# Patient Record
Sex: FEMALE | Race: WHITE | NOT HISPANIC OR LATINO | URBAN - METROPOLITAN AREA
[De-identification: names, ages, dates, MRNs, and addresses within clinical notes are randomized per-mention and may not be internally consistent; named-entity substitution may affect disease eponyms.]

---

## 2017-06-27 ENCOUNTER — OUTPATIENT (OUTPATIENT)
Dept: OUTPATIENT SERVICES | Facility: HOSPITAL | Age: 72
LOS: 1 days | End: 2017-06-27
Payer: MEDICARE

## 2017-06-27 ENCOUNTER — APPOINTMENT (OUTPATIENT)
Dept: MAMMOGRAPHY | Facility: IMAGING CENTER | Age: 72
End: 2017-06-27

## 2017-06-27 DIAGNOSIS — Z12.31 ENCOUNTER FOR SCREENING MAMMOGRAM FOR MALIGNANT NEOPLASM OF BREAST: ICD-10-CM

## 2017-06-27 DIAGNOSIS — Z98.89 OTHER SPECIFIED POSTPROCEDURAL STATES: Chronic | ICD-10-CM

## 2017-06-27 PROCEDURE — 77063 BREAST TOMOSYNTHESIS BI: CPT

## 2017-06-27 PROCEDURE — 77067 SCR MAMMO BI INCL CAD: CPT

## 2017-08-01 ENCOUNTER — RX RENEWAL (OUTPATIENT)
Age: 72
End: 2017-08-01

## 2017-08-10 ENCOUNTER — MEDICATION RENEWAL (OUTPATIENT)
Age: 72
End: 2017-08-10

## 2017-08-14 ENCOUNTER — MEDICATION RENEWAL (OUTPATIENT)
Age: 72
End: 2017-08-14

## 2017-10-03 ENCOUNTER — LABORATORY RESULT (OUTPATIENT)
Age: 72
End: 2017-10-03

## 2017-10-03 ENCOUNTER — APPOINTMENT (OUTPATIENT)
Dept: INTERNAL MEDICINE | Facility: CLINIC | Age: 72
End: 2017-10-03
Payer: MEDICARE

## 2017-10-03 VITALS
OXYGEN SATURATION: 98 % | SYSTOLIC BLOOD PRESSURE: 128 MMHG | TEMPERATURE: 98.6 F | WEIGHT: 176 LBS | HEART RATE: 75 BPM | HEIGHT: 63 IN | DIASTOLIC BLOOD PRESSURE: 64 MMHG | BODY MASS INDEX: 31.18 KG/M2

## 2017-10-03 LAB — HBA1C MFR BLD HPLC: 9.7

## 2017-10-03 PROCEDURE — G0439: CPT

## 2017-10-03 PROCEDURE — 83036 HEMOGLOBIN GLYCOSYLATED A1C: CPT | Mod: QW

## 2017-10-03 PROCEDURE — 36415 COLL VENOUS BLD VENIPUNCTURE: CPT

## 2017-10-03 PROCEDURE — 90662 IIV NO PRSV INCREASED AG IM: CPT

## 2017-10-03 PROCEDURE — G0008: CPT

## 2017-10-04 LAB
25(OH)D3 SERPL-MCNC: 43.8 NG/ML
ALBUMIN SERPL ELPH-MCNC: 4.5 G/DL
ALP BLD-CCNC: 60 U/L
ALT SERPL-CCNC: 30 U/L
ANION GAP SERPL CALC-SCNC: 22 MMOL/L
APPEARANCE: CLEAR
AST SERPL-CCNC: 34 U/L
BASOPHILS # BLD AUTO: 0.03 K/UL
BASOPHILS NFR BLD AUTO: 0.5 %
BILIRUB SERPL-MCNC: 0.5 MG/DL
BILIRUBIN URINE: NEGATIVE
BLOOD URINE: NEGATIVE
BUN SERPL-MCNC: 14 MG/DL
CALCIUM SERPL-MCNC: 10.1 MG/DL
CHLORIDE SERPL-SCNC: 102 MMOL/L
CHOLEST SERPL-MCNC: 179 MG/DL
CHOLEST/HDLC SERPL: 3.2 RATIO
CO2 SERPL-SCNC: 18 MMOL/L
COLOR: YELLOW
CREAT SERPL-MCNC: 0.93 MG/DL
EOSINOPHIL # BLD AUTO: 0.19 K/UL
EOSINOPHIL NFR BLD AUTO: 3 %
GLUCOSE QUALITATIVE U: NEGATIVE MG/DL
GLUCOSE SERPL-MCNC: 171 MG/DL
HCT VFR BLD CALC: 41.4 %
HDLC SERPL-MCNC: 56 MG/DL
HGB BLD-MCNC: 13.8 G/DL
IMM GRANULOCYTES NFR BLD AUTO: 0.2 %
KETONES URINE: NEGATIVE
LDLC SERPL CALC-MCNC: 84 MG/DL
LEUKOCYTE ESTERASE URINE: ABNORMAL
LYMPHOCYTES # BLD AUTO: 2.17 K/UL
LYMPHOCYTES NFR BLD AUTO: 34.6 %
MAN DIFF?: NORMAL
MCHC RBC-ENTMCNC: 31.2 PG
MCHC RBC-ENTMCNC: 33.3 GM/DL
MCV RBC AUTO: 93.5 FL
MONOCYTES # BLD AUTO: 0.41 K/UL
MONOCYTES NFR BLD AUTO: 6.5 %
NEUTROPHILS # BLD AUTO: 3.47 K/UL
NEUTROPHILS NFR BLD AUTO: 55.2 %
NITRITE URINE: NEGATIVE
PH URINE: 6
PLATELET # BLD AUTO: 281 K/UL
POTASSIUM SERPL-SCNC: 5.2 MMOL/L
PROT SERPL-MCNC: 7.9 G/DL
PROTEIN URINE: NEGATIVE MG/DL
RBC # BLD: 4.43 M/UL
RBC # FLD: 14.4 %
SODIUM SERPL-SCNC: 142 MMOL/L
SPECIFIC GRAVITY URINE: 1.01
TRIGL SERPL-MCNC: 195 MG/DL
TSH SERPL-ACNC: 2.19 UIU/ML
UROBILINOGEN URINE: NEGATIVE MG/DL
VIT B12 SERPL-MCNC: 439 PG/ML
WBC # FLD AUTO: 6.28 K/UL

## 2017-10-06 ENCOUNTER — MEDICATION RENEWAL (OUTPATIENT)
Age: 72
End: 2017-10-06

## 2017-10-06 ENCOUNTER — RX RENEWAL (OUTPATIENT)
Age: 72
End: 2017-10-06

## 2017-11-20 ENCOUNTER — APPOINTMENT (OUTPATIENT)
Dept: CHRONIC DISEASE MANAGEMENT | Facility: CLINIC | Age: 72
End: 2017-11-20

## 2018-01-30 ENCOUNTER — APPOINTMENT (OUTPATIENT)
Dept: INTERNAL MEDICINE | Facility: CLINIC | Age: 73
End: 2018-01-30

## 2018-09-05 ENCOUNTER — FORM ENCOUNTER (OUTPATIENT)
Age: 73
End: 2018-09-05

## 2018-09-06 ENCOUNTER — OUTPATIENT (OUTPATIENT)
Dept: OUTPATIENT SERVICES | Facility: HOSPITAL | Age: 73
LOS: 1 days | End: 2018-09-06
Payer: MEDICARE

## 2018-09-06 ENCOUNTER — APPOINTMENT (OUTPATIENT)
Dept: ULTRASOUND IMAGING | Facility: IMAGING CENTER | Age: 73
End: 2018-09-06
Payer: MEDICARE

## 2018-09-06 ENCOUNTER — APPOINTMENT (OUTPATIENT)
Dept: MAMMOGRAPHY | Facility: IMAGING CENTER | Age: 73
End: 2018-09-06
Payer: MEDICARE

## 2018-09-06 DIAGNOSIS — Z98.89 OTHER SPECIFIED POSTPROCEDURAL STATES: Chronic | ICD-10-CM

## 2018-09-06 DIAGNOSIS — Z00.8 ENCOUNTER FOR OTHER GENERAL EXAMINATION: ICD-10-CM

## 2018-09-06 PROCEDURE — 76641 ULTRASOUND BREAST COMPLETE: CPT | Mod: 26,50

## 2018-09-06 PROCEDURE — 77063 BREAST TOMOSYNTHESIS BI: CPT | Mod: 26

## 2018-09-06 PROCEDURE — 77067 SCR MAMMO BI INCL CAD: CPT | Mod: 26

## 2018-09-06 PROCEDURE — 77067 SCR MAMMO BI INCL CAD: CPT

## 2018-09-06 PROCEDURE — 76641 ULTRASOUND BREAST COMPLETE: CPT

## 2018-09-06 PROCEDURE — 77063 BREAST TOMOSYNTHESIS BI: CPT

## 2018-10-12 ENCOUNTER — LABORATORY RESULT (OUTPATIENT)
Age: 73
End: 2018-10-12

## 2018-10-12 ENCOUNTER — APPOINTMENT (OUTPATIENT)
Dept: INTERNAL MEDICINE | Facility: CLINIC | Age: 73
End: 2018-10-12
Payer: MEDICARE

## 2018-10-12 VITALS
WEIGHT: 176 LBS | DIASTOLIC BLOOD PRESSURE: 80 MMHG | SYSTOLIC BLOOD PRESSURE: 140 MMHG | TEMPERATURE: 97.8 F | OXYGEN SATURATION: 98 % | HEIGHT: 63 IN | BODY MASS INDEX: 31.18 KG/M2 | HEART RATE: 78 BPM

## 2018-10-12 PROCEDURE — G0439: CPT

## 2018-10-12 PROCEDURE — 36415 COLL VENOUS BLD VENIPUNCTURE: CPT

## 2018-10-12 NOTE — HISTORY OF PRESENT ILLNESS
[FreeTextEntry1] : came in for annual check up  [de-identified] : DM- \par - saw diabetic educator \par - sedentary now  , compliant with medications \par -has to schedule appointment to see eye doctor - has cataract - will schedule for 11/2018 \par \par Hyperlipidemia- on simvastatin 40 daily\par \par rarely goes to gym once in 2 week x a week does treadmill 15 minutes , bars leg exercises for 40 minutes \par \par did mammo 2018\par

## 2018-10-12 NOTE — ASSESSMENT
[FreeTextEntry1] : Diabetes-active - not monitoring BW sugars \par - get AIC and Microalbumin \par -taking glipizide 10 mg xl po daily continue metformin 1000 bid ,\par -educated pt risk of uncontrolled DM , including microvascular and macrovascular complications ,  retinopathy leading to blindness , nephropathy leading to ESRD requiring Dialysis , neuropathy leading to amputations , poor wound healing , CAD- leading to MI and death. \par -pt verbalized and understands , compliance with medication , diet and exercise enforced\par  -educated to Monitor Accu-Cheks daily fasting and 2 hrs post prandial , ophthalmology consult requested ,Pt to Monitor for signs of hypoglycemia. urine r/o proteins \par -d/w pt to eat 1800 kcal ADA diet, avoid rice, pasta, sugar, sweet, soda, juices, exercise daily.\par rtc 6 weeks \par \par High calcium levels - repeat CA ++ , Vit D normal , PTH normal , endo referral given \par \par Hyperlipidemia- controlled 140/70 \par  continue current medications, \par -eat Low-fat diet, avoid red meat, cheese, butter, peanuts and exercise daily for 40 minutes.\par \par Hypertension- Controlled, continue current medications, low sodium-DASH diet.\par  \par Health maintenance\par Mammogram -2018 \par Colonoscopy 10/2016 \par Flu vaccine - got 1 week ago at [facility] \par Tetanus vaccine -given 2014\par Pneumovax - given 2014, \par Prevnar 13 given 2016 \par skin check - referral to derm given \par Hep c 2016 neg \par

## 2018-10-12 NOTE — HEALTH RISK ASSESSMENT
[Good] : ~his/her~  mood as  good [No falls in past year] : Patient reported no falls in the past year [0] : 2) Feeling down, depressed, or hopeless: Not at all (0) [Alone] : lives alone [Retired] : retired [] :  [Feels Safe at Home] : Feels safe at home [Fully functional (bathing, dressing, toileting, transferring, walking, feeding)] : Fully functional (bathing, dressing, toileting, transferring, walking, feeding) [Fully functional (using the telephone, shopping, preparing meals, housekeeping, doing laundry, using] : Fully functional and needs no help or supervision to perform IADLs (using the telephone, shopping, preparing meals, housekeeping, doing laundry, using transportation, managing medications and managing finances) [Smoke Detector] : smoke detector [Carbon Monoxide Detector] : carbon monoxide detector [] : No [de-identified] : none  [[id number]] : 0 [Reports changes in hearing] : Reports no changes in hearing [Reports changes in vision] : Reports no changes in vision [Reports changes in dental health] : Reports no changes in dental health

## 2018-10-15 ENCOUNTER — LABORATORY RESULT (OUTPATIENT)
Age: 73
End: 2018-10-15

## 2018-10-15 ENCOUNTER — MEDICATION RENEWAL (OUTPATIENT)
Age: 73
End: 2018-10-15

## 2018-10-15 LAB
25(OH)D3 SERPL-MCNC: 26.1 NG/ML
ALBUMIN SERPL ELPH-MCNC: 4.8 G/DL
ALP BLD-CCNC: 64 U/L
ALT SERPL-CCNC: 16 U/L
ANION GAP SERPL CALC-SCNC: 17 MMOL/L
AST SERPL-CCNC: 12 U/L
BASOPHILS # BLD AUTO: 0.05 K/UL
BASOPHILS NFR BLD AUTO: 0.7 %
BILIRUB SERPL-MCNC: 0.3 MG/DL
BUN SERPL-MCNC: 13 MG/DL
CALCIUM SERPL-MCNC: 9.8 MG/DL
CHLORIDE SERPL-SCNC: 104 MMOL/L
CHOLEST SERPL-MCNC: 183 MG/DL
CHOLEST/HDLC SERPL: 3.7 RATIO
CO2 SERPL-SCNC: 22 MMOL/L
CREAT SERPL-MCNC: 0.73 MG/DL
EOSINOPHIL # BLD AUTO: 0.25 K/UL
EOSINOPHIL NFR BLD AUTO: 3.4 %
ESTIMATED AVERAGE GLUCOSE: 240 MG/DL
GLUCOSE SERPL-MCNC: 125 MG/DL
HBA1C MFR BLD HPLC: 10 %
HCT VFR BLD CALC: 39.5 %
HDLC SERPL-MCNC: 50 MG/DL
HGB BLD-MCNC: 13.3 G/DL
IMM GRANULOCYTES NFR BLD AUTO: 0.3 %
LDLC SERPL CALC-MCNC: 93 MG/DL
LYMPHOCYTES # BLD AUTO: 2.68 K/UL
LYMPHOCYTES NFR BLD AUTO: 36.3 %
MAN DIFF?: NORMAL
MCHC RBC-ENTMCNC: 31.4 PG
MCHC RBC-ENTMCNC: 33.7 GM/DL
MCV RBC AUTO: 93.4 FL
MONOCYTES # BLD AUTO: 0.53 K/UL
MONOCYTES NFR BLD AUTO: 7.2 %
NEUTROPHILS # BLD AUTO: 3.85 K/UL
NEUTROPHILS NFR BLD AUTO: 52.1 %
PLATELET # BLD AUTO: 285 K/UL
POTASSIUM SERPL-SCNC: 4.6 MMOL/L
PROT SERPL-MCNC: 7.7 G/DL
RBC # BLD: 4.23 M/UL
RBC # FLD: 13.7 %
SODIUM SERPL-SCNC: 143 MMOL/L
TRIGL SERPL-MCNC: 198 MG/DL
TSH SERPL-ACNC: 1.85 UIU/ML
VIT B12 SERPL-MCNC: 427 PG/ML
WBC # FLD AUTO: 7.38 K/UL

## 2018-10-18 ENCOUNTER — MEDICATION RENEWAL (OUTPATIENT)
Age: 73
End: 2018-10-18

## 2018-10-30 ENCOUNTER — NON-APPOINTMENT (OUTPATIENT)
Age: 73
End: 2018-10-30

## 2018-10-30 ENCOUNTER — APPOINTMENT (OUTPATIENT)
Dept: INTERNAL MEDICINE | Facility: CLINIC | Age: 73
End: 2018-10-30
Payer: MEDICARE

## 2018-10-30 VITALS
BODY MASS INDEX: 31.89 KG/M2 | HEIGHT: 63 IN | TEMPERATURE: 99.1 F | SYSTOLIC BLOOD PRESSURE: 140 MMHG | HEART RATE: 93 BPM | WEIGHT: 180 LBS | DIASTOLIC BLOOD PRESSURE: 70 MMHG | OXYGEN SATURATION: 98 %

## 2018-10-30 PROCEDURE — 93000 ELECTROCARDIOGRAM COMPLETE: CPT

## 2018-10-30 PROCEDURE — 99213 OFFICE O/P EST LOW 20 MIN: CPT | Mod: 25

## 2018-10-30 NOTE — HISTORY OF PRESENT ILLNESS
[No Pertinent Cardiac History] : no history of aortic stenosis, atrial fibrillation, coronary artery disease, recent myocardial infarction, or implantable device/pacemaker [No Pertinent Pulmonary History] : no history of asthma, COPD, sleep apnea, or smoking [No Adverse Anesthesia Reaction] : no adverse anesthesia reaction in self or family member [(Patient denies any chest pain, claudication, dyspnea on exertion, orthopnea, palpitations or syncope)] : Patient denies any chest pain, claudication, dyspnea on exertion, orthopnea, palpitations or syncope [FreeTextEntry1] : cataract L eye  [FreeTextEntry2] : 11/05/18  [FreeTextEntry4] : here for pre- op eval for cataract sx \par she reports that she is feeling well \par was diagnosed w/ UTI - started on abx - no fever / chills/ nausea/ vomiting\par A1C - 10 \par renal fx - nl \par \par  [FreeTextEntry7] : EKG - no acute changes

## 2018-10-30 NOTE — ASSESSMENT
[High Risk Surgery - Intraperitoneal, Intrathoracic or Supringuinal Vascular Procedures] : High Risk Surgery - Intraperitoneal, Intrathoracic or Supringuinal Vascular Procedures - No (0) [Ischemic Heart Disease] : Ischemic Heart Disease - No (0) [Congestive Heart Failure] : Congestive Heart Failure - No (0) [Prior Cerebrovascular Accident or TIA] : Prior Cerebrovascular Accident or TIA - No (0) [Creatinine >= 2mg/dL (1 Point)] : Creatinine >= 2mg/dL - No (0) [Insulin-dependent Diabetic (1 Point)] : Insulin-dependent Diabetic - No (0) [0] : 0 , RCRI Class: I, Risk of Post-Op Cardiac Complications: 0.4%, Procedure Risk: Low-Risk [Patient Optimized for Surgery] : Patient optimized for surgery [No Further Testing Recommended] : no further testing recommended [As per surgery] : as per surgery

## 2018-10-30 NOTE — RESULTS/DATA
[] : results reviewed [de-identified] : NL  [de-identified] : high BG  [de-identified] : no acute changes

## 2018-10-30 NOTE — PHYSICAL EXAM
[No Acute Distress] : no acute distress [Well Nourished] : well nourished [Well Developed] : well developed [Normal Oropharynx] : the oropharynx was normal [No JVD] : no jugular venous distention [Supple] : supple [No Lymphadenopathy] : no lymphadenopathy [No Respiratory Distress] : no respiratory distress  [Clear to Auscultation] : lungs were clear to auscultation bilaterally [No Accessory Muscle Use] : no accessory muscle use [Normal Rate] : normal rate  [Regular Rhythm] : with a regular rhythm [Normal S1, S2] : normal S1 and S2 [No Edema] : there was no peripheral edema

## 2018-11-02 NOTE — ASU PATIENT PROFILE, ADULT - PMH
DMII (diabetes mellitus, type 2)    [place] (hard of hearing)    HTN (hypertension)    Hyperlipidemia    Vitamin D deficiency

## 2018-11-04 ENCOUNTER — TRANSCRIPTION ENCOUNTER (OUTPATIENT)
Age: 73
End: 2018-11-04

## 2018-11-05 ENCOUNTER — OUTPATIENT (OUTPATIENT)
Dept: OUTPATIENT SERVICES | Facility: HOSPITAL | Age: 73
LOS: 1 days | End: 2018-11-05
Payer: MEDICARE

## 2018-11-05 VITALS
TEMPERATURE: 98 F | HEIGHT: 63.5 IN | OXYGEN SATURATION: 100 % | WEIGHT: 180.34 LBS | RESPIRATION RATE: 17 BRPM | SYSTOLIC BLOOD PRESSURE: 120 MMHG | HEART RATE: 73 BPM | DIASTOLIC BLOOD PRESSURE: 54 MMHG

## 2018-11-05 VITALS
DIASTOLIC BLOOD PRESSURE: 61 MMHG | SYSTOLIC BLOOD PRESSURE: 124 MMHG | RESPIRATION RATE: 15 BRPM | HEART RATE: 75 BPM | OXYGEN SATURATION: 100 %

## 2018-11-05 DIAGNOSIS — H25.12 AGE-RELATED NUCLEAR CATARACT, LEFT EYE: ICD-10-CM

## 2018-11-05 DIAGNOSIS — Z98.89 OTHER SPECIFIED POSTPROCEDURAL STATES: Chronic | ICD-10-CM

## 2018-11-05 DIAGNOSIS — Z98.41 CATARACT EXTRACTION STATUS, RIGHT EYE: Chronic | ICD-10-CM

## 2018-11-05 LAB — GLUCOSE BLDC GLUCOMTR-MCNC: 270 MG/DL — HIGH (ref 70–99)

## 2018-11-05 PROCEDURE — 66984 XCAPSL CTRC RMVL W/O ECP: CPT | Mod: LT

## 2018-11-05 PROCEDURE — V2632: CPT

## 2018-11-05 PROCEDURE — 82962 GLUCOSE BLOOD TEST: CPT

## 2018-12-03 ENCOUNTER — FORM ENCOUNTER (OUTPATIENT)
Age: 73
End: 2018-12-03

## 2018-12-04 ENCOUNTER — OUTPATIENT (OUTPATIENT)
Dept: OUTPATIENT SERVICES | Facility: HOSPITAL | Age: 73
LOS: 1 days | End: 2018-12-04
Payer: MEDICARE

## 2018-12-04 ENCOUNTER — APPOINTMENT (OUTPATIENT)
Dept: RADIOLOGY | Facility: IMAGING CENTER | Age: 73
End: 2018-12-04
Payer: MEDICARE

## 2018-12-04 DIAGNOSIS — Z00.8 ENCOUNTER FOR OTHER GENERAL EXAMINATION: ICD-10-CM

## 2018-12-04 DIAGNOSIS — Z98.89 OTHER SPECIFIED POSTPROCEDURAL STATES: Chronic | ICD-10-CM

## 2018-12-04 DIAGNOSIS — Z98.41 CATARACT EXTRACTION STATUS, RIGHT EYE: Chronic | ICD-10-CM

## 2018-12-04 PROBLEM — H91.90 UNSPECIFIED HEARING LOSS, UNSPECIFIED EAR: Chronic | Status: ACTIVE | Noted: 2018-11-05

## 2018-12-04 PROCEDURE — 77080 DXA BONE DENSITY AXIAL: CPT | Mod: 26

## 2018-12-04 PROCEDURE — 77080 DXA BONE DENSITY AXIAL: CPT

## 2019-03-13 ENCOUNTER — APPOINTMENT (OUTPATIENT)
Dept: OBGYN | Facility: CLINIC | Age: 74
End: 2019-03-13
Payer: MEDICARE

## 2019-03-13 VITALS
WEIGHT: 176 LBS | SYSTOLIC BLOOD PRESSURE: 152 MMHG | HEIGHT: 63 IN | DIASTOLIC BLOOD PRESSURE: 76 MMHG | HEART RATE: 86 BPM | BODY MASS INDEX: 31.18 KG/M2

## 2019-03-13 DIAGNOSIS — Z86.69 PERSONAL HISTORY OF OTHER DISEASES OF THE NERVOUS SYSTEM AND SENSE ORGANS: ICD-10-CM

## 2019-03-13 DIAGNOSIS — Z01.419 ENCOUNTER FOR GYNECOLOGICAL EXAMINATION (GENERAL) (ROUTINE) W/OUT ABNORMAL FINDINGS: ICD-10-CM

## 2019-03-13 PROCEDURE — G0101: CPT

## 2019-03-13 NOTE — PHYSICAL EXAM
[Awake] : awake [Alert] : alert [Acute Distress] : no acute distress [LAD] : no lymphadenopathy [Thyroid Nodule] : no thyroid nodule [Goiter] : no goiter [Mass] : no breast mass [Nipple Discharge] : no nipple discharge [Axillary LAD] : no axillary lymphadenopathy [Soft] : soft [Tender] : non tender [Oriented x3] : oriented to person, place, and time [Normal] : uterus [No Bleeding] : there was no active vaginal bleeding [Uterine Adnexae] : were not tender and not enlarged [No Tenderness] : no rectal tenderness [Nl Sphincter Tone] : normal sphincter tone

## 2019-03-13 NOTE — HISTORY OF PRESENT ILLNESS
[Good] : being in good health [Last Mammogram ___] : Last Mammogram was [unfilled] [Last Bone Density ___] : Last bone density studies [unfilled] [Last Colonoscopy ___] : Last colonoscopy [unfilled] [Sexually Active] : is not sexually active

## 2019-03-15 ENCOUNTER — APPOINTMENT (OUTPATIENT)
Dept: INTERNAL MEDICINE | Facility: CLINIC | Age: 74
End: 2019-03-15
Payer: MEDICARE

## 2019-03-15 VITALS
OXYGEN SATURATION: 98 % | WEIGHT: 177 LBS | HEART RATE: 88 BPM | TEMPERATURE: 98.9 F | BODY MASS INDEX: 31.36 KG/M2 | HEIGHT: 63 IN | DIASTOLIC BLOOD PRESSURE: 74 MMHG | SYSTOLIC BLOOD PRESSURE: 142 MMHG

## 2019-03-15 LAB — GLUCOSE BLDC GLUCOMTR-MCNC: 205

## 2019-03-15 PROCEDURE — 99214 OFFICE O/P EST MOD 30 MIN: CPT | Mod: 25

## 2019-03-15 RX ORDER — CIPROFLOXACIN HYDROCHLORIDE 250 MG/1
250 TABLET, FILM COATED ORAL
Qty: 10 | Refills: 0 | Status: DISCONTINUED | COMMUNITY
Start: 2018-10-18 | End: 2019-03-15

## 2019-03-15 NOTE — HISTORY OF PRESENT ILLNESS
[FreeTextEntry1] : f/u on chronic medical conditions  [de-identified] : DM- uncontrolled due to non comp with ? medications and diet - but pt agrees being compliant with all her medications \par -pt never went up on her medication glipizide to BID 10 mg still on 1 daily , takes metformin 500 2 tab po BID \par -AIC never < 9 its been > 9 since 2014 \par -has not scheduled appt with endo yet \par - sedentary now\par -does not monitor sugars at home \par -saw eye doctor 11/2018 did cataract Sx \par \par Hyperlipidemia- on simvastatin 40 daily\par \par rarely goes to gym once in 2 week x a week does treadmill 15 minutes , bars leg exercises for 40 minutes \par \par did mammo 2018\par

## 2019-03-15 NOTE — ASSESSMENT
[FreeTextEntry1] : Diabetes-uncontrolled \par  - not monitoring BW sugars \par - get AIC and Microalbumin \par -taking glipizide 10 mg xl po daily continue metformin 1000 bid ,start Basargal 10 units qhs - pt agreed to start insulin - teaching done on how to use it \par - referral to see endo given \par -educated pt risk of uncontrolled DM , including microvascular and macrovascular complications ,  retinopathy leading to blindness , nephropathy leading to ESRD requiring Dialysis , neuropathy leading to amputations , poor wound healing , CAD- leading to MI and death. \par -pt verbalized and understands , compliance with medication , diet and exercise enforced\par  -educated to Monitor Accu-Cheks daily fasting and 2 hrs post prandial , ophthalmology consult requested ,Pt to Monitor for signs of hypoglycemia. urine r/o proteins \par -d/w pt to eat 1800 kcal ADA diet, avoid rice, pasta, sugar, sweet, soda, juices, exercise daily.\par rtc 6 weeks with glucose readings \par \par High calcium levels - repeat CA ++ , Vit D normal , PTH normal , endo referral given \par \par Hyperlipidemia- controlled 140/70 \par  continue current medications, \par -eat Low-fat diet, avoid red meat, cheese, butter, peanuts and exercise daily for 40 minutes.\par \par Hypertension- Controlled, continue current medications, low sodium-DASH diet.\par  \par Health maintenance\par Mammogram -2018 \par Colonoscopy 10/2016 \par Flu vaccine - 10/[facility] \par Tetanus vaccine -given 2014\par Pneumovax - given 2014, \par Prevnar 13 given 2016 \par skin check - referral to derm given \par Hep c 2016 neg \par shingrex advised

## 2019-03-20 RX ORDER — INSULIN GLARGINE 100 [IU]/ML
100 INJECTION, SOLUTION SUBCUTANEOUS
Qty: 1 | Refills: 0 | Status: DISCONTINUED | COMMUNITY
Start: 2019-03-15 | End: 2019-03-20

## 2019-03-21 ENCOUNTER — MEDICATION RENEWAL (OUTPATIENT)
Age: 74
End: 2019-03-21

## 2019-03-21 RX ORDER — INSULIN DETEMIR 100 [IU]/ML
100 INJECTION, SOLUTION SUBCUTANEOUS
Qty: 1 | Refills: 0 | Status: DISCONTINUED | COMMUNITY
Start: 2019-03-19 | End: 2019-03-21

## 2019-03-26 ENCOUNTER — APPOINTMENT (OUTPATIENT)
Dept: INTERNAL MEDICINE | Facility: CLINIC | Age: 74
End: 2019-03-26

## 2019-03-26 VITALS
HEART RATE: 87 BPM | OXYGEN SATURATION: 97 % | DIASTOLIC BLOOD PRESSURE: 70 MMHG | WEIGHT: 178 LBS | BODY MASS INDEX: 31.54 KG/M2 | HEIGHT: 63 IN | SYSTOLIC BLOOD PRESSURE: 140 MMHG

## 2019-03-26 LAB — HBA1C MFR BLD HPLC: 9.8

## 2019-05-29 ENCOUNTER — APPOINTMENT (OUTPATIENT)
Dept: ENDOCRINOLOGY | Facility: CLINIC | Age: 74
End: 2019-05-29
Payer: MEDICARE

## 2019-05-29 VITALS
HEIGHT: 63 IN | WEIGHT: 174 LBS | BODY MASS INDEX: 30.83 KG/M2 | DIASTOLIC BLOOD PRESSURE: 76 MMHG | HEART RATE: 76 BPM | SYSTOLIC BLOOD PRESSURE: 127 MMHG | OXYGEN SATURATION: 96 %

## 2019-05-29 LAB
GLUCOSE BLDC GLUCOMTR-MCNC: 313
HBA1C MFR BLD HPLC: 11

## 2019-05-29 PROCEDURE — 82962 GLUCOSE BLOOD TEST: CPT

## 2019-05-29 PROCEDURE — 99205 OFFICE O/P NEW HI 60 MIN: CPT | Mod: 25

## 2019-05-29 PROCEDURE — 83036 HEMOGLOBIN GLYCOSYLATED A1C: CPT | Mod: QW

## 2019-05-29 PROCEDURE — 36415 COLL VENOUS BLD VENIPUNCTURE: CPT

## 2019-05-29 RX ORDER — PREDNISOLONE ACETATE 10 MG/ML
1 SUSPENSION/ DROPS OPHTHALMIC
Qty: 5 | Refills: 0 | Status: DISCONTINUED | COMMUNITY
Start: 2018-09-18 | End: 2019-05-29

## 2019-05-29 RX ORDER — OFLOXACIN 3 MG/ML
0.3 SOLUTION/ DROPS OPHTHALMIC
Qty: 5 | Refills: 0 | Status: DISCONTINUED | COMMUNITY
Start: 2018-09-18 | End: 2019-05-29

## 2019-06-14 ENCOUNTER — APPOINTMENT (OUTPATIENT)
Dept: INTERNAL MEDICINE | Facility: CLINIC | Age: 74
End: 2019-06-14

## 2019-06-21 ENCOUNTER — OTHER (OUTPATIENT)
Age: 74
End: 2019-06-21

## 2019-06-30 LAB
25(OH)D3 SERPL-MCNC: 31.6 NG/ML
ALBUMIN SERPL ELPH-MCNC: 4.3 G/DL
ALP BLD-CCNC: 71 U/L
ALT SERPL-CCNC: 17 U/L
ANION GAP SERPL CALC-SCNC: 14 MMOL/L
AST SERPL-CCNC: 13 U/L
BILIRUB SERPL-MCNC: 0.5 MG/DL
BUN SERPL-MCNC: 18 MG/DL
CALCIUM SERPL-MCNC: 10.1 MG/DL
CHLORIDE SERPL-SCNC: 101 MMOL/L
CHOLEST SERPL-MCNC: 201 MG/DL
CHOLEST/HDLC SERPL: 4.2 RATIO
CK SERPL-CCNC: 51 U/L
CO2 SERPL-SCNC: 22 MMOL/L
CREAT SERPL-MCNC: 0.62 MG/DL
CREAT SPEC-SCNC: 72 MG/DL
ESTIMATED AVERAGE GLUCOSE: 263 MG/DL
GLUCOSE SERPL-MCNC: 250 MG/DL
HBA1C MFR BLD HPLC: 10.8 %
HDLC SERPL-MCNC: 48 MG/DL
LDLC SERPL CALC-MCNC: 106 MG/DL
MICROALBUMIN 24H UR DL<=1MG/L-MCNC: <1.2 MG/DL
MICROALBUMIN/CREAT 24H UR-RTO: NORMAL MG/G
POTASSIUM SERPL-SCNC: 4.8 MMOL/L
PROT SERPL-MCNC: 7.2 G/DL
SODIUM SERPL-SCNC: 137 MMOL/L
THYROGLOB AB SERPL-ACNC: <20 IU/ML
THYROPEROXIDASE AB SERPL IA-ACNC: 10.1 IU/ML
TRIGL SERPL-MCNC: 236 MG/DL

## 2019-06-30 NOTE — ASSESSMENT
[FreeTextEntry1] : 74 yo F with PMH DM2, HTN, HLD for DM2\par \par 1. DM2 - will d/c metformin and change to levemir 16 units at bedtime and novolog 5 u tidac.\par \par 2. HLD  - continue simvastatin \par \par 3. HTN - continue lisinopril\par

## 2019-06-30 NOTE — HISTORY OF PRESENT ILLNESS
[FreeTextEntry1] : 72 yo F with PMH DM2, HTN, HLD for DM2\par \par She was diagnosed with DM2 8 yrs ago \par She is on metformin  mg 2 tabs BID also levemir 12-14 units qhs \par B: eggs + 1 slice toast or  2 rice cakes + coffee ( no sugar)\par L; TF  3-4 crackers + regular ice tea no sugar + grapes/ honeydew+ selzter\par D: meat + veg + rare 1-2 x per week carb with meal \par snacks popcorn, light and fit, \par no soda no juice\par Last ophthalmologist visit: 1 month ago\par Last podiatry visit: never \par fasting 150-300\par \par

## 2019-07-01 ENCOUNTER — APPOINTMENT (OUTPATIENT)
Dept: ENDOCRINOLOGY | Facility: CLINIC | Age: 74
End: 2019-07-01
Payer: MEDICARE

## 2019-07-01 ENCOUNTER — APPOINTMENT (OUTPATIENT)
Dept: CHRONIC DISEASE MANAGEMENT | Facility: CLINIC | Age: 74
End: 2019-07-01
Payer: MEDICARE

## 2019-07-01 VITALS
SYSTOLIC BLOOD PRESSURE: 120 MMHG | BODY MASS INDEX: 30.83 KG/M2 | DIASTOLIC BLOOD PRESSURE: 70 MMHG | OXYGEN SATURATION: 96 % | WEIGHT: 174 LBS | HEART RATE: 71 BPM | HEIGHT: 63 IN

## 2019-07-01 PROCEDURE — 99214 OFFICE O/P EST MOD 30 MIN: CPT | Mod: 25

## 2019-07-01 PROCEDURE — 36415 COLL VENOUS BLD VENIPUNCTURE: CPT

## 2019-07-01 PROCEDURE — G0108 DIAB MANAGE TRN  PER INDIV: CPT

## 2019-07-01 NOTE — HISTORY OF PRESENT ILLNESS
[FreeTextEntry1] : 74 yo F with PMH DM2, HTN, HLD for DM2\par \par She was diagnosed with DM2 2011\par previously on metformin IR \par after last visit to endocrine she was transitioned to levemir 16 u qhs and novolog 5 u tidac\par please see scanned glucose log\par she has been taking novolog and then 2 hours after has been eating. \par + nephropathy\par B: light and lively yogurt + rice cake + coffee\par L: sardines + crackers + seltzer\par D: meatloaf + salad + veg + rice + seltzer \par snacks on nuts and popcorn\par Last ophthalmologist visit: 4/2019\par Last podiatry visit: never \par \par \par

## 2019-07-01 NOTE — ASSESSMENT
[FreeTextEntry1] : 74 yo F with PMH DM2, HTN, HLD for DM2\par \par 1. DM2 - continue regimen as above. instructed on correct insulin administration. will send in log\par \par 2. HLD  - has held simvastatin since last visit. will resume. check lipid profile\par \par 3. HTN - stopped lisinopril. BP acceptable.  continue to monitor. \par

## 2019-07-09 ENCOUNTER — OTHER (OUTPATIENT)
Age: 74
End: 2019-07-09

## 2019-07-09 LAB
ALBUMIN SERPL ELPH-MCNC: 4.5 G/DL
ALP BLD-CCNC: 70 U/L
ALT SERPL-CCNC: 22 U/L
ANION GAP SERPL CALC-SCNC: 14 MMOL/L
AST SERPL-CCNC: 17 U/L
BILIRUB SERPL-MCNC: 0.4 MG/DL
BUN SERPL-MCNC: 13 MG/DL
CALCIUM SERPL-MCNC: 9.7 MG/DL
CHLORIDE SERPL-SCNC: 101 MMOL/L
CHOLEST SERPL-MCNC: 292 MG/DL
CHOLEST/HDLC SERPL: 5.6 RATIO
CK SERPL-CCNC: 64 U/L
CO2 SERPL-SCNC: 23 MMOL/L
CREAT SERPL-MCNC: 0.61 MG/DL
GLUCOSE SERPL-MCNC: 242 MG/DL
HDLC SERPL-MCNC: 52 MG/DL
LDLC SERPL CALC-MCNC: 192 MG/DL
POTASSIUM SERPL-SCNC: 4.2 MMOL/L
PROT SERPL-MCNC: 7.3 G/DL
SODIUM SERPL-SCNC: 138 MMOL/L
TRIGL SERPL-MCNC: 240 MG/DL

## 2019-07-13 LAB
CYTOLOGY CVX/VAG DOC THIN PREP: NORMAL
HPV HIGH+LOW RISK DNA PNL CVX: NOT DETECTED

## 2019-07-15 ENCOUNTER — APPOINTMENT (OUTPATIENT)
Dept: INTERNAL MEDICINE | Facility: CLINIC | Age: 74
End: 2019-07-15
Payer: MEDICARE

## 2019-07-15 VITALS
HEIGHT: 63 IN | SYSTOLIC BLOOD PRESSURE: 136 MMHG | RESPIRATION RATE: 16 BRPM | TEMPERATURE: 97.7 F | DIASTOLIC BLOOD PRESSURE: 72 MMHG | OXYGEN SATURATION: 98 % | HEART RATE: 77 BPM

## 2019-07-15 DIAGNOSIS — L72.9 FOLLICULAR CYST OF THE SKIN AND SUBCUTANEOUS TISSUE, UNSPECIFIED: ICD-10-CM

## 2019-07-15 PROCEDURE — 99214 OFFICE O/P EST MOD 30 MIN: CPT

## 2019-07-15 NOTE — ASSESSMENT
[FreeTextEntry1] : Diabetes-uncontrolled AIC 10.8 5/2019 \par -take medications as prescribed by endo \par - start simvastatin 40 qhs \par -f/u  endo as scheduled \par -educated pt risk of uncontrolled DM , including microvascular and macrovascular complications ,  retinopathy leading to blindness , nephropathy leading to ESRD requiring Dialysis , neuropathy leading to amputations , poor wound healing , CAD- leading to MI and death. \par -pt verbalized and understands , compliance with medication , diet and exercise enforced\par  -educated to Monitor Accu-Cheks daily fasting and 2 hrs post prandial , ophthalmology consult requested ,Pt to Monitor for signs of hypoglycemia. urine r/o proteins \par -d/w pt to eat 1800 kcal ADA diet, avoid rice, pasta, sugar, sweet, soda, juices, exercise daily.\par \par High calcium levels - repeat CA ++ , Vit D normal , PTH normal , endo referral given \par \par Hyperlipidemia- controlled 150/70 \par  - was taken off lisinopril by endo - will monitor if elevated next visit will start ACI \par -eat Low-fat diet, avoid red meat, cheese, butter, peanuts and exercise daily for 40 minutes.\par \par Hypertension- Controlled, continue current medications, low sodium-DASH diet.\par  \par Health maintenance\par PAP-3/2019\par Mammogram -9/2018 \par Colonoscopy 10/2016 \par Flu vaccine - 10/[facility] at [facility] \par Tetanus vaccine -given 2014\par Pneumovax - given 2014, \par Prevnar 13 given 2016 \par skin check - referral to derm given \par Hep c 2016 neg \par shingrex advised

## 2019-07-15 NOTE — HISTORY OF PRESENT ILLNESS
[de-identified] : DM- uncontrolled due to non comp with ? medications and diet - but pt agrees being compliant with all her medications \par -AIC never < 9 its been > 9 since 2014 \par now followed by endo - off oral medications - taking levemir 16 units at bedtime and Novolog 5 units with meals - AIC 5/2019 -10.8 \par - sedentary now\par -saw eye doctor 11/2018 did cataract Sx \par \par Hyperlipidemia-was taken off  simvastatin 40 daily 2 month ago - lipids 7/1 elevated \par \par rarely goes to gym once in 2 week x a week does treadmill 15 minutes , bars leg exercises for 40 minutes \par \par did mammo 2018\par

## 2019-08-19 ENCOUNTER — APPOINTMENT (OUTPATIENT)
Dept: CHRONIC DISEASE MANAGEMENT | Facility: CLINIC | Age: 74
End: 2019-08-19
Payer: MEDICARE

## 2019-08-19 PROCEDURE — G0108 DIAB MANAGE TRN  PER INDIV: CPT

## 2019-09-26 ENCOUNTER — APPOINTMENT (OUTPATIENT)
Dept: ENDOCRINOLOGY | Facility: CLINIC | Age: 74
End: 2019-09-26
Payer: MEDICARE

## 2019-09-26 VITALS
SYSTOLIC BLOOD PRESSURE: 137 MMHG | HEART RATE: 77 BPM | HEIGHT: 63 IN | WEIGHT: 172 LBS | DIASTOLIC BLOOD PRESSURE: 76 MMHG | BODY MASS INDEX: 30.48 KG/M2 | OXYGEN SATURATION: 98 %

## 2019-09-26 LAB
GLUCOSE BLDC GLUCOMTR-MCNC: 173
HBA1C MFR BLD HPLC: 9.7

## 2019-09-26 PROCEDURE — 99214 OFFICE O/P EST MOD 30 MIN: CPT | Mod: 25

## 2019-09-26 PROCEDURE — 82962 GLUCOSE BLOOD TEST: CPT

## 2019-09-26 PROCEDURE — 36415 COLL VENOUS BLD VENIPUNCTURE: CPT

## 2019-09-26 PROCEDURE — 83036 HEMOGLOBIN GLYCOSYLATED A1C: CPT | Mod: QW

## 2019-10-02 ENCOUNTER — RX RENEWAL (OUTPATIENT)
Age: 74
End: 2019-10-02

## 2019-10-03 NOTE — HISTORY OF PRESENT ILLNESS
[FreeTextEntry1] : 72 yo F with PMH DM2, HTN, HLD for DM2\par \par She was diagnosed with DM2 2011\par previously on metformin IR \par after last visit to endocrine she was transitioned to levemir 16 u qhs and novolog - she has been taking 6 u tidac also metformin  mg BID\par please see scanned glucose log\par + nephropathy, +neuropathy\par B: light and lively yogurt + rice cake + coffee\par sometimes smoothies 3 x per week \par L: sardines + crackers + seltzer\par D: cken+ veg +/- 1/4 plate rice + seltzer \par snacks on popcorn, pears, \par snacks on nuts and popcorn\par Last ophthalmologist visit: pending 10/2019\par Last podiatry visit: pending 10/2019\par \par \par

## 2019-10-03 NOTE — ASSESSMENT
[FreeTextEntry1] : 72 yo F with PMH DM2, HTN, HLD for DM2\par \par 1. DM2 - continue regimen as above. when returns from vacation in one week will attempt ozempic 0.25 mg q week (samples given - to verify coverage) ;  increase of metformin to 500 mg 2 tabs BID, glipizide 5 mg qd, levemir 16 u qhs (d/c novolog). will call when returns\par \par 2. HLD  - resumed simvastatin check lipid profile.\par \par 3. HTN - stopped lisinopril. BP acceptable.  continue to monitor. \par

## 2019-10-05 LAB
25(OH)D3 SERPL-MCNC: 38.1 NG/ML
ALBUMIN SERPL ELPH-MCNC: 4.4 G/DL
ALP BLD-CCNC: 56 U/L
ALT SERPL-CCNC: 15 U/L
ANION GAP SERPL CALC-SCNC: 14 MMOL/L
AST SERPL-CCNC: 13 U/L
BILIRUB SERPL-MCNC: 0.4 MG/DL
BUN SERPL-MCNC: 15 MG/DL
C PEPTIDE SERPL-MCNC: 3.4 NG/ML
CALCIUM SERPL-MCNC: 9.5 MG/DL
CHLORIDE SERPL-SCNC: 96 MMOL/L
CHOLEST SERPL-MCNC: 180 MG/DL
CHOLEST/HDLC SERPL: 4.1 RATIO
CK SERPL-CCNC: 47 U/L
CO2 SERPL-SCNC: 22 MMOL/L
CREAT SERPL-MCNC: 0.63 MG/DL
ESTIMATED AVERAGE GLUCOSE: 229 MG/DL
GLUCOSE SERPL-MCNC: 141 MG/DL
HBA1C MFR BLD HPLC: 9.6 %
HDLC SERPL-MCNC: 44 MG/DL
LDLC SERPL CALC-MCNC: 95 MG/DL
POTASSIUM SERPL-SCNC: 4.3 MMOL/L
PROT SERPL-MCNC: 7 G/DL
SODIUM SERPL-SCNC: 132 MMOL/L
T4 FREE SERPL-MCNC: 1.1 NG/DL
THYROGLOB AB SERPL-ACNC: <20 IU/ML
THYROPEROXIDASE AB SERPL IA-ACNC: 15.4 IU/ML
TRIGL SERPL-MCNC: 206 MG/DL
TSH SERPL-ACNC: 2.05 UIU/ML

## 2019-10-22 ENCOUNTER — APPOINTMENT (OUTPATIENT)
Dept: INTERNAL MEDICINE | Facility: CLINIC | Age: 74
End: 2019-10-22
Payer: MEDICARE

## 2019-10-22 VITALS
OXYGEN SATURATION: 98 % | TEMPERATURE: 97.8 F | WEIGHT: 170 LBS | HEART RATE: 77 BPM | BODY MASS INDEX: 30.12 KG/M2 | HEIGHT: 63 IN | DIASTOLIC BLOOD PRESSURE: 68 MMHG | SYSTOLIC BLOOD PRESSURE: 150 MMHG

## 2019-10-22 DIAGNOSIS — Z60.2 PROBLEMS RELATED TO LIVING ALONE: ICD-10-CM

## 2019-10-22 DIAGNOSIS — Z63.4 DISAPPEARANCE AND DEATH OF FAMILY MEMBER: ICD-10-CM

## 2019-10-22 PROCEDURE — G0444 DEPRESSION SCREEN ANNUAL: CPT | Mod: 59

## 2019-10-22 PROCEDURE — G0442 ANNUAL ALCOHOL SCREEN 15 MIN: CPT | Mod: 59

## 2019-10-22 PROCEDURE — G0439: CPT

## 2019-10-22 SDOH — SOCIAL STABILITY - SOCIAL INSECURITY: DISSAPEARANCE AND DEATH OF FAMILY MEMBER: Z63.4

## 2019-10-22 SDOH — SOCIAL STABILITY - SOCIAL INSECURITY: PROBLEMS RELATED TO LIVING ALONE: Z60.2

## 2019-10-22 NOTE — HEALTH RISK ASSESSMENT
[Good] : ~his/her~  mood as  good [No] : No [0] : 2) Feeling down, depressed, or hopeless: Not at all (0) [No falls in past year] : Patient reported no falls in the past year [None] : None [Alone] : lives alone [Retired] : retired [] :  [Fully functional (bathing, dressing, toileting, transferring, walking, feeding)] : Fully functional (bathing, dressing, toileting, transferring, walking, feeding) [Fully functional (using the telephone, shopping, preparing meals, housekeeping, doing laundry, using] : Fully functional and needs no help or supervision to perform IADLs (using the telephone, shopping, preparing meals, housekeeping, doing laundry, using transportation, managing medications and managing finances) [] : No [de-identified] : walking  [[id number]] : 0 [Reports changes in hearing] : Reports no changes in hearing [Reports changes in vision] : Reports no changes in vision [Reports changes in dental health] : Reports no changes in dental health

## 2019-10-22 NOTE — HISTORY OF PRESENT ILLNESS
[de-identified] : came in for annual check up \par \par DM- started seeing Endo 5/2019 - was supposed to be on levemir 16 units - does not have refill as per her was was supposed to satr a new injection once a week and was told to stop all other ing Levemir and Novolog - she stopped her injection 2 weeks ago - she ran out of levimir 1 month ago as pharmacy did not refill it as needed more infromation - she left her message and went to her last week but has not heard back from her - at present only on metformin 500 2 ama 2 tab pm \par -she does not have any glipizide 5 mg \par -AIC 9.6 9/2019 \par -uncontrolled due to non comp with ? medications and diet - but pt agrees being compliant with all her medications  -AIC never < 9 its been > 9 since 2014  now followed by [facility] - - sedentary now\par - -saw eye doctor 11/2018 did cataract Sx\par \par    Hyperlipidemia-back on simvastatin 40 daily - lipids 9/2019 better \par \par    did mammo 2018

## 2019-10-22 NOTE — DATA REVIEWED
[FreeTextEntry1] : 9/2019 AIC 9.6 \par TSH -2.05\par LDL-95, TRG-206\par cr-0.63\par ca- 9.5\par \par

## 2019-10-22 NOTE — ASSESSMENT
[FreeTextEntry1] : Diabetes-uncontrolled AIC 9.6 9 /2019 \par -take medications as prescribed by endo - pt will speak with endo today to get her rx clarified - she has rx at pharmacy for levimir will  \par - cont metformin and  simvastatin 40 qhs \par -f/u endo as scheduled 11/2019 \par -educated pt risk of uncontrolled DM , including microvascular and macrovascular complications , retinopathy leading to blindness , nephropathy leading to ESRD requiring Dialysis , neuropathy leading to amputations , poor wound healing , CAD- leading to MI and death. \par -pt verbalized and understands , compliance with medication , diet and exercise enforced\par  -educated to Monitor Accu-Cheks daily fasting and 2 hrs post prandial , ophthalmology consult requested ,Pt to Monitor for signs of hypoglycemia. urine r/o proteins \par -d/w pt to eat 1800 kcal ADA diet, avoid rice, pasta, sugar, sweet, soda, juices, exercise daily.\par \par Hyperlipidemia- \par -eat Low-fat diet, avoid red meat, cheese, butter, peanuts and exercise daily for 40 minutes.\par \par Hypertension- 130/80 Controlled, continue current medications, low sodium-DASH diet.\par  \par Health maintenance\par PAP-3/2019\par Mammogram -9/2018 referral given \par Colonoscopy 10/2016 \par Flu vaccine - 10/[facility] at [facility] \par Tetanus vaccine -given 2014\par Pneumovax - given 2014, \par Prevnar 13 given 2016 \par skin check - referral to derm given \par Hep c 2016 neg \par shingrex advised. \par

## 2019-10-22 NOTE — PHYSICAL EXAM
[No Acute Distress] : no acute distress [Well Nourished] : well nourished [Well Developed] : well developed [Well-Appearing] : well-appearing [Normal Sclera/Conjunctiva] : normal sclera/conjunctiva [Normal Outer Ear/Nose] : the outer ears and nose were normal in appearance [EOMI] : extraocular movements intact [PERRL] : pupils equal round and reactive to light [No JVD] : no jugular venous distention [Normal Oropharynx] : the oropharynx was normal [No Lymphadenopathy] : no lymphadenopathy [Supple] : supple [Thyroid Normal, No Nodules] : the thyroid was normal and there were no nodules present [No Respiratory Distress] : no respiratory distress  [No Accessory Muscle Use] : no accessory muscle use [Clear to Auscultation] : lungs were clear to auscultation bilaterally [Normal Rate] : normal rate  [Regular Rhythm] : with a regular rhythm [No Murmur] : no murmur heard [Normal S1, S2] : normal S1 and S2 [No Carotid Bruits] : no carotid bruits [No Varicosities] : no varicosities [No Abdominal Bruit] : a ~M bruit was not heard ~T in the abdomen [No Edema] : there was no peripheral edema [Pedal Pulses Present] : the pedal pulses are present [No Extremity Clubbing/Cyanosis] : no extremity clubbing/cyanosis [No Palpable Aorta] : no palpable aorta [Soft] : abdomen soft [Non Tender] : non-tender [Non-distended] : non-distended [No HSM] : no HSM [Normal Bowel Sounds] : normal bowel sounds [No Masses] : no abdominal mass palpated [Normal Anterior Cervical Nodes] : no anterior cervical lymphadenopathy [Normal Posterior Cervical Nodes] : no posterior cervical lymphadenopathy [No Spinal Tenderness] : no spinal tenderness [No Joint Swelling] : no joint swelling [No CVA Tenderness] : no CVA  tenderness [Grossly Normal Strength/Tone] : grossly normal strength/tone [No Rash] : no rash [No Focal Deficits] : no focal deficits [Coordination Grossly Intact] : coordination grossly intact [Normal Gait] : normal gait [Deep Tendon Reflexes (DTR)] : deep tendon reflexes were 2+ and symmetric [Normal Affect] : the affect was normal [Normal Insight/Judgement] : insight and judgment were intact

## 2019-10-25 ENCOUNTER — OTHER (OUTPATIENT)
Age: 74
End: 2019-10-25

## 2019-11-04 ENCOUNTER — APPOINTMENT (OUTPATIENT)
Dept: CHRONIC DISEASE MANAGEMENT | Facility: CLINIC | Age: 74
End: 2019-11-04
Payer: MEDICARE

## 2019-11-04 VITALS — HEIGHT: 63 IN | BODY MASS INDEX: 30.12 KG/M2 | WEIGHT: 170 LBS

## 2019-11-04 PROCEDURE — 97803 MED NUTRITION INDIV SUBSEQ: CPT

## 2019-11-04 PROCEDURE — G0108 DIAB MANAGE TRN  PER INDIV: CPT

## 2019-11-08 ENCOUNTER — APPOINTMENT (OUTPATIENT)
Dept: ENDOCRINOLOGY | Facility: CLINIC | Age: 74
End: 2019-11-08
Payer: MEDICARE

## 2019-11-08 VITALS
DIASTOLIC BLOOD PRESSURE: 70 MMHG | HEART RATE: 84 BPM | WEIGHT: 170 LBS | HEIGHT: 63 IN | SYSTOLIC BLOOD PRESSURE: 120 MMHG | OXYGEN SATURATION: 98 % | BODY MASS INDEX: 30.12 KG/M2

## 2019-11-08 LAB
GLUCOSE BLDC GLUCOMTR-MCNC: 129
HBA1C MFR BLD HPLC: 8.6

## 2019-11-08 PROCEDURE — 82962 GLUCOSE BLOOD TEST: CPT

## 2019-11-08 PROCEDURE — 83036 HEMOGLOBIN GLYCOSYLATED A1C: CPT | Mod: QW

## 2019-11-08 PROCEDURE — 99214 OFFICE O/P EST MOD 30 MIN: CPT | Mod: 25

## 2019-11-08 PROCEDURE — 36415 COLL VENOUS BLD VENIPUNCTURE: CPT

## 2019-11-08 RX ORDER — DULAGLUTIDE 0.75 MG/.5ML
0.75 INJECTION, SOLUTION SUBCUTANEOUS
Qty: 1 | Refills: 0 | Status: DISCONTINUED | COMMUNITY
Start: 2019-10-25 | End: 2019-11-08

## 2019-11-08 RX ORDER — SEMAGLUTIDE 1.34 MG/ML
2 INJECTION, SOLUTION SUBCUTANEOUS
Qty: 1 | Refills: 3 | Status: DISCONTINUED | COMMUNITY
Start: 2019-09-26 | End: 2019-11-08

## 2019-11-08 NOTE — HISTORY OF PRESENT ILLNESS
[FreeTextEntry1] : 74 yo F with PMH DM2, HTN, HLD \par \par She was diagnosed with DM2 2011\par previously on metformin IR \par after visit to endocrine she was transitioned to levemir 16 u qhs and novolog 5 u tidac with metformin  mg 2 tabs BID\par please see scanned glucose log\par + nephropathy, + neuropathy\par B: light and lively yogurt + rice cake + tea\par sometimes smoothies 3 x per week \par L: sardines + 4 crackers + tea\par D: fish + veg + tea\par snacks on popcorn, nuts\par Last ophthalmologist visit: 11/7/19 - mild retinopathy?\par Last podiatry visit: 3 weeks ago\par \par \par

## 2019-11-08 NOTE — ASSESSMENT
[FreeTextEntry1] : 72 yo F with PMH DM2, HTN, HLD\par \par 1. DM2 - attempt d/c of novolog and start ozempic 0.25 mg q saturday. continue levemir 16 units qhs, continue metformin 500 mg 2 tabs twice daily . RV next week.  \par \par 2. HLD  - continue simvastatin .\par \par 3. HTN - continue lisinopril.. \par

## 2019-11-15 ENCOUNTER — APPOINTMENT (OUTPATIENT)
Dept: ENDOCRINOLOGY | Facility: CLINIC | Age: 74
End: 2019-11-15
Payer: MEDICARE

## 2019-11-15 VITALS
SYSTOLIC BLOOD PRESSURE: 140 MMHG | OXYGEN SATURATION: 98 % | WEIGHT: 170 LBS | BODY MASS INDEX: 30.12 KG/M2 | HEART RATE: 85 BPM | HEIGHT: 63 IN | DIASTOLIC BLOOD PRESSURE: 80 MMHG

## 2019-11-15 LAB
25(OH)D3 SERPL-MCNC: 49.4 NG/ML
ALBUMIN SERPL ELPH-MCNC: 4.6 G/DL
ALP BLD-CCNC: 59 U/L
ALT SERPL-CCNC: 14 U/L
ANION GAP SERPL CALC-SCNC: 14 MMOL/L
AST SERPL-CCNC: 14 U/L
BILIRUB SERPL-MCNC: 0.3 MG/DL
BUN SERPL-MCNC: 16 MG/DL
CALCIUM SERPL-MCNC: 10.4 MG/DL
CHLORIDE SERPL-SCNC: 103 MMOL/L
CO2 SERPL-SCNC: 24 MMOL/L
CREAT SERPL-MCNC: 0.71 MG/DL
ESTIMATED AVERAGE GLUCOSE: 206 MG/DL
GLUCOSE BLDC GLUCOMTR-MCNC: 124
GLUCOSE SERPL-MCNC: 114 MG/DL
HBA1C MFR BLD HPLC: 8.8 %
POTASSIUM SERPL-SCNC: 4.7 MMOL/L
PROT SERPL-MCNC: 7.4 G/DL
SODIUM SERPL-SCNC: 141 MMOL/L

## 2019-11-15 PROCEDURE — 99214 OFFICE O/P EST MOD 30 MIN: CPT | Mod: 25

## 2019-11-15 PROCEDURE — 82962 GLUCOSE BLOOD TEST: CPT

## 2019-11-18 NOTE — ASSESSMENT
[FreeTextEntry1] : 74 yo F with PMH DM2, HTN, HLD\par \par 1. DM2 - attempt d/c of novolog and start ozempic 0.25 mg q saturday. continue levemir 16 units qhs, continue metformin 500 mg 2 tabs twice daily . RV next week. (noted that glucose was at goal on above regimen)  \par \par 2. HLD  - continue simvastatin .\par \par 3. HTN - continue lisinopril.. \par

## 2019-11-18 NOTE — HISTORY OF PRESENT ILLNESS
[FreeTextEntry1] : 72 yo F with PMH DM2, HTN, HLD \par \par She was diagnosed with DM2 2011\par previously on metformin IR \par after visit to endocrine she was transitioned to levemir 16 u qhs and novolog 5 u tidac with metformin  mg 2 tabs BID\par please see scanned glucose log\par + nephropathy, + neuropathy\par B: light and lively yogurt + rice cake + tea\par sometimes smoothies 3 x per week \par L: sardines + 4 crackers + tea\par D: fish + veg + tea\par snacks on popcorn, nuts\par Last ophthalmologist visit: 11/7/19 - mild retinopathy?\par Last podiatry visit: 3 weeks ago\par \par \par

## 2019-11-22 ENCOUNTER — APPOINTMENT (OUTPATIENT)
Dept: ENDOCRINOLOGY | Facility: CLINIC | Age: 74
End: 2019-11-22
Payer: MEDICARE

## 2019-11-22 VITALS
DIASTOLIC BLOOD PRESSURE: 60 MMHG | BODY MASS INDEX: 30.12 KG/M2 | SYSTOLIC BLOOD PRESSURE: 110 MMHG | WEIGHT: 170 LBS | HEIGHT: 63 IN | HEART RATE: 85 BPM | OXYGEN SATURATION: 97 %

## 2019-11-22 LAB — GLUCOSE BLDC GLUCOMTR-MCNC: 139

## 2019-11-22 PROCEDURE — 82962 GLUCOSE BLOOD TEST: CPT

## 2019-11-22 PROCEDURE — 99213 OFFICE O/P EST LOW 20 MIN: CPT | Mod: 25

## 2019-11-22 PROCEDURE — 36415 COLL VENOUS BLD VENIPUNCTURE: CPT

## 2019-11-22 RX ORDER — INSULIN ASPART 100 [IU]/ML
100 INJECTION, SOLUTION INTRAVENOUS; SUBCUTANEOUS
Qty: 3 | Refills: 0 | Status: DISCONTINUED | COMMUNITY
Start: 2019-05-30 | End: 2019-11-22

## 2019-11-24 RX ORDER — INSULIN LISPRO 100 [IU]/ML
100 INJECTION, SOLUTION INTRAVENOUS; SUBCUTANEOUS
Qty: 6 | Refills: 0 | Status: DISCONTINUED | COMMUNITY
Start: 2019-11-22 | End: 2019-11-24

## 2019-11-24 NOTE — HISTORY OF PRESENT ILLNESS
[FreeTextEntry1] : 74 yo F with PMH DM2, HTN, HLD \par \par She was diagnosed with DM2 2011\par previously on metformin IR \par after last visit to endocrine she was transitioned to levemir 16 u qhs metformin  mg 2 tabs qd, and started on ozempic 0.25 mg q week. novolog was discontinued\par please see scanned glucose log\par + nephropathy, + neuropathy\par B: light and lively yogurt + rice cake + tea\par sometimes smoothies 3 x per week \par L: sardines + 4 crackers + tea\par D: fish + veg + tea\par snacks on popcorn, nuts\par Last ophthalmologist visit: 11/7/19 - mild retinopathy?\par Last podiatry visit: 3 weeks ago\par \par \par

## 2019-11-24 NOTE — ASSESSMENT
[FreeTextEntry1] : 72 yo F with PMH DM2, HTN, HLD\par \par 1. DM2 - continue regimen as above  \par \par 2. HLD  - continue simvastatin .\par \par 3. HTN - continue lisinopril.. \par

## 2019-12-16 ENCOUNTER — APPOINTMENT (OUTPATIENT)
Dept: CHRONIC DISEASE MANAGEMENT | Facility: CLINIC | Age: 74
End: 2019-12-16
Payer: MEDICARE

## 2019-12-16 PROCEDURE — 97803 MED NUTRITION INDIV SUBSEQ: CPT

## 2019-12-16 PROCEDURE — G0108 DIAB MANAGE TRN  PER INDIV: CPT

## 2020-01-03 ENCOUNTER — APPOINTMENT (OUTPATIENT)
Dept: INTERNAL MEDICINE | Facility: CLINIC | Age: 75
End: 2020-01-03

## 2020-01-16 ENCOUNTER — APPOINTMENT (OUTPATIENT)
Dept: ENDOCRINOLOGY | Facility: CLINIC | Age: 75
End: 2020-01-16
Payer: MEDICARE

## 2020-01-16 VITALS
OXYGEN SATURATION: 99 % | WEIGHT: 170 LBS | HEIGHT: 63 IN | DIASTOLIC BLOOD PRESSURE: 60 MMHG | BODY MASS INDEX: 30.12 KG/M2 | SYSTOLIC BLOOD PRESSURE: 110 MMHG | HEART RATE: 87 BPM

## 2020-01-16 LAB — GLUCOSE BLDC GLUCOMTR-MCNC: 128

## 2020-01-16 PROCEDURE — 99214 OFFICE O/P EST MOD 30 MIN: CPT | Mod: 25

## 2020-01-16 PROCEDURE — 82962 GLUCOSE BLOOD TEST: CPT

## 2020-01-16 NOTE — HISTORY OF PRESENT ILLNESS
[FreeTextEntry1] : 75 yo F with PMH DM2, HTN, HLD \par \par She was diagnosed with DM2 2011\par previously on metformin IR \par on levemir 16 u qhs metformin  mg 2 tabs BID and ozempic 0.25 mg q week. novolog was discontinued\par please see scanned glucose log\par + nephropathy, + neuropathy\par Last ophthalmologist visit: 11/7/19 - mild retinopathy?\par Last podiatry visit: 11/2019\par \par \par

## 2020-01-16 NOTE — ASSESSMENT
[FreeTextEntry1] : 75 yo F with PMH DM2, HTN, HLD\par \par 1. DM2 - increase ozempic to 0.5 mg q Saturday and decrease metformin to 500 mg BID. continue levemir  \par \par 2. HLD  - continue simvastatin .\par \par 3. HTN - continue lisinopril.. \par \par RV 4 weeks

## 2020-01-21 ENCOUNTER — APPOINTMENT (OUTPATIENT)
Dept: INTERNAL MEDICINE | Facility: CLINIC | Age: 75
End: 2020-01-21
Payer: MEDICARE

## 2020-01-21 VITALS
HEIGHT: 63 IN | BODY MASS INDEX: 29.77 KG/M2 | WEIGHT: 168 LBS | OXYGEN SATURATION: 98 % | DIASTOLIC BLOOD PRESSURE: 70 MMHG | TEMPERATURE: 97.9 F | SYSTOLIC BLOOD PRESSURE: 120 MMHG | HEART RATE: 79 BPM

## 2020-01-21 PROCEDURE — 99214 OFFICE O/P EST MOD 30 MIN: CPT

## 2020-01-21 NOTE — ASSESSMENT
[FreeTextEntry1] : Diabetes-controlled Aic 7.2 1/2020\par -take medications as prescribed by endo \par - cont metformin and simvastatin 40 qhs \par -f/u endo as scheduled \par -educated pt risk of uncontrolled DM , including microvascular and macrovascular complications , retinopathy leading to blindness , nephropathy leading to ESRD requiring Dialysis , neuropathy leading to amputations , poor wound healing , CAD- leading to MI and death. \par -pt verbalized and understands , compliance with medication , diet and exercise enforced\par  -educated to Monitor Accu-Cheks daily fasting and 2 hrs post prandial , ophthalmology consult requested ,Pt to Monitor for signs of hypoglycemia. urine r/o proteins \par -d/w pt to eat 1800 kcal ADA diet, avoid rice, pasta, sugar, sweet, soda, juices, exercise daily.\par \par Hyperlipidemia- \par -eat Low-fat diet, avoid red meat, cheese, butter, peanuts and exercise daily for 40 minutes.\par \par Hypertension- 130/80 Controlled, continue current medications, low sodium-DASH diet.\par  \par Health maintenance\par PAP-3/2019\par dexa -12/2018 normal \par Mammogram -9/2018 referral given again \par Colonoscopy 10/2016 \par Flu vaccine - 10/2019 at [facility] \par Tetanus vaccine -given 2014\par Pneumovax - given 2014, \par Prevnar 13 given 2016 \par skin check - referral to derm given \par Hep c 2016 neg \par shingrex advised.

## 2020-01-21 NOTE — HISTORY OF PRESENT ILLNESS
[de-identified] : came in for f/u on chronic medical conditions \par \par DM- started seeing Endo 5/2019 - was supposed to be on Levemir 16 units qhs - taking metformin 2 tab d q daily since 1 week now  and Ozempic 0.25  q weekly \par -AIC 7.2 1/20202\par - pt agrees being compliant with all her medications \par --saw eye doctor 11/7/2019 retinopathy \par \par  Hyperlipidemia -back on simvastatin 40 daily - lipids 9/2019 better \par \par  did mammo 2018 \par

## 2020-01-24 LAB
25(OH)D3 SERPL-MCNC: 49.4 NG/ML
ALBUMIN SERPL ELPH-MCNC: 4.7 G/DL
ALP BLD-CCNC: 57 U/L
ALT SERPL-CCNC: 14 U/L
ANION GAP SERPL CALC-SCNC: 17 MMOL/L
AST SERPL-CCNC: 18 U/L
BILIRUB SERPL-MCNC: 0.4 MG/DL
BUN SERPL-MCNC: 19 MG/DL
CALCIUM SERPL-MCNC: 9.6 MG/DL
CHLORIDE SERPL-SCNC: 100 MMOL/L
CO2 SERPL-SCNC: 22 MMOL/L
CREAT SERPL-MCNC: 0.78 MG/DL
ESTIMATED AVERAGE GLUCOSE: 160 MG/DL
GLUCOSE SERPL-MCNC: 102 MG/DL
HBA1C MFR BLD HPLC: 7.2 %
POTASSIUM SERPL-SCNC: 4.8 MMOL/L
PROT SERPL-MCNC: 7.7 G/DL
SODIUM SERPL-SCNC: 139 MMOL/L

## 2020-02-06 ENCOUNTER — FORM ENCOUNTER (OUTPATIENT)
Age: 75
End: 2020-02-06

## 2020-02-07 ENCOUNTER — APPOINTMENT (OUTPATIENT)
Dept: MAMMOGRAPHY | Facility: IMAGING CENTER | Age: 75
End: 2020-02-07
Payer: MEDICARE

## 2020-02-07 ENCOUNTER — RX RENEWAL (OUTPATIENT)
Age: 75
End: 2020-02-07

## 2020-02-07 ENCOUNTER — OUTPATIENT (OUTPATIENT)
Dept: OUTPATIENT SERVICES | Facility: HOSPITAL | Age: 75
LOS: 1 days | End: 2020-02-07
Payer: MEDICARE

## 2020-02-07 DIAGNOSIS — Z98.41 CATARACT EXTRACTION STATUS, RIGHT EYE: Chronic | ICD-10-CM

## 2020-02-07 DIAGNOSIS — Z98.89 OTHER SPECIFIED POSTPROCEDURAL STATES: Chronic | ICD-10-CM

## 2020-02-07 DIAGNOSIS — Z00.8 ENCOUNTER FOR OTHER GENERAL EXAMINATION: ICD-10-CM

## 2020-02-07 PROCEDURE — 77063 BREAST TOMOSYNTHESIS BI: CPT | Mod: 26

## 2020-02-07 PROCEDURE — 77067 SCR MAMMO BI INCL CAD: CPT

## 2020-02-07 PROCEDURE — 77063 BREAST TOMOSYNTHESIS BI: CPT

## 2020-02-07 PROCEDURE — 77067 SCR MAMMO BI INCL CAD: CPT | Mod: 26

## 2020-02-10 ENCOUNTER — APPOINTMENT (OUTPATIENT)
Dept: CHRONIC DISEASE MANAGEMENT | Facility: CLINIC | Age: 75
End: 2020-02-10

## 2020-02-12 ENCOUNTER — APPOINTMENT (OUTPATIENT)
Dept: ENDOCRINOLOGY | Facility: CLINIC | Age: 75
End: 2020-02-12
Payer: MEDICARE

## 2020-02-12 VITALS — OXYGEN SATURATION: 99 % | WEIGHT: 168 LBS | HEART RATE: 78 BPM | BODY MASS INDEX: 29.77 KG/M2 | HEIGHT: 63 IN

## 2020-02-12 LAB — GLUCOSE BLDC GLUCOMTR-MCNC: 106

## 2020-02-12 PROCEDURE — 99214 OFFICE O/P EST MOD 30 MIN: CPT | Mod: 25

## 2020-02-12 PROCEDURE — 82962 GLUCOSE BLOOD TEST: CPT

## 2020-02-13 NOTE — ASSESSMENT
[FreeTextEntry1] : 75 yo F with PMH DM2, HTN, HLD\par \par 1. DM2 - increase ozempic to 1 mg q Saturday, discontinue metformin. decrease levemir to 12 u qhs  \par \par 2. HLD  - continue simvastatin .\par \par 3. HTN - continue lisinopril.. \par \par RV 4 weeks

## 2020-02-13 NOTE — HISTORY OF PRESENT ILLNESS
[FreeTextEntry1] : 75 yo F with PMH DM2, HTN, HLD \par \par She was diagnosed with DM2 2011\par previously on metformin IR \par on levemir 16 u qhs metformin  mg 1 tabs BID and ozempic 0.5 mg q week. novolog was discontinued\par please see scanned glucose log\par + nephropathy, + neuropathy\par Last ophthalmologist visit: 11/7/19 - mild retinopathy?\par Last podiatry visit: 11/2019\par snacks on popcorn, nuts\par B: cereal + light and lively \par L: tuna + 5 crackers + diet tea\par D: cken + 1/4 plate of rice + salad + diet tea\par \par

## 2020-02-20 ENCOUNTER — APPOINTMENT (OUTPATIENT)
Dept: ENDOCRINOLOGY | Facility: CLINIC | Age: 75
End: 2020-02-20
Payer: MEDICARE

## 2020-02-20 LAB — GLUCOSE BLDC GLUCOMTR-MCNC: 127

## 2020-02-20 PROCEDURE — 82962 GLUCOSE BLOOD TEST: CPT

## 2020-02-20 PROCEDURE — 99212 OFFICE O/P EST SF 10 MIN: CPT | Mod: 25

## 2020-02-24 ENCOUNTER — APPOINTMENT (OUTPATIENT)
Dept: CHRONIC DISEASE MANAGEMENT | Facility: CLINIC | Age: 75
End: 2020-02-24
Payer: MEDICARE

## 2020-02-24 PROCEDURE — G0108 DIAB MANAGE TRN  PER INDIV: CPT

## 2020-04-28 ENCOUNTER — APPOINTMENT (OUTPATIENT)
Dept: ENDOCRINOLOGY | Facility: CLINIC | Age: 75
End: 2020-04-28
Payer: MEDICARE

## 2020-04-28 VITALS
SYSTOLIC BLOOD PRESSURE: 120 MMHG | HEART RATE: 68 BPM | WEIGHT: 168 LBS | DIASTOLIC BLOOD PRESSURE: 70 MMHG | HEIGHT: 63 IN | OXYGEN SATURATION: 98 % | BODY MASS INDEX: 29.77 KG/M2

## 2020-04-28 LAB — GLUCOSE BLDC GLUCOMTR-MCNC: 129

## 2020-04-28 PROCEDURE — 99214 OFFICE O/P EST MOD 30 MIN: CPT | Mod: 25

## 2020-04-28 PROCEDURE — 82962 GLUCOSE BLOOD TEST: CPT

## 2020-04-28 PROCEDURE — 36415 COLL VENOUS BLD VENIPUNCTURE: CPT

## 2020-04-28 NOTE — HISTORY OF PRESENT ILLNESS
[FreeTextEntry1] : 75 yo F with PMH DM2, HTN, HLD \par \par She was diagnosed with DM2 2011\par previously on metformin IR \par decreased to levemir 12 u qhs after last visit , metformin  mg 1 tabs BID was discontinued and ozempic was doubled to 1 mg q Saturday.\par novolog previously discontinued\par please see scanned glucose log\par + nephropathy, + neuropathy\par Last ophthalmologist visit: 11/7/19 - mild retinopathy?\par Last podiatry visit: 11/2019\par snacks on popcorn, nuts\par B: cereal + light and lively \par L: tuna + 5 crackers + diet tea\par D: cken + 1/4 plate of rice + salad + diet tea\par \par

## 2020-04-28 NOTE — HISTORY OF PRESENT ILLNESS
[FreeTextEntry1] : 73 yo F with PMH DM2, HTN, HLD \par \par She was diagnosed with DM2 2011\par previously on metformin IR \par decreased to levemir 12 u qhs and ozempic 1 mg q Saturday.\par novolog previously discontinued\par please see scanned glucose log\par + nephropathy, + neuropathy\par Last ophthalmologist visit: 11/7/19 - mild retinopathy?\par Last podiatry visit: 11/2019\par snacks on popcorn, nuts\par B: cereal + light and lively \par L: tuna + 5 crackers + diet tea\par D: cken + 1/4 plate of rice + salad + diet tea\par \par

## 2020-04-28 NOTE — ASSESSMENT
[FreeTextEntry1] : 73 yo F with PMH DM2, HTN, HLD\par \par 1. DM2 - ozempic is no longer covered . change to metformin 4 tabs daily continue levemir 12 u qhs\par \par continue regimen as above. send log in 1 week\par \par 2. HLD  - continue simvastatin .\par \par 3. HTN - continue lisinopril.. \par \par RV 4 weeks

## 2020-05-06 LAB
25(OH)D3 SERPL-MCNC: 51.2 NG/ML
CHOLEST SERPL-MCNC: 150 MG/DL
CHOLEST/HDLC SERPL: 3.1 RATIO
CK SERPL-CCNC: 45 U/L
CREAT SPEC-SCNC: 152 MG/DL
ESTIMATED AVERAGE GLUCOSE: 146 MG/DL
HBA1C MFR BLD HPLC: 6.7 %
HDLC SERPL-MCNC: 48 MG/DL
LDLC SERPL CALC-MCNC: 72 MG/DL
MICROALBUMIN 24H UR DL<=1MG/L-MCNC: 2 MG/DL
MICROALBUMIN/CREAT 24H UR-RTO: 13 MG/G
TRIGL SERPL-MCNC: 147 MG/DL

## 2020-05-18 ENCOUNTER — APPOINTMENT (OUTPATIENT)
Dept: CHRONIC DISEASE MANAGEMENT | Facility: CLINIC | Age: 75
End: 2020-05-18

## 2020-07-27 ENCOUNTER — RX RENEWAL (OUTPATIENT)
Age: 75
End: 2020-07-27

## 2020-09-15 ENCOUNTER — APPOINTMENT (OUTPATIENT)
Dept: ENDOCRINOLOGY | Facility: CLINIC | Age: 75
End: 2020-09-15
Payer: MEDICARE

## 2020-09-15 VITALS
WEIGHT: 170 LBS | OXYGEN SATURATION: 98 % | BODY MASS INDEX: 30.12 KG/M2 | SYSTOLIC BLOOD PRESSURE: 120 MMHG | HEART RATE: 86 BPM | DIASTOLIC BLOOD PRESSURE: 60 MMHG | HEIGHT: 63 IN

## 2020-09-15 PROCEDURE — 36415 COLL VENOUS BLD VENIPUNCTURE: CPT

## 2020-09-15 PROCEDURE — 90662 IIV NO PRSV INCREASED AG IM: CPT

## 2020-09-15 PROCEDURE — G0008: CPT

## 2020-09-15 PROCEDURE — 99213 OFFICE O/P EST LOW 20 MIN: CPT | Mod: 25

## 2020-09-15 PROCEDURE — 82962 GLUCOSE BLOOD TEST: CPT

## 2020-09-15 NOTE — HISTORY OF PRESENT ILLNESS
[FreeTextEntry1] : 75 yo F with PMH DM2, HTN, HLD \par \par She was diagnosed with DM2 2011\par previously on metformin IR \par on levemir 16 u qhs and metformin 500 mg 2 tabs BID\par B: cereal + fruit + coffee\par L: TF 1 toast diet\par D: turkey + veg no carb\par snacks SF jello\par novolog previously discontinued\par + nephropathy, + neuropathy\par Last ophthalmologist visit: 8/2020 - mild retinopathy?\par Last podiatry visit: 8/2020\par snacks on SF Jello\par

## 2020-09-15 NOTE — ASSESSMENT
[FreeTextEntry1] : 75 yo F with PMH DM2, HTN, HLD\par \par 1. DM2 - ozempic is no longer covered . continue above regimen\par \par 2. HLD  - continue simvastatin .\par \par 3. HTN - continue lisinopril.. \par \par RV 4 weeks

## 2020-09-22 LAB
ALBUMIN SERPL ELPH-MCNC: 5 G/DL
ALP BLD-CCNC: 67 U/L
ALT SERPL-CCNC: 14 U/L
ANION GAP SERPL CALC-SCNC: 18 MMOL/L
AST SERPL-CCNC: 15 U/L
BASOPHILS # BLD AUTO: 0.07 K/UL
BASOPHILS NFR BLD AUTO: 1.1 %
BILIRUB SERPL-MCNC: 0.3 MG/DL
BUN SERPL-MCNC: 23 MG/DL
CALCIUM SERPL-MCNC: 10.3 MG/DL
CHLORIDE SERPL-SCNC: 102 MMOL/L
CHOLEST SERPL-MCNC: 193 MG/DL
CHOLEST/HDLC SERPL: 3.4 RATIO
CK SERPL-CCNC: 68 U/L
CO2 SERPL-SCNC: 19 MMOL/L
CREAT SERPL-MCNC: 0.69 MG/DL
CREAT SPEC-SCNC: 90 MG/DL
EOSINOPHIL # BLD AUTO: 0.22 K/UL
EOSINOPHIL NFR BLD AUTO: 3.3 %
ESTIMATED AVERAGE GLUCOSE: 194 MG/DL
GLUCOSE BLDC GLUCOMTR-MCNC: 210
GLUCOSE SERPL-MCNC: 167 MG/DL
HBA1C MFR BLD HPLC: 8.4 %
HCT VFR BLD CALC: 40.2 %
HDLC SERPL-MCNC: 57 MG/DL
HGB BLD-MCNC: 13.3 G/DL
IMM GRANULOCYTES NFR BLD AUTO: 0.3 %
LDLC SERPL CALC-MCNC: 98 MG/DL
LYMPHOCYTES # BLD AUTO: 2.39 K/UL
LYMPHOCYTES NFR BLD AUTO: 36.2 %
MAN DIFF?: NORMAL
MCHC RBC-ENTMCNC: 31.3 PG
MCHC RBC-ENTMCNC: 33.1 GM/DL
MCV RBC AUTO: 94.6 FL
MICROALBUMIN 24H UR DL<=1MG/L-MCNC: 1.4 MG/DL
MICROALBUMIN/CREAT 24H UR-RTO: 15 MG/G
MONOCYTES # BLD AUTO: 0.42 K/UL
MONOCYTES NFR BLD AUTO: 6.4 %
NEUTROPHILS # BLD AUTO: 3.49 K/UL
NEUTROPHILS NFR BLD AUTO: 52.7 %
PLATELET # BLD AUTO: 274 K/UL
POTASSIUM SERPL-SCNC: 4.5 MMOL/L
PROT SERPL-MCNC: 7.8 G/DL
RBC # BLD: 4.25 M/UL
RBC # FLD: 13.1 %
SODIUM SERPL-SCNC: 139 MMOL/L
T4 FREE SERPL-MCNC: 1.2 NG/DL
TRIGL SERPL-MCNC: 190 MG/DL
TSH SERPL-ACNC: 2.21 UIU/ML
WBC # FLD AUTO: 6.61 K/UL

## 2020-11-25 ENCOUNTER — APPOINTMENT (OUTPATIENT)
Dept: INTERNAL MEDICINE | Facility: CLINIC | Age: 75
End: 2020-11-25
Payer: MEDICARE

## 2020-11-25 VITALS
OXYGEN SATURATION: 96 % | BODY MASS INDEX: 29.58 KG/M2 | DIASTOLIC BLOOD PRESSURE: 92 MMHG | TEMPERATURE: 97.8 F | WEIGHT: 167 LBS | HEART RATE: 94 BPM | SYSTOLIC BLOOD PRESSURE: 132 MMHG

## 2020-11-25 PROCEDURE — G0444 DEPRESSION SCREEN ANNUAL: CPT | Mod: 59

## 2020-11-25 PROCEDURE — G0439: CPT

## 2020-11-25 PROCEDURE — G0442 ANNUAL ALCOHOL SCREEN 15 MIN: CPT | Mod: 59

## 2020-11-25 RX ORDER — PEN NEEDLE, DIABETIC 29 G X1/2"
32G X 4 MM NEEDLE, DISPOSABLE MISCELLANEOUS
Qty: 1 | Refills: 0 | Status: DISCONTINUED | COMMUNITY
Start: 2019-03-21 | End: 2020-11-25

## 2020-11-25 NOTE — ASSESSMENT
[FreeTextEntry1] : Diabetes-controlled Aic 8.5 9/2020 due to non comp with diet was caring for grand kids now back home since 2 weeks \par -take medications as prescribed by endo \par - cont metformin and simvastatin 40 qhs \par -f/u endo as scheduled \par -educated pt risk of uncontrolled DM , including microvascular and macrovascular complications , retinopathy leading to blindness , nephropathy leading to ESRD requiring Dialysis , neuropathy leading to amputations , poor wound healing , CAD- leading to MI and death. \par -pt verbalized and understands , compliance with medication , diet and exercise enforced\par  -educated to Monitor Accu-Cheks daily fasting and 2 hrs post prandial , ophthalmology consult requested ,Pt to Monitor for signs of hypoglycemia. urine r/o proteins \par -d/w pt to eat 1800 kcal ADA diet, avoid rice, pasta, sugar, sweet, soda, juices, exercise daily.\par \par Hyperlipidemia- \par -eat Low-fat diet, avoid red meat, cheese, butter, peanuts and exercise daily for 40 minutes.\par \par Hypertension- 130/80 Controlled, continue current medications, low sodium-DASH diet.\par  \par Health maintenance\par PAP-3/2019\par dexa -12/2018 normal \par Mammogram - 2/2020 bi rad 2 \par Colonoscopy 10/2016 due 3 yr due referral given \par Flu vaccine - 9/2020 at endo \par Tetanus vaccine -given 2014\par Pneumovax - given 2014, \par Prevnar 13 given 2016 \par skin check - referral to derm given \par Hep c 2016 neg \par shingrex advised. \par

## 2020-11-25 NOTE — HEALTH RISK ASSESSMENT
[Good] : ~his/her~  mood as  good [No] : No [No falls in past year] : Patient reported no falls in the past year [0] : 2) Feeling down, depressed, or hopeless: Not at all (0) [Alone] : lives alone [Retired] : retired [Single] : single [] : No [de-identified] : walking  [[id number]] : 0 [Reports changes in hearing] : Reports no changes in hearing [Reports changes in vision] : Reports no changes in vision [Reports changes in dental health] : Reports no changes in dental health

## 2020-12-11 ENCOUNTER — APPOINTMENT (OUTPATIENT)
Dept: ENDOCRINOLOGY | Facility: CLINIC | Age: 75
End: 2020-12-11
Payer: MEDICARE

## 2020-12-11 VITALS
OXYGEN SATURATION: 96 % | SYSTOLIC BLOOD PRESSURE: 120 MMHG | WEIGHT: 167 LBS | BODY MASS INDEX: 29.59 KG/M2 | HEIGHT: 63 IN | HEART RATE: 82 BPM | DIASTOLIC BLOOD PRESSURE: 68 MMHG

## 2020-12-11 LAB — GLUCOSE BLDC GLUCOMTR-MCNC: 162

## 2020-12-11 PROCEDURE — 82962 GLUCOSE BLOOD TEST: CPT

## 2020-12-11 PROCEDURE — 99213 OFFICE O/P EST LOW 20 MIN: CPT | Mod: 25

## 2020-12-11 PROCEDURE — 36415 COLL VENOUS BLD VENIPUNCTURE: CPT

## 2020-12-11 RX ORDER — SEMAGLUTIDE 1.34 MG/ML
2 INJECTION, SOLUTION SUBCUTANEOUS
Qty: 3 | Refills: 0 | Status: DISCONTINUED | COMMUNITY
Start: 2020-02-13 | End: 2020-12-11

## 2020-12-11 NOTE — HISTORY OF PRESENT ILLNESS
[FreeTextEntry1] : 76 yo F with PMH DM2, HTN, HLD \par \par She was diagnosed with DM2 2011\par previously on metformin IR \par on levemir 16 u qhs and metformin 500 mg 2 tabs BID\par B: cereal + fruit + coffee\par L: TF 1 toast diet\par D: turkey + veg no carb\par snacks SF jello\par novolog previously discontinued\par + nephropathy, + neuropathy\par Last ophthalmologist visit: 8/2020 - mild retinopathy? Dr Luiza David, [facility]\par Last podiatry visit: 8/2020\par \par

## 2020-12-11 NOTE — ASSESSMENT
[FreeTextEntry1] : 76 yo F with PMH DM2, HTN, HLD\par \par 1. DM2 - ozempic is no longer covered . continue above regimen\par \par 2. HLD  - continue simvastatin .\par \par 3. HTN - continue lisinopril.. \par \par

## 2020-12-19 ENCOUNTER — NON-APPOINTMENT (OUTPATIENT)
Age: 75
End: 2020-12-19

## 2020-12-21 PROBLEM — Z01.419 ENCOUNTER FOR ANNUAL ROUTINE GYNECOLOGICAL EXAMINATION: Status: RESOLVED | Noted: 2019-03-13 | Resolved: 2020-12-21

## 2020-12-21 LAB
25(OH)D3 SERPL-MCNC: 59.2 NG/ML
ALBUMIN SERPL ELPH-MCNC: 4.9 G/DL
ALP BLD-CCNC: 66 U/L
ALT SERPL-CCNC: 14 U/L
ANION GAP SERPL CALC-SCNC: 17 MMOL/L
AST SERPL-CCNC: 16 U/L
BILIRUB SERPL-MCNC: 0.4 MG/DL
BUN SERPL-MCNC: 18 MG/DL
CALCIUM SERPL-MCNC: 10.1 MG/DL
CHLORIDE SERPL-SCNC: 102 MMOL/L
CO2 SERPL-SCNC: 22 MMOL/L
CREAT SERPL-MCNC: 0.77 MG/DL
ESTIMATED AVERAGE GLUCOSE: 235 MG/DL
GLUCOSE SERPL-MCNC: 168 MG/DL
HBA1C MFR BLD HPLC: 9.8 %
POTASSIUM SERPL-SCNC: 4.6 MMOL/L
PROT SERPL-MCNC: 7.8 G/DL
SODIUM SERPL-SCNC: 141 MMOL/L

## 2021-01-18 ENCOUNTER — RX RENEWAL (OUTPATIENT)
Age: 76
End: 2021-01-18

## 2021-02-24 ENCOUNTER — APPOINTMENT (OUTPATIENT)
Dept: INTERNAL MEDICINE | Facility: CLINIC | Age: 76
End: 2021-02-24
Payer: MEDICARE

## 2021-02-24 VITALS
SYSTOLIC BLOOD PRESSURE: 140 MMHG | HEIGHT: 63 IN | DIASTOLIC BLOOD PRESSURE: 70 MMHG | OXYGEN SATURATION: 97 % | TEMPERATURE: 97.7 F | WEIGHT: 169 LBS | HEART RATE: 84 BPM | BODY MASS INDEX: 29.95 KG/M2

## 2021-02-24 PROCEDURE — 99214 OFFICE O/P EST MOD 30 MIN: CPT

## 2021-02-24 NOTE — ASSESSMENT
[FreeTextEntry1] : Diabetes-controlled Aic 8.5 9/2020 - 9.8 12/2020 due to non comp with diet and medications \par -take medications as prescribed by endo \par - cont metformin and levemir  and simvastatin 40 qhs \par -f/u endo as scheduled \par -educated pt risk of uncontrolled DM , including microvascular and macrovascular complications , retinopathy leading to blindness , nephropathy leading to ESRD requiring Dialysis , neuropathy leading to amputations , poor wound healing , CAD- leading to MI and death. \par -pt verbalized and understands , compliance with medication , diet and exercise enforced\par  -educated to Monitor Accu-Cheks daily fasting and 2 hrs post prandial , ophthalmology consult requested ,Pt to Monitor for signs of hypoglycemia. urine r/o proteins \par -d/w pt to eat 1800 kcal ADA diet, avoid rice, pasta, sugar, sweet, soda, juices, exercise daily.\par \par Hyperlipidemia- \par -eat Low-fat diet, avoid red meat, cheese, butter, peanuts and exercise daily for 40 minutes.\par \par Hypertension- 130/80 Controlled, continue current medications, low sodium-DASH diet.\par  \par Health maintenance\par PAP-3/2019\par dexa -12/2018 normal \par Mammogram - 2/2020 bi rad 2 referral given \par Colonoscopy 10/2016 due 3 yr due referral given - has appt for 6/2021 \par Flu vaccine - 9/2020 at endo \par Tetanus vaccine -given 2014\par Pneumovax - given 2014, \par Prevnar 13 given 2016 \par skin check - referral to derm given \par Hep c 2016 neg \par shingrex advised. \par Covid shot first dose 1/13/21, second dose 2/13

## 2021-02-24 NOTE — HISTORY OF PRESENT ILLNESS
[de-identified] : f/u on ch medical conditions \par \par DM- started seeing Endo 5/2019 - was supposed to be on Levemir 16 units qhs - taking metformin 2 tab d q daily- ?? compliance with medication \par  - stopped Ozempic 0.25 q weekly as not covered \par -AIC 8.4 9/22/2020 - 9.8 12/2020 \par - pt agrees being compliant with all her medications \par --saw eye doctor 5/2020 + retinopathy \par \par  Hyperlipidemia -back on simvastatin 40 daily - lipids 9/2020 better \par

## 2021-03-30 ENCOUNTER — APPOINTMENT (OUTPATIENT)
Dept: ENDOCRINOLOGY | Facility: CLINIC | Age: 76
End: 2021-03-30
Payer: MEDICARE

## 2021-03-30 VITALS
WEIGHT: 169 LBS | BODY MASS INDEX: 29.95 KG/M2 | OXYGEN SATURATION: 97 % | HEIGHT: 63 IN | SYSTOLIC BLOOD PRESSURE: 120 MMHG | HEART RATE: 80 BPM | DIASTOLIC BLOOD PRESSURE: 70 MMHG

## 2021-03-30 PROCEDURE — 36415 COLL VENOUS BLD VENIPUNCTURE: CPT

## 2021-03-30 PROCEDURE — 99212 OFFICE O/P EST SF 10 MIN: CPT | Mod: 25

## 2021-03-31 NOTE — HISTORY OF PRESENT ILLNESS
[FreeTextEntry1] : 76 yo F with PMH DM2, HTN, HLD \par \par She was diagnosed with DM2 2011\par previously on metformin IR \par on levemir 16 u qhs and metformin 500 mg 2 tabs BID\par B: cereal + fruit + coffee\par L: TF 1 toast diet\par D: turkey + veg no carb\par snacks SF jello, nuts/popcorn\par F 140-190\par novolog previously discontinued\par + nephropathy, + neuropathy\par Last [facility]20 [facility]\par Last podiatry visit: 2/2021\par \par

## 2021-03-31 NOTE — ASSESSMENT
[FreeTextEntry1] : 74 yo F with PMH DM2, HTN, HLD\par \par 1. DM2 - ozempic is no longer covered . continue above regimen\par \par 2. HLD  - continue simvastatin .\par \par 3. HTN - continue lisinopril.. \par \par

## 2021-04-08 ENCOUNTER — APPOINTMENT (OUTPATIENT)
Dept: MAMMOGRAPHY | Facility: IMAGING CENTER | Age: 76
End: 2021-04-08
Payer: MEDICARE

## 2021-04-08 ENCOUNTER — OUTPATIENT (OUTPATIENT)
Dept: OUTPATIENT SERVICES | Facility: HOSPITAL | Age: 76
LOS: 1 days | End: 2021-04-08
Payer: MEDICARE

## 2021-04-08 ENCOUNTER — RESULT REVIEW (OUTPATIENT)
Age: 76
End: 2021-04-08

## 2021-04-08 DIAGNOSIS — Z00.8 ENCOUNTER FOR OTHER GENERAL EXAMINATION: ICD-10-CM

## 2021-04-08 DIAGNOSIS — Z98.41 CATARACT EXTRACTION STATUS, RIGHT EYE: Chronic | ICD-10-CM

## 2021-04-08 DIAGNOSIS — Z98.89 OTHER SPECIFIED POSTPROCEDURAL STATES: Chronic | ICD-10-CM

## 2021-04-08 PROCEDURE — 77063 BREAST TOMOSYNTHESIS BI: CPT

## 2021-04-08 PROCEDURE — 77063 BREAST TOMOSYNTHESIS BI: CPT | Mod: 26

## 2021-04-08 PROCEDURE — 77067 SCR MAMMO BI INCL CAD: CPT | Mod: 26

## 2021-04-08 PROCEDURE — 77067 SCR MAMMO BI INCL CAD: CPT

## 2021-04-09 DIAGNOSIS — N64.89 OTHER SPECIFIED DISORDERS OF BREAST: ICD-10-CM

## 2021-04-12 ENCOUNTER — OUTPATIENT (OUTPATIENT)
Dept: OUTPATIENT SERVICES | Facility: HOSPITAL | Age: 76
LOS: 1 days | End: 2021-04-12
Payer: MEDICARE

## 2021-04-12 ENCOUNTER — RESULT REVIEW (OUTPATIENT)
Age: 76
End: 2021-04-12

## 2021-04-12 ENCOUNTER — APPOINTMENT (OUTPATIENT)
Dept: ULTRASOUND IMAGING | Facility: IMAGING CENTER | Age: 76
End: 2021-04-12
Payer: MEDICARE

## 2021-04-12 ENCOUNTER — APPOINTMENT (OUTPATIENT)
Dept: MAMMOGRAPHY | Facility: IMAGING CENTER | Age: 76
End: 2021-04-12
Payer: MEDICARE

## 2021-04-12 DIAGNOSIS — Z00.8 ENCOUNTER FOR OTHER GENERAL EXAMINATION: ICD-10-CM

## 2021-04-12 DIAGNOSIS — Z98.89 OTHER SPECIFIED POSTPROCEDURAL STATES: Chronic | ICD-10-CM

## 2021-04-12 DIAGNOSIS — Z98.41 CATARACT EXTRACTION STATUS, RIGHT EYE: Chronic | ICD-10-CM

## 2021-04-12 PROCEDURE — G0279: CPT | Mod: 26

## 2021-04-12 PROCEDURE — 76642 ULTRASOUND BREAST LIMITED: CPT

## 2021-04-12 PROCEDURE — 76642 ULTRASOUND BREAST LIMITED: CPT | Mod: 26,LT

## 2021-04-12 PROCEDURE — 77065 DX MAMMO INCL CAD UNI: CPT

## 2021-04-12 PROCEDURE — G0279: CPT

## 2021-04-12 PROCEDURE — 77065 DX MAMMO INCL CAD UNI: CPT | Mod: 26,LT

## 2021-04-16 ENCOUNTER — RX RENEWAL (OUTPATIENT)
Age: 76
End: 2021-04-16

## 2021-04-23 ENCOUNTER — APPOINTMENT (OUTPATIENT)
Dept: INTERNAL MEDICINE | Facility: CLINIC | Age: 76
End: 2021-04-23

## 2021-04-23 VITALS — DIASTOLIC BLOOD PRESSURE: 70 MMHG | SYSTOLIC BLOOD PRESSURE: 134 MMHG | OXYGEN SATURATION: 98 % | HEART RATE: 75 BPM

## 2021-06-09 ENCOUNTER — APPOINTMENT (OUTPATIENT)
Dept: GASTROENTEROLOGY | Facility: CLINIC | Age: 76
End: 2021-06-09
Payer: MEDICARE

## 2021-06-09 VITALS
HEIGHT: 63 IN | TEMPERATURE: 97.5 F | OXYGEN SATURATION: 98 % | DIASTOLIC BLOOD PRESSURE: 80 MMHG | BODY MASS INDEX: 30.65 KG/M2 | SYSTOLIC BLOOD PRESSURE: 110 MMHG | WEIGHT: 173 LBS | RESPIRATION RATE: 16 BRPM | HEART RATE: 87 BPM

## 2021-06-09 DIAGNOSIS — Z80.0 FAMILY HISTORY OF MALIGNANT NEOPLASM OF DIGESTIVE ORGANS: ICD-10-CM

## 2021-06-09 PROCEDURE — 99202 OFFICE O/P NEW SF 15 MIN: CPT

## 2021-06-09 NOTE — REASON FOR VISIT
[Initial Evaluation] : an initial evaluation [FreeTextEntry1] : FH colon ca, hx of adenomatous colon polyp

## 2021-06-09 NOTE — HISTORY OF PRESENT ILLNESS
[FreeTextEntry1] : This is a 75-year-old female with history of diabetes and hypertension presenting for screening colonoscopy evaluation.  She has a family history of colon cancer in her sister.  She underwent a screening colonoscopy in September 2016 and was found to have an adenomatous colon polyp.  She denies abdominal pain, nausea or vomiting.  She denies changes in bowel habits.  She denies rectal bleeding or melena.  She denies weight loss or anemia.

## 2021-06-09 NOTE — ASSESSMENT
[FreeTextEntry1] : This is a 75-year-old female with family history of colon cancer in his sister found to have an adenomatous colon polyp by colonoscopy 5 years ago.  I explained to her the risks, alternatives and benefits to a colonoscopy.  Risk including but not limited to bleeding, perforation, infection and adverse medication reaction.  Questions were answered.  She stated understanding.

## 2021-07-15 ENCOUNTER — APPOINTMENT (OUTPATIENT)
Dept: OTOLARYNGOLOGY | Facility: CLINIC | Age: 76
End: 2021-07-15
Payer: MEDICARE

## 2021-07-15 VITALS
WEIGHT: 170 LBS | HEART RATE: 80 BPM | BODY MASS INDEX: 30.12 KG/M2 | HEIGHT: 63 IN | DIASTOLIC BLOOD PRESSURE: 72 MMHG | SYSTOLIC BLOOD PRESSURE: 148 MMHG

## 2021-07-15 DIAGNOSIS — H61.23 IMPACTED CERUMEN, BILATERAL: ICD-10-CM

## 2021-07-15 PROCEDURE — 99203 OFFICE O/P NEW LOW 30 MIN: CPT | Mod: 25

## 2021-07-15 PROCEDURE — 92557 COMPREHENSIVE HEARING TEST: CPT

## 2021-07-15 PROCEDURE — 69210 REMOVE IMPACTED EAR WAX UNI: CPT

## 2021-07-15 PROCEDURE — 92567 TYMPANOMETRY: CPT

## 2021-07-15 NOTE — HISTORY OF PRESENT ILLNESS
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 Stated that she had chickenpox as a child and that is where she developed hearing loss. Notes that her hearing appears to have become worse in the right ear is much worse than the left ear.  She was concerned that perhaps there was an infection or something blocking that ear.  She denies any vertigo or tinnitus.

## 2021-07-15 NOTE — PROCEDURE
[Cerumen Impaction] : Cerumen Impaction [Same] : same as the Pre Op Dx. [] : Removal of Cerumen [FreeTextEntry6] : Cerumen was removed from both ears under binocular microscopy with a combination of a suction and/or a loop curette and a Colace soak. The patient tolerated the procedure well and there were no complications. The  findings are noted above.\par

## 2021-08-30 ENCOUNTER — APPOINTMENT (OUTPATIENT)
Dept: DISASTER EMERGENCY | Facility: CLINIC | Age: 76
End: 2021-08-30

## 2021-08-31 LAB — SARS-COV-2 N GENE NPH QL NAA+PROBE: NOT DETECTED

## 2021-09-02 ENCOUNTER — APPOINTMENT (OUTPATIENT)
Dept: GASTROENTEROLOGY | Facility: HOSPITAL | Age: 76
End: 2021-09-02

## 2021-09-02 ENCOUNTER — RESULT REVIEW (OUTPATIENT)
Age: 76
End: 2021-09-02

## 2021-09-02 ENCOUNTER — OUTPATIENT (OUTPATIENT)
Dept: OUTPATIENT SERVICES | Facility: HOSPITAL | Age: 76
LOS: 1 days | End: 2021-09-02
Payer: MEDICARE

## 2021-09-02 VITALS
DIASTOLIC BLOOD PRESSURE: 62 MMHG | HEART RATE: 75 BPM | TEMPERATURE: 97 F | OXYGEN SATURATION: 98 % | SYSTOLIC BLOOD PRESSURE: 171 MMHG | RESPIRATION RATE: 19 BRPM | WEIGHT: 175.05 LBS | HEIGHT: 63 IN

## 2021-09-02 VITALS
DIASTOLIC BLOOD PRESSURE: 69 MMHG | RESPIRATION RATE: 16 BRPM | SYSTOLIC BLOOD PRESSURE: 155 MMHG | OXYGEN SATURATION: 100 % | HEART RATE: 72 BPM

## 2021-09-02 DIAGNOSIS — Z98.89 OTHER SPECIFIED POSTPROCEDURAL STATES: Chronic | ICD-10-CM

## 2021-09-02 DIAGNOSIS — Z80.0 FAMILY HISTORY OF MALIGNANT NEOPLASM OF DIGESTIVE ORGANS: ICD-10-CM

## 2021-09-02 DIAGNOSIS — Z98.41 CATARACT EXTRACTION STATUS, RIGHT EYE: Chronic | ICD-10-CM

## 2021-09-02 LAB — GLUCOSE BLDC GLUCOMTR-MCNC: 336 MG/DL — HIGH (ref 70–99)

## 2021-09-02 PROCEDURE — 82962 GLUCOSE BLOOD TEST: CPT

## 2021-09-02 PROCEDURE — 45385 COLONOSCOPY W/LESION REMOVAL: CPT | Mod: GC

## 2021-09-02 PROCEDURE — 88305 TISSUE EXAM BY PATHOLOGIST: CPT

## 2021-09-02 PROCEDURE — 45380 COLONOSCOPY AND BIOPSY: CPT | Mod: PT,XU

## 2021-09-02 PROCEDURE — 45380 COLONOSCOPY AND BIOPSY: CPT | Mod: 59,GC

## 2021-09-02 PROCEDURE — 88305 TISSUE EXAM BY PATHOLOGIST: CPT | Mod: 26

## 2021-09-02 PROCEDURE — 45385 COLONOSCOPY W/LESION REMOVAL: CPT | Mod: PT

## 2021-09-02 RX ORDER — SODIUM CHLORIDE 9 MG/ML
500 INJECTION INTRAMUSCULAR; INTRAVENOUS; SUBCUTANEOUS
Refills: 0 | Status: COMPLETED | OUTPATIENT
Start: 2021-09-02 | End: 2021-09-02

## 2021-09-02 RX ADMIN — SODIUM CHLORIDE 30 MILLILITER(S): 9 INJECTION INTRAMUSCULAR; INTRAVENOUS; SUBCUTANEOUS at 10:36

## 2021-09-02 NOTE — ASU PATIENT PROFILE, ADULT - NSICDXPASTMEDICALHX_GEN_ALL_CORE_FT
PAST MEDICAL HISTORY:  DMII (diabetes mellitus, type 2)     [place] (hard of hearing)     HTN (hypertension)     Hyperlipidemia     Vitamin D deficiency

## 2021-09-02 NOTE — PRE PROCEDURE NOTE - PRE PROCEDURE EVALUATION
Attending Physician:              Kerrie              Procedure: Colonoscopy     Indication for Procedure: polyps surveillance   ________________________________________________________  PAST MEDICAL & SURGICAL HISTORY:  DMII (diabetes mellitus, type 2)    Hyperlipidemia    HTN (hypertension)    Vitamin D deficiency    [place] (hard of hearing)    History of cholecystectomy    S/P right cataract extraction      ALLERGIES:  No Known Allergies    HOME MEDICATIONS:    AICD/PPM: [ ] yes   [ ] no    PERTINENT LAB DATA:                      PHYSICAL EXAMINATION:    T(C): --  HR: --  BP: --  RR: --  SpO2: --    Constitutional: NAD  HEENT: PERRLA, EOMI,    Neck:  No JVD  Respiratory: CTAB/L  Cardiovascular: S1 and S2  Gastrointestinal: BS+, soft, NT/ND  Extremities: No peripheral edema  Neurological: A/O x 3, no focal deficits  Psychiatric: Normal mood, normal affect  Skin: No rashes    ASA Class: I [ ]  II [ ]  III [ ]  IV [ ]    COMMENTS:    The patient is a suitable candidate for the planned procedure unless box checked [ ]  No, explain:    
Attending Physician:             Delmy Shah               Procedure: colonoscopy    Indication for Procedure: FH colon cancer, hx of adenomatous colon polyp  ________________________________________________________  PAST MEDICAL & SURGICAL HISTORY:  DMII (diabetes mellitus, type 2)    Hyperlipidemia    HTN (hypertension)    Vitamin D deficiency    [place] (hard of hearing)    History of cholecystectomy    S/P right cataract extraction      ALLERGIES:  No Known Allergies    HOME MEDICATIONS:  lisinopril 5 mg oral tablet: 1 tab(s) orally once a day  metFORMIN 500 mg oral tablet: 1 tab(s) orally 2 times a day  simvastatin 40 mg oral tablet: 1 tab(s) orally once a day (at bedtime)    AICD/PPM: [ ] yes   [ ] no    PERTINENT LAB DATA:                      PHYSICAL EXAMINATION:    Height (cm): 160  Weight (kg): 79.4  BMI (kg/m2): 31  BSA (m2): 1.83T(C): 35.9  HR: 75  BP: 171/62  RR: 19  SpO2: 98%    Constitutional: NAD  HEENT: PERRLA, EOMI,    Neck:  No JVD  Respiratory: CTAB/L  Cardiovascular: S1 and S2  Gastrointestinal: BS+, soft, NT/ND  Extremities: No peripheral edema  Neurological: A/O x 3, no focal deficits  Psychiatric: Normal mood, normal affect  Skin: No rashes    ASA Class: I [ ]  II [ ]  III [ ]  IV [ ]    COMMENTS:    The patient is a suitable candidate for the planned procedure unless box checked [ ]  No, explain:

## 2021-09-07 LAB — SURGICAL PATHOLOGY STUDY: SIGNIFICANT CHANGE UP

## 2021-09-08 ENCOUNTER — APPOINTMENT (OUTPATIENT)
Dept: ENDOCRINOLOGY | Facility: CLINIC | Age: 76
End: 2021-09-08

## 2021-11-30 ENCOUNTER — LABORATORY RESULT (OUTPATIENT)
Age: 76
End: 2021-11-30

## 2021-11-30 ENCOUNTER — APPOINTMENT (OUTPATIENT)
Dept: INTERNAL MEDICINE | Facility: CLINIC | Age: 76
End: 2021-11-30
Payer: MEDICARE

## 2021-11-30 VITALS
HEART RATE: 71 BPM | BODY MASS INDEX: 30.12 KG/M2 | TEMPERATURE: 98.3 F | HEIGHT: 63 IN | WEIGHT: 170 LBS | SYSTOLIC BLOOD PRESSURE: 132 MMHG | DIASTOLIC BLOOD PRESSURE: 70 MMHG | OXYGEN SATURATION: 98 %

## 2021-11-30 PROCEDURE — G0444 DEPRESSION SCREEN ANNUAL: CPT | Mod: 59

## 2021-11-30 PROCEDURE — G0442 ANNUAL ALCOHOL SCREEN 15 MIN: CPT | Mod: 59

## 2021-11-30 PROCEDURE — 99213 OFFICE O/P EST LOW 20 MIN: CPT | Mod: 25

## 2021-11-30 PROCEDURE — 36415 COLL VENOUS BLD VENIPUNCTURE: CPT

## 2021-11-30 PROCEDURE — 83036 HEMOGLOBIN GLYCOSYLATED A1C: CPT | Mod: QW

## 2021-11-30 PROCEDURE — G0439: CPT

## 2021-11-30 RX ORDER — IBUPROFEN 600 MG/1
600 TABLET, FILM COATED ORAL
Qty: 28 | Refills: 0 | Status: DISCONTINUED | COMMUNITY
Start: 2021-11-24

## 2021-11-30 RX ORDER — AMOXICILLIN 500 MG/1
500 CAPSULE ORAL
Qty: 21 | Refills: 0 | Status: DISCONTINUED | COMMUNITY
Start: 2021-11-24

## 2021-11-30 NOTE — HEALTH RISK ASSESSMENT
[Good] : ~his/her~  mood as  good [No] : No [No falls in past year] : Patient reported no falls in the past year [0] : 2) Feeling down, depressed, or hopeless: Not at all (0) [PHQ-2 Negative - No further assessment needed] : PHQ-2 Negative - No further assessment needed [Alone] : lives alone [Retired] : retired [Single] : single [Fully functional (bathing, dressing, toileting, transferring, walking, feeding)] : Fully functional (bathing, dressing, toileting, transferring, walking, feeding) [Fully functional (using the telephone, shopping, preparing meals, housekeeping, doing laundry, using] : Fully functional and needs no help or supervision to perform IADLs (using the telephone, shopping, preparing meals, housekeeping, doing laundry, using transportation, managing medications and managing finances) [Reports changes in hearing] : Reports changes in hearing [With Patient/Caregiver] : , with patient/caregiver [] : No [de-identified] : walking , sedentary  [[id number]] : 0 [Reports changes in vision] : Reports no changes in vision [Reports changes in dental health] : Reports no changes in dental health [AdvancecareDate] : 11/30/21 [FreeTextEntry4] : HCP forms given to pt to fill bring to next visit

## 2021-11-30 NOTE — ASSESSMENT
[FreeTextEntry1] : Diabetes-controlled Aic 8.5 9/2020 - 9.8 12/2020 -POC AIC 11/30/21--> 12.3 \par -due to non comp with diet and medications \par -keep appointment with ENDO 1/2022 \par - cont metformin and levemir and simvastatin 40 qhs \par -educated pt risk of uncontrolled DM , including microvascular and macrovascular complications , retinopathy leading to blindness , nephropathy leading to ESRD requiring Dialysis , neuropathy leading to amputations , poor wound healing , CAD- leading to MI and death. \par -pt verbalized and understands , compliance with medication , diet and exercise enforced\par  -educated to Monitor Accu-Cheks daily fasting and 2 hrs post prandial , ophthalmology consult requested ,Pt to Monitor for signs of hypoglycemia. urine r/o proteins \par -d/w pt to eat 1800 kcal ADA diet, avoid rice, pasta, sugar, sweet, soda, juices, exercise daily.\par \par Hyperlipidemia- \par -eat Low-fat diet, avoid red meat, cheese, butter, peanuts and exercise daily for 40 minutes.\par \par Hypertension- 130/80 Controlled, continue current medications, low sodium-DASH diet.\par  \par Health maintenance\par PAP-3/2019\par dexa -12/2018 normal \par Mammogram -4/2021 bi rad 2 referral given \par Colonoscopy 10/2016 due 3 yr 9/2/21 - tubular adenoma - due 3 yrs \par Flu vaccine - 11/2021 \par Tetanus vaccine -given 2014\par Pneumovax - given 2014, \par Prevnar 13 given 2016 \par skin check - referral to derm given \par Hep c 2016 neg \par shingrex advised. \par Moderna Covid shot first dose 1/13/21, second dose 2/13. Booster 11/2021 \par dermatologist - 12/2020 skinn check - due pt to schedule appt \par

## 2021-11-30 NOTE — HISTORY OF PRESENT ILLNESS
[de-identified] : last seen 2/24/21 \par seen today for AWV \par Hard of hearing even with hearing aids \par \par DMdx 2011 \par - saw Endo 3/30/21 - Dr Guerrero left - she is looking to schedule appt with new endo has appt 1/2022 \par -on levemir 16 u qhs and metformin 500 mg 2 tabs BID- ?? compliance with medication admits - has not been taking medications regularly \par  - stopped Ozempic 0.25 q weekly as not covered \par -AIC 8.4 9/22/2020 - 9.8 12/2020 \par - pt agrees being compliant with all her medications \par --saw eye doctor 12/2020 + retinopathy Dr Gutierrez has appt 12/2021 \par \par  Hyperlipidemia -back on simvastatin 40 daily - lipids 9/2020 better \par

## 2021-12-01 LAB
25(OH)D3 SERPL-MCNC: 38 NG/ML
ALBUMIN SERPL ELPH-MCNC: 4.6 G/DL
ALP BLD-CCNC: 76 U/L
ALT SERPL-CCNC: 14 U/L
ANION GAP SERPL CALC-SCNC: 15 MMOL/L
APPEARANCE: ABNORMAL
AST SERPL-CCNC: 14 U/L
BASOPHILS # BLD AUTO: 0.06 K/UL
BASOPHILS NFR BLD AUTO: 0.8 %
BILIRUB SERPL-MCNC: 0.4 MG/DL
BILIRUBIN URINE: NEGATIVE
BLOOD URINE: NEGATIVE
BUN SERPL-MCNC: 15 MG/DL
CALCIUM SERPL-MCNC: 9.6 MG/DL
CHLORIDE SERPL-SCNC: 102 MMOL/L
CHOLEST SERPL-MCNC: 187 MG/DL
CO2 SERPL-SCNC: 22 MMOL/L
COLOR: YELLOW
CREAT SERPL-MCNC: 0.61 MG/DL
EOSINOPHIL # BLD AUTO: 0.22 K/UL
EOSINOPHIL NFR BLD AUTO: 3.1 %
GLUCOSE QUALITATIVE U: ABNORMAL
GLUCOSE SERPL-MCNC: 273 MG/DL
HCT VFR BLD CALC: 38.4 %
HDLC SERPL-MCNC: 52 MG/DL
HGB BLD-MCNC: 12.8 G/DL
IMM GRANULOCYTES NFR BLD AUTO: 0.1 %
KETONES URINE: ABNORMAL
LDLC SERPL CALC-MCNC: 98 MG/DL
LEUKOCYTE ESTERASE URINE: ABNORMAL
LYMPHOCYTES # BLD AUTO: 1.99 K/UL
LYMPHOCYTES NFR BLD AUTO: 27.8 %
MAN DIFF?: NORMAL
MCHC RBC-ENTMCNC: 30.5 PG
MCHC RBC-ENTMCNC: 33.3 GM/DL
MCV RBC AUTO: 91.4 FL
MONOCYTES # BLD AUTO: 0.49 K/UL
MONOCYTES NFR BLD AUTO: 6.9 %
NEUTROPHILS # BLD AUTO: 4.38 K/UL
NEUTROPHILS NFR BLD AUTO: 61.3 %
NITRITE URINE: NEGATIVE
NONHDLC SERPL-MCNC: 136 MG/DL
PH URINE: 5.5
PLATELET # BLD AUTO: 299 K/UL
POTASSIUM SERPL-SCNC: 4.5 MMOL/L
PROT SERPL-MCNC: 7.4 G/DL
PROTEIN URINE: ABNORMAL
RBC # BLD: 4.2 M/UL
RBC # FLD: 13.2 %
SODIUM SERPL-SCNC: 139 MMOL/L
SPECIFIC GRAVITY URINE: 1.02
TRIGL SERPL-MCNC: 188 MG/DL
TSH SERPL-ACNC: 1.38 UIU/ML
UROBILINOGEN URINE: NORMAL
VIT B12 SERPL-MCNC: 565 PG/ML
WBC # FLD AUTO: 7.15 K/UL

## 2021-12-09 NOTE — ASU PATIENT PROFILE, ADULT - PATIENT KNOW
I have reviewed discharge instructions with the patient. The patient verbalized understanding. Patient left ED via Discharge Method: ambulatory to Home with self. Opportunity for questions and clarification provided. Patient given 5 scripts. To continue your aftercare when you leave the hospital, you may receive an automated call from our care team to check in on how you are doing. This is a free service and part of our promise to provide the best care and service to meet your aftercare needs.  If you have questions, or wish to unsubscribe from this service please call [phone]. Thank you for Choosing our [address] [place] [address] Emergency Department.
yes

## 2022-01-05 ENCOUNTER — APPOINTMENT (OUTPATIENT)
Dept: ENDOCRINOLOGY | Facility: CLINIC | Age: 77
End: 2022-01-05
Payer: MEDICARE

## 2022-01-05 VITALS
SYSTOLIC BLOOD PRESSURE: 123 MMHG | WEIGHT: 166 LBS | OXYGEN SATURATION: 97 % | BODY MASS INDEX: 29.41 KG/M2 | DIASTOLIC BLOOD PRESSURE: 82 MMHG | HEIGHT: 63 IN | HEART RATE: 69 BPM

## 2022-01-05 LAB
GLUCOSE BLDC GLUCOMTR-MCNC: 196
HBA1C MFR BLD HPLC: 10.1

## 2022-01-05 PROCEDURE — 99204 OFFICE O/P NEW MOD 45 MIN: CPT

## 2022-01-05 PROCEDURE — 83036 HEMOGLOBIN GLYCOSYLATED A1C: CPT | Mod: QW

## 2022-01-05 PROCEDURE — 95250 CONT GLUC MNTR PHYS/QHP EQP: CPT

## 2022-01-05 NOTE — HISTORY OF PRESENT ILLNESS
[FreeTextEntry1] : Ms. YUSUF SAWYER is a 76 year old female who presents for initial endocrine evaluation with regard to a hx of type 2 dm. The diabetes has been present for about 10 years. She denies any history of retinopathy or nephropathy. With regard to neuropathy, she denies any neurologic s/s. For the diabetes, she is currently taking Levemir 25 units hs along with Metformin 500 mg two bid. She is starting Jardiance 10 mg daily. She denies polyuria, polydipsia, or any visual changes. She too denies any skin lesions, skin breakdown or non-healing areas of skin. She too denies any podiatric concerns. Ophthalmologic evaluation is up to date. She has received retinopathy injections as per Dr. David. Home glucose monitoring has shown values to be running in the 180-200s. She  does deny any hypoglycemia or hypoglycemic signs or symptoms. A1c over the last 2 years has run from 6.7 to latest value on 11/30 of 12.3%. States had not been fully compliant in taking her medications. Had apparently also been on novolog in the past.\par POCT A1c returned 11/30/2021 at 10.1% ; previously 12.3% on 11/30/2021.\par POCT glucose returned today at 196 mg/dL\par Additional medical history includes that of hypertension, hyperlipidemia,  along with vitamin d deficiency.\par She does take Lisinopril 5 mg and Simvastatin 40 mg.\par Taking multivitamins

## 2022-01-18 ENCOUNTER — APPOINTMENT (OUTPATIENT)
Dept: ENDOCRINOLOGY | Facility: CLINIC | Age: 77
End: 2022-01-18
Payer: MEDICARE

## 2022-01-18 VITALS
HEART RATE: 98 BPM | SYSTOLIC BLOOD PRESSURE: 122 MMHG | WEIGHT: 164 LBS | OXYGEN SATURATION: 99 % | BODY MASS INDEX: 29.06 KG/M2 | HEIGHT: 63 IN | TEMPERATURE: 98.2 F | DIASTOLIC BLOOD PRESSURE: 70 MMHG

## 2022-01-18 PROCEDURE — 95251 CONT GLUC MNTR ANALYSIS I&R: CPT

## 2022-01-18 PROCEDURE — 99214 OFFICE O/P EST MOD 30 MIN: CPT | Mod: 25

## 2022-01-18 NOTE — HISTORY OF PRESENT ILLNESS
[FreeTextEntry1] : Ms. YUSUF SAWYER is a 76 year old female who follows up with regard to a hx of type 2 dm. The diabetes has been present for about 10 years. She denies any history of retinopathy or nephropathy. With regard to neuropathy, she denies any neurologic s/s. For the diabetes, she is currently taking Levemir 28 units hs along with Metformin 500 mg two bid. She is starting Jardiance 10 mg daily. She denies polyuria, polydipsia, or any visual changes. She too denies any skin lesions, skin breakdown or non-healing areas of skin. She too denies any podiatric concerns. Ophthalmologic evaluation is up to date. She has received retinopathy injections as per Dr. David. Home glucose monitoring via [employer] has shown values to be running   120- 220 in the am, 200s pre-dinner.  She does deny any hypoglycemia or hypoglycemic signs or symptoms. A1c over the last 2 years has run from 6.7 to latest value on 11/30 of 12.3%. States had not been fully compliant in taking her medications. Had apparently also been on novolog in the past.\par POCT A1c returned 1/5/2022 at 10.1% ; previously 12.3% on 11/30/2021.\par POCT glucose returned today at 196 mg/dL\par Additional medical history includes that of hypertension, hyperlipidemia, along with vitamin d deficiency.\par She does take Lisinopril 5 mg and Simvastatin 40 mg.\par Taking multivitamins

## 2022-02-18 ENCOUNTER — APPOINTMENT (OUTPATIENT)
Dept: ENDOCRINOLOGY | Facility: CLINIC | Age: 77
End: 2022-02-18
Payer: MEDICARE

## 2022-02-18 VITALS
TEMPERATURE: 98.6 F | RESPIRATION RATE: 15 BRPM | WEIGHT: 165 LBS | HEART RATE: 77 BPM | OXYGEN SATURATION: 98 % | DIASTOLIC BLOOD PRESSURE: 78 MMHG | BODY MASS INDEX: 29.23 KG/M2 | SYSTOLIC BLOOD PRESSURE: 122 MMHG

## 2022-02-18 LAB
GLUCOSE BLDC GLUCOMTR-MCNC: 114
HBA1C MFR BLD HPLC: 8.1

## 2022-02-18 PROCEDURE — 82962 GLUCOSE BLOOD TEST: CPT

## 2022-02-18 PROCEDURE — 99214 OFFICE O/P EST MOD 30 MIN: CPT

## 2022-02-18 PROCEDURE — 83036 HEMOGLOBIN GLYCOSYLATED A1C: CPT | Mod: QW

## 2022-02-18 NOTE — PHYSICAL EXAM
[Alert] : alert [Well Nourished] : well nourished [No Acute Distress] : no acute distress [Well Developed] : well developed [Normal Sclera/Conjunctiva] : normal sclera/conjunctiva [EOMI] : extra ocular movement intact [No Proptosis] : no proptosis [Normal Oropharynx] : the oropharynx was normal [Thyroid Not Enlarged] : the thyroid was not enlarged [No Thyroid Nodules] : no palpable thyroid nodules [No Respiratory Distress] : no respiratory distress [No Accessory Muscle Use] : no accessory muscle use [Clear to Auscultation] : lungs were clear to auscultation bilaterally [Normal S1, S2] : normal S1 and S2 [Normal Rate] : heart rate was normal [No Edema] : no peripheral edema [Regular Rhythm] : with a regular rhythm [Pedal Pulses Normal] : the pedal pulses are present [Normal Bowel Sounds] : normal bowel sounds [Not Tender] : non-tender [Not Distended] : not distended [Soft] : abdomen soft [Normal Anterior Cervical Nodes] : no anterior cervical lymphadenopathy [Normal Posterior Cervical Nodes] : no posterior cervical lymphadenopathy [No Spinal Tenderness] : no spinal tenderness [Spine Straight] : spine straight [No Stigmata of Cushings Syndrome] : no stigmata of Cushings Syndrome [Normal Gait] : normal gait [Normal Strength/Tone] : muscle strength and tone were normal [No Rash] : no rash [Normal Reflexes] : deep tendon reflexes were 2+ and symmetric [No Tremors] : no tremors [Oriented x3] : oriented to person, place, and time [Acanthosis Nigricans] : no acanthosis nigricans

## 2022-04-15 ENCOUNTER — APPOINTMENT (OUTPATIENT)
Dept: INTERNAL MEDICINE | Facility: CLINIC | Age: 77
End: 2022-04-15
Payer: MEDICARE

## 2022-04-15 VITALS
WEIGHT: 163 LBS | HEIGHT: 63 IN | OXYGEN SATURATION: 96 % | BODY MASS INDEX: 28.88 KG/M2 | HEART RATE: 86 BPM | SYSTOLIC BLOOD PRESSURE: 126 MMHG | DIASTOLIC BLOOD PRESSURE: 60 MMHG | TEMPERATURE: 98.4 F

## 2022-04-15 PROCEDURE — 99214 OFFICE O/P EST MOD 30 MIN: CPT

## 2022-04-15 NOTE — ASSESSMENT
[FreeTextEntry1] : Diabetes-10.1 1/22 to 8.1 2/2022 \par -discussed comp with diet and medications \par -f/u with endo consult reviewed \par - cont metformin and levemir and jardiance 10  \par -educated pt risk of uncontrolled DM , including microvascular and macrovascular complications , retinopathy leading to blindness , nephropathy leading to ESRD requiring Dialysis , neuropathy leading to amputations , poor wound healing , CAD- leading to MI and death. \par -pt verbalized and understands , compliance with medication , diet and exercise enforced\par  -educated to Monitor Accu-Cheks daily fasting and 2 hrs post prandial , ophthalmology consult requested ,Pt to Monitor for signs of hypoglycemia. urine r/o proteins \par -d/w pt to eat 1800 kcal ADA diet, avoid rice, pasta, sugar, sweet, soda, juices, exercise daily.\par \par Hyperlipidemia- ASCVD risk 40 % change to Crestor 20 rtc 3 month get LFT and lipids \par -eat Low-fat diet, avoid red meat, cheese, butter, peanuts and exercise daily for 40 minutes.\par \par Hypertension- 130/80 Controlled, continue current medications, low sodium-DASH diet.\par  \par Health maintenance\par PAP-3/2019- advised \par dexa -12/2018 normal \par Mammogram -4/2021 bi rad 2 referral given again \par Colonoscopy 10/2016 due 3 yr 9/2/21 - tubular adenoma - due 3 yrs \par Flu vaccine - 11/2021 \par Tetanus vaccine -given 2014\par Pneumovax - given 2014, \par Prevnar 13 given 2016 \par skin check - referral to derm given \par Hep c 2016 neg \par shingrex advised. \par Moderna Covid shot first dose 1/13/21, second dose 2/13. Booster 11/2021 \par dermatologist - 12/2020 skinn check - due pt to schedule appt

## 2022-04-15 NOTE — HISTORY OF PRESENT ILLNESS
[de-identified] : Hard of hearing even with hearing aids \par \par DMdx 2011 \par - saw Endo 1/5/22 \par -changed to  levemir  12  units nazario m 23units in qhs  and metformin 500 mg 2 tabs BID- jardiance 10 qd compliance with medication admits - has not been taking medications regularly - ran out of mneedles this week so only on on dose till she will get new script \par  - stopped Ozempic 0.25 q weekly as not covered \par -AIC was 1/2022 10.1 to 8.1 2/2022 \par - pt agrees being compliant with all her medications \par --saw eye doctor 12/2021 + retinopathy Dr Gutierrez  has f/u appt 6/2022 \par \par  Hyperlipidemia -back on simvastatin 40 daily - lipids 9/2020 better \par

## 2022-05-25 ENCOUNTER — APPOINTMENT (OUTPATIENT)
Dept: ENDOCRINOLOGY | Facility: CLINIC | Age: 77
End: 2022-05-25
Payer: MEDICARE

## 2022-05-25 VITALS
DIASTOLIC BLOOD PRESSURE: 78 MMHG | BODY MASS INDEX: 28.87 KG/M2 | WEIGHT: 163 LBS | SYSTOLIC BLOOD PRESSURE: 122 MMHG | OXYGEN SATURATION: 98 % | HEART RATE: 77 BPM | TEMPERATURE: 98.6 F | RESPIRATION RATE: 15 BRPM

## 2022-05-25 LAB
GLUCOSE BLDC GLUCOMTR-MCNC: 104
HBA1C MFR BLD HPLC: 8.1

## 2022-05-25 PROCEDURE — 99214 OFFICE O/P EST MOD 30 MIN: CPT

## 2022-05-25 PROCEDURE — 83036 HEMOGLOBIN GLYCOSYLATED A1C: CPT | Mod: QW

## 2022-05-25 PROCEDURE — 82962 GLUCOSE BLOOD TEST: CPT

## 2022-05-25 NOTE — HISTORY OF PRESENT ILLNESS
[FreeTextEntry1] : Ms. YUSUF SAWYER is a 76 year old female who follows up with regard to a hx of type 2 dm. The diabetes has been present for about 10 years. She denies any history of retinopathy or nephropathy. With regard to neuropathy, she denies any neurologic s/s. For the diabetes, she is currently taking Levemir 12-13 units am and 25 hs ( was suppose to increase to 28 units but did not)  along with Metformin 500 mg two bid. She also continues on Jardiance 10 mg daily. She denies polyuria, polydipsia, or any visual changes. She too denies any skin lesions, skin breakdown or non-healing areas of skin. She too denies any podiatric concerns. Ophthalmologic evaluation is up to date, no new changes . She has received retinopathy injections as per Dr. David. Home glucose monitoring has shown values to be running -140 and before bed 112-140s. No Bg values < 100 or >200.   She does deny any hypoglycemia or hypoglycemic signs or symptoms. Had apparently also been on novolog in the past.\par \par POCT A1c returned 8.1 % ; previously 8.1 % on 2/18/22 (from 10.1 in January 2022) \par POCT glucose returned today at 104 mg/dL\par \par Additional medical history includes that of hypertension, hyperlipidemia, along with vitamin d deficiency.\par She does take Lisinopril 5 mg and Simvastatin 40 mg.\par Taking multivitamins \par Received COVID vaccine x3

## 2022-05-26 LAB
25(OH)D3 SERPL-MCNC: 54.6 NG/ML
ALBUMIN SERPL ELPH-MCNC: 4.6 G/DL
ALP BLD-CCNC: 58 U/L
ALT SERPL-CCNC: 14 U/L
ANION GAP SERPL CALC-SCNC: 15 MMOL/L
AST SERPL-CCNC: 20 U/L
BILIRUB SERPL-MCNC: 0.3 MG/DL
BUN SERPL-MCNC: 19 MG/DL
CALCIUM SERPL-MCNC: 10.6 MG/DL
CHLORIDE SERPL-SCNC: 103 MMOL/L
CHOLEST SERPL-MCNC: 138 MG/DL
CO2 SERPL-SCNC: 22 MMOL/L
CREAT SERPL-MCNC: 0.7 MG/DL
CREAT SPEC-SCNC: 84 MG/DL
EGFR: 89 ML/MIN/1.73M2
ESTIMATED AVERAGE GLUCOSE: 186 MG/DL
FRUCTOSAMINE SERPL-MCNC: 347 UMOL/L
GLUCOSE SERPL-MCNC: 91 MG/DL
GLYCOMARK.: 1 UG/ML
HBA1C MFR BLD HPLC: 8.1 %
HDLC SERPL-MCNC: 52 MG/DL
LDLC SERPL CALC-MCNC: 58 MG/DL
MICROALBUMIN 24H UR DL<=1MG/L-MCNC: 2.9 MG/DL
MICROALBUMIN/CREAT 24H UR-RTO: 35 MG/G
NONHDLC SERPL-MCNC: 86 MG/DL
POTASSIUM SERPL-SCNC: 4.5 MMOL/L
PROT SERPL-MCNC: 8 G/DL
SODIUM SERPL-SCNC: 140 MMOL/L
T3FREE SERPL-MCNC: 2.63 PG/ML
T4 FREE SERPL-MCNC: 1.1 NG/DL
TRIGL SERPL-MCNC: 139 MG/DL
TSH SERPL-ACNC: 2.32 UIU/ML
TSH SERPL-ACNC: 2.34 UIU/ML

## 2022-06-01 ENCOUNTER — NON-APPOINTMENT (OUTPATIENT)
Age: 77
End: 2022-06-01

## 2022-06-07 ENCOUNTER — RESULT REVIEW (OUTPATIENT)
Age: 77
End: 2022-06-07

## 2022-06-07 ENCOUNTER — OUTPATIENT (OUTPATIENT)
Dept: OUTPATIENT SERVICES | Facility: HOSPITAL | Age: 77
LOS: 1 days | End: 2022-06-07
Payer: MEDICARE

## 2022-06-07 ENCOUNTER — APPOINTMENT (OUTPATIENT)
Dept: MAMMOGRAPHY | Facility: IMAGING CENTER | Age: 77
End: 2022-06-07
Payer: MEDICARE

## 2022-06-07 ENCOUNTER — APPOINTMENT (OUTPATIENT)
Dept: ULTRASOUND IMAGING | Facility: IMAGING CENTER | Age: 77
End: 2022-06-07
Payer: MEDICARE

## 2022-06-07 DIAGNOSIS — Z00.8 ENCOUNTER FOR OTHER GENERAL EXAMINATION: ICD-10-CM

## 2022-06-07 DIAGNOSIS — Z98.41 CATARACT EXTRACTION STATUS, RIGHT EYE: Chronic | ICD-10-CM

## 2022-06-07 DIAGNOSIS — Z98.89 OTHER SPECIFIED POSTPROCEDURAL STATES: Chronic | ICD-10-CM

## 2022-06-07 PROCEDURE — 76641 ULTRASOUND BREAST COMPLETE: CPT | Mod: 26,50

## 2022-06-07 PROCEDURE — 76641 ULTRASOUND BREAST COMPLETE: CPT

## 2022-06-07 PROCEDURE — 77063 BREAST TOMOSYNTHESIS BI: CPT | Mod: 26

## 2022-06-07 PROCEDURE — 77063 BREAST TOMOSYNTHESIS BI: CPT

## 2022-06-07 PROCEDURE — 77067 SCR MAMMO BI INCL CAD: CPT | Mod: 26

## 2022-06-07 PROCEDURE — 77067 SCR MAMMO BI INCL CAD: CPT

## 2022-07-19 ENCOUNTER — LABORATORY RESULT (OUTPATIENT)
Age: 77
End: 2022-07-19

## 2022-07-19 ENCOUNTER — APPOINTMENT (OUTPATIENT)
Dept: ENDOCRINOLOGY | Facility: CLINIC | Age: 77
End: 2022-07-19

## 2022-07-19 VITALS
OXYGEN SATURATION: 96 % | DIASTOLIC BLOOD PRESSURE: 64 MMHG | HEIGHT: 63 IN | BODY MASS INDEX: 29.41 KG/M2 | WEIGHT: 166 LBS | HEART RATE: 76 BPM | SYSTOLIC BLOOD PRESSURE: 120 MMHG

## 2022-07-19 LAB
HBA1C MFR BLD HPLC: 8.3
POCT GLUC: 94

## 2022-07-19 PROCEDURE — 95250 CONT GLUC MNTR PHYS/QHP EQP: CPT

## 2022-07-19 PROCEDURE — 99214 OFFICE O/P EST MOD 30 MIN: CPT

## 2022-07-19 PROCEDURE — 83036 HEMOGLOBIN GLYCOSYLATED A1C: CPT | Mod: QW

## 2022-07-20 LAB
24R-OH-CALCIDIOL SERPL-MCNC: 26 PG/ML
25(OH)D3 SERPL-MCNC: 49.4 NG/ML
ALBUMIN SERPL ELPH-MCNC: 4.7 G/DL
ALP BLD-CCNC: 59 U/L
ALT SERPL-CCNC: 15 U/L
ANION GAP SERPL CALC-SCNC: 13 MMOL/L
AST SERPL-CCNC: 16 U/L
BILIRUB SERPL-MCNC: 0.3 MG/DL
BUN SERPL-MCNC: 26 MG/DL
CA-I SERPL-SCNC: 5.1 MG/DL
CALCIUM SERPL-MCNC: 10 MG/DL
CALCIUM SERPL-MCNC: 10 MG/DL
CHLORIDE SERPL-SCNC: 104 MMOL/L
CO2 SERPL-SCNC: 24 MMOL/L
CREAT SERPL-MCNC: 0.69 MG/DL
EGFR: 89 ML/MIN/1.73M2
GLUCOSE SERPL-MCNC: 101 MG/DL
MAGNESIUM SERPL-MCNC: 2.2 MG/DL
PARATHYROID HORMONE INTACT: 26 PG/ML
PHOSPHATE SERPL-MCNC: 4.2 MG/DL
POTASSIUM SERPL-SCNC: 4.5 MMOL/L
PROT SERPL-MCNC: 7.4 G/DL
SODIUM SERPL-SCNC: 141 MMOL/L

## 2022-07-22 LAB — M PROTEIN SPEC IFE-MCNC: NORMAL

## 2022-07-23 NOTE — HISTORY OF PRESENT ILLNESS
[FreeTextEntry1] : Ms. YUSUF SAWYER is a 76 year old female who follows up with regard to a hx of type 2 dm. The diabetes has been present for about 10 years. She denies any history of retinopathy or nephropathy. With regard to neuropathy, she denies any neurologic s/s. For the diabetes, she is currently taking Levemir 15 units am and 25 hs  along with Metformin 500 mg two bid. She also continues on Jardiance 10 mg daily. She denies polyuria, polydipsia, or any visual changes. She too denies any skin lesions, skin breakdown or non-healing areas of skin. She too denies any podiatric concerns. Ophthalmologic evaluation is up to date, no new changes . She has received retinopathy injections as per Dr. David. \par Home glucose monitoring has shown values to be running in the morning  140's. She does deny any hypoglycemia or hypoglycemic signs or symptoms. \par \par POCT A1c returned  8.3%  ; previously 8.1% on 5/25/22 \par POCT glucose returned today at 94 mg/dL\par \par Additional medical history includes that of hypertension, hyperlipidemia, along with vitamin d deficiency. \par \par Too, on 05/25/2022, serum calcium returned at 10.6\par \par She does take Lisinopril 5 mg and Simvastatin 40 mg.\par Taking multivitamins \par Received COVID vaccine x3

## 2022-07-28 ENCOUNTER — APPOINTMENT (OUTPATIENT)
Dept: ENDOCRINOLOGY | Facility: CLINIC | Age: 77
End: 2022-07-28

## 2022-07-28 VITALS
SYSTOLIC BLOOD PRESSURE: 123 MMHG | HEIGHT: 63 IN | OXYGEN SATURATION: 98 % | WEIGHT: 166 LBS | HEART RATE: 68 BPM | BODY MASS INDEX: 29.41 KG/M2 | DIASTOLIC BLOOD PRESSURE: 75 MMHG | TEMPERATURE: 98.7 F

## 2022-07-28 LAB
GLUCOSE BLDC GLUCOMTR-MCNC: 128
IGA 24H UR QL IFE: NORMAL

## 2022-07-28 PROCEDURE — 99214 OFFICE O/P EST MOD 30 MIN: CPT | Mod: 25

## 2022-07-28 PROCEDURE — 95251 CONT GLUC MNTR ANALYSIS I&R: CPT

## 2022-07-29 ENCOUNTER — TRANSCRIPTION ENCOUNTER (OUTPATIENT)
Age: 77
End: 2022-07-29

## 2022-07-29 ENCOUNTER — APPOINTMENT (OUTPATIENT)
Dept: ENDOCRINOLOGY | Facility: CLINIC | Age: 77
End: 2022-07-29

## 2022-08-02 NOTE — HISTORY OF PRESENT ILLNESS
[FreeTextEntry1] : Ms. YUSUF SAWYER is a 76 year old female who follows up with regard to a hx of type 2 dm. The diabetes has been present for about 10 years. She denies any history of retinopathy or nephropathy. With regard to neuropathy, she denies any neurologic s/s. For the diabetes, she is currently taking Levemir 15 units am and 25 hs along with Metformin 500 mg two bid. She also continues on Jardiance 10 mg daily. She is somewhat confused as to how much insulin she is taking  but  ultimately we clarified the aforementioned dosages.\par \par She denies polyuria, polydipsia, or any visual changes. She too denies any skin lesions, skin breakdown or non-healing areas of skin. She too denies any podiatric concerns. Ophthalmologic evaluation is up to date, no new changes . She has received retinopathy injections as per Dr. David. \par Home glucose monitoring from [employer] shows values in the 130-150 range  occasionally bg to 80 range but rare.\par \par  A1c returned 8.3% on 7/19/22 ; previously 8.1% on 5/25/22 \par \par Additional medical history includes that of hypertension, hyperlipidemia, along with vitamin d deficiency. \par \par Too, on 05/25/2022, serum calcium returned at 10.6. Urine immunofixation showed weak bence leger kappa type proteins. \par \par She does take Lisinopril 5 mg and Simvastatin 40 mg.\par Taking multivitamins \par Received COVID vaccine x3

## 2022-08-19 ENCOUNTER — APPOINTMENT (OUTPATIENT)
Dept: INTERNAL MEDICINE | Facility: CLINIC | Age: 77
End: 2022-08-19

## 2022-08-19 VITALS
WEIGHT: 170 LBS | BODY MASS INDEX: 30.12 KG/M2 | HEART RATE: 76 BPM | HEIGHT: 63 IN | DIASTOLIC BLOOD PRESSURE: 71 MMHG | OXYGEN SATURATION: 98 % | SYSTOLIC BLOOD PRESSURE: 127 MMHG

## 2022-08-19 PROCEDURE — 99214 OFFICE O/P EST MOD 30 MIN: CPT

## 2022-08-19 NOTE — ASSESSMENT
[FreeTextEntry1] : Weak Bence Collins protein in urine immunofixation - referral to see hematologist given \par hx hypercalcemia\par \par Diabetes-10.1 1/22 to 8.1 2/2022 --> 8.3 7/2022 \par -discussed comp with diet and medications \par -f/u with endo consult reviewed \par - cont metformin 500 mg 2 tab BID and levemir 15U am 25 PM  and jardiance 10 \par -educated pt risk of uncontrolled DM , including microvascular and macrovascular complications , retinopathy leading to blindness , nephropathy leading to ESRD requiring Dialysis , neuropathy leading to amputations , poor wound healing , CAD- leading to MI and death. \par -pt verbalized and understands , compliance with medication , diet and exercise enforced\par  -educated to Monitor Accu-Cheks daily fasting and 2 hrs post prandial , ophthalmology consult requested ,Pt to Monitor for signs of hypoglycemia. urine r/o proteins \par -d/w pt to eat 1800 kcal ADA diet, avoid rice, pasta, sugar, sweet, soda, juices, exercise daily.\par \par Hyperlipidemia- ASCVD risk 40 % change to Crestor 20 4/2022  LFT and lipids nl 5/2022 \par -eat Low-fat diet, avoid red meat, cheese, butter, peanuts and exercise daily for 40 minutes.\par \par Hypertension- 130/80 Controlled, continue current medications, low sodium-DASH diet.\par  \par Health maintenance\par PAP-3/2019- advised \par dexa -12/2018 normal \par Mammogram -4/2021 bi rad 2 referral given again \par Colonoscopy 10/2016 due 3 yr 9/2/21 - tubular adenoma - due 3 yrs \par Flu vaccine - 11/2021 \par Tetanus vaccine -given 2014\par Pneumovax - given 2014, \par Prevnar 13 given 2016 \par skin check - referral to derm given \par Hep c 2016 neg \par shingrex advised. \par Moderna Covid shot first dose 1/13/21, second dose 2/13. Booster 11/2021 \par dermatologist - 12/2020 skinn check - due pt to schedule appt. \par \par

## 2022-08-19 NOTE — HISTORY OF PRESENT ILLNESS
[de-identified] : seen for f/u on ch medical issues \par \par Hard of hearing even with hearing aids \par \par Weak benzone protein in urine - Serum Nl -was told to see heamtologist - pending a call back from her office to schedule appt as per pt \par calcium nl \par \par DMdx 2011 \par Followed by endo \par -on  levemir 15 units nazario m 25 units in qhs and metformin 500 mg 2 tabs BID- jardiance 10 qd compliance with medication admits - has not been taking medications regularly \par  - stopped Ozempic 0.25 q weekly as not covered \par -AIC was 8.3 7/2022 \par - pt agrees being compliant with all her medications \par --saw eye doctor 12/2021 + retinopathy Dr Gutierrez  8/18/22 as per pt \par \par  Hyperlipidemia -on crestor 20 4/2022 \par

## 2022-09-19 ENCOUNTER — OUTPATIENT (OUTPATIENT)
Dept: OUTPATIENT SERVICES | Facility: HOSPITAL | Age: 77
LOS: 1 days | Discharge: HOME | End: 2022-09-19

## 2022-09-19 DIAGNOSIS — Z98.89 OTHER SPECIFIED POSTPROCEDURAL STATES: Chronic | ICD-10-CM

## 2022-09-19 DIAGNOSIS — R79.82 ELEVATED C-REACTIVE PROTEIN (CRP): ICD-10-CM

## 2022-09-19 DIAGNOSIS — Z98.41 CATARACT EXTRACTION STATUS, RIGHT EYE: Chronic | ICD-10-CM

## 2022-09-20 ENCOUNTER — RESULT REVIEW (OUTPATIENT)
Age: 77
End: 2022-09-20

## 2022-09-20 ENCOUNTER — APPOINTMENT (OUTPATIENT)
Dept: HEMATOLOGY ONCOLOGY | Facility: CLINIC | Age: 77
End: 2022-09-20

## 2022-09-20 VITALS
WEIGHT: 169.07 LBS | BODY MASS INDEX: 29.96 KG/M2 | OXYGEN SATURATION: 94 % | SYSTOLIC BLOOD PRESSURE: 138 MMHG | DIASTOLIC BLOOD PRESSURE: 73 MMHG | HEART RATE: 93 BPM | TEMPERATURE: 96.8 F | HEIGHT: 62.99 IN | RESPIRATION RATE: 16 BRPM

## 2022-09-20 LAB
BASOPHILS # BLD AUTO: 0.06 K/UL — SIGNIFICANT CHANGE UP (ref 0–0.2)
BASOPHILS NFR BLD AUTO: 0.9 % — SIGNIFICANT CHANGE UP (ref 0–2)
EOSINOPHIL # BLD AUTO: 0.3 K/UL — SIGNIFICANT CHANGE UP (ref 0–0.5)
EOSINOPHIL NFR BLD AUTO: 4.4 % — SIGNIFICANT CHANGE UP (ref 0–6)
HCT VFR BLD CALC: 39 % — SIGNIFICANT CHANGE UP (ref 34.5–45)
HGB BLD-MCNC: 12.9 G/DL — SIGNIFICANT CHANGE UP (ref 11.5–15.5)
IMM GRANULOCYTES NFR BLD AUTO: 0.3 % — SIGNIFICANT CHANGE UP (ref 0–0.9)
LYMPHOCYTES # BLD AUTO: 2.15 K/UL — SIGNIFICANT CHANGE UP (ref 1–3.3)
LYMPHOCYTES # BLD AUTO: 31.4 % — SIGNIFICANT CHANGE UP (ref 13–44)
MCHC RBC-ENTMCNC: 30 PG — SIGNIFICANT CHANGE UP (ref 27–34)
MCHC RBC-ENTMCNC: 33.1 G/DL — SIGNIFICANT CHANGE UP (ref 32–36)
MCV RBC AUTO: 90.7 FL — SIGNIFICANT CHANGE UP (ref 80–100)
MONOCYTES # BLD AUTO: 0.46 K/UL — SIGNIFICANT CHANGE UP (ref 0–0.9)
MONOCYTES NFR BLD AUTO: 6.7 % — SIGNIFICANT CHANGE UP (ref 2–14)
NEUTROPHILS # BLD AUTO: 3.85 K/UL — SIGNIFICANT CHANGE UP (ref 1.8–7.4)
NEUTROPHILS NFR BLD AUTO: 56.3 % — SIGNIFICANT CHANGE UP (ref 43–77)
NRBC # BLD: 0 /100 WBCS — SIGNIFICANT CHANGE UP (ref 0–0)
PLATELET # BLD AUTO: 248 K/UL — SIGNIFICANT CHANGE UP (ref 150–400)
RBC # BLD: 4.3 M/UL — SIGNIFICANT CHANGE UP (ref 3.8–5.2)
RBC # FLD: 13.6 % — SIGNIFICANT CHANGE UP (ref 10.3–14.5)
WBC # BLD: 6.84 K/UL — SIGNIFICANT CHANGE UP (ref 3.8–10.5)
WBC # FLD AUTO: 6.84 K/UL — SIGNIFICANT CHANGE UP (ref 3.8–10.5)

## 2022-09-20 PROCEDURE — 99204 OFFICE O/P NEW MOD 45 MIN: CPT

## 2022-09-20 RX ORDER — MULTIVIT-MIN/IRON/FOLIC ACID/K 18-600-40
CAPSULE ORAL
Refills: 0 | Status: ACTIVE | COMMUNITY

## 2022-09-20 RX ORDER — COLD-HOT PACK
EACH MISCELLANEOUS DAILY
Refills: 0 | Status: ACTIVE | COMMUNITY

## 2022-09-27 NOTE — HISTORY OF PRESENT ILLNESS
[de-identified] : Initial Consultation on 2022\par Referred by: Dr. Coe\par CC: Bence Colilns proteinuria\par \par HPI:\par 76 year old woman with history of diabetes, hyperlipidemia and hypertension here for evaluation of weak Bence Collins proteinuria.  Patient had laboratory studies on 2022 that showed total protein random urine 15 and urine immunofixation that showed weak Bence Collins protein kappa type.  The serum immunofixation showed no monoclonal band. Recent laboratory studies showed normal renal function (creatinine 0.69) and normal CBC.   \par \par She feels well overall.  She reports a good energy level and appetite without recent weight loss.  Patient denies any fever/chills, recent infections, CP, SOB, palpitations, abdominal pain, n/v/d, frequent headaches, dizziness, change in vision or new skeletal pain. \par \par PMHx:\par Diabetes\par Diabetic retinopathy- stable\par Chronic hearing loss from childhood varicella infection\par Hyperlipidemia\par Hypertension\par Vitamin D deficiency\par \par PSHx:\par Cataract surgery\par Cholecystectomy- 30 years ago\par \par Hospitalizations:\par Denies\par \par Medications:\par See List.  Medications reconciled\par \par Allergies:\par NKDA\par \par Social History:\par .    7 years ago from leukemia. Has one son and two grandchildren. \par Retired.  Used to work as an .  \par Denies smoking or alcohol use. \[place] Parents are [place] \par Lives alone and is able to take care of her own ADLs. Drives herself. \par \par Family History:\par Mother , breast cancer s/p surgical resection\par Sister , ? colon cancer\par \par Healthcare Maintenance:\par Primary care doctor- Dr. Coe- last visit 2022 \par Last colonoscopy- 2021- polyps removed- tubular adenoma\par Denies endoscopy\par Last mammogram- 2022\par Last gyn exam- many years ago\par Received three doses of COVID vaccine\par Denies history of COVID illness\par Dr. Rai- endocrine [de-identified] : Initial Visit

## 2022-09-27 NOTE — ASSESSMENT
[FreeTextEntry1] : 76 year old woman with history of diabetes, hyperlipidemia and hypertension here for evaluation of weak Bence Collins proteinuria.  Patient had laboratory studies on 7/19/2022 that showed total protein random urine 15 and urine immunofixation that showed weak Bence Collins protein kappa type.  The serum immunofixation showed no monoclonal band. Recent laboratory studies showed normal renal function (creatinine 0.69) and normal CBC.   \par \par Plan:\par Will check CBC, CMP, SPEP, serum immunofixation and quantitative free light chains. \par Will call patient when laboratory results are available. \par Patient seen and examined, case and management discussed with Dr. Arnoldo David. \par \par Attending Attestation:\par This is a 76 year old woman with a history of hypertension, diabetes. She presents for evaluation of Bence Collins protein in the urine. This is an older screening tool for multiple myeloma, however has since been supplanted by the immunofixation and PEP. Will have patient repeat blood work with SPEP, KADEN, free light chains rule out plasma cell dyscrasia.

## 2022-09-27 NOTE — PHYSICAL EXAM
[Fully active, able to carry on all pre-disease performance without restriction] : Status 0 - Fully active, able to carry on all pre-disease performance without restriction [Normal] : affect appropriate [de-identified] : RRR with systolic murmur [de-identified] : trace pedal edema  [de-identified] : No cervical, axillary or inguinal LAD noted.

## 2022-09-28 LAB
ALBUMIN MFR SERPL ELPH: 58.5 %
ALBUMIN SERPL ELPH-MCNC: 4.9 G/DL
ALBUMIN SERPL-MCNC: 4.6 G/DL
ALBUMIN/GLOB SERPL: 1.4 RATIO
ALP BLD-CCNC: 63 U/L
ALPHA1 GLOB MFR SERPL ELPH: 3.2 %
ALPHA1 GLOB SERPL ELPH-MCNC: 0.2 G/DL
ALPHA2 GLOB MFR SERPL ELPH: 11 %
ALPHA2 GLOB SERPL ELPH-MCNC: 0.9 G/DL
ALT SERPL-CCNC: 16 U/L
ANION GAP SERPL CALC-SCNC: 14 MMOL/L
AST SERPL-CCNC: 17 U/L
B-GLOBULIN MFR SERPL ELPH: 11.8 %
B-GLOBULIN SERPL ELPH-MCNC: 0.9 G/DL
BILIRUB SERPL-MCNC: 0.3 MG/DL
BUN SERPL-MCNC: 20 MG/DL
CALCIUM SERPL-MCNC: 10.4 MG/DL
CHLORIDE SERPL-SCNC: 104 MMOL/L
CO2 SERPL-SCNC: 25 MMOL/L
CREAT SERPL-MCNC: 0.75 MG/DL
DEPRECATED KAPPA LC FREE/LAMBDA SER: 2.15 RATIO
DEPRECATED KAPPA LC FREE/LAMBDA SER: 2.15 RATIO
EGFR: 82 ML/MIN/1.73M2
GAMMA GLOB FLD ELPH-MCNC: 1.2 G/DL
GAMMA GLOB MFR SERPL ELPH: 15.5 %
GLUCOSE SERPL-MCNC: 132 MG/DL
IGA SER QL IEP: 182 MG/DL
IGG SER QL IEP: 1242 MG/DL
IGM SER QL IEP: 77 MG/DL
INTERPRETATION SERPL IEP-IMP: NORMAL
KAPPA LC CSF-MCNC: 2.03 MG/DL
KAPPA LC CSF-MCNC: 2.03 MG/DL
KAPPA LC SERPL-MCNC: 4.36 MG/DL
KAPPA LC SERPL-MCNC: 4.36 MG/DL
M PROTEIN SPEC IFE-MCNC: NORMAL
POTASSIUM SERPL-SCNC: 4.9 MMOL/L
PROT SERPL-MCNC: 7.8 G/DL
SODIUM SERPL-SCNC: 143 MMOL/L

## 2022-09-29 ENCOUNTER — NON-APPOINTMENT (OUTPATIENT)
Age: 77
End: 2022-09-29

## 2022-11-04 ENCOUNTER — APPOINTMENT (OUTPATIENT)
Dept: ENDOCRINOLOGY | Facility: CLINIC | Age: 77
End: 2022-11-04

## 2022-11-04 VITALS
SYSTOLIC BLOOD PRESSURE: 118 MMHG | BODY MASS INDEX: 29.95 KG/M2 | WEIGHT: 169 LBS | HEART RATE: 69 BPM | HEIGHT: 62.99 IN | OXYGEN SATURATION: 97 % | TEMPERATURE: 98.3 F | DIASTOLIC BLOOD PRESSURE: 65 MMHG

## 2022-11-04 LAB
GLUCOSE BLDC GLUCOMTR-MCNC: 104
HBA1C MFR BLD HPLC: 7.6

## 2022-11-04 PROCEDURE — 82962 GLUCOSE BLOOD TEST: CPT

## 2022-11-04 PROCEDURE — 83036 HEMOGLOBIN GLYCOSYLATED A1C: CPT | Mod: QW

## 2022-11-04 PROCEDURE — 99214 OFFICE O/P EST MOD 30 MIN: CPT

## 2022-11-07 LAB
CREAT SPEC-SCNC: 73 MG/DL
MICROALBUMIN 24H UR DL<=1MG/L-MCNC: 2.6 MG/DL
MICROALBUMIN/CREAT 24H UR-RTO: 36 MG/G

## 2022-11-15 NOTE — HISTORY OF PRESENT ILLNESS
[FreeTextEntry1] : Ms. SAWYER is a 76 year year old  female who  returns today in follow up with regard to a history of type 2 diabetes mellitus. The diabetes mellitus has been present for about  15-20 years  There is no known history of  nephropathy. She  too denies any history of neuropathy. There is a hx of retinopathy. \par \par Current dm medications include Jardiance 10 mg QD, Metformin 500 mg [2 tabts BID] and Levemir pen [15 units in the AM and 25 units HS]. Patient checks BG levels at home 2 times per day. HGM of late has shown values to be running 115-120 mg/dL in the AM fasting and 140-150 in the evening. There has been no significant hypoglycemia. Patient denies any chest pain, sob, neurologic or ophthalmologic complaints. She  too denies any new podiatric concerns. She  is up to date with her ophthalmologic visit.\par \par POCT glucose returned today at 104 mg/dL\par POCT A1c returned today at 7.6 %; previously 8.3% in July 2022. \par \par Too, she plans to begin walking next week. \par Patient follows with hematology; weak bence leger kappa type noted in urine immunofixation levels dated 7-. \par Additional medical history includes that of\par

## 2022-12-02 ENCOUNTER — APPOINTMENT (OUTPATIENT)
Dept: INTERNAL MEDICINE | Facility: CLINIC | Age: 77
End: 2022-12-02

## 2022-12-02 VITALS
BODY MASS INDEX: 29.59 KG/M2 | WEIGHT: 167 LBS | SYSTOLIC BLOOD PRESSURE: 150 MMHG | HEART RATE: 73 BPM | OXYGEN SATURATION: 98 % | DIASTOLIC BLOOD PRESSURE: 78 MMHG | TEMPERATURE: 97.8 F | HEIGHT: 63 IN

## 2022-12-02 VITALS — SYSTOLIC BLOOD PRESSURE: 134 MMHG | DIASTOLIC BLOOD PRESSURE: 76 MMHG

## 2022-12-02 PROCEDURE — 36415 COLL VENOUS BLD VENIPUNCTURE: CPT

## 2022-12-02 PROCEDURE — G0439: CPT

## 2022-12-02 NOTE — HISTORY OF PRESENT ILLNESS
[de-identified] : seen fro [facility] \par \par Weak benzone protein in urine - Serum Nl -saw heamtologist 9/20/22 -was advised - despite the bence leger protein, the free light chain ratio is only minimally abnormal, not representative of myeloma. Follow up annually. \par \par DMdx 2011 \par Followed by endo \par -on levemir 15 units nazario m 25 units in qhs and metformin 500 mg 2 tabs BID- jardiance 10 qd compliance with medication admits - has not been taking medications regularly \par  - stopped Ozempic 0.25 q weekly as not covered \par -AIC was 7.6 11/2022 \par - pt agrees being compliant with all her medications \par --saw eye doctor 12/2021 + retinopathy Dr Gutierrez 8/18/22 as per pt \par \par  Hyperlipidemia -on crestor 20 4/2022 \par

## 2022-12-02 NOTE — ASSESSMENT
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 Follow up annually. \par \par Diabetes-10.1 1/22 to 8.1 2/2022 --> 8.3 7/2022 --> 7.6 11/2022 \par -discussed comp with diet and medications \par -f/u with endo consult reviewed \par - cont metformin 500 mg 2 tab BID and levemir 15U am 25 PM and jardiance 10 \par -educated pt risk of uncontrolled DM , including microvascular and macrovascular complications , retinopathy leading to blindness , nephropathy leading to ESRD requiring Dialysis , neuropathy leading to amputations , poor wound healing , CAD- leading to MI and death. \par -pt verbalized and understands , compliance with medication , diet and exercise enforced\par  -educated to Monitor Accu-Cheks daily fasting and 2 hrs post prandial , ophthalmology consult requested ,Pt to Monitor for signs of hypoglycemia. urine r/o proteins \par -d/w pt to eat 1800 kcal ADA diet, avoid rice, pasta, sugar, sweet, soda, juices, exercise daily.\par \par Hyperlipidemia- ASCVD risk 40 % change to Crestor 20 4/2022 LFT and lipids nl 5/2022 \par -eat Low-fat diet, avoid red meat, cheese, butter, peanuts and exercise daily for 40 minutes.\par \par Hypertension- 130/80 Controlled, continue current medications, low sodium-DASH diet.\par  \par Health maintenance\par PAP-3/2019- advised \par dexa -12/2018 normal \par Mammogram -6/2022  bi rad 2 \par Colonoscopy 10/2016 due 3 yr repeated 9/2/21 - tubular adenoma - due 3 yrs \par Flu vaccine -9/2022 \par Tetanus vaccine -given 2014\par Pneumovax - given 2014, \par Prevnar 13 given 2016 \par skin check - referral to derm given \par Hep c 2016 neg \par shingrex advised. \par Moderna Covid shot first dose 1/13/21, second dose 2/13. Booster 11/2021, 10/2022 \par dermatologist - 12/2020 skinn check - due pt to schedule appt. \par

## 2022-12-02 NOTE — HEALTH RISK ASSESSMENT
[Good] : ~his/her~  mood as  good [Never] : Never [No] : In the past 12 months have you used drugs other than those required for medical reasons? No [No falls in past year] : Patient reported no falls in the past year [0] : 2) Feeling down, depressed, or hopeless: Not at all (0) [PHQ-2 Negative - No further assessment needed] : PHQ-2 Negative - No further assessment needed [Alone] : lives alone [Retired] : retired [Single] : single [Fully functional (bathing, dressing, toileting, transferring, walking, feeding)] : Fully functional (bathing, dressing, toileting, transferring, walking, feeding) [Fully functional (using the telephone, shopping, preparing meals, housekeeping, doing laundry, using] : Fully functional and needs no help or supervision to perform IADLs (using the telephone, shopping, preparing meals, housekeeping, doing laundry, using transportation, managing medications and managing finances) [Reports changes in hearing] : Reports changes in hearing [Reports changes in vision] : Reports changes in vision [de-identified] : walking 30 minutes 3-4 days a week  [[id number]] : 0 [Reports changes in dental health] : Reports no changes in dental health

## 2022-12-03 LAB
CHOLEST SERPL-MCNC: 153 MG/DL
HDLC SERPL-MCNC: 61 MG/DL
LDLC SERPL CALC-MCNC: 69 MG/DL
NONHDLC SERPL-MCNC: 92 MG/DL
TRIGL SERPL-MCNC: 113 MG/DL
VIT B12 SERPL-MCNC: 652 PG/ML

## 2023-01-23 ENCOUNTER — NON-APPOINTMENT (OUTPATIENT)
Age: 78
End: 2023-01-23

## 2023-02-03 DIAGNOSIS — E13.319 OTHER SPECIFIED DIABETES MELLITUS WITH UNSPECIFIED DIABETIC RETINOPATHY W/OUT MACULAR EDEMA: ICD-10-CM

## 2023-02-21 ENCOUNTER — RX RENEWAL (OUTPATIENT)
Age: 78
End: 2023-02-21

## 2023-03-20 ENCOUNTER — APPOINTMENT (OUTPATIENT)
Dept: ENDOCRINOLOGY | Facility: CLINIC | Age: 78
End: 2023-03-20
Payer: MEDICARE

## 2023-03-20 VITALS
TEMPERATURE: 98.6 F | WEIGHT: 169 LBS | SYSTOLIC BLOOD PRESSURE: 122 MMHG | HEIGHT: 63 IN | OXYGEN SATURATION: 98 % | HEART RATE: 76 BPM | BODY MASS INDEX: 29.95 KG/M2 | DIASTOLIC BLOOD PRESSURE: 58 MMHG

## 2023-03-20 LAB
GLUCOSE BLDC GLUCOMTR-MCNC: 81
HBA1C MFR BLD HPLC: 7.9

## 2023-03-20 PROCEDURE — 99214 OFFICE O/P EST MOD 30 MIN: CPT | Mod: 25

## 2023-03-20 PROCEDURE — 83036 HEMOGLOBIN GLYCOSYLATED A1C: CPT | Mod: QW

## 2023-03-20 PROCEDURE — 82962 GLUCOSE BLOOD TEST: CPT

## 2023-03-20 PROCEDURE — 36415 COLL VENOUS BLD VENIPUNCTURE: CPT

## 2023-03-21 LAB
25(OH)D3 SERPL-MCNC: 56 NG/ML
ALBUMIN SERPL ELPH-MCNC: 4.7 G/DL
ALP BLD-CCNC: 55 U/L
ALT SERPL-CCNC: 16 U/L
ANION GAP SERPL CALC-SCNC: 19 MMOL/L
AST SERPL-CCNC: 18 U/L
BILIRUB SERPL-MCNC: 0.3 MG/DL
BUN SERPL-MCNC: 26 MG/DL
CALCIUM SERPL-MCNC: 10.3 MG/DL
CALCIUM SERPL-MCNC: 10.3 MG/DL
CHLORIDE SERPL-SCNC: 104 MMOL/L
CHOLEST SERPL-MCNC: 139 MG/DL
CO2 SERPL-SCNC: 19 MMOL/L
CREAT SERPL-MCNC: 0.67 MG/DL
EGFR: 89 ML/MIN/1.73M2
ESTIMATED AVERAGE GLUCOSE: 189 MG/DL
FRUCTOSAMINE SERPL-MCNC: 333 UMOL/L
GLUCOSE SERPL-MCNC: 72 MG/DL
GLYCOMARK.: 1.3 UG/ML
HBA1C MFR BLD HPLC: 8.2 %
HDLC SERPL-MCNC: 55 MG/DL
LDLC SERPL DIRECT ASSAY-MCNC: 59 MG/DL
MAGNESIUM SERPL-MCNC: 2.3 MG/DL
PARATHYROID HORMONE INTACT: 24 PG/ML
PHOSPHATE SERPL-MCNC: 4 MG/DL
POTASSIUM SERPL-SCNC: 4.3 MMOL/L
PROT SERPL-MCNC: 7.3 G/DL
SODIUM SERPL-SCNC: 142 MMOL/L
T3FREE SERPL-MCNC: 2.54 PG/ML
T4 FREE SERPL-MCNC: 1.2 NG/DL
TRIGL SERPL-MCNC: 136 MG/DL
TSH SERPL-ACNC: 2.01 UIU/ML

## 2023-03-22 LAB — CA-I SERPL-SCNC: 5.2 MG/DL

## 2023-03-24 ENCOUNTER — APPOINTMENT (OUTPATIENT)
Dept: INTERNAL MEDICINE | Facility: CLINIC | Age: 78
End: 2023-03-24
Payer: MEDICARE

## 2023-03-24 VITALS
RESPIRATION RATE: 16 BRPM | TEMPERATURE: 98.1 F | WEIGHT: 169 LBS | HEIGHT: 63 IN | HEART RATE: 80 BPM | BODY MASS INDEX: 29.95 KG/M2 | DIASTOLIC BLOOD PRESSURE: 70 MMHG | OXYGEN SATURATION: 98 % | SYSTOLIC BLOOD PRESSURE: 128 MMHG

## 2023-03-24 PROCEDURE — 99214 OFFICE O/P EST MOD 30 MIN: CPT

## 2023-03-24 NOTE — ASSESSMENT
[FreeTextEntry1] : Groin rash / itching rx for Nystatin / triamcinolone cream send BID 2-4 weeks see gyn if no help \par \par Weak Bence Collins protein in urine immunofixation - saw hematologist 9/2022-despite the bence collins protein, the free light chain ratio is only minimally abnormal, not representative of myeloma. Follow up annually. \par \par Diabetes-10.1 1/22 to 8.1 2/2022 --> 8.3 7/2022 --> 7.6 11/2022 --> 8.2 3/2023 \par - saw endo 3/21/23 \par -discussed comp with diet and medications \par -f/u with endo consult reviewed \par - cont metformin 500 mg 2 tab BID and levemir 15U am 25 PM and jardiance 10 \par -educated pt risk of uncontrolled DM , including microvascular and macrovascular complications , retinopathy leading to blindness , nephropathy leading to ESRD requiring Dialysis , neuropathy leading to amputations , poor wound healing , CAD- leading to MI and death. \par -pt verbalized and understands , compliance with medication , diet and exercise enforced\par  -educated to Monitor Accu-Cheks daily fasting and 2 hrs post prandial , ophthalmology consult requested ,Pt to Monitor for signs of hypoglycemia. urine r/o proteins \par -d/w pt to eat 1800 kcal ADA diet, avoid rice, pasta, sugar, sweet, soda, juices, exercise daily.\par \par Hyperlipidemia- ASCVD risk 40 % change to Crestor 20 4/2022 LFT and lipids nl 3/2023 - LDL 59 \par -eat Low-fat diet, avoid red meat, cheese, butter, peanuts and exercise daily for 40 minutes.\par \par Hypertension- 130/80 Controlled, continue current medications, low sodium-DASH diet.\par  \par Health maintenance\par PAP-3/2019- advised \par dexa -12/2018 normal \par Mammogram -6/2022 bi rad 2 \par Colonoscopy 10/2016 due 3 yr repeated 9/2/21 - tubular adenoma - due 3 yrs 2024 \par Flu vaccine -9/2022 \par Tetanus vaccine -given 2014\par Pneumovax - given 2014, Prevnar 13 given 2016 - completed \par skin check - referral to derm given \par Hep c 2016 neg \par shingrex advised. \par Moderna Covid shot first dose 1/13/21, second dose 2/13. Booster 11/2021, 10/2022 \par dermatologist - 12/2020 skinn check - due pt to schedule appt. \par  \par

## 2023-03-24 NOTE — HISTORY OF PRESENT ILLNESS
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 Follow up annually. \par \par DMdx 2011 \par Followed by endo \par -on levemir 15 units nazario m 25 units in qhs and metformin 500 mg 2 tabs BID- jardiance 10 qd compliance with medication admits - has not been taking medications regularly \par  - stopped Ozempic 0.25 q weekly as not covered \par -AIC was 7.6 11/2022 \par - pt agrees being compliant with all her medications \par --saw eye doctor 12/2021 + retinopathy Dr Gutierrez 8/18/22 as per pt \par \par  Hyperlipidemia -on crestor 20 4/2022 \par

## 2023-04-01 NOTE — HISTORY OF PRESENT ILLNESS
[FreeTextEntry1] : Ms. SAWYER is a 76 year year old female who returns today in follow up with regard to a history of type 2 diabetes mellitus. The diabetes mellitus has been present for about 15-20 years There is no known history of nephropathy. She too denies any history of neuropathy. There is a hx of retinopathy. \par \par Current dm medications include Jardiance 10 mg QD, Metformin 500 mg [2 tabts BID] and Levemir pen [15 units in the AM and 25 units HS].\par  Patient checks BG levels at home 2 times per day. HGM of late has shown values to be running 80's to 150 range    There has been no significant hypoglycemia. Patient denies any chest pain, sob, neurologic or ophthalmologic complaints. She too denies any new podiatric concerns. She is up to date with her ophthalmologic visit.\par \par \par POCT A1C returned today at 7.9 %\par POCT BG was 81\par \par \par Too, she plans to begin walking next week. \par \par Additional medical history includes that of Hyperlipidemia dn hypertension\par Patient follows with hematology; weak Bence Collins kappa type noted in urine immunofixation levels dated 7-. \par \par

## 2023-04-01 NOTE — ADDENDUM
[FreeTextEntry1] : Note: Blood was drawn in the office today for laboratory evaluation.\par POCT glucose testing and Hemoglobin A1c was carried out today in light of underlying diagnosis of type 2 diabetes mellitus.\par

## 2023-04-04 RX ORDER — SEMAGLUTIDE 1.34 MG/ML
2 INJECTION, SOLUTION SUBCUTANEOUS
Qty: 4 | Refills: 1 | Status: DISCONTINUED | COMMUNITY
Start: 2023-03-31 | End: 2023-04-04

## 2023-04-11 ENCOUNTER — APPOINTMENT (OUTPATIENT)
Dept: INTERNAL MEDICINE | Facility: CLINIC | Age: 78
End: 2023-04-11

## 2023-04-11 VITALS
HEIGHT: 63 IN | WEIGHT: 167 LBS | OXYGEN SATURATION: 98 % | BODY MASS INDEX: 29.59 KG/M2 | TEMPERATURE: 98 F | HEART RATE: 84 BPM | DIASTOLIC BLOOD PRESSURE: 74 MMHG | SYSTOLIC BLOOD PRESSURE: 162 MMHG

## 2023-04-25 ENCOUNTER — NON-APPOINTMENT (OUTPATIENT)
Age: 78
End: 2023-04-25

## 2023-06-26 ENCOUNTER — APPOINTMENT (OUTPATIENT)
Dept: INTERNAL MEDICINE | Facility: CLINIC | Age: 78
End: 2023-06-26

## 2023-06-26 VITALS
SYSTOLIC BLOOD PRESSURE: 113 MMHG | HEIGHT: 63 IN | TEMPERATURE: 98.3 F | WEIGHT: 162 LBS | DIASTOLIC BLOOD PRESSURE: 72 MMHG | BODY MASS INDEX: 28.7 KG/M2 | OXYGEN SATURATION: 98 % | HEART RATE: 76 BPM

## 2023-06-30 ENCOUNTER — APPOINTMENT (OUTPATIENT)
Dept: INTERNAL MEDICINE | Facility: CLINIC | Age: 78
End: 2023-06-30
Payer: MEDICARE

## 2023-06-30 VITALS
OXYGEN SATURATION: 98 % | SYSTOLIC BLOOD PRESSURE: 132 MMHG | HEART RATE: 68 BPM | WEIGHT: 162 LBS | HEIGHT: 63 IN | BODY MASS INDEX: 28.7 KG/M2 | DIASTOLIC BLOOD PRESSURE: 68 MMHG

## 2023-06-30 DIAGNOSIS — H91.93 UNSPECIFIED HEARING LOSS, BILATERAL: ICD-10-CM

## 2023-06-30 DIAGNOSIS — E11.3493 TYPE 2 DIABETES MELLITUS WITH SEVERE NONPROLIFERATIVE DIABETIC RETINOPATHY WITHOUT MACULAR EDEMA, BILATERAL: ICD-10-CM

## 2023-06-30 LAB — HBA1C MFR BLD HPLC: 7.4

## 2023-06-30 PROCEDURE — 99214 OFFICE O/P EST MOD 30 MIN: CPT | Mod: 25

## 2023-06-30 PROCEDURE — 83036 HEMOGLOBIN GLYCOSYLATED A1C: CPT | Mod: QW

## 2023-06-30 NOTE — HISTORY OF PRESENT ILLNESS
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 Follow up annually. \par \par DMdx 2011 \par Followed by endo \par -on levemir 15 units nazario m 25 units in qhs and metformin 500 mg 2 tabs BID- jardiance 10 qd compliance with medication admits - has not been taking medications regularly \par  -started Ozempic 0.50 q weekly 5/2023 \par -AIC was 8.2 3/2023 \par - pt agrees being compliant with all her medications \par --saw eye doctor 12/2021 + retinopathy Dr Gutierrez 8/18/22 as per pt \par \par  Hyperlipidemia -on crestor 20 4/2022 \par

## 2023-06-30 NOTE — ASSESSMENT
[FreeTextEntry1] : \par Diabetes-10.1 1/22 to 8.1 2/2022 --> 8.3 7/2022 --> 7.6 11/2022 --> 8.2 3/2023 --> 7.4 6/30/23 -improving \par - saw endo 3/21/23 \par -discussed comp with diet and medications \par -f/u with endo consult reviewed \par - cont metformin 500 mg 2 tab BID and levemir 15U am 25 PM and jardiance 10 , added ozempic 0.25 5/2023 - now started 0.5 1 week ago \par -educated pt risk of uncontrolled DM , including microvascular and macrovascular complications , retinopathy leading to blindness , nephropathy leading to ESRD requiring Dialysis , neuropathy leading to amputations , poor wound healing , CAD- leading to MI and death. \par -pt verbalized and understands , compliance with medication , diet and exercise enforced\par  -educated to Monitor Accu-Cheks daily fasting and 2 hrs post prandial , ophthalmology consult requested ,Pt to Monitor for signs of hypoglycemia. 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 Follow up annually. \par \par Hyperlipidemia- ASCVD risk 40 % change to Crestor 20 4/2022 LFT and lipids nl 3/2023 - LDL 59 \par -eat Low-fat diet, avoid red meat, cheese, butter, peanuts and exercise daily for 40 minutes.\par \par Hypertension- 132/68 Controlled, continue current medications, low sodium-DASH diet.\par  \par Health maintenance\par PAP-3/2019- advised \par dexa -12/2018 normal \par Mammogram -6/2022 bi rad 2 \par Colonoscopy 10/2016 due 3 yr repeated 9/2/21 - tubular adenoma - due 3 yrs 2024 \par Flu vaccine -9/2022 \par Tetanus vaccine -given 2014\par Pneumovax - given 2014, Prevnar 13 given 2016 - completed \par skin check - referral to derm given \par Hep c 2016 neg \par shingrex advised. \par Moderna Covid shot first dose 1/13/21, second dose 2/13. Booster 11/2021, 10/2022 \par dermatologist - 12/2020 skinn check - due pt to schedule appt. \par  \par

## 2023-07-20 ENCOUNTER — APPOINTMENT (OUTPATIENT)
Dept: ENDOCRINOLOGY | Facility: CLINIC | Age: 78
End: 2023-07-20
Payer: MEDICARE

## 2023-07-20 VITALS
BODY MASS INDEX: 28.88 KG/M2 | OXYGEN SATURATION: 97 % | HEART RATE: 73 BPM | WEIGHT: 163 LBS | DIASTOLIC BLOOD PRESSURE: 60 MMHG | HEIGHT: 63 IN | TEMPERATURE: 98 F | SYSTOLIC BLOOD PRESSURE: 120 MMHG

## 2023-07-20 LAB — GLUCOSE BLDC GLUCOMTR-MCNC: 87

## 2023-07-20 PROCEDURE — 83036 HEMOGLOBIN GLYCOSYLATED A1C: CPT | Mod: QW

## 2023-07-20 PROCEDURE — 82962 GLUCOSE BLOOD TEST: CPT

## 2023-07-20 PROCEDURE — 99214 OFFICE O/P EST MOD 30 MIN: CPT

## 2023-07-24 RX ORDER — PEN NEEDLE, DIABETIC 29 G X1/2"
32G X 4 MM NEEDLE, DISPOSABLE MISCELLANEOUS
Qty: 2 | Refills: 3 | Status: ACTIVE | COMMUNITY
Start: 2019-05-30 | End: 1900-01-01

## 2023-07-25 ENCOUNTER — RX RENEWAL (OUTPATIENT)
Age: 78
End: 2023-07-25

## 2023-07-30 NOTE — ADDENDUM
[FreeTextEntry1] : POCT glucose testing and Hemoglobin A1c was carried out today in light of underlying diagnosis of type 2 diabetes mellitus.

## 2023-07-30 NOTE — HISTORY OF PRESENT ILLNESS
[FreeTextEntry1] : Ms. SAWYER is a 77 year old female who returns today in follow up with regard to a history of type 2 diabetes mellitus. The diabetes mellitus has been present for about 20 years There is no known history of nephropathy. She too denies any history of neuropathy. There is a hx of retinopathy.   Current dm medications include Jardiance 10 mg QD, Metformin 500 mg [2 tabts BID] and Levemir pen [13 units in the AM and 22 units HS]. She was previously taking Ozempic 0.5 mg weekly, but she has been off for the past 3 weeks because it is too expensive.   ***Patient states that Ozempic and Jardiance are too expensive***  Patient checks BG levels at home 2 times per day. HGM of late has shown values to be running mainly in the 120s in the AM.    There has been no significant hypoglycemia. Patient denies any chest pain, sob, neurologic or ophthalmologic complaints. She too denies any new podiatric concerns. She is up to date with her ophthalmologic visit.  A1c returned at 7.4% on 6/30/23 POCT BG was 87   Too, has been walking for exercise  Additional medical history includes that of Hyperlipidemia and hypertension Patient follows with hematology; weak Bence Collins kappa type noted in urine immunofixation-has seen hematologist

## 2023-08-11 NOTE — ASU PREOP CHECKLIST - TYPE OF SOLUTION
saline lock Valtrex Counseling: I discussed with the patient the risks of valacyclovir including but not limited to kidney damage, nausea, vomiting and severe allergy.  The patient understands that if the infection seems to be worsening or is not improving, they are to call.

## 2023-09-18 ENCOUNTER — OUTPATIENT (OUTPATIENT)
Dept: OUTPATIENT SERVICES | Facility: HOSPITAL | Age: 78
LOS: 1 days | Discharge: HOME | End: 2023-09-18

## 2023-09-18 DIAGNOSIS — Z98.89 OTHER SPECIFIED POSTPROCEDURAL STATES: Chronic | ICD-10-CM

## 2023-09-18 DIAGNOSIS — R79.82 ELEVATED C-REACTIVE PROTEIN (CRP): ICD-10-CM

## 2023-09-18 DIAGNOSIS — Z98.41 CATARACT EXTRACTION STATUS, RIGHT EYE: Chronic | ICD-10-CM

## 2023-09-22 ENCOUNTER — APPOINTMENT (OUTPATIENT)
Dept: HEMATOLOGY ONCOLOGY | Facility: CLINIC | Age: 78
End: 2023-09-22

## 2023-09-27 ENCOUNTER — RESULT REVIEW (OUTPATIENT)
Age: 78
End: 2023-09-27

## 2023-09-27 ENCOUNTER — APPOINTMENT (OUTPATIENT)
Dept: HEMATOLOGY ONCOLOGY | Facility: CLINIC | Age: 78
End: 2023-09-27
Payer: MEDICARE

## 2023-09-27 VITALS
BODY MASS INDEX: 27.28 KG/M2 | WEIGHT: 157.85 LBS | HEIGHT: 63.86 IN | RESPIRATION RATE: 16 BRPM | TEMPERATURE: 97.2 F | HEART RATE: 79 BPM | SYSTOLIC BLOOD PRESSURE: 131 MMHG | OXYGEN SATURATION: 98 % | DIASTOLIC BLOOD PRESSURE: 75 MMHG

## 2023-09-27 DIAGNOSIS — R80.3 BENCE JONES PROTEINURIA: ICD-10-CM

## 2023-09-27 LAB
BASOPHILS # BLD AUTO: 0.05 K/UL — SIGNIFICANT CHANGE UP (ref 0–0.2)
BASOPHILS NFR BLD AUTO: 0.7 % — SIGNIFICANT CHANGE UP (ref 0–2)
EOSINOPHIL # BLD AUTO: 0.21 K/UL — SIGNIFICANT CHANGE UP (ref 0–0.5)
EOSINOPHIL NFR BLD AUTO: 2.9 % — SIGNIFICANT CHANGE UP (ref 0–6)
HCT VFR BLD CALC: 39.3 % — SIGNIFICANT CHANGE UP (ref 34.5–45)
HGB BLD-MCNC: 12.9 G/DL — SIGNIFICANT CHANGE UP (ref 11.5–15.5)
IMM GRANULOCYTES NFR BLD AUTO: 0.4 % — SIGNIFICANT CHANGE UP (ref 0–0.9)
LYMPHOCYTES # BLD AUTO: 1.89 K/UL — SIGNIFICANT CHANGE UP (ref 1–3.3)
LYMPHOCYTES # BLD AUTO: 26.4 % — SIGNIFICANT CHANGE UP (ref 13–44)
MCHC RBC-ENTMCNC: 30.2 PG — SIGNIFICANT CHANGE UP (ref 27–34)
MCHC RBC-ENTMCNC: 32.8 G/DL — SIGNIFICANT CHANGE UP (ref 32–36)
MCV RBC AUTO: 92 FL — SIGNIFICANT CHANGE UP (ref 80–100)
MONOCYTES # BLD AUTO: 0.47 K/UL — SIGNIFICANT CHANGE UP (ref 0–0.9)
MONOCYTES NFR BLD AUTO: 6.6 % — SIGNIFICANT CHANGE UP (ref 2–14)
NEUTROPHILS # BLD AUTO: 4.52 K/UL — SIGNIFICANT CHANGE UP (ref 1.8–7.4)
NEUTROPHILS NFR BLD AUTO: 63 % — SIGNIFICANT CHANGE UP (ref 43–77)
NRBC # BLD: 0 /100 WBCS — SIGNIFICANT CHANGE UP (ref 0–0)
PLATELET # BLD AUTO: 245 K/UL — SIGNIFICANT CHANGE UP (ref 150–400)
RBC # BLD: 4.27 M/UL — SIGNIFICANT CHANGE UP (ref 3.8–5.2)
RBC # FLD: 13.7 % — SIGNIFICANT CHANGE UP (ref 10.3–14.5)
WBC # BLD: 7.17 K/UL — SIGNIFICANT CHANGE UP (ref 3.8–10.5)
WBC # FLD AUTO: 7.17 K/UL — SIGNIFICANT CHANGE UP (ref 3.8–10.5)

## 2023-09-27 PROCEDURE — 99213 OFFICE O/P EST LOW 20 MIN: CPT

## 2023-10-04 ENCOUNTER — APPOINTMENT (OUTPATIENT)
Dept: INTERNAL MEDICINE | Facility: CLINIC | Age: 78
End: 2023-10-04
Payer: MEDICARE

## 2023-10-04 VITALS
OXYGEN SATURATION: 98 % | SYSTOLIC BLOOD PRESSURE: 118 MMHG | HEART RATE: 80 BPM | BODY MASS INDEX: 28.17 KG/M2 | WEIGHT: 159 LBS | DIASTOLIC BLOOD PRESSURE: 71 MMHG | RESPIRATION RATE: 16 BRPM | HEIGHT: 63 IN

## 2023-10-04 DIAGNOSIS — Z23 ENCOUNTER FOR IMMUNIZATION: ICD-10-CM

## 2023-10-04 LAB — HBA1C MFR BLD HPLC: 6.6

## 2023-10-04 PROCEDURE — G0008: CPT

## 2023-10-04 PROCEDURE — 83036 HEMOGLOBIN GLYCOSYLATED A1C: CPT | Mod: QW

## 2023-10-04 PROCEDURE — 90662 IIV NO PRSV INCREASED AG IM: CPT

## 2023-10-04 PROCEDURE — 99214 OFFICE O/P EST MOD 30 MIN: CPT | Mod: 25

## 2023-10-06 LAB
ALBUMIN MFR SERPL ELPH: 58.5 %
ALBUMIN SERPL ELPH-MCNC: 4.7 G/DL
ALBUMIN SERPL-MCNC: 4.3 G/DL
ALBUMIN/GLOB SERPL: 1.4 RATIO
ALP BLD-CCNC: 51 U/L
ALPHA1 GLOB MFR SERPL ELPH: 3.1 %
ALPHA1 GLOB SERPL ELPH-MCNC: 0.2 G/DL
ALPHA2 GLOB MFR SERPL ELPH: 10.8 %
ALPHA2 GLOB SERPL ELPH-MCNC: 0.8 G/DL
ALT SERPL-CCNC: 18 U/L
ANION GAP SERPL CALC-SCNC: 12 MMOL/L
AST SERPL-CCNC: 18 U/L
B-GLOBULIN MFR SERPL ELPH: 11.1 %
B-GLOBULIN SERPL ELPH-MCNC: 0.8 G/DL
BILIRUB SERPL-MCNC: 0.4 MG/DL
BUN SERPL-MCNC: 22 MG/DL
CALCIUM SERPL-MCNC: 10.2 MG/DL
CHLORIDE SERPL-SCNC: 102 MMOL/L
CO2 SERPL-SCNC: 26 MMOL/L
CREAT SERPL-MCNC: 0.72 MG/DL
DEPRECATED KAPPA LC FREE/LAMBDA SER: 2.76 RATIO
EGFR: 86 ML/MIN/1.73M2
GAMMA GLOB FLD ELPH-MCNC: 1.2 G/DL
GAMMA GLOB MFR SERPL ELPH: 16.5 %
GLUCOSE SERPL-MCNC: 101 MG/DL
IGA SER QL IEP: 214 MG/DL
IGG SER QL IEP: 1255 MG/DL
IGM SER QL IEP: 73 MG/DL
INTERPRETATION SERPL IEP-IMP: NORMAL
KAPPA LC CSF-MCNC: 2.01 MG/DL
KAPPA LC SERPL-MCNC: 5.54 MG/DL
M PROTEIN SPEC IFE-MCNC: NORMAL
POTASSIUM SERPL-SCNC: 4.8 MMOL/L
PROT SERPL-MCNC: 7.4 G/DL
SODIUM SERPL-SCNC: 140 MMOL/L

## 2023-10-23 ENCOUNTER — RX RENEWAL (OUTPATIENT)
Age: 78
End: 2023-10-23

## 2023-11-06 ENCOUNTER — RX RENEWAL (OUTPATIENT)
Age: 78
End: 2023-11-06

## 2023-11-22 ENCOUNTER — RESULT REVIEW (OUTPATIENT)
Age: 78
End: 2023-11-22

## 2023-11-22 ENCOUNTER — APPOINTMENT (OUTPATIENT)
Dept: RADIOLOGY | Facility: IMAGING CENTER | Age: 78
End: 2023-11-22
Payer: MEDICARE

## 2023-11-22 ENCOUNTER — APPOINTMENT (OUTPATIENT)
Dept: ULTRASOUND IMAGING | Facility: IMAGING CENTER | Age: 78
End: 2023-11-22
Payer: MEDICARE

## 2023-11-22 ENCOUNTER — APPOINTMENT (OUTPATIENT)
Dept: ENDOCRINOLOGY | Facility: CLINIC | Age: 78
End: 2023-11-22
Payer: MEDICARE

## 2023-11-22 ENCOUNTER — OUTPATIENT (OUTPATIENT)
Dept: OUTPATIENT SERVICES | Facility: HOSPITAL | Age: 78
LOS: 1 days | End: 2023-11-22
Payer: MEDICARE

## 2023-11-22 VITALS
OXYGEN SATURATION: 96 % | HEART RATE: 90 BPM | DIASTOLIC BLOOD PRESSURE: 65 MMHG | WEIGHT: 159 LBS | HEIGHT: 63 IN | SYSTOLIC BLOOD PRESSURE: 110 MMHG | BODY MASS INDEX: 28.17 KG/M2

## 2023-11-22 DIAGNOSIS — Z00.00 ENCOUNTER FOR GENERAL ADULT MEDICAL EXAMINATION WITHOUT ABNORMAL FINDINGS: ICD-10-CM

## 2023-11-22 DIAGNOSIS — N64.89 OTHER SPECIFIED DISORDERS OF BREAST: ICD-10-CM

## 2023-11-22 DIAGNOSIS — Z98.89 OTHER SPECIFIED POSTPROCEDURAL STATES: Chronic | ICD-10-CM

## 2023-11-22 DIAGNOSIS — Z98.41 CATARACT EXTRACTION STATUS, RIGHT EYE: Chronic | ICD-10-CM

## 2023-11-22 LAB — GLUCOSE BLDC GLUCOMTR-MCNC: 104

## 2023-11-22 PROCEDURE — 77063 BREAST TOMOSYNTHESIS BI: CPT

## 2023-11-22 PROCEDURE — 77067 SCR MAMMO BI INCL CAD: CPT | Mod: 26

## 2023-11-22 PROCEDURE — 76641 ULTRASOUND BREAST COMPLETE: CPT | Mod: 26,50,GY

## 2023-11-22 PROCEDURE — 77080 DXA BONE DENSITY AXIAL: CPT | Mod: 26

## 2023-11-22 PROCEDURE — 82962 GLUCOSE BLOOD TEST: CPT

## 2023-11-22 PROCEDURE — 77067 SCR MAMMO BI INCL CAD: CPT

## 2023-11-22 PROCEDURE — 77063 BREAST TOMOSYNTHESIS BI: CPT | Mod: 26

## 2023-11-22 PROCEDURE — 83036 HEMOGLOBIN GLYCOSYLATED A1C: CPT | Mod: QW

## 2023-11-22 PROCEDURE — 36415 COLL VENOUS BLD VENIPUNCTURE: CPT

## 2023-11-22 PROCEDURE — 76641 ULTRASOUND BREAST COMPLETE: CPT

## 2023-11-22 PROCEDURE — 77080 DXA BONE DENSITY AXIAL: CPT

## 2023-11-22 RX ORDER — SEMAGLUTIDE 0.68 MG/ML
2 INJECTION, SOLUTION SUBCUTANEOUS
Qty: 1 | Refills: 11 | Status: DISCONTINUED | COMMUNITY
Start: 2023-06-26 | End: 2023-11-22

## 2023-11-24 LAB
25(OH)D3 SERPL-MCNC: 46.8 NG/ML
ALBUMIN SERPL ELPH-MCNC: 4.2 G/DL
ALP BLD-CCNC: 55 U/L
ALT SERPL-CCNC: 20 U/L
ANION GAP SERPL CALC-SCNC: 13 MMOL/L
AST SERPL-CCNC: 35 U/L
BASOPHILS # BLD AUTO: 0.05 K/UL
BASOPHILS NFR BLD AUTO: 0.5 %
BILIRUB SERPL-MCNC: 0.4 MG/DL
BUN SERPL-MCNC: 22 MG/DL
CALCIUM SERPL-MCNC: 9.8 MG/DL
CHLORIDE SERPL-SCNC: 101 MMOL/L
CHOLEST SERPL-MCNC: 108 MG/DL
CO2 SERPL-SCNC: 22 MMOL/L
CREAT SERPL-MCNC: 0.83 MG/DL
CREAT SPEC-SCNC: 108 MG/DL
EGFR: 72 ML/MIN/1.73M2
EOSINOPHIL # BLD AUTO: 0.1 K/UL
EOSINOPHIL NFR BLD AUTO: 1.1 %
ESTIMATED AVERAGE GLUCOSE: 137 MG/DL
FRUCTOSAMINE SERPL-MCNC: 255 UMOL/L
GLUCOSE SERPL-MCNC: 88 MG/DL
GLYCOMARK.: 2 UG/ML
HBA1C MFR BLD HPLC: 6.4 %
HCT VFR BLD CALC: 33 %
HDLC SERPL-MCNC: 57 MG/DL
HGB BLD-MCNC: 10.5 G/DL
IMM GRANULOCYTES NFR BLD AUTO: 0.2 %
LDLC SERPL DIRECT ASSAY-MCNC: 36 MG/DL
LYMPHOCYTES # BLD AUTO: 1.9 K/UL
LYMPHOCYTES NFR BLD AUTO: 20.3 %
MAGNESIUM SERPL-MCNC: 2.1 MG/DL
MAN DIFF?: NORMAL
MCHC RBC-ENTMCNC: 30 PG
MCHC RBC-ENTMCNC: 31.8 GM/DL
MCV RBC AUTO: 94.3 FL
MICROALBUMIN 24H UR DL<=1MG/L-MCNC: 4.8 MG/DL
MICROALBUMIN/CREAT 24H UR-RTO: 44 MG/G
MONOCYTES # BLD AUTO: 1.02 K/UL
MONOCYTES NFR BLD AUTO: 10.9 %
NEUTROPHILS # BLD AUTO: 6.28 K/UL
NEUTROPHILS NFR BLD AUTO: 67 %
PLATELET # BLD AUTO: 279 K/UL
POTASSIUM SERPL-SCNC: 4.5 MMOL/L
PROT SERPL-MCNC: 7.6 G/DL
RBC # BLD: 3.5 M/UL
RBC # FLD: 14.4 %
SODIUM SERPL-SCNC: 137 MMOL/L
T3FREE SERPL-MCNC: 2.09 PG/ML
T4 FREE SERPL-MCNC: 1.3 NG/DL
TRIGL SERPL-MCNC: 61 MG/DL
TSH SERPL-ACNC: 1.85 UIU/ML
WBC # FLD AUTO: 9.37 K/UL

## 2023-11-27 ENCOUNTER — APPOINTMENT (OUTPATIENT)
Dept: INTERNAL MEDICINE | Facility: CLINIC | Age: 78
End: 2023-11-27
Payer: MEDICARE

## 2023-11-27 ENCOUNTER — INPATIENT (INPATIENT)
Facility: HOSPITAL | Age: 78
LOS: 8 days | Discharge: HOME HEALTH | End: 2023-12-06
Attending: INTERNAL MEDICINE | Admitting: INTERNAL MEDICINE
Payer: MEDICARE

## 2023-11-27 ENCOUNTER — NON-APPOINTMENT (OUTPATIENT)
Age: 78
End: 2023-11-27

## 2023-11-27 VITALS
TEMPERATURE: 98 F | SYSTOLIC BLOOD PRESSURE: 98 MMHG | HEART RATE: 81 BPM | OXYGEN SATURATION: 98 % | DIASTOLIC BLOOD PRESSURE: 64 MMHG | RESPIRATION RATE: 18 BRPM

## 2023-11-27 VITALS
SYSTOLIC BLOOD PRESSURE: 103 MMHG | RESPIRATION RATE: 16 BRPM | DIASTOLIC BLOOD PRESSURE: 66 MMHG | HEART RATE: 85 BPM | OXYGEN SATURATION: 98 % | HEIGHT: 63 IN

## 2023-11-27 DIAGNOSIS — R06.09 OTHER FORMS OF DYSPNEA: ICD-10-CM

## 2023-11-27 DIAGNOSIS — Z98.41 CATARACT EXTRACTION STATUS, RIGHT EYE: Chronic | ICD-10-CM

## 2023-11-27 DIAGNOSIS — Z98.89 OTHER SPECIFIED POSTPROCEDURAL STATES: Chronic | ICD-10-CM

## 2023-11-27 DIAGNOSIS — I21.4 NON-ST ELEVATION (NSTEMI) MYOCARDIAL INFARCTION: ICD-10-CM

## 2023-11-27 DIAGNOSIS — E78.5 HYPERLIPIDEMIA, UNSPECIFIED: ICD-10-CM

## 2023-11-27 DIAGNOSIS — I10 ESSENTIAL (PRIMARY) HYPERTENSION: ICD-10-CM

## 2023-11-27 DIAGNOSIS — D50.9 IRON DEFICIENCY ANEMIA, UNSPECIFIED: ICD-10-CM

## 2023-11-27 DIAGNOSIS — I50.9 HEART FAILURE, UNSPECIFIED: ICD-10-CM

## 2023-11-27 DIAGNOSIS — Z29.9 ENCOUNTER FOR PROPHYLACTIC MEASURES, UNSPECIFIED: ICD-10-CM

## 2023-11-27 LAB
ALBUMIN SERPL ELPH-MCNC: 3.8 G/DL — SIGNIFICANT CHANGE UP (ref 3.3–5)
ALBUMIN SERPL ELPH-MCNC: 3.8 G/DL — SIGNIFICANT CHANGE UP (ref 3.3–5)
ALP SERPL-CCNC: 65 U/L — SIGNIFICANT CHANGE UP (ref 40–120)
ALP SERPL-CCNC: 65 U/L — SIGNIFICANT CHANGE UP (ref 40–120)
ALT FLD-CCNC: 29 U/L — SIGNIFICANT CHANGE UP (ref 4–33)
ALT FLD-CCNC: 29 U/L — SIGNIFICANT CHANGE UP (ref 4–33)
ANION GAP SERPL CALC-SCNC: 13 MMOL/L — SIGNIFICANT CHANGE UP (ref 7–14)
ANION GAP SERPL CALC-SCNC: 13 MMOL/L — SIGNIFICANT CHANGE UP (ref 7–14)
APTT BLD: 26.7 SEC — SIGNIFICANT CHANGE UP (ref 24.5–35.6)
APTT BLD: 26.7 SEC — SIGNIFICANT CHANGE UP (ref 24.5–35.6)
APTT BLD: 47.1 SEC — HIGH (ref 24.5–35.6)
APTT BLD: 47.1 SEC — HIGH (ref 24.5–35.6)
AST SERPL-CCNC: 24 U/L — SIGNIFICANT CHANGE UP (ref 4–32)
AST SERPL-CCNC: 24 U/L — SIGNIFICANT CHANGE UP (ref 4–32)
BASE EXCESS BLDV CALC-SCNC: 0 MMOL/L — SIGNIFICANT CHANGE UP (ref -2–3)
BASE EXCESS BLDV CALC-SCNC: 0 MMOL/L — SIGNIFICANT CHANGE UP (ref -2–3)
BASOPHILS # BLD AUTO: 0.06 K/UL — SIGNIFICANT CHANGE UP (ref 0–0.2)
BASOPHILS # BLD AUTO: 0.06 K/UL — SIGNIFICANT CHANGE UP (ref 0–0.2)
BASOPHILS NFR BLD AUTO: 0.9 % — SIGNIFICANT CHANGE UP (ref 0–2)
BASOPHILS NFR BLD AUTO: 0.9 % — SIGNIFICANT CHANGE UP (ref 0–2)
BILIRUB SERPL-MCNC: 0.3 MG/DL — SIGNIFICANT CHANGE UP (ref 0.2–1.2)
BILIRUB SERPL-MCNC: 0.3 MG/DL — SIGNIFICANT CHANGE UP (ref 0.2–1.2)
BLOOD GAS VENOUS COMPREHENSIVE RESULT: SIGNIFICANT CHANGE UP
BLOOD GAS VENOUS COMPREHENSIVE RESULT: SIGNIFICANT CHANGE UP
BUN SERPL-MCNC: 25 MG/DL — HIGH (ref 7–23)
BUN SERPL-MCNC: 25 MG/DL — HIGH (ref 7–23)
CALCIUM SERPL-MCNC: 8.9 MG/DL — SIGNIFICANT CHANGE UP (ref 8.4–10.5)
CALCIUM SERPL-MCNC: 8.9 MG/DL — SIGNIFICANT CHANGE UP (ref 8.4–10.5)
CHLORIDE BLDV-SCNC: 105 MMOL/L — SIGNIFICANT CHANGE UP (ref 96–108)
CHLORIDE BLDV-SCNC: 105 MMOL/L — SIGNIFICANT CHANGE UP (ref 96–108)
CHLORIDE SERPL-SCNC: 103 MMOL/L — SIGNIFICANT CHANGE UP (ref 98–107)
CHLORIDE SERPL-SCNC: 103 MMOL/L — SIGNIFICANT CHANGE UP (ref 98–107)
CO2 BLDV-SCNC: 24.9 MMOL/L — SIGNIFICANT CHANGE UP (ref 22–26)
CO2 BLDV-SCNC: 24.9 MMOL/L — SIGNIFICANT CHANGE UP (ref 22–26)
CO2 SERPL-SCNC: 21 MMOL/L — LOW (ref 22–31)
CO2 SERPL-SCNC: 21 MMOL/L — LOW (ref 22–31)
CREAT SERPL-MCNC: 0.79 MG/DL — SIGNIFICANT CHANGE UP (ref 0.5–1.3)
CREAT SERPL-MCNC: 0.79 MG/DL — SIGNIFICANT CHANGE UP (ref 0.5–1.3)
EGFR: 76 ML/MIN/1.73M2 — SIGNIFICANT CHANGE UP
EGFR: 76 ML/MIN/1.73M2 — SIGNIFICANT CHANGE UP
EOSINOPHIL # BLD AUTO: 0.22 K/UL — SIGNIFICANT CHANGE UP (ref 0–0.5)
EOSINOPHIL # BLD AUTO: 0.22 K/UL — SIGNIFICANT CHANGE UP (ref 0–0.5)
EOSINOPHIL NFR BLD AUTO: 3.3 % — SIGNIFICANT CHANGE UP (ref 0–6)
EOSINOPHIL NFR BLD AUTO: 3.3 % — SIGNIFICANT CHANGE UP (ref 0–6)
GAS PNL BLDV: 135 MMOL/L — LOW (ref 136–145)
GAS PNL BLDV: 135 MMOL/L — LOW (ref 136–145)
GLUCOSE BLDC GLUCOMTR-MCNC: 184 MG/DL — HIGH (ref 70–99)
GLUCOSE BLDC GLUCOMTR-MCNC: 184 MG/DL — HIGH (ref 70–99)
GLUCOSE BLDC GLUCOMTR-MCNC: 93 MG/DL — SIGNIFICANT CHANGE UP (ref 70–99)
GLUCOSE BLDC GLUCOMTR-MCNC: 93 MG/DL — SIGNIFICANT CHANGE UP (ref 70–99)
GLUCOSE BLDV-MCNC: 102 MG/DL — HIGH (ref 70–99)
GLUCOSE BLDV-MCNC: 102 MG/DL — HIGH (ref 70–99)
GLUCOSE SERPL-MCNC: 100 MG/DL — HIGH (ref 70–99)
GLUCOSE SERPL-MCNC: 100 MG/DL — HIGH (ref 70–99)
HCO3 BLDV-SCNC: 24 MMOL/L — SIGNIFICANT CHANGE UP (ref 22–29)
HCO3 BLDV-SCNC: 24 MMOL/L — SIGNIFICANT CHANGE UP (ref 22–29)
HCT VFR BLD CALC: 29 % — LOW (ref 34.5–45)
HCT VFR BLD CALC: 29 % — LOW (ref 34.5–45)
HCT VFR BLD CALC: 33.2 % — LOW (ref 34.5–45)
HCT VFR BLD CALC: 33.2 % — LOW (ref 34.5–45)
HCT VFR BLDA CALC: 36 % — SIGNIFICANT CHANGE UP (ref 34.5–46.5)
HCT VFR BLDA CALC: 36 % — SIGNIFICANT CHANGE UP (ref 34.5–46.5)
HGB BLD CALC-MCNC: 12 G/DL — SIGNIFICANT CHANGE UP (ref 11.7–16.1)
HGB BLD CALC-MCNC: 12 G/DL — SIGNIFICANT CHANGE UP (ref 11.7–16.1)
HGB BLD-MCNC: 11.1 G/DL — LOW (ref 11.5–15.5)
HGB BLD-MCNC: 11.1 G/DL — LOW (ref 11.5–15.5)
HGB BLD-MCNC: 9.5 G/DL — LOW (ref 11.5–15.5)
HGB BLD-MCNC: 9.5 G/DL — LOW (ref 11.5–15.5)
IANC: 4.53 K/UL — SIGNIFICANT CHANGE UP (ref 1.8–7.4)
IANC: 4.53 K/UL — SIGNIFICANT CHANGE UP (ref 1.8–7.4)
IMM GRANULOCYTES NFR BLD AUTO: 0.6 % — SIGNIFICANT CHANGE UP (ref 0–0.9)
IMM GRANULOCYTES NFR BLD AUTO: 0.6 % — SIGNIFICANT CHANGE UP (ref 0–0.9)
INR BLD: 1.2 RATIO — HIGH (ref 0.85–1.18)
INR BLD: 1.2 RATIO — HIGH (ref 0.85–1.18)
LACTATE BLDV-MCNC: 2.1 MMOL/L — HIGH (ref 0.5–2)
LACTATE BLDV-MCNC: 2.1 MMOL/L — HIGH (ref 0.5–2)
LYMPHOCYTES # BLD AUTO: 1.37 K/UL — SIGNIFICANT CHANGE UP (ref 1–3.3)
LYMPHOCYTES # BLD AUTO: 1.37 K/UL — SIGNIFICANT CHANGE UP (ref 1–3.3)
LYMPHOCYTES # BLD AUTO: 20.4 % — SIGNIFICANT CHANGE UP (ref 13–44)
LYMPHOCYTES # BLD AUTO: 20.4 % — SIGNIFICANT CHANGE UP (ref 13–44)
MAGNESIUM SERPL-MCNC: 2.3 MG/DL — SIGNIFICANT CHANGE UP (ref 1.6–2.6)
MAGNESIUM SERPL-MCNC: 2.3 MG/DL — SIGNIFICANT CHANGE UP (ref 1.6–2.6)
MCHC RBC-ENTMCNC: 29.8 PG — SIGNIFICANT CHANGE UP (ref 27–34)
MCHC RBC-ENTMCNC: 32.8 GM/DL — SIGNIFICANT CHANGE UP (ref 32–36)
MCHC RBC-ENTMCNC: 32.8 GM/DL — SIGNIFICANT CHANGE UP (ref 32–36)
MCHC RBC-ENTMCNC: 33.4 GM/DL — SIGNIFICANT CHANGE UP (ref 32–36)
MCHC RBC-ENTMCNC: 33.4 GM/DL — SIGNIFICANT CHANGE UP (ref 32–36)
MCV RBC AUTO: 89.2 FL — SIGNIFICANT CHANGE UP (ref 80–100)
MCV RBC AUTO: 89.2 FL — SIGNIFICANT CHANGE UP (ref 80–100)
MCV RBC AUTO: 90.9 FL — SIGNIFICANT CHANGE UP (ref 80–100)
MCV RBC AUTO: 90.9 FL — SIGNIFICANT CHANGE UP (ref 80–100)
MONOCYTES # BLD AUTO: 0.51 K/UL — SIGNIFICANT CHANGE UP (ref 0–0.9)
MONOCYTES # BLD AUTO: 0.51 K/UL — SIGNIFICANT CHANGE UP (ref 0–0.9)
MONOCYTES NFR BLD AUTO: 7.6 % — SIGNIFICANT CHANGE UP (ref 2–14)
MONOCYTES NFR BLD AUTO: 7.6 % — SIGNIFICANT CHANGE UP (ref 2–14)
NEUTROPHILS # BLD AUTO: 4.53 K/UL — SIGNIFICANT CHANGE UP (ref 1.8–7.4)
NEUTROPHILS # BLD AUTO: 4.53 K/UL — SIGNIFICANT CHANGE UP (ref 1.8–7.4)
NEUTROPHILS NFR BLD AUTO: 67.2 % — SIGNIFICANT CHANGE UP (ref 43–77)
NEUTROPHILS NFR BLD AUTO: 67.2 % — SIGNIFICANT CHANGE UP (ref 43–77)
NRBC # BLD: 0 /100 WBCS — SIGNIFICANT CHANGE UP (ref 0–0)
NRBC # FLD: 0.03 K/UL — HIGH (ref 0–0)
NT-PROBNP SERPL-SCNC: 8329 PG/ML — HIGH
NT-PROBNP SERPL-SCNC: 8329 PG/ML — HIGH
PCO2 BLDV: 35 MMHG — LOW (ref 39–52)
PCO2 BLDV: 35 MMHG — LOW (ref 39–52)
PH BLDV: 7.44 — HIGH (ref 7.32–7.43)
PH BLDV: 7.44 — HIGH (ref 7.32–7.43)
PLATELET # BLD AUTO: 285 K/UL — SIGNIFICANT CHANGE UP (ref 150–400)
PLATELET # BLD AUTO: 285 K/UL — SIGNIFICANT CHANGE UP (ref 150–400)
PLATELET # BLD AUTO: 337 K/UL — SIGNIFICANT CHANGE UP (ref 150–400)
PLATELET # BLD AUTO: 337 K/UL — SIGNIFICANT CHANGE UP (ref 150–400)
PO2 BLDV: <20 MMHG — LOW (ref 25–45)
PO2 BLDV: <20 MMHG — LOW (ref 25–45)
POTASSIUM BLDV-SCNC: 4.4 MMOL/L — SIGNIFICANT CHANGE UP (ref 3.5–5.1)
POTASSIUM BLDV-SCNC: 4.4 MMOL/L — SIGNIFICANT CHANGE UP (ref 3.5–5.1)
POTASSIUM SERPL-MCNC: 4.3 MMOL/L — SIGNIFICANT CHANGE UP (ref 3.5–5.3)
POTASSIUM SERPL-MCNC: 4.3 MMOL/L — SIGNIFICANT CHANGE UP (ref 3.5–5.3)
POTASSIUM SERPL-SCNC: 4.3 MMOL/L — SIGNIFICANT CHANGE UP (ref 3.5–5.3)
POTASSIUM SERPL-SCNC: 4.3 MMOL/L — SIGNIFICANT CHANGE UP (ref 3.5–5.3)
PROT SERPL-MCNC: 7 G/DL — SIGNIFICANT CHANGE UP (ref 6–8.3)
PROT SERPL-MCNC: 7 G/DL — SIGNIFICANT CHANGE UP (ref 6–8.3)
PROTHROM AB SERPL-ACNC: 13.4 SEC — HIGH (ref 9.5–13)
PROTHROM AB SERPL-ACNC: 13.4 SEC — HIGH (ref 9.5–13)
RBC # BLD: 3.19 M/UL — LOW (ref 3.8–5.2)
RBC # BLD: 3.19 M/UL — LOW (ref 3.8–5.2)
RBC # BLD: 3.72 M/UL — LOW (ref 3.8–5.2)
RBC # BLD: 3.72 M/UL — LOW (ref 3.8–5.2)
RBC # FLD: 14.2 % — SIGNIFICANT CHANGE UP (ref 10.3–14.5)
RBC # FLD: 14.2 % — SIGNIFICANT CHANGE UP (ref 10.3–14.5)
RBC # FLD: 14.3 % — SIGNIFICANT CHANGE UP (ref 10.3–14.5)
RBC # FLD: 14.3 % — SIGNIFICANT CHANGE UP (ref 10.3–14.5)
SAO2 % BLDV: 13.5 % — LOW (ref 67–88)
SAO2 % BLDV: 13.5 % — LOW (ref 67–88)
SODIUM SERPL-SCNC: 137 MMOL/L — SIGNIFICANT CHANGE UP (ref 135–145)
SODIUM SERPL-SCNC: 137 MMOL/L — SIGNIFICANT CHANGE UP (ref 135–145)
TROPONIN T, HIGH SENSITIVITY RESULT: 856 NG/L — CRITICAL HIGH
TROPONIN T, HIGH SENSITIVITY RESULT: 856 NG/L — CRITICAL HIGH
TROPONIN T, HIGH SENSITIVITY RESULT: 862 NG/L — CRITICAL HIGH
TROPONIN T, HIGH SENSITIVITY RESULT: 862 NG/L — CRITICAL HIGH
WBC # BLD: 6.25 K/UL — SIGNIFICANT CHANGE UP (ref 3.8–10.5)
WBC # BLD: 6.25 K/UL — SIGNIFICANT CHANGE UP (ref 3.8–10.5)
WBC # BLD: 6.73 K/UL — SIGNIFICANT CHANGE UP (ref 3.8–10.5)
WBC # BLD: 6.73 K/UL — SIGNIFICANT CHANGE UP (ref 3.8–10.5)
WBC # FLD AUTO: 6.25 K/UL — SIGNIFICANT CHANGE UP (ref 3.8–10.5)
WBC # FLD AUTO: 6.25 K/UL — SIGNIFICANT CHANGE UP (ref 3.8–10.5)
WBC # FLD AUTO: 6.73 K/UL — SIGNIFICANT CHANGE UP (ref 3.8–10.5)
WBC # FLD AUTO: 6.73 K/UL — SIGNIFICANT CHANGE UP (ref 3.8–10.5)

## 2023-11-27 PROCEDURE — 93454 CORONARY ARTERY ANGIO S&I: CPT | Mod: 26

## 2023-11-27 PROCEDURE — 99223 1ST HOSP IP/OBS HIGH 75: CPT

## 2023-11-27 PROCEDURE — 99285 EMERGENCY DEPT VISIT HI MDM: CPT | Mod: GC

## 2023-11-27 PROCEDURE — 99152 MOD SED SAME PHYS/QHP 5/>YRS: CPT

## 2023-11-27 PROCEDURE — 99223 1ST HOSP IP/OBS HIGH 75: CPT | Mod: 25

## 2023-11-27 PROCEDURE — 71045 X-RAY EXAM CHEST 1 VIEW: CPT | Mod: 26

## 2023-11-27 PROCEDURE — 99214 OFFICE O/P EST MOD 30 MIN: CPT

## 2023-11-27 RX ORDER — DEXTROSE 50 % IN WATER 50 %
25 SYRINGE (ML) INTRAVENOUS ONCE
Refills: 0 | Status: DISCONTINUED | OUTPATIENT
Start: 2023-11-27 | End: 2023-12-06

## 2023-11-27 RX ORDER — HEPARIN SODIUM 5000 [USP'U]/ML
4400 INJECTION INTRAVENOUS; SUBCUTANEOUS EVERY 6 HOURS
Refills: 0 | Status: DISCONTINUED | OUTPATIENT
Start: 2023-11-27 | End: 2023-11-28

## 2023-11-27 RX ORDER — LISINOPRIL 2.5 MG/1
5 TABLET ORAL DAILY
Refills: 0 | Status: DISCONTINUED | OUTPATIENT
Start: 2023-11-27 | End: 2023-11-28

## 2023-11-27 RX ORDER — INSULIN LISPRO 100/ML
VIAL (ML) SUBCUTANEOUS
Refills: 0 | Status: DISCONTINUED | OUTPATIENT
Start: 2023-11-27 | End: 2023-12-06

## 2023-11-27 RX ORDER — DEXTROSE 50 % IN WATER 50 %
12.5 SYRINGE (ML) INTRAVENOUS ONCE
Refills: 0 | Status: DISCONTINUED | OUTPATIENT
Start: 2023-11-27 | End: 2023-12-06

## 2023-11-27 RX ORDER — ACETAMINOPHEN 500 MG
650 TABLET ORAL EVERY 6 HOURS
Refills: 0 | Status: DISCONTINUED | OUTPATIENT
Start: 2023-11-27 | End: 2023-12-06

## 2023-11-27 RX ORDER — ATORVASTATIN CALCIUM 80 MG/1
80 TABLET, FILM COATED ORAL AT BEDTIME
Refills: 0 | Status: DISCONTINUED | OUTPATIENT
Start: 2023-11-27 | End: 2023-12-06

## 2023-11-27 RX ORDER — FUROSEMIDE 40 MG
40 TABLET ORAL DAILY
Refills: 0 | Status: DISCONTINUED | OUTPATIENT
Start: 2023-11-27 | End: 2023-11-28

## 2023-11-27 RX ORDER — ASPIRIN/CALCIUM CARB/MAGNESIUM 324 MG
324 TABLET ORAL ONCE
Refills: 0 | Status: COMPLETED | OUTPATIENT
Start: 2023-11-27 | End: 2023-11-27

## 2023-11-27 RX ORDER — GLUCAGON INJECTION, SOLUTION 0.5 MG/.1ML
1 INJECTION, SOLUTION SUBCUTANEOUS ONCE
Refills: 0 | Status: DISCONTINUED | OUTPATIENT
Start: 2023-11-27 | End: 2023-12-06

## 2023-11-27 RX ORDER — DEXTROSE 50 % IN WATER 50 %
15 SYRINGE (ML) INTRAVENOUS ONCE
Refills: 0 | Status: DISCONTINUED | OUTPATIENT
Start: 2023-11-27 | End: 2023-12-06

## 2023-11-27 RX ORDER — HEPARIN SODIUM 5000 [USP'U]/ML
INJECTION INTRAVENOUS; SUBCUTANEOUS
Qty: 25000 | Refills: 0 | Status: DISCONTINUED | OUTPATIENT
Start: 2023-11-27 | End: 2023-11-27

## 2023-11-27 RX ORDER — ASPIRIN/CALCIUM CARB/MAGNESIUM 324 MG
81 TABLET ORAL DAILY
Refills: 0 | Status: DISCONTINUED | OUTPATIENT
Start: 2023-11-28 | End: 2023-12-01

## 2023-11-27 RX ORDER — SODIUM CHLORIDE 9 MG/ML
1000 INJECTION, SOLUTION INTRAVENOUS
Refills: 0 | Status: DISCONTINUED | OUTPATIENT
Start: 2023-11-27 | End: 2023-12-06

## 2023-11-27 RX ORDER — ONDANSETRON 8 MG/1
4 TABLET, FILM COATED ORAL EVERY 8 HOURS
Refills: 0 | Status: DISCONTINUED | OUTPATIENT
Start: 2023-11-27 | End: 2023-12-06

## 2023-11-27 RX ORDER — HEPARIN SODIUM 5000 [USP'U]/ML
4400 INJECTION INTRAVENOUS; SUBCUTANEOUS ONCE
Refills: 0 | Status: COMPLETED | OUTPATIENT
Start: 2023-11-27 | End: 2023-11-27

## 2023-11-27 RX ORDER — CLOPIDOGREL BISULFATE 75 MG/1
300 TABLET, FILM COATED ORAL ONCE
Refills: 0 | Status: COMPLETED | OUTPATIENT
Start: 2023-11-27 | End: 2023-11-27

## 2023-11-27 RX ORDER — CLOPIDOGREL BISULFATE 75 MG/1
75 TABLET, FILM COATED ORAL DAILY
Refills: 0 | Status: DISCONTINUED | OUTPATIENT
Start: 2023-11-28 | End: 2023-12-06

## 2023-11-27 RX ORDER — HEPARIN SODIUM 5000 [USP'U]/ML
INJECTION INTRAVENOUS; SUBCUTANEOUS
Qty: 25000 | Refills: 0 | Status: DISCONTINUED | OUTPATIENT
Start: 2023-11-27 | End: 2023-11-28

## 2023-11-27 RX ORDER — LANOLIN ALCOHOL/MO/W.PET/CERES
3 CREAM (GRAM) TOPICAL AT BEDTIME
Refills: 0 | Status: DISCONTINUED | OUTPATIENT
Start: 2023-11-27 | End: 2023-12-06

## 2023-11-27 RX ADMIN — Medication 40 MILLIGRAM(S): at 21:09

## 2023-11-27 RX ADMIN — ATORVASTATIN CALCIUM 80 MILLIGRAM(S): 80 TABLET, FILM COATED ORAL at 23:37

## 2023-11-27 RX ADMIN — CLOPIDOGREL BISULFATE 300 MILLIGRAM(S): 75 TABLET, FILM COATED ORAL at 21:09

## 2023-11-27 RX ADMIN — HEPARIN SODIUM 1050 UNIT(S)/HR: 5000 INJECTION INTRAVENOUS; SUBCUTANEOUS at 20:29

## 2023-11-27 RX ADMIN — HEPARIN SODIUM 4400 UNIT(S): 5000 INJECTION INTRAVENOUS; SUBCUTANEOUS at 13:43

## 2023-11-27 RX ADMIN — HEPARIN SODIUM 1050 UNIT(S)/HR: 5000 INJECTION INTRAVENOUS; SUBCUTANEOUS at 19:52

## 2023-11-27 RX ADMIN — HEPARIN SODIUM 900 UNIT(S)/HR: 5000 INJECTION INTRAVENOUS; SUBCUTANEOUS at 13:43

## 2023-11-27 RX ADMIN — Medication 324 MILLIGRAM(S): at 13:05

## 2023-11-27 RX ADMIN — HEPARIN SODIUM 1050 UNIT(S)/HR: 5000 INJECTION INTRAVENOUS; SUBCUTANEOUS at 21:44

## 2023-11-27 NOTE — ED PROVIDER NOTE - ST/T WAVE
j elev 0.5-1.0mm in anterior leads without corresponding st dep Improved JAMIN in V4, V3, V2, No STEMI New 1mm JAMIN in v4, unchanged JAMIN v2,v3, no STEMI

## 2023-11-27 NOTE — ED ADULT NURSE REASSESSMENT NOTE - NS ED NURSE REASSESS COMMENT FT1
20g placed in the pts left AC. pt respirations even and unlabored. pt denies chest pain, SOB, headaches, dizziness, n/v/d, abdominal pain, or fever like symptoms. Heparin dosage checked at break coverage shift to verify correct dosing, Plan of care ongoing.

## 2023-11-27 NOTE — ED ADULT NURSE NOTE - PRO INTERPRETER NEED 2
Reason For Visit  AMAURY KWONG is here today for a nurse visit for TB Placement.      Allergies  No Known Drug Allergies    Vitals  Vital Signs    Recorded: 03Nov2018 12:09PM   Temperature 98.7 F, Oral     Screening  TB Screening:     Symptoms the patient presents with today: no symptoms   Reason TB test is being done today? work     Have you ever had a positive TB skin test or TB blood test? No.   Have you had a sever reaction to a TB skin test? No.   What country were you born in[place]   Have you traveled or lived ouside the [place] in the last 2 years? Yes.   -- If yes, where? [place].   Have you been in contact with someone who has TB disease? No.   Have you ever used injection drugs? No.   Do you have HIV/AIDS? No.   Do you have any diseases that could affect your immunes system such as cancer, leukemia or other? No.   Do you have diabetes? No.   Do you have severe kidney disease? No.   Are you underweight or do you have a disease which affects how you absorb food and nutrients? No.   Have you had the BCG vaccine? No.   Do you take any prescription medications? No.      Nurse Documentation    Pt here for PPD placement. TB screening questions answered and completed. PPD placed as ordered.PPD placed in RIGHT forearm., Patient was observed after administration and no side effects noted., Patient discharged to home.           Assessment   1. PPD screening test (Z11.1)    Plan    Patient Instructions Pt given verbal instructions to return in 48 -72 hrs for reading. Pt states she will come 11/05/18 after work. Informed patient to come before 6:30 pm for reading. Pt will bring form to complete for work.   Return to Clinic in 2 days for TB reading, .      Signatures   Electronically signed by : Nesha Banerjee R.N.; Nov  3 2018 12:21PM CST    
English

## 2023-11-27 NOTE — H&P ADULT - HISTORY OF PRESENT ILLNESS
78-year-old female with past medical history significant for hypertension, hyperlipidemia, diabetes on metformin presented with SOB. Patient developed SOB acutely on Tuesday  and since has been having WILLIS. She also reports to have intermittent chest pain on exertion. She had a dry cough as well, without any fevers, sore throat, sick contactsShe continued to have sx for the next few days so she saw her PCP who got an EKG that showed septal/anterior Q waves w/ possible JAMIN so she was sent ot the ED. In the ED she was HDS w/o any sx. Denies orthopnea, PND, LE swelling or active CP/SOB. Trop was 862 > 856 and BNP 8300 w/ Cr 0.79. CXR was clear. EKG showed anterolateral septal q waves with sub-1mm ST elevations, not meeting STEMI criteria

## 2023-11-27 NOTE — ED ADULT NURSE NOTE - OBJECTIVE STATEMENT
Patient arrives to room #22 AOx4, ambulatory. Hx of DMT2, HTN, HLD. c/o new onset exertional dyspnea since 11/21. Pt denies cardiac hx, hx of blood clots. States she started having SOB w/ walking a few feet, states rest helps improve symptoms. Denies chest pain, palpitations, dizziness, weakness. Denies recent travel or long car rides. No swelling noted LE or UE b/l. Denies HA, vision changes, n/v/d, constipation, fever, chills, numbness, tingling. Breathing even and unlabored on RA, no signs of dyspnea or respiratory distress. Neuro status intact. Skin warm, dry, intact, appropriate for race, No signs of cyanosis/pallor noted. Pulses b/l equal, cap refill <2 sec. EKG in chart, NSR w/ runs of PVCs on cardiac monitor.  20G IV placed in right ac. Labs drawn and sent. Vital signs as charted. Safety maintained, stretcher locked in lowest position with siderails up x2, call bell and personal items within reach. Pt hard of hearing- uses hearing aides but depends mostly on lip-reading to communicate as per pt. Patient pending

## 2023-11-27 NOTE — ED ADULT NURSE NOTE - NSFALLRISKINTERV_ED_ALL_ED
Assistance with ambulation/Communicate fall risk and risk factors to all staff, patient, and family/Provide visual cue: yellow wristband, yellow gown, etc/Reinforce activity limits and safety measures with patient and family/Call bell, personal items and telephone in reach/Instruct patient to call for assistance before getting out of bed/chair/stretcher/Non-slip footwear applied when patient is off stretcher/[place] to call system/Physically safe environment - no spills, clutter or unnecessary equipment/Purposeful Proactive Rounding/Room/bathroom lighting operational, light cord in reach

## 2023-11-27 NOTE — CONSULT NOTE ADULT - ASSESSMENT
Assessment and Plan:   78F with PMHx HTN, HLD, DM p/w shortness of breath on exertion. Pt states SOB started acutely last week while shopping. Pt with mild b/l rales and peripheral edema. EKG with anterolateral septal q waves with sub-1mm ST elevations, not meeting STEMI criteria. Trop 862>856. CXR without acute consolidation. No suspicion of acute ACS at this time however possibly had ischemic event a week ago. Possible atypical presentation in elderly female with hx of DM. Concern for new heart failure in setting of NSTEMI    #NSTEMI  #suspected new CHF   #HTN    Recommendations:  -treat for NSTEMI with DAPT, statin, bblocker  -diuresis with 40mg IV lasix, strict Is and Os, daily weights   -STAT Echo  -plan for cardiac cath on this admission     *** Recommendations are preliminary until cosigned by the attending.   Assessment and Plan:   78F with PMHx HTN, HLD, DM p/w shortness of breath on exertion. Pt states SOB started acutely last week while shopping. Pt with mild b/l rales and peripheral edema. EKG with anterolateral septal q waves with sub-1mm ST elevations, not meeting STEMI criteria. Trop 862>856. CXR without acute consolidation. No suspicion of acute ACS at this time however possibly had ischemic event a week ago. Possible atypical presentation in elderly female with hx of DM. Concern for new heart failure in setting of NSTEMI    #NSTEMI  #suspected new CHF   #HTN    Recommendations:  -treat for NSTEMI with DAPT, statin, bblocker  -diuresis with 40mg IV lasix, strict Is and Os, daily weights   -STAT Echo  -plan for cardiac cath within 48-72 hrs    *** Recommendations are preliminary until cosigned by the attending.   Assessment and Plan:   78F with PMHx HTN, HLD, DM p/w shortness of breath on exertion. Pt states SOB started acutely last week while shopping. Pt with mild b/l rales and peripheral edema. EKG with anterolateral septal q waves with sub-1mm ST elevations, not meeting STEMI criteria. Trop 862>856. CXR without acute consolidation. No suspicion of acute ACS at this time however possibly had ischemic event a week ago. Possible atypical presentation in elderly female with hx of DM. Concern for new heart failure in setting of NSTEMI    #NSTEMI  #suspected new CHF   #HTN    Recommendations:  - plan for LHC within the next 1-2d  - please get a TTE  - continue heparin gtt for 48hr or until LHC, which ever happens first  - please give plavix 300mg qd, continue 90mg qd following  - please give lasix 40mg IV now, and re-dose in the morning  - please continue ASA 81mg qd  - please start atrovastatin 40mg  - will plan to start BB after TTE  - strict I/Os, daily weights, K >4, Mg >2    *** Recommendations are preliminary until cosigned by the attending.   Assessment and Plan:   78F with PMHx HTN, HLD, DM p/w shortness of breath on exertion. Pt states SOB started acutely last week while shopping. Pt with mild b/l rales and peripheral edema. EKG with anterolateral septal q waves with sub-1mm ST elevations, not meeting STEMI criteria. Trop 862>856. CXR without acute consolidation. No suspicion of acute ACS at this time however possibly had ischemic event a week ago. Possible atypical presentation in elderly female with hx of DM. Concern for new heart failure in setting of NSTEMI    #NSTEMI  #suspected new CHF   #HTN    Recommendations:  - plan for LHC within the next 1-2d  - please get a TTE  - continue heparin gtt for 48hr or until LHC, which ever happens first  - please give plavix 300mg qd, continue 90mg qd following  - please give lasix 40mg IV now, and re-dose in the morning  - please continue ASA 81mg qd  - please start atrovastatin 40mg  - please get lipid panel, A1c and TSH  - will plan to start BB after TTE  - strict I/Os, daily weights, K >4, Mg >2    *** Recommendations are preliminary until cosigned by the attending.

## 2023-11-27 NOTE — H&P ADULT - NSHPREVIEWOFSYSTEMS_GEN_ALL_CORE
REVIEW OF SYSTEMS:    CONSTITUTIONAL: No weakness, fevers or chills  EYES/ENT: No visual changes;  No vertigo or throat pain   NECK: No pain or stiffness  RESPIRATORY: No cough, wheezing, hemoptysis; + shortness of breath  CARDIOVASCULAR: +chest pain  GASTROINTESTINAL: No abdominal or epigastric pain. No nausea, vomiting, or hematemesis; No diarrhea or constipation. No melena or hematochezia.  GENITOURINARY: No dysuria, frequency or hematuria  NEUROLOGICAL: No numbness or weakness  MUSCULOSKELETAL: No joint pain, no muscle ache   SKIN: No itching, burning, rashes, or lesions   All other review of systems is negative unless indicated above.

## 2023-11-27 NOTE — H&P ADULT - NSICDXPASTMEDICALHX_GEN_ALL_CORE_FT
PAST MEDICAL HISTORY:  DMII (diabetes mellitus, type 2)     [place] (hard of hearing)     HTN (hypertension)     Hyperlipidemia     Vitamin D deficiency

## 2023-11-27 NOTE — H&P ADULT - PROBLEM SELECTOR PLAN 1
Patient with SOB, found to have elevated trops and EKG changes   -consistent with NSTEMI   -Cardiology consulted, recs appreciated   -S/p heparin gtt, ASA and plavix load   -C/w DAPT and heparin gtt   -C/w Atorvastatin 80 mg daily   -F/u with TTE   -Plan for cath this admission   -C/w IV lasix 40 mg daily  -F/u with lipid panel and HgbA1c

## 2023-11-27 NOTE — H&P ADULT - ASSESSMENT
78-year-old female with past medical history significant for hypertension, hyperlipidemia, diabetes on metformin presented with SOB, found to have elevated trops and EKG changes, consistent with NSTEMI.

## 2023-11-27 NOTE — H&P ADULT - NSHPLABSRESULTS_GEN_ALL_CORE
9.5    6.25  )-----------( 285      ( 27 Nov 2023 19:30 )             29.0     Hgb Trend: 9.5<--, 11.1<--  11-27    137  |  103  |  25<H>  ----------------------------<  100<H>  4.3   |  21<L>  |  0.79    Ca    8.9      27 Nov 2023 10:56  Mg     2.30     11-27    TPro  7.0  /  Alb  3.8  /  TBili  0.3  /  DBili  x   /  AST  24  /  ALT  29  /  AlkPhos  65  11-27    Creatinine Trend: 0.79<--  PT/INR - ( 27 Nov 2023 10:56 )   PT: 13.4 sec;   INR: 1.20 ratio         PTT - ( 27 Nov 2023 19:30 )  PTT:47.1 sec      Urinalysis Basic - ( 27 Nov 2023 10:56 )    Color: x / Appearance: x / SG: x / pH: x  Gluc: 100 mg/dL / Ketone: x  / Bili: x / Urobili: x   Blood: x / Protein: x / Nitrite: x   Leuk Esterase: x / RBC: x / WBC x   Sq Epi: x / Non Sq Epi: x / Bacteria: x        TTE is ordered

## 2023-11-27 NOTE — ED PROVIDER NOTE - PROGRESS NOTE DETAILS
Juan Cruz, DO PGY-3: Patient's repeat EKG with 1 mm ST elevation in V4, will repeat EKG for third time. Juan Cruz, DO PGY-3: Received call from lab, troponin greater than 800.  Third EKG with improved ST elevation in V4 as well as V2, V3.  Will place patient on heparin drip, give full dose aspirin and consult cardiology. Stefan, Attending  Cardiology at bedside and report EKGs do not meet STEMI criteria. Report no plavix/brillinta load at this time and continue to trend troponins. Nikolay Blancas MD PGY2: Heparin initiated, repeat trop nonascending. Discussed with teledoc Brenna, to admit to Dr. Damaris vera.

## 2023-11-27 NOTE — H&P ADULT - PROBLEM SELECTOR PLAN 2
Exam concerning for volume overload in the setting of NSTEMI   -proBNP elevated   -Cardiology recs appreciated   -F/u with TTE   -C/w IV lasix 40 mg daily   -GDMT after TTE   -Plan for LHC within 24-48 hrs   -C/w telemonitoring

## 2023-11-27 NOTE — H&P ADULT - NSHPPHYSICALEXAM_GEN_ALL_CORE
Vital Signs Last 24 Hrs  T(C): 36.7 (27 Nov 2023 19:08), Max: 36.9 (27 Nov 2023 10:59)  T(F): 98 (27 Nov 2023 19:08), Max: 98.5 (27 Nov 2023 10:59)  HR: 80 (27 Nov 2023 19:08) (75 - 81)  BP: 109/62 (27 Nov 2023 19:08) (98/64 - 118/71)  BP(mean): --  RR: 20 (27 Nov 2023 19:08) (16 - 21)  SpO2: 100% (27 Nov 2023 19:08) (98% - 100%)    Parameters below as of 27 Nov 2023 19:08  Patient On (Oxygen Delivery Method): room air    GENERAL: NAD, well-developed  HEENT:  Atraumatic, Normocephalic, EOMI, PERRLA, conjunctiva and sclera clear, oral mucosa moist, clear w/o any exudate   NECK: Supple, No JVD  CHEST/LUNG: Clear to auscultation bilaterally; No wheeze  HEART: Regular rate and rhythm; No murmurs, rubs, or gallops  ABDOMEN: Soft, Nontender, Nondistended; Bowel sounds present  EXTREMITIES:  2+ Peripheral Pulses, No clubbing, cyanosis, or edema  PSYCH: AAOx3, normal affect  NEUROLOGY: non-focal, moving all extremities   SKIN: No rashes or lesions

## 2023-11-27 NOTE — CONSULT NOTE ADULT - SUBJECTIVE AND OBJECTIVE BOX
Cardiology Consult Note   [Please check [email or web address] password: [email or web address]" for cardiology service schedule and contact information]    History of Present Illness:   78-year-old female with past medical history significant for hypertension, hyperlipidemia, diabetes on metformin presenting for shortness of breath on exertion.  Patient states since Tuesday she has been experiencing shortness of breath with taking a few steps while walking, present united without any associated chest pain, diaphoresis, syncope, nausea or vomiting.  Has had a dry cough as well, without any fevers, sore throat, sick contacts.  No recent travel.  States she has not noticed any leg pain or swelling.  Has not seen a cardiologist in the past, no prior cardiac workup including echo or stress.  Cardiology consulted for concern for NSTEMI    EKG: anterolateral septal q waves with sub-1mm ST elevations, not meeting STEMI criteria       PMHx: reviewed, see below  SOCIAL HISTORY:  unchanged    MEDICATIONS:  heparin   Injectable 4400 Unit(s) IV Push every 6 hours PRN  heparin  Infusion.  Unit(s)/Hr IV Continuous <Continuous>        REVIEW OF SYSTEMS:  CV: chest pain (-), palpitation (-), orthopnea (-), PND (-), edema (-)  PULM: SOB (-), cough (-), wheezing (-), hemoptysis (-).   CONST: fever (-), chills (-) or fatigue (-)  GI: abdominal distension (-), abdominal pain (-) , nausea/vomiting (-), hematemesis, (-), melena (-), hematochezia (-)  : dysuria (-), frequency (-), hematuria (-).   NEURO: numbness (-), weakness (-), dizziness (-)  SKIN: itching (-), rash (-)  HEENT:  visual changes (-); vertigo or throat pain (-);  neck stiffness (-)     All other review of systems is negative unless indicated above.   -------------------------------------------------------------------------------------------  VITALS:  T(C): 36.9 (11-27-23 @ 10:59), Max: 36.9 (11-27-23 @ 10:59)  HR: 75 (11-27-23 @ 13:40) (75 - 81)  BP: 102/67 (11-27-23 @ 13:40) (98/64 - 102/67)  RR: 20 (11-27-23 @ 13:40) (16 - 20)  SpO2: 100% (11-27-23 @ 13:40) (98% - 100%)  Wt(kg): --  I&O's Summary      PHYSICAL EXAM:  GEN: NAD, sitting in bed  HEENT: NCAT, EOMI  CV: RRR, Ns1/s2, no m/r/g, JVP not elevated  RESP: CTA B/l, +mild b/l rales  ABD: soft, nt, nd  EXT: warm, 2+ pulses, 2+ b/l edema  SKIN: no visible lesions, rashes  NEURO: AAOx3, no focal deficits  PSYCH: Calm, cooperative    -------------------------------------------------------------------------------------------  LABS:                          11.1   6.73  )-----------( 337      ( 27 Nov 2023 10:56 )             33.2     11-27    137  |  103  |  25<H>  ----------------------------<  100<H>  4.3   |  21<L>  |  0.79    Ca    8.9      27 Nov 2023 10:56  Mg     2.30     11-27    TPro  7.0  /  Alb  3.8  /  TBili  0.3  /  DBili  x   /  AST  24  /  ALT  29  /  AlkPhos  65  11-27    PT/INR - ( 27 Nov 2023 10:56 )   PT: 13.4 sec;   INR: 1.20 ratio         PTT - ( 27 Nov 2023 10:56 )  PTT:26.7 sec  CARDIAC MARKERS ( 27 Nov 2023 13:27 )  856 ng/L / x     / x     / x     / x     / x      CARDIAC MARKERS ( 27 Nov 2023 10:56 )  862 ng/L / x     / x     / x     / x     / x          IMAGING:       ACC: [id number] EXAM:  XR CHEST PORTABLE URGENT 1V   ORDERED BY: DAVONTE MERLOS     PROCEDURE DATE:  11/27/2023          INTERPRETATION:  CLINICAL INDICATION: Chest pain    EXAM: Frontal radiograph of the chest.    COMPARISON: None available.    FINDINGS:  No focal consolidations.  There is no pleural effusion or pneumothorax.  The heart is normal in size  The visualized osseous structures demonstrate no acute pathology.  Right upper quadrant clips.    IMPRESSION:  No focal consolidations.    --- End of Report ---      PIPPA MITCHELL MD; Resident Radiologist  This document has been electronically signed.  JANE WHITMAN MD; Attending Interventional Radiologist  This document has been electronically signed. Nov 27 2023  2:45PM     Cardiology Consult Note   [Please check [email or web address] password: [email or web address]" for cardiology service schedule and contact information]    History of Present Illness:   78-year-old female with past medical history significant for hypertension, hyperlipidemia, diabetes on metformin presenting for shortness of breath on exertion found to have EKG changes from b/l and elevated trop for which cardiology is being consulted.    Pt states she developed relatviely acute onset SOB on Tuesday and since has been having WILLIS w/o CP, diaphoresis, syncope, nausea or vomiting.  Has had a dry cough as well, without any fevers, sore throat, sick contacts.  No recent travel.  States she has not noticed any leg pain or swelling.  She continued to have sx for the next few days so she saw her PCP who got an EKG that showed septal/anterior Q waves w/ possible JAMIN so she was sent ot the ED. She has not seen a cardiologist in the past, no prior cardiac workup including echo or stress.    In the ED she was HDS w/o any sx. Denies orthopnea, PND, LE swelling or active CP/SOB. Trop was 862 > 856 and BNP 8300 w/ Cr 0.79. CXR was clear. EKG showed anterolateral septal q waves with sub-1mm ST elevations, not meeting STEMI criteria    She was given  and started on a heparin gtt.    PMHx: reviewed, see below  SOCIAL HISTORY:  unchanged    MEDICATIONS:  heparin   Injectable 4400 Unit(s) IV Push every 6 hours PRN  heparin  Infusion.  Unit(s)/Hr IV Continuous <Continuous>        REVIEW OF SYSTEMS:  CV: chest pain (-), palpitation (-), orthopnea (-), PND (-), edema (-)  PULM: SOB (+), cough (-), wheezing (-), hemoptysis (-).   CONST: fever (-), chills (-) or fatigue (-)  GI: abdominal distension (-), abdominal pain (-) , nausea/vomiting (-), hematemesis, (-), melena (-), hematochezia (-)  : dysuria (-), frequency (-), hematuria (-).   NEURO: numbness (-), weakness (-), dizziness (-)  SKIN: itching (-), rash (-)  HEENT:  visual changes (-); vertigo or throat pain (-);  neck stiffness (-)     All other review of systems is negative unless indicated above.   -------------------------------------------------------------------------------------------  VITALS:  T(C): 36.9 (11-27-23 @ 10:59), Max: 36.9 (11-27-23 @ 10:59)  HR: 75 (11-27-23 @ 13:40) (75 - 81)  BP: 102/67 (11-27-23 @ 13:40) (98/64 - 102/67)  RR: 20 (11-27-23 @ 13:40) (16 - 20)  SpO2: 100% (11-27-23 @ 13:40) (98% - 100%)  Wt(kg): --  I&O's Summary      PHYSICAL EXAM:  GEN: NAD, sitting in bed  HEENT: NCAT, EOMI  CV: RRR, Ns1/s2, no m/r/g, JVP not elevated  RESP: +mild b/l rales  ABD: soft, nt, nd  EXT: warm, 2+ pulses, 2+ b/l edema  SKIN: no visible lesions, rashes  NEURO: AAOx3, no focal deficits  PSYCH: Calm, cooperative    -------------------------------------------------------------------------------------------  LABS:                          11.1   6.73  )-----------( 337      ( 27 Nov 2023 10:56 )             33.2     11-27    137  |  103  |  25<H>  ----------------------------<  100<H>  4.3   |  21<L>  |  0.79    Ca    8.9      27 Nov 2023 10:56  Mg     2.30     11-27    TPro  7.0  /  Alb  3.8  /  TBili  0.3  /  DBili  x   /  AST  24  /  ALT  29  /  AlkPhos  65  11-27    PT/INR - ( 27 Nov 2023 10:56 )   PT: 13.4 sec;   INR: 1.20 ratio         PTT - ( 27 Nov 2023 10:56 )  PTT:26.7 sec  CARDIAC MARKERS ( 27 Nov 2023 13:27 )  856 ng/L / x     / x     / x     / x     / x      CARDIAC MARKERS ( 27 Nov 2023 10:56 )  862 ng/L / x     / x     / x     / x     / x          IMAGING:       ACC: [id number] EXAM:  XR CHEST PORTABLE URGENT 1V   ORDERED BY: DAVONTE MERLOS     PROCEDURE DATE:  11/27/2023          INTERPRETATION:  CLINICAL INDICATION: Chest pain    EXAM: Frontal radiograph of the chest.    COMPARISON: None available.    FINDINGS:  No focal consolidations.  There is no pleural effusion or pneumothorax.  The heart is normal in size  The visualized osseous structures demonstrate no acute pathology.  Right upper quadrant clips.    IMPRESSION:  No focal consolidations.    --- End of Report ---      PIPPA MITCHELL MD; Resident Radiologist  This document has been electronically signed.  JANE WHITMAN MD; Attending Interventional Radiologist  This document has been electronically signed. Nov 27 2023  2:45PM

## 2023-11-27 NOTE — ED PROVIDER NOTE - CLINICAL SUMMARY MEDICAL DECISION MAKING FREE TEXT BOX
Patient is a 78-year-old female with past medical history significant for hypertension, hyperlipidemia, diabetes on metformin presenting for shortness of breath on exertion. Currently with vital signs significant for blood pressure in triage 98/64, nontachycardic, nonhypoxic on room air and afebrile.  Presenting for dyspnea on exertion for several days without corresponding chest pain or diaphoresis, no active chest pain currently.  With exam findings significant for lower extremity pitting edema, clear lung sounds.  Differential includes but not limited to new diagnosis of CHF  versus valvulopathy versus ACS given abnormal EKG, to repeat EKG.  Clinically unlikely to represent PE, less likely pneumonia without fever. To eval with labs, cxr, repeat ekg, likely admit for further cardiac eval. Patient is a 78-year-old female with past medical history significant for hypertension, hyperlipidemia, diabetes on metformin presenting for shortness of breath on exertion. Currently with vital signs significant for blood pressure in triage 98/64, nontachycardic, nonhypoxic on room air and afebrile.  Presenting for dyspnea on exertion for several days without corresponding chest pain or diaphoresis, no active chest pain currently.  With exam findings significant for lower extremity pitting edema, clear lung sounds.  Differential includes but not limited to new diagnosis of CHF  versus valvulopathy versus ACS given abnormal EKG, to repeat EKG.  Clinically unlikely to represent PE, less likely pneumonia without fever. To eval with labs, cxr, repeat ekg, likely admit for further cardiac eval.    Stefan, Attending  See Attestation

## 2023-11-27 NOTE — CONSULT NOTE ADULT - ATTENDING COMMENTS
Sudden onset dyspnea that started while patient was shopping for groceries 6 days prior to admission to   Troponin elevation ~800. ECG with anteroseptal Q wave with JAMIN <1 mm not meeting Stemi criteria   Exam consistent to ADHF  Start DAPT, heparin and high-intensity statin for ACS treatment  Echocardiogram   Plan for [facility] this admission

## 2023-11-27 NOTE — ED PROVIDER NOTE - OBJECTIVE STATEMENT
Patient is a 78-year-old female with past medical history significant for hypertension, hyperlipidemia, diabetes on metformin presenting for shortness of breath on exertion.  Patient states since Tuesday she has been experiencing shortness of breath with taking a few steps while walking, present united without any associated chest pain, diaphoresis, syncope, nausea or vomiting.  Has had a dry cough as well, without any fevers, sore throat, sick contacts.  No recent travel.  States she has not noticed any leg pain or swelling.  Has not seen a cardiologist in the past, no prior cardiac workup including echo or stress.

## 2023-11-27 NOTE — ED PROVIDER NOTE - PHYSICAL EXAMINATION
CONSTITUTIONAL: awake; in no acute distress.   SKIN: warm, dry  HEAD: Normocephalic; atraumatic.  EYES: no conjunctival injection. no scleral icterus  ENT: No nasal discharge; airway clear.  CARD: S1, S2 normal; no murmurs, gallops, or rubs. Regular rate and rhythm.   RESP: No wheezes, rales or rhonchi. Good air movement bilaterally.   ABD: soft ntnd, no guarding, no distention, no rigidity.   EXT: +BLE pitting edema to shins  NEURO: Alert, oriented, grossly unremarkable  PSYCH: Cooperative, appropriate.

## 2023-11-27 NOTE — ED ADULT NURSE REASSESSMENT NOTE - NS ED NURSE REASSESS COMMENT FT1
Patient remains at baseline mental status. Appears to be resting comfortably in stretcher, breathing even and unlabored on RA. Vital signs as charted. NAD noted at this time. Medications administered as per eMAR. IV site clean dry and intact. Maintained on heparin IV infusion. Safety maintained, stretcher locked in lowest position with siderails up x2, call bell and personal items within reach.

## 2023-11-27 NOTE — ED PROVIDER NOTE - ATTENDING CONTRIBUTION TO CARE
I performed a history and physical exam of the patient and discussed their management with the resident/fellow/ACP/student. I have reviewed the resident/fellow/ACP/student note and agree with the documented findings and plan of care, except as noted. I have personally performed a substantive portion of the visit including all aspects of the medical decision making. My medical decision making and observations are found above. Please refer to any progress notes for updates on clinical course.    78-year-old female with past medical history significant for hypertension, hyperlipidemia, DM2 on metformin presenting with shortness of breath on exertion.  Patient reports since Tuesday she has been having intermittent SOB after taking a few steps while walking with no associated LOC, chest pain, palpitations, diaphoresis, focal weakness, numbness/tingling.  Reports intermittent mild dry cough but denies any fever/chills, N/V/D, rashes, or changes in urination.  Denies any history of blood clots, leg swelling/pain, hemoptysis, recent travel/bedbound nature.  Never seen a cardiologist with no prior cardiac work-up including echo or stress.    Gen: WDWN, NAD, comfortable appearing, BP 98/64, afebrile   HEENT: No nasal discharge, mucous membranes moist,  CV: RRR, +S1/S2, no M/R/G, equal b/l radial pulses 2+  Resp: CTAB, no W/R/R, SPO2 >95% on RA, no increased WOB   GI: Abdomen soft non-distended, NTTP, no masses/organomegaly   MSK/Skin: No CVA tenderness, no open wounds, no bruising, mild LE symmetric edema with no calf TTP   Neuro: A&Ox4, moving all 4 extremities spontaneously, gross sensation intact in UE and LE BL  Psych: appropriate mood    MDM:   78-year-old female with past medical history significant for hypertension, hyperlipidemia, DM2, presenting with SOB on exertion, no chest pain, no increased work of breathing/hypoxia, mild cough but no infectious symptoms, afebrile, well-appearing, mild lower extremity symmetric edema with no calf TTP, no PE risk factors. EKG shows nonspecific ST changes/Q waves in anterior septal leads, no prior cardiac/echo/stress work-up, . Exam/history concerning for but not limited to ACS versus CHF versus pneumonia versus viral syndrome versus anemia versus metabolic derangement.  Will evaluate with cardiac enzymes, proBNP, basic labs, chest x-ray.  Patient high risk with high heart score and will require admission due to no prior cardiac work-up/exertional symptoms with minimal exertion I performed a history and physical exam of the patient and discussed their management with the resident/fellow/ACP/student. I have reviewed the resident/fellow/ACP/student note and agree with the documented findings and plan of care, except as noted. I have personally performed a substantive portion of the visit including all aspects of the medical decision making. My medical decision making and observations are found above. Please refer to any progress notes for updates on clinical course.    78-year-old female with past medical history significant for hypertension, hyperlipidemia, DM2 on metformin presenting with shortness of breath on exertion.  Patient reports since Tuesday she has been having intermittent SOB after taking a few steps while walking with no associated LOC, chest pain, palpitations, diaphoresis, focal weakness, numbness/tingling.  Reports intermittent mild dry cough but denies any fever/chills, N/V/D, rashes, or changes in urination.  Denies any history of blood clots, leg swelling/pain, hemoptysis, recent travel/bedbound nature.  Never seen a cardiologist with no prior cardiac work-up including echo or stress.    Gen: WDWN, NAD, comfortable appearing, BP 98/64, afebrile   HEENT: No nasal discharge, mucous membranes moist,  CV: RRR, +S1/S2, no M/R/G, equal b/l radial pulses 2+  Resp: CTAB, no W/R/R, SPO2 >95% on RA, no increased WOB   GI: Abdomen soft non-distended, NTTP, no masses/organomegaly   MSK/Skin: No CVA tenderness, no open wounds, no bruising, mild LE symmetric edema with no calf TTP   Neuro: A&Ox4, moving all 4 extremities spontaneously, gross sensation intact in UE and LE BL  Psych: appropriate mood    MDM:   78-year-old female with past medical history significant for hypertension, hyperlipidemia, DM2, presenting with SOB on exertion, no chest pain, no increased work of breathing/hypoxia, mild cough but no infectious symptoms, afebrile, well-appearing, mild lower extremity symmetric edema with no calf TTP, no PE risk factors. EKG shows nonspecific ST changes with no prior to compare to in anterior septal leads, no prior cardiac/echo/stress work-up, . Exam/history concerning for but not limited to ACS versus CHF versus pneumonia versus viral syndrome versus anemia versus metabolic derangement.  Will evaluate with cardiac enzymes, proBNP, basic labs, chest x-ray and rpt EKG.  Patient high risk with high heart score and will require admission due to no prior cardiac work-up/exertional symptoms with minimal exertion

## 2023-11-28 ENCOUNTER — TRANSCRIPTION ENCOUNTER (OUTPATIENT)
Age: 78
End: 2023-11-28

## 2023-11-28 LAB
A1C WITH ESTIMATED AVERAGE GLUCOSE RESULT: 6.4 % — HIGH (ref 4–5.6)
A1C WITH ESTIMATED AVERAGE GLUCOSE RESULT: 6.4 % — HIGH (ref 4–5.6)
ALBUMIN SERPL ELPH-MCNC: 3.3 G/DL — SIGNIFICANT CHANGE UP (ref 3.3–5)
ALBUMIN SERPL ELPH-MCNC: 3.3 G/DL — SIGNIFICANT CHANGE UP (ref 3.3–5)
ALP SERPL-CCNC: 63 U/L — SIGNIFICANT CHANGE UP (ref 40–120)
ALP SERPL-CCNC: 63 U/L — SIGNIFICANT CHANGE UP (ref 40–120)
ALT FLD-CCNC: 23 U/L — SIGNIFICANT CHANGE UP (ref 4–33)
ALT FLD-CCNC: 23 U/L — SIGNIFICANT CHANGE UP (ref 4–33)
ANION GAP SERPL CALC-SCNC: 14 MMOL/L — SIGNIFICANT CHANGE UP (ref 7–14)
ANION GAP SERPL CALC-SCNC: 14 MMOL/L — SIGNIFICANT CHANGE UP (ref 7–14)
APTT BLD: 46.7 SEC — HIGH (ref 24.5–35.6)
APTT BLD: 46.7 SEC — HIGH (ref 24.5–35.6)
APTT BLD: 55.9 SEC — HIGH (ref 24.5–35.6)
APTT BLD: 55.9 SEC — HIGH (ref 24.5–35.6)
AST SERPL-CCNC: 16 U/L — SIGNIFICANT CHANGE UP (ref 4–32)
AST SERPL-CCNC: 16 U/L — SIGNIFICANT CHANGE UP (ref 4–32)
BASOPHILS # BLD AUTO: 0.04 K/UL — SIGNIFICANT CHANGE UP (ref 0–0.2)
BASOPHILS # BLD AUTO: 0.04 K/UL — SIGNIFICANT CHANGE UP (ref 0–0.2)
BASOPHILS NFR BLD AUTO: 0.6 % — SIGNIFICANT CHANGE UP (ref 0–2)
BASOPHILS NFR BLD AUTO: 0.6 % — SIGNIFICANT CHANGE UP (ref 0–2)
BILIRUB SERPL-MCNC: 0.3 MG/DL — SIGNIFICANT CHANGE UP (ref 0.2–1.2)
BILIRUB SERPL-MCNC: 0.3 MG/DL — SIGNIFICANT CHANGE UP (ref 0.2–1.2)
BUN SERPL-MCNC: 26 MG/DL — HIGH (ref 7–23)
BUN SERPL-MCNC: 26 MG/DL — HIGH (ref 7–23)
CALCIUM SERPL-MCNC: 8.5 MG/DL — SIGNIFICANT CHANGE UP (ref 8.4–10.5)
CALCIUM SERPL-MCNC: 8.5 MG/DL — SIGNIFICANT CHANGE UP (ref 8.4–10.5)
CHLORIDE SERPL-SCNC: 103 MMOL/L — SIGNIFICANT CHANGE UP (ref 98–107)
CHLORIDE SERPL-SCNC: 103 MMOL/L — SIGNIFICANT CHANGE UP (ref 98–107)
CHOLEST SERPL-MCNC: 108 MG/DL — SIGNIFICANT CHANGE UP
CHOLEST SERPL-MCNC: 108 MG/DL — SIGNIFICANT CHANGE UP
CO2 SERPL-SCNC: 20 MMOL/L — LOW (ref 22–31)
CO2 SERPL-SCNC: 20 MMOL/L — LOW (ref 22–31)
CREAT SERPL-MCNC: 0.81 MG/DL — SIGNIFICANT CHANGE UP (ref 0.5–1.3)
CREAT SERPL-MCNC: 0.81 MG/DL — SIGNIFICANT CHANGE UP (ref 0.5–1.3)
EGFR: 74 ML/MIN/1.73M2 — SIGNIFICANT CHANGE UP
EGFR: 74 ML/MIN/1.73M2 — SIGNIFICANT CHANGE UP
EOSINOPHIL # BLD AUTO: 0.31 K/UL — SIGNIFICANT CHANGE UP (ref 0–0.5)
EOSINOPHIL # BLD AUTO: 0.31 K/UL — SIGNIFICANT CHANGE UP (ref 0–0.5)
EOSINOPHIL NFR BLD AUTO: 4.7 % — SIGNIFICANT CHANGE UP (ref 0–6)
EOSINOPHIL NFR BLD AUTO: 4.7 % — SIGNIFICANT CHANGE UP (ref 0–6)
ESTIMATED AVERAGE GLUCOSE: 137 — SIGNIFICANT CHANGE UP
ESTIMATED AVERAGE GLUCOSE: 137 — SIGNIFICANT CHANGE UP
GLUCOSE BLDC GLUCOMTR-MCNC: 198 MG/DL — HIGH (ref 70–99)
GLUCOSE BLDC GLUCOMTR-MCNC: 198 MG/DL — HIGH (ref 70–99)
GLUCOSE BLDC GLUCOMTR-MCNC: 210 MG/DL — HIGH (ref 70–99)
GLUCOSE BLDC GLUCOMTR-MCNC: 210 MG/DL — HIGH (ref 70–99)
GLUCOSE BLDC GLUCOMTR-MCNC: 213 MG/DL — HIGH (ref 70–99)
GLUCOSE BLDC GLUCOMTR-MCNC: 213 MG/DL — HIGH (ref 70–99)
GLUCOSE BLDC GLUCOMTR-MCNC: 253 MG/DL — HIGH (ref 70–99)
GLUCOSE BLDC GLUCOMTR-MCNC: 253 MG/DL — HIGH (ref 70–99)
GLUCOSE SERPL-MCNC: 237 MG/DL — HIGH (ref 70–99)
GLUCOSE SERPL-MCNC: 237 MG/DL — HIGH (ref 70–99)
HCT VFR BLD CALC: 31 % — LOW (ref 34.5–45)
HCT VFR BLD CALC: 31 % — LOW (ref 34.5–45)
HDLC SERPL-MCNC: 31 MG/DL — LOW
HDLC SERPL-MCNC: 31 MG/DL — LOW
HGB BLD-MCNC: 10.3 G/DL — LOW (ref 11.5–15.5)
HGB BLD-MCNC: 10.3 G/DL — LOW (ref 11.5–15.5)
IANC: 3.97 K/UL — SIGNIFICANT CHANGE UP (ref 1.8–7.4)
IANC: 3.97 K/UL — SIGNIFICANT CHANGE UP (ref 1.8–7.4)
IMM GRANULOCYTES NFR BLD AUTO: 0.5 % — SIGNIFICANT CHANGE UP (ref 0–0.9)
IMM GRANULOCYTES NFR BLD AUTO: 0.5 % — SIGNIFICANT CHANGE UP (ref 0–0.9)
LIPID PNL WITH DIRECT LDL SERPL: 54 MG/DL — SIGNIFICANT CHANGE UP
LIPID PNL WITH DIRECT LDL SERPL: 54 MG/DL — SIGNIFICANT CHANGE UP
LYMPHOCYTES # BLD AUTO: 1.69 K/UL — SIGNIFICANT CHANGE UP (ref 1–3.3)
LYMPHOCYTES # BLD AUTO: 1.69 K/UL — SIGNIFICANT CHANGE UP (ref 1–3.3)
LYMPHOCYTES # BLD AUTO: 25.8 % — SIGNIFICANT CHANGE UP (ref 13–44)
LYMPHOCYTES # BLD AUTO: 25.8 % — SIGNIFICANT CHANGE UP (ref 13–44)
MCHC RBC-ENTMCNC: 30.2 PG — SIGNIFICANT CHANGE UP (ref 27–34)
MCHC RBC-ENTMCNC: 30.2 PG — SIGNIFICANT CHANGE UP (ref 27–34)
MCHC RBC-ENTMCNC: 33.2 GM/DL — SIGNIFICANT CHANGE UP (ref 32–36)
MCHC RBC-ENTMCNC: 33.2 GM/DL — SIGNIFICANT CHANGE UP (ref 32–36)
MCV RBC AUTO: 90.9 FL — SIGNIFICANT CHANGE UP (ref 80–100)
MCV RBC AUTO: 90.9 FL — SIGNIFICANT CHANGE UP (ref 80–100)
MONOCYTES # BLD AUTO: 0.52 K/UL — SIGNIFICANT CHANGE UP (ref 0–0.9)
MONOCYTES # BLD AUTO: 0.52 K/UL — SIGNIFICANT CHANGE UP (ref 0–0.9)
MONOCYTES NFR BLD AUTO: 7.9 % — SIGNIFICANT CHANGE UP (ref 2–14)
MONOCYTES NFR BLD AUTO: 7.9 % — SIGNIFICANT CHANGE UP (ref 2–14)
NEUTROPHILS # BLD AUTO: 3.97 K/UL — SIGNIFICANT CHANGE UP (ref 1.8–7.4)
NEUTROPHILS # BLD AUTO: 3.97 K/UL — SIGNIFICANT CHANGE UP (ref 1.8–7.4)
NEUTROPHILS NFR BLD AUTO: 60.5 % — SIGNIFICANT CHANGE UP (ref 43–77)
NEUTROPHILS NFR BLD AUTO: 60.5 % — SIGNIFICANT CHANGE UP (ref 43–77)
NON HDL CHOLESTEROL: 77 MG/DL — SIGNIFICANT CHANGE UP
NON HDL CHOLESTEROL: 77 MG/DL — SIGNIFICANT CHANGE UP
NRBC # BLD: 0 /100 WBCS — SIGNIFICANT CHANGE UP (ref 0–0)
NRBC # BLD: 0 /100 WBCS — SIGNIFICANT CHANGE UP (ref 0–0)
NRBC # FLD: 0.02 K/UL — HIGH (ref 0–0)
NRBC # FLD: 0.02 K/UL — HIGH (ref 0–0)
PLATELET # BLD AUTO: 327 K/UL — SIGNIFICANT CHANGE UP (ref 150–400)
PLATELET # BLD AUTO: 327 K/UL — SIGNIFICANT CHANGE UP (ref 150–400)
POTASSIUM SERPL-MCNC: 3.8 MMOL/L — SIGNIFICANT CHANGE UP (ref 3.5–5.3)
POTASSIUM SERPL-MCNC: 3.8 MMOL/L — SIGNIFICANT CHANGE UP (ref 3.5–5.3)
POTASSIUM SERPL-SCNC: 3.8 MMOL/L — SIGNIFICANT CHANGE UP (ref 3.5–5.3)
POTASSIUM SERPL-SCNC: 3.8 MMOL/L — SIGNIFICANT CHANGE UP (ref 3.5–5.3)
PROT SERPL-MCNC: 6.6 G/DL — SIGNIFICANT CHANGE UP (ref 6–8.3)
PROT SERPL-MCNC: 6.6 G/DL — SIGNIFICANT CHANGE UP (ref 6–8.3)
RBC # BLD: 3.41 M/UL — LOW (ref 3.8–5.2)
RBC # BLD: 3.41 M/UL — LOW (ref 3.8–5.2)
RBC # FLD: 14.2 % — SIGNIFICANT CHANGE UP (ref 10.3–14.5)
RBC # FLD: 14.2 % — SIGNIFICANT CHANGE UP (ref 10.3–14.5)
SODIUM SERPL-SCNC: 137 MMOL/L — SIGNIFICANT CHANGE UP (ref 135–145)
SODIUM SERPL-SCNC: 137 MMOL/L — SIGNIFICANT CHANGE UP (ref 135–145)
TRIGL SERPL-MCNC: 117 MG/DL — SIGNIFICANT CHANGE UP
TRIGL SERPL-MCNC: 117 MG/DL — SIGNIFICANT CHANGE UP
TSH SERPL-MCNC: 2.02 UIU/ML — SIGNIFICANT CHANGE UP (ref 0.27–4.2)
TSH SERPL-MCNC: 2.02 UIU/ML — SIGNIFICANT CHANGE UP (ref 0.27–4.2)
WBC # BLD: 6.56 K/UL — SIGNIFICANT CHANGE UP (ref 3.8–10.5)
WBC # BLD: 6.56 K/UL — SIGNIFICANT CHANGE UP (ref 3.8–10.5)
WBC # FLD AUTO: 6.56 K/UL — SIGNIFICANT CHANGE UP (ref 3.8–10.5)
WBC # FLD AUTO: 6.56 K/UL — SIGNIFICANT CHANGE UP (ref 3.8–10.5)

## 2023-11-28 PROCEDURE — 99233 SBSQ HOSP IP/OBS HIGH 50: CPT

## 2023-11-28 RX ORDER — HEPARIN SODIUM 5000 [USP'U]/ML
3000 INJECTION INTRAVENOUS; SUBCUTANEOUS EVERY 6 HOURS
Refills: 0 | Status: DISCONTINUED | OUTPATIENT
Start: 2023-11-28 | End: 2023-12-02

## 2023-11-28 RX ORDER — LANOLIN ALCOHOL/MO/W.PET/CERES
3 CREAM (GRAM) TOPICAL ONCE
Refills: 0 | Status: DISCONTINUED | OUTPATIENT
Start: 2023-11-28 | End: 2023-12-06

## 2023-11-28 RX ORDER — LOSARTAN POTASSIUM 100 MG/1
12.5 TABLET, FILM COATED ORAL DAILY
Refills: 0 | Status: DISCONTINUED | OUTPATIENT
Start: 2023-11-28 | End: 2023-11-29

## 2023-11-28 RX ORDER — HEPARIN SODIUM 5000 [USP'U]/ML
1200 INJECTION INTRAVENOUS; SUBCUTANEOUS
Qty: 25000 | Refills: 0 | Status: DISCONTINUED | OUTPATIENT
Start: 2023-11-28 | End: 2023-12-02

## 2023-11-28 RX ORDER — HEPARIN SODIUM 5000 [USP'U]/ML
6000 INJECTION INTRAVENOUS; SUBCUTANEOUS EVERY 6 HOURS
Refills: 0 | Status: DISCONTINUED | OUTPATIENT
Start: 2023-11-28 | End: 2023-12-02

## 2023-11-28 RX ORDER — FUROSEMIDE 40 MG
40 TABLET ORAL
Refills: 0 | Status: DISCONTINUED | OUTPATIENT
Start: 2023-11-28 | End: 2023-11-29

## 2023-11-28 RX ADMIN — Medication 81 MILLIGRAM(S): at 12:28

## 2023-11-28 RX ADMIN — HEPARIN SODIUM 1200 UNIT(S)/HR: 5000 INJECTION INTRAVENOUS; SUBCUTANEOUS at 02:17

## 2023-11-28 RX ADMIN — HEPARIN SODIUM 1200 UNIT(S)/HR: 5000 INJECTION INTRAVENOUS; SUBCUTANEOUS at 20:01

## 2023-11-28 RX ADMIN — HEPARIN SODIUM 1200 UNIT(S)/HR: 5000 INJECTION INTRAVENOUS; SUBCUTANEOUS at 08:13

## 2023-11-28 RX ADMIN — Medication 40 MILLIGRAM(S): at 17:32

## 2023-11-28 RX ADMIN — Medication 2: at 08:35

## 2023-11-28 RX ADMIN — Medication 3: at 13:01

## 2023-11-28 RX ADMIN — CLOPIDOGREL BISULFATE 75 MILLIGRAM(S): 75 TABLET, FILM COATED ORAL at 12:29

## 2023-11-28 RX ADMIN — ATORVASTATIN CALCIUM 80 MILLIGRAM(S): 80 TABLET, FILM COATED ORAL at 23:05

## 2023-11-28 RX ADMIN — HEPARIN SODIUM 1200 UNIT(S)/HR: 5000 INJECTION INTRAVENOUS; SUBCUTANEOUS at 20:16

## 2023-11-28 RX ADMIN — Medication 2: at 17:33

## 2023-11-28 NOTE — DISCHARGE NOTE PROVIDER - NSDCFUSCHEDAPPT_GEN_ALL_CORE_FT
Adria Coe  [facility]  [address]  Scheduled Appointment: 12/13/2023     Adria Coe  [facility]  [address]  Scheduled Appointment: 12/13/2023     Adria Coe  [facility]  [address]  Scheduled Appointment: 12/13/2023

## 2023-11-28 NOTE — DISCHARGE NOTE PROVIDER - HOSPITAL COURSE
78-year-old female with past medical history significant for hypertension, hyperlipidemia, diabetes on metformin presented with SOB, found to have elevated trops and EKG changes, consistent with NSTEMI 78-year-old female with past medical history significant for hypertension, hyperlipidemia, diabetes on metformin presented with SOB, found to have elevated trops and EKG changes, consistent with NSTEMI.         Problem/Plan - 1:  ·  Problem: NSTEMI (non-ST elevation myocardial infarction).   ·  Plan: Patient with SOB, found to have elevated trops and EKG changes   Recommendations:   - please start empagliflozin 10mg qd, check w/ pharmacy for outpt insurance approval to continue at home ( pt declined secondary to cost)  - continue furosemide 40mg po BID  - continue warfarin for goal INR 2-3 (DC'd on 5 mg every evening) needs Coumadin INR check in 2-3 days , continue for LV thrombus  - continue clopidogrel 75mg qd  - continue sacubitril-valsartan 24-26mg BID, hold for SBP <90 (DC'd Lisinopril)  - continue metop succ 25mg qd, hold for HR <50  - continue spironolactone 12.5mg qd, hold for SBP <90  - continue atorvastatin 40mg  - stable for cardiology perspective for DC once INR is therapeutic, INR 2-3 , 2.3 on DC  - please ensure pt has cardiology f/up within 2w of DC      Patient seen and evaluated. Reviewed discharge medications with patient and attending. All new medications requiring new prescriptions were sent to the pharmacy of patient's choice. Reviewed need for prescription for previous home medications and new prescriptions sent if requested. Medically cleared/stable for discharge as per Dr. Thompson with appropriate follow up. Patient understands and agrees with plan of care. 78-year-old female with past medical history significant for hypertension, hyperlipidemia, diabetes on metformin presented with SOB, found to have elevated trops and EKG changes, consistent with NSTEMI.         NSTEMI (non-ST elevation myocardial infarction).   Patient with SOB, found to have elevated trops and EKG changes   Recommendations:   - please start empagliflozin 10mg qd, check w/ pharmacy for outpt insurance approval to continue at home ( pt declined secondary to cost)  - continue furosemide 40mg po BID  - continue warfarin for goal INR 2-3 (DC'd on 5 mg every evening) needs Coumadin INR check in 2-3 days , continue for LV thrombus  - continue clopidogrel 75mg qd  - continue sacubitril-valsartan 24-26mg BID, hold for SBP <90 (DC'd Lisinopril)  - continue metop succ 25mg qd, hold for HR <50  - continue spironolactone 12.5mg qd, hold for SBP <90  - continue atorvastatin 40mg  - stable for cardiology perspective for DC once INR is therapeutic, INR 2-3 , 2.3 on DC  - please ensure pt has cardiology f/up within 2w of DC      Patient seen and evaluated. Reviewed discharge medications with patient and attending. All new medications requiring new prescriptions were sent to the pharmacy of patient's choice. Reviewed need for prescription for previous home medications and new prescriptions sent if requested. Medically cleared/stable for discharge as per Dr. Thompson with appropriate follow up. Patient understands and agrees with plan of care.

## 2023-11-28 NOTE — PROGRESS NOTE ADULT - SUBJECTIVE AND OBJECTIVE BOX
Patient is a 78y old  Female who presents with a chief complaint of Chest pain, SOB (28 Nov 2023 15:33)    Date of servie : 11-28-23 @ 16:55  INTERVAL HPI/OVERNIGHT EVENTS:  T(C): 36.8 (11-28-23 @ 05:52), Max: 36.8 (11-28-23 @ 05:52)  HR: 78 (11-28-23 @ 05:52) (77 - 80)  BP: 100/55 (11-28-23 @ 05:52) (100/55 - 109/62)  RR: 18 (11-28-23 @ 05:52) (18 - 20)  SpO2: 100% (11-28-23 @ 05:52) (98% - 100%)  Wt(kg): --  I&O's Summary      LABS:                        10.3   6.56  )-----------( 327      ( 28 Nov 2023 06:00 )             31.0     11-28    137  |  103  |  26<H>  ----------------------------<  237<H>  3.8   |  20<L>  |  0.81    Ca    8.5      28 Nov 2023 06:00  Mg     2.30     11-27    TPro  6.6  /  Alb  3.3  /  TBili  0.3  /  DBili  x   /  AST  16  /  ALT  23  /  AlkPhos  63  11-28    PT/INR - ( 27 Nov 2023 10:56 )   PT: 13.4 sec;   INR: 1.20 ratio         PTT - ( 28 Nov 2023 09:00 )  PTT:55.9 sec  Urinalysis Basic - ( 28 Nov 2023 06:00 )    Color: x / Appearance: x / SG: x / pH: x  Gluc: 237 mg/dL / Ketone: x  / Bili: x / Urobili: x   Blood: x / Protein: x / Nitrite: x   Leuk Esterase: x / RBC: x / WBC x   Sq Epi: x / Non Sq Epi: x / Bacteria: x      CAPILLARY BLOOD GLUCOSE      POCT Blood Glucose.: 253 mg/dL (28 Nov 2023 12:36)  POCT Blood Glucose.: 210 mg/dL (28 Nov 2023 08:29)  POCT Blood Glucose.: 184 mg/dL (27 Nov 2023 21:20)  POCT Blood Glucose.: 93 mg/dL (27 Nov 2023 17:05)        Urinalysis Basic - ( 28 Nov 2023 06:00 )    Color: x / Appearance: x / SG: x / pH: x  Gluc: 237 mg/dL / Ketone: x  / Bili: x / Urobili: x   Blood: x / Protein: x / Nitrite: x   Leuk Esterase: x / RBC: x / WBC x   Sq Epi: x / Non Sq Epi: x / Bacteria: x        MEDICATIONS  (STANDING):  aspirin enteric coated 81 milliGRAM(s) Oral daily  atorvastatin 80 milliGRAM(s) Oral at bedtime  clopidogrel Tablet 75 milliGRAM(s) Oral daily  dextrose 5%. 1000 milliLiter(s) (50 mL/Hr) IV Continuous <Continuous>  dextrose 5%. 1000 milliLiter(s) (100 mL/Hr) IV Continuous <Continuous>  dextrose 50% Injectable 25 Gram(s) IV Push once  dextrose 50% Injectable 12.5 Gram(s) IV Push once  dextrose 50% Injectable 25 Gram(s) IV Push once  furosemide   Injectable 40 milliGRAM(s) IV Push two times a day  glucagon  Injectable 1 milliGRAM(s) IntraMuscular once  heparin  Infusion. 1200 Unit(s)/Hr (12 mL/Hr) IV Continuous <Continuous>  insulin lispro (ADMELOG) corrective regimen sliding scale   SubCutaneous three times a day before meals  losartan 12.5 milliGRAM(s) Oral daily    MEDICATIONS  (PRN):  acetaminophen     Tablet .. 650 milliGRAM(s) Oral every 6 hours PRN Temp greater or equal to 38C (100.4F), Mild Pain (1 - 3)  aluminum hydroxide/magnesium hydroxide/simethicone Suspension 30 milliLiter(s) Oral every 4 hours PRN Dyspepsia  dextrose Oral Gel 15 Gram(s) Oral once PRN Blood Glucose LESS THAN 70 milliGRAM(s)/deciliter  heparin   Injectable 6000 Unit(s) IV Push every 6 hours PRN For aPTT less than 40  heparin   Injectable 3000 Unit(s) IV Push every 6 hours PRN For aPTT between 40 - 57  melatonin 3 milliGRAM(s) Oral at bedtime PRN Insomnia  melatonin 3 milliGRAM(s) Oral once PRN Insomnia  ondansetron Injectable 4 milliGRAM(s) IV Push every 8 hours PRN Nausea and/or Vomiting          PHYSICAL EXAM:  GENERAL: NAD, well-groomed, well-developed  HEAD:  Atraumatic, Normocephalic  CHEST/LUNG: Clear to percussion bilaterally; No rales, rhonchi, wheezing, or rubs  HEART: Regular rate and rhythm; No murmurs, rubs, or gallops  ABDOMEN: Soft, Nontender, Nondistended; Bowel sounds present  EXTREMITIES:  2+ Peripheral Pulses, No clubbing, cyanosis, or edema  LYMPH: No lymphadenopathy noted  SKIN: No rashes or lesions    Care Discussed with Consultants/Other Providers [ ] YES  [ ] NO

## 2023-11-28 NOTE — DISCHARGE NOTE PROVIDER - CARE PROVIDER_API CALL
Adria Coe  Internal Medicine  [address] [facility] [address], [address]  [place] [address]  Phone: [phone]  Fax: [phone]  Follow Up Time:    Adria Coe  Internal Medicine  [address] [facility], [address]  [place] [address]  Phone: [phone]  Fax: [phone]  Follow Up Time:    Adria Coe  Internal Medicine  [address] [facility], [address]  [place] [address]  Phone: [phone]  Fax: [phone]  Follow Up Time:

## 2023-11-28 NOTE — DISCHARGE NOTE PROVIDER - NSDCCPCAREPLAN_GEN_ALL_CORE_FT
PRINCIPAL DISCHARGE DIAGNOSIS  Diagnosis: Dyspnea on exertion  Assessment and Plan of Treatment:

## 2023-11-28 NOTE — DISCHARGE NOTE PROVIDER - NSFOLLOWUPCLINICSTOKEN_GEN_ALL_ED_FT
757894: ||12/8/2023||00\01||False; [id number]: ||12/8/2023||00\01||False; [id number]: ||12/8/2023||00\01||False;

## 2023-11-28 NOTE — DISCHARGE NOTE PROVIDER - CARE PROVIDERS DIRECT ADDRESSES
[email or web address]

## 2023-11-28 NOTE — CHART NOTE - NSCHARTNOTEFT_GEN_A_CORE
Right radial site s/p LHC procedure is without bleeding or hematoma. Dressing dry, clean and intact. Vital signs stable. *Radial/fem/pedal* pulses palpable and symmetric.**No bruit heard at femoral site**. Sensation, strength, and ROM equal bilaterally. Patient denies chest pain, SOB, numbness, and tingling. Will continue to monitor.    ******CXR preliminary report w/o acute findings. Right radial site s/p LHC procedure is without bleeding or hematoma. Dressing dry, clean and intact. Vital signs stable. BL radial and brachial pulses palpable and symmetric. Sensation, strength, and ROM equal bilaterally. Patient denies chest pain, SOB, numbness, and tingling. Will continue to monitor. Right radial site s/p LHC procedure is without bleeding or hematoma. Dressing dry, clean and intact. Vital signs stable. BL radial and brachial pulses palpable and symmetric. Sensation, strength, and ROM equal bilaterally. Patient denies chest pain, SOB, numbness, and tingling. Hep gtt restarted by cardiology, will monitor site closely. Will continue to monitor.

## 2023-11-28 NOTE — DISCHARGE NOTE PROVIDER - NSDCMRMEDTOKEN_GEN_ALL_CORE_FT
lisinopril 5 mg oral tablet: 1 tab(s) orally once a day  metFORMIN 500 mg oral tablet: 1 tab(s) orally 2 times a day  simvastatin 40 mg oral tablet: 1 tab(s) orally once a day (at bedtime)   atorvastatin 80 mg oral tablet: 1 tab(s) orally once a day (at bedtime)  clopidogrel 75 mg oral tablet: 1 tab(s) orally once a day  furosemide 40 mg oral tablet: 1 tab(s) orally 2 times a day  metFORMIN 500 mg oral tablet: 1 tab(s) orally 2 times a day  metoprolol succinate 25 mg oral tablet, extended release: 1 tab(s) orally once a day  sacubitril-valsartan 24 mg-26 mg oral tablet: 1 tab(s) orally 2 times a day  spironolactone 25 mg oral tablet: 0.5 tab(s) orally once a day  warfarin 5 mg oral tablet: 1 tab(s) orally once a day (in the evening)

## 2023-11-28 NOTE — PROGRESS NOTE ADULT - ATTENDING COMMENTS
78F, postmenopausal, DM2 (HbA1c 6.4), presenting with NSTEMI c/b ADHF.   Echo reviewed personally by me-  shows severe segmental LV dysfunction with LV thrombus.   Start DAPT, heparin and high-intensity statin for ACS treatment  LHC today, will likely need viability study  Start Losartan 12.5 mg after cath, no BB at this time due to ADHF

## 2023-11-28 NOTE — PROGRESS NOTE ADULT - SUBJECTIVE AND OBJECTIVE BOX
Cardiology Consult Note   [Please check [email or web address] password: [email or web address]" for cardiology service schedule and contact information]    History of Present Illness:      78-year-old female with past medical history significant for hypertension, hyperlipidemia, diabetes on metformin presenting for shortness of breath on exertion found to have EKG changes from b/l and elevated trop for which cardiology is being consulted.    Pt states she developed relatviely acute onset SOB on Tuesday and since has been having WILLIS w/o CP, diaphoresis, syncope, nausea or vomiting.  Has had a dry cough as well, without any fevers, sore throat, sick contacts.  No recent travel.  States she has not noticed any leg pain or swelling.  She continued to have sx for the next few days so she saw her PCP who got an EKG that showed septal/anterior Q waves w/ possible JAMIN so she was sent ot the ED. She has not seen a cardiologist in the past, no prior cardiac workup including echo or stress.    In the ED she was HDS w/o any sx. Denies orthopnea, PND, LE swelling or active CP/SOB. Trop was 862 > 856 and BNP 8300 w/ Cr 0.79. CXR was clear. EKG showed anterolateral septal q waves with sub-1mm ST elevations, not meeting STEMI criteria. She was given  and started on a heparin gtt.      PMHx: reviewed, see below  SOCIAL HISTORY:  unchanged    MEDICATIONS:  aspirin enteric coated 81 milliGRAM(s) Oral daily  clopidogrel Tablet 75 milliGRAM(s) Oral daily  furosemide   Injectable 40 milliGRAM(s) IV Push daily  heparin   Injectable 4400 Unit(s) IV Push every 6 hours PRN  heparin  Infusion.  Unit(s)/Hr IV Continuous <Continuous>  lisinopril 5 milliGRAM(s) Oral daily        acetaminophen     Tablet .. 650 milliGRAM(s) Oral every 6 hours PRN  melatonin 3 milliGRAM(s) Oral at bedtime PRN  melatonin 3 milliGRAM(s) Oral once PRN  ondansetron Injectable 4 milliGRAM(s) IV Push every 8 hours PRN    aluminum hydroxide/magnesium hydroxide/simethicone Suspension 30 milliLiter(s) Oral every 4 hours PRN    atorvastatin 80 milliGRAM(s) Oral at bedtime  dextrose 50% Injectable 12.5 Gram(s) IV Push once  dextrose 50% Injectable 25 Gram(s) IV Push once  dextrose 50% Injectable 25 Gram(s) IV Push once  dextrose Oral Gel 15 Gram(s) Oral once PRN  glucagon  Injectable 1 milliGRAM(s) IntraMuscular once  insulin lispro (ADMELOG) corrective regimen sliding scale   SubCutaneous three times a day before meals    dextrose 5%. 1000 milliLiter(s) IV Continuous <Continuous>  dextrose 5%. 1000 milliLiter(s) IV Continuous <Continuous>      REVIEW OF SYSTEMS:  CV: chest pain (-), palpitation (-), orthopnea (-), PND (-), edema (-)  PULM: SOB (-), cough (-), wheezing (-), hemoptysis (-).   CONST: fever (-), chills (-) or fatigue (-)  GI: abdominal distension (-), abdominal pain (-) , nausea/vomiting (-), hematemesis, (-), melena (-), hematochezia (-)  : dysuria (-), frequency (-), hematuria (-).   NEURO: numbness (-), weakness (-), dizziness (-)  SKIN: itching (-), rash (-)  HEENT:  visual changes (-); vertigo or throat pain (-);  neck stiffness (-)     All other review of systems is negative unless indicated above.   -------------------------------------------------------------------------------------------  VITALS:  T(C): 36.8 (11-28-23 @ 05:52), Max: 36.9 (11-27-23 @ 10:59)  HR: 78 (11-28-23 @ 05:52) (75 - 81)  BP: 100/55 (11-28-23 @ 05:52) (98/64 - 118/71)  RR: 18 (11-28-23 @ 05:52) (16 - 21)  SpO2: 100% (11-28-23 @ 05:52) (98% - 100%)  Wt(kg): --  I&O's Summary      PHYSICAL EXAM:  GEN: NAD, sitting in bed  HEENT: NCAT, EOMI  CV: RRR, Ns1/s2, no m/r/g, JVP not elevated  RESP: CTA B/l, no w/r/r  ABD: soft, nt, nd  EXT: warm, 2+ pulses, no edema  SKIN: no visible lesions, rashes  NEURO: AAOx3, no focal deficits  PSYCH: Calm, cooperative    -------------------------------------------------------------------------------------------  LABS:                          10.3   6.56  )-----------( 327      ( 28 Nov 2023 06:00 )             31.0     11-28    137  |  103  |  26<H>  ----------------------------<  237<H>  3.8   |  20<L>  |  0.81    Ca    8.5      28 Nov 2023 06:00  Mg     2.30     11-27    TPro  6.6  /  Alb  3.3  /  TBili  0.3  /  DBili  x   /  AST  16  /  ALT  23  /  AlkPhos  63  11-28    PT/INR - ( 27 Nov 2023 10:56 )   PT: 13.4 sec;   INR: 1.20 ratio         PTT - ( 28 Nov 2023 01:47 )  PTT:46.7 sec  CARDIAC MARKERS ( 27 Nov 2023 13:27 )  856 ng/L / x     / x     / x     / x     / x      CARDIAC MARKERS ( 27 Nov 2023 10:56 )  862 ng/L / x     / x     / x     / x     / x            IMAGING:       ACC: [id number] EXAM:  XR CHEST PORTABLE URGENT 1V   ORDERED BY: DAVONTE MERLOS     PROCEDURE DATE:  11/27/2023          INTERPRETATION:  CLINICAL INDICATION: Chest pain    EXAM: Frontal radiograph of the chest.    COMPARISON: None available.    FINDINGS:  No focal consolidations.  There is no pleural effusion or pneumothorax.  The heart is normal in size  The visualized osseous structures demonstrate no acute pathology.  Right upper quadrant clips.    IMPRESSION:  No focal consolidations.    --- End of Report ---      PIPPA MITCHELL MD; Resident Radiologist  This document has been electronically signed.  JANE WHITMAN MD; Attending Interventional Radiologist  This document has been electronically signed. Nov 27 2023  2:45PM        ECHO:    TRANSTHORACIC ECHOCARDIOGRAM REPORT  ________________________________________________________________________________                                      _______       *Report Contains Critical Result*       Pt. Name:       YUSUF SAWYER Study Date:    11/28/2023  MRN:            [id number]           YOB: 1945  Accession #:    [id number]           Age:           78 years  Account#:       [id number]            Gender:        F  Heart Rate:                         Height: 63.00 in (160.02 cm)  Rhythm:                             Weight:        163.00 lb (73.94 kg)  Blood Pressure: 104/56 mmHg         BSA/BMI:       1.77 m² / 28.87 kg/m²  ________________________________________________________________________________________  Referring Physician:    Abdelrahman Owens MD  Interpreting Physician: Johan Perez M.D.  Primary Sonographer:    [facility]    CPT:               ECHO TTE WO CON COMP W DOPP - 07590.m  Indication(s):     Abnormal electrocardiogram ECG/EKG - R94.31; Dyspnea,                     unspecified - R06.00  Procedure:         Transthoracic echocardiogram with 2-D, M-mode and complete                     spectral and color flow Doppler.  Ordering Location: [facility]  Study Information: Image quality for this study is good.    _______________________________________________________________________________________     CONCLUSIONS:      1. The left ventricular cavity is normal. Left ventricular wall thickness is normal. Left ventricular systolic function is severely decreased with an ejection fraction visually estimated at 20 to 25%. There are regional wall motion abnormalities present. There is mild (grade 1) left ventricular diastolic dysfunction. There is a left ventricular thrombus. A large (~ 2.7 cm X 1.9 cm) left ventricular apical thrombus is visualized. Hypokinesis of the apex, mid to distal septum, and mid to distal anterior wall.   2. Normal right ventricular cavity size and systolic function.   3. Structurally normal mitral valve with normal leaflet excursion. There is calcification of the mitral valve annulus. There is mild to moderate mitral regurgitation.   4. No prior echocardiogram is available for comparison.    ________________________________________________________________________________________  *Report Contains Critical Result*  Critical Findings: Intracardiac Thrombus. Josiah Resendez M.D. was informed of the  findings by telephone on 11/28/2023 at 8:00:00 AM.    ________________________________________________________________________________________  FINDINGS:     Left Ventricle:  The left ventricular cavity is normal. Left ventricular wall thickness is normal. Left ventricular systolic function is severely decreased with an ejection fraction visually estimated at 20 to 25%. There are regional wall motion abnormalities present. There is mild (grade 1) left ventricular diastolic dysfunction. There is a left ventricular thrombus. A large (~ 2.7 cm X 1.9 cm) left ventricular apical thrombus is visualized. Hypokinesis of the apex, mid to distal septum, and mid to distal anterior wall.     Right Ventricle:  The right ventricular cavity is normal in size and normal systolic function. Tricuspid annular plane systolic excursion (TAPSE) is 1.6 cm (normal >=1.7 cm).     Left Atrium:  The left atrium is normal with an indexed volume of 32.43 ml/m².     Right Atrium:  The right atrium is normal in size.     Aortic Valve:  The aortic valve appears trileaflet with normal systolic excursion. There is calcification of the aortic valve leaflets. There is no evidence of aortic regurgitation.     Mitral Valve:  Structurally normal mitral valve with normal leaflet excursion. There is calcification of the mitral valve annulus. There is mild to moderate mitral regurgitation.     Tricuspid Valve:  Structurally normal tricuspid valve with normal leaflet excursion. There is mild tricuspid regurgitation.     Pulmonic Valve:  Structurally normal pulmonic valve with normal leaflet excursion. There is mild pulmonic regurgitation.     Aorta:  The aortic annulus and aortic root appear normal in size. The aortic root at the sinuses of Valsalva is normal in size, measuring 2.70 cm (indexed 1.52 cm/m²). The aortic diameter at the sinotubular junction is normal in size, measuring 2.1 cm. The ascending aorta diameter is normal in size, measuring 2.70 cm (indexed 1.52 cm/m²).     Pericardium:  No pericardial effusion seen.     Systemic Veins:  The inferior vena cava is normal in size (normal <2.1cm) with abnormal inspiratory collapse (abnormal <50%) consistent with mildly elevated right atrial pressure (~8, range 5-10mmHg).  ____________________________________________________________________  QUANTITATIVE DATA:  Left Ventricle Measurements: (Indexed to BSA)     IVSd (2D):   0.8 cm  LVPWd (2D):  0.8 cm  LVIDd (2D):  5.0 cm  LVIDs (2D):  4.0 cm  LV Mass:     136 g  76.6 g/m²  Visualized LV EF%: 20 to 25%     MV E Vmax:    0.68 m/s  MV A Vmax:    0.99 m/s  MV E/A:       0.68  e' lateral:   8.16 cm/s  e' medial:    5.22 cm/s  E/e' lateral: 8.28  E/e' medial:  12.95  E/e' Average: 10.10  MV DT:        194 msec    Aorta Measurements: (normal range) (Indexed to BSA)     Sinuses of Valsalva: 2.70 cm (2.7 - 3.3 cm)  Ao ST Junct:         2.1 cm  Ao Ascprox:         2.70 cm       Left Atrium Measurements: (Indexed to BSA)  LA Diam 2D:        3.80 cm  LA Vol s, MOD A4C: 48.00 ml.  LA Vol s, MOD A2C: 66.60 ml.  LA Vol s, MOD BP:  57.50 ml  32.43 ml/m²    Right Ventricle Measurements:     TAPSE:     1.6 cm  TV Larissa. S':       8.49 cm/s  RV Base (RVID1):  3.1 cm  RV Mid (RVID2):   2.3 cm  RV Major (RVID3): 8.3 cm       LVOT / RVOT/ Qp/Qs Data: (Indexed to BSA)  LVOT Diameter: 2.00 cm  LVOT Vmax:     0.79 m/s  LVOT VTI:      15.00 cm  LVOT SV:      47.1 ml  26.58 ml/m²    Mitral Valve Measurements:     MV E Vmax: 0.7 m/s         MR Vmax:          5.24 m/s  MV A Vmax: 1.0 m/s         MR VTI:           185.00 cm  MV E/A:    0.7             MR Mean Gradient: 57.0 mmHg      MR Peak Gradient: 109.8 mmHg                             MR PISA Radius:   0.60 cm                             MR Aliasing Adryan:  30.80 cm/s                             MR Regurg Fract:  0.52 %       Tricuspid Valve Measurements:     RA Pressure: 8 mmHg    ________________________________________________________________________________________  Electronically signed on 11/28/2023 at 8:32:36 AM by Johan Perez M.D.       *** Final ***     Cardiology Consult Note   [Please check [email or web address] password: "[email or web address]" for cardiology service schedule and contact information]    History of Present Illness:      78-year-old female with past medical history significant for hypertension, hyperlipidemia, diabetes on metformin presenting for shortness of breath on exertion found to have EKG changes from b/l and elevated trop for which cardiology is being consulted.    Pt states she developed relatviely acute onset SOB on Tuesday and since has been having WILLIS w/o CP, diaphoresis, syncope, nausea or vomiting.  Has had a dry cough as well, without any fevers, sore throat, sick contacts.  No recent travel.  States she has not noticed any leg pain or swelling.  She continued to have sx for the next few days so she saw her PCP who got an EKG that showed septal/anterior Q waves w/ possible JAMIN so she was sent ot the ED. She has not seen a cardiologist in the past, no prior cardiac workup including echo or stress.    In the ED she was HDS w/o any sx. Denies orthopnea, PND, LE swelling or active CP/SOB. Trop was 862 > 856 and BNP 8300 w/ Cr 0.79. CXR was clear. EKG showed anterolateral septal q waves with sub-1mm ST elevations, not meeting STEMI criteria. She was given  and started on a heparin gtt.      PMHx: reviewed, see below  SOCIAL HISTORY:  unchanged    MEDICATIONS:  aspirin enteric coated 81 milliGRAM(s) Oral daily  clopidogrel Tablet 75 milliGRAM(s) Oral daily  furosemide   Injectable 40 milliGRAM(s) IV Push daily  heparin   Injectable 4400 Unit(s) IV Push every 6 hours PRN  heparin  Infusion.  Unit(s)/Hr IV Continuous <Continuous>  lisinopril 5 milliGRAM(s) Oral daily        acetaminophen     Tablet .. 650 milliGRAM(s) Oral every 6 hours PRN  melatonin 3 milliGRAM(s) Oral at bedtime PRN  melatonin 3 milliGRAM(s) Oral once PRN  ondansetron Injectable 4 milliGRAM(s) IV Push every 8 hours PRN    aluminum hydroxide/magnesium hydroxide/simethicone Suspension 30 milliLiter(s) Oral every 4 hours PRN    atorvastatin 80 milliGRAM(s) Oral at bedtime  dextrose 50% Injectable 12.5 Gram(s) IV Push once  dextrose 50% Injectable 25 Gram(s) IV Push once  dextrose 50% Injectable 25 Gram(s) IV Push once  dextrose Oral Gel 15 Gram(s) Oral once PRN  glucagon  Injectable 1 milliGRAM(s) IntraMuscular once  insulin lispro (ADMELOG) corrective regimen sliding scale   SubCutaneous three times a day before meals    dextrose 5%. 1000 milliLiter(s) IV Continuous <Continuous>  dextrose 5%. 1000 milliLiter(s) IV Continuous <Continuous>      REVIEW OF SYSTEMS:  CV: chest pain (-), palpitation (-), orthopnea (-), PND (-), edema (-)  PULM: SOB (-), cough (-), wheezing (-), hemoptysis (-).   CONST: fever (-), chills (-) or fatigue (-)  GI: abdominal distension (-), abdominal pain (-) , nausea/vomiting (-), hematemesis, (-), melena (-), hematochezia (-)  : dysuria (-), frequency (-), hematuria (-).   NEURO: numbness (-), weakness (-), dizziness (-)  SKIN: itching (-), rash (-)  HEENT:  visual changes (-); vertigo or throat pain (-);  neck stiffness (-)     All other review of systems is negative unless indicated above.   -------------------------------------------------------------------------------------------  VITALS:  T(C): 36.8 (11-28-23 @ 05:52), Max: 36.9 (11-27-23 @ 10:59)  HR: 78 (11-28-23 @ 05:52) (75 - 81)  BP: 100/55 (11-28-23 @ 05:52) (98/64 - 118/71)  RR: 18 (11-28-23 @ 05:52) (16 - 21)  SpO2: 100% (11-28-23 @ 05:52) (98% - 100%)  Wt(kg): --  I&O's Summary      PHYSICAL EXAM:  GEN: NAD, sitting in bed  HEENT: NCAT, EOMI  CV: RRR, Ns1/s2, no m/r/g, JVP not elevated  RESP: +mild b/l rales, no wheezing   ABD: soft, nt, nd  EXT: warm, 2+ pulses, no edema  SKIN: no visible lesions, rashes  NEURO: AAOx3, no focal deficits  PSYCH: Calm, cooperative    -------------------------------------------------------------------------------------------  LABS:                          10.3   6.56  )-----------( 327      ( 28 Nov 2023 06:00 )             31.0     11-28    137  |  103  |  26<H>  ----------------------------<  237<H>  3.8   |  20<L>  |  0.81    Ca    8.5      28 Nov 2023 06:00  Mg     2.30     11-27    TPro  6.6  /  Alb  3.3  /  TBili  0.3  /  DBili  x   /  AST  16  /  ALT  23  /  AlkPhos  63  11-28    PT/INR - ( 27 Nov 2023 10:56 )   PT: 13.4 sec;   INR: 1.20 ratio         PTT - ( 28 Nov 2023 01:47 )  PTT:46.7 sec  CARDIAC MARKERS ( 27 Nov 2023 13:27 )  856 ng/L / x     / x     / x     / x     / x      CARDIAC MARKERS ( 27 Nov 2023 10:56 )  862 ng/L / x     / x     / x     / x     / x            IMAGING:       ACC: [id number] EXAM:  XR CHEST PORTABLE URGENT 1V   ORDERED BY: DAVONTE MERLOS     PROCEDURE DATE:  11/27/2023          INTERPRETATION:  CLINICAL INDICATION: Chest pain    EXAM: Frontal radiograph of the chest.    COMPARISON: None available.    FINDINGS:  No focal consolidations.  There is no pleural effusion or pneumothorax.  The heart is normal in size  The visualized osseous structures demonstrate no acute pathology.  Right upper quadrant clips.    IMPRESSION:  No focal consolidations.    --- End of Report ---      PIPPA MITCHELL MD; Resident Radiologist  This document has been electronically signed.  JANE WHITMAN MD; Attending Interventional Radiologist  This document has been electronically signed. Nov 27 2023  2:45PM        ECHO:    TRANSTHORACIC ECHOCARDIOGRAM REPORT  ________________________________________________________________________________                                      _______       *Report Contains Critical Result*       Pt. Name:       YUSUF SAWYER Study Date:    11/28/2023  MRN:            [id number]           YOB: 1945  Accession #:    [id number]           Age:           78 years  Account#:       [id number]            Gender:        F  Heart Rate:                         Height: 63.00 in (160.02 cm)  Rhythm:                             Weight:        163.00 lb (73.94 kg)  Blood Pressure: 104/56 mmHg         BSA/BMI:       1.77 m² / 28.87 kg/m²  ________________________________________________________________________________________  Referring Physician:    Abdelrahman Owens MD  Interpreting Physician: Johan Perez M.D.  Primary Sonographer:    [facility]    CPT:               ECHO TTE WO CON COMP W DOPP - 20789.m  Indication(s):     Abnormal electrocardiogram ECG/EKG - R94.31; Dyspnea,                     unspecified - R06.00  Procedure:         Transthoracic echocardiogram with 2-D, M-mode and complete                     spectral and color flow Doppler.  Ordering Location: [facility]  Study Information: Image quality for this study is good.    _______________________________________________________________________________________     CONCLUSIONS:      1. The left ventricular cavity is normal. Left ventricular wall thickness is normal. Left ventricular systolic function is severely decreased with an ejection fraction visually estimated at 20 to 25%. There are regional wall motion abnormalities present. There is mild (grade 1) left ventricular diastolic dysfunction. There is a left ventricular thrombus. A large (~ 2.7 cm X 1.9 cm) left ventricular apical thrombus is visualized. Hypokinesis of the apex, mid to distal septum, and mid to distal anterior wall.   2. Normal right ventricular cavity size and systolic function.   3. Structurally normal mitral valve with normal leaflet excursion. There is calcification of the mitral valve annulus. There is mild to moderate mitral regurgitation.   4. No prior echocardiogram is available for comparison.    ________________________________________________________________________________________  *Report Contains Critical Result*  Critical Findings: Intracardiac Thrombus. Josiah Resendez M.D. was informed of the  findings by telephone on 11/28/2023 at 8:00:00 AM.    ________________________________________________________________________________________  FINDINGS:     Left Ventricle:  The left ventricular cavity is normal. Left ventricular wall thickness is normal. Left ventricular systolic function is severely decreased with an ejection fraction visually estimated at 20 to 25%. There are regional wall motion abnormalities present. There is mild (grade 1) left ventricular diastolic dysfunction. There is a left ventricular thrombus. A large (~ 2.7 cm X 1.9 cm) left ventricular apical thrombus is visualized. Hypokinesis of the apex, mid to distal septum, and mid to distal anterior wall.     Right Ventricle:  The right ventricular cavity is normal in size and normal systolic function. Tricuspid annular plane systolic excursion (TAPSE) is 1.6 cm (normal >=1.7 cm).     Left Atrium:  The left atrium is normal with an indexed volume of 32.43 ml/m².     Right Atrium:  The right atrium is normal in size.     Aortic Valve:  The aortic valve appears trileaflet with normal systolic excursion. There is calcification of the aortic valve leaflets. There is no evidence of aortic regurgitation.     Mitral Valve:  Structurally normal mitral valve with normal leaflet excursion. There is calcification of the mitral valve annulus. There is mild to moderate mitral regurgitation.     Tricuspid Valve:  Structurally normal tricuspid valve with normal leaflet excursion. There is mild tricuspid regurgitation.     Pulmonic Valve:  Structurally normal pulmonic valve with normal leaflet excursion. There is mild pulmonic regurgitation.     Aorta:  The aortic annulus and aortic root appear normal in size. The aortic root at the sinuses of Valsalva is normal in size, measuring 2.70 cm (indexed 1.52 cm/m²). The aortic diameter at the sinotubular junction is normal in size, measuring 2.1 cm. The ascending aorta diameter is normal in size, measuring 2.70 cm (indexed 1.52 cm/m²).     Pericardium:  No pericardial effusion seen.     Systemic Veins:  The inferior vena cava is normal in size (normal <2.1cm) with abnormal inspiratory collapse (abnormal <50%) consistent with mildly elevated right atrial pressure (~8, range 5-10mmHg).  ____________________________________________________________________  QUANTITATIVE DATA:  Left Ventricle Measurements: (Indexed to BSA)     IVSd (2D):   0.8 cm  LVPWd (2D):  0.8 cm  LVIDd (2D):  5.0 cm  LVIDs (2D):  4.0 cm  LV Mass:     136 g  76.6 g/m²  Visualized LV EF%: 20 to 25%     MV E Vmax:    0.68 m/s  MV A Vmax:    0.99 m/s  MV E/A:       0.68  e' lateral:   8.16 cm/s  e' medial:    5.22 cm/s  E/e' lateral: 8.28  E/e' medial:  12.95  E/e' Average: 10.10  MV DT:        194 msec    Aorta Measurements: (normal range) (Indexed to BSA)     Sinuses of Valsalva: 2.70 cm (2.7 - 3.3 cm)  Ao ST Junct:         2.1 cm  Ao Ascprox:         2.70 cm       Left Atrium Measurements: (Indexed to BSA)  LA Diam 2D:        3.80 cm  LA Vol s, MOD A4C: 48.00 ml.  LA Vol s, MOD A2C: 66.60 ml.  LA Vol s, MOD BP:  57.50 ml  32.43 ml/m²    Right Ventricle Measurements:     TAPSE:     1.6 cm  TV Larissa. S':       8.49 cm/s  RV Base (RVID1):  3.1 cm  RV Mid (RVID2):   2.3 cm  RV Major (RVID3): 8.3 cm       LVOT / RVOT/ Qp/Qs Data: (Indexed to BSA)  LVOT Diameter: 2.00 cm  LVOT Vmax:     0.79 m/s  LVOT VTI:      15.00 cm  LVOT SV:      47.1 ml  26.58 ml/m²    Mitral Valve Measurements:     MV E Vmax: 0.7 m/s         MR Vmax:          5.24 m/s  MV A Vmax: 1.0 m/s         MR VTI:           185.00 cm  MV E/A:    0.7             MR Mean Gradient: 57.0 mmHg      MR Peak Gradient: 109.8 mmHg                             MR PISA Radius:   0.60 cm                             MR Aliasing Adryan:  30.80 cm/s                             MR Regurg Fract:  0.52 %       Tricuspid Valve Measurements:     RA Pressure: 8 mmHg    ________________________________________________________________________________________  Electronically signed on 11/28/2023 at 8:32:36 AM by Johan Perez M.D.       *** Final ***

## 2023-11-28 NOTE — PROGRESS NOTE ADULT - ASSESSMENT
Assessment and Plan:   78F with PMHx HTN, HLD, DM p/w shortness of breath on exertion. Pt states SOB started acutely last week while shopping. Pt with mild b/l rales and peripheral edema. EKG with anterolateral septal q waves with sub-1mm ST elevations, not meeting STEMI criteria. Trop 862>856. CXR without acute consolidation. No suspicion of acute ACS at this time however possibly had ischemic event a week ago. Possible atypical presentation in elderly female with hx of DM. Concern for new heart failure in setting of NSTEMI. Echo as above, showing EF 20-25% with wall motion abnormalities as well as LV thrombus.     #NSTEMI  #suspected new CHF   #HTN    Recommendations:  - plan for cardiac cath today   - continue heparin gtt for 48hr or until LHC   - c/w DAPT with aspirin 81mg and plavix 90mg   - c/w atorvastatin 40mg  - c/w lasix 40mg qd     - strict I/Os, daily weights, K >4, Mg >2    *** Recommendations are preliminary until cosigned by the attending.     Assessment and Plan:   78F with PMHx HTN, HLD, DM p/w shortness of breath on exertion. Pt states SOB started acutely last week while shopping. Pt with mild b/l rales and peripheral edema. EKG with anterolateral septal q waves with sub-1mm ST elevations, not meeting STEMI criteria. Trop 862>856. CXR without acute consolidation. No suspicion of acute ACS at this time however possibly had ischemic event a week ago. Possible atypical presentation in elderly female with hx of DM. Concern for new heart failure in setting of NSTEMI. Echo as above, showing EF 20-25% with wall motion abnormalities as well as LV thrombus.     #NSTEMI  #suspected new CHF   #HTN    Recommendations:  - plan for cardiac cath today   - continue heparin gtt for 48hr or until LHC   - start metoprolol succinate 25mg qd  - c/w DAPT with aspirin 81mg and plavix 90mg   - c/w atorvastatin 40mg  - c/w lasix 40mg qd     - strict I/Os, daily weights, K >4, Mg >2    *** Recommendations are preliminary until cosigned by the attending.     Assessment and Plan:   78F with PMHx HTN, HLD, DM p/w shortness of breath on exertion. Pt states SOB started acutely last week while shopping. Pt with mild b/l rales and peripheral edema. EKG with anterolateral septal q waves with sub-1mm ST elevations, not meeting STEMI criteria. Trop 862>856. CXR without acute consolidation. No suspicion of acute ACS at this time however possibly had ischemic event a week ago. Possible atypical presentation in elderly female with hx of DM. Concern for new heart failure in setting of NSTEMI. Echo as above, showing EF 20-25% with wall motion abnormalities as well as LV thrombus.     #NSTEMI  #suspected new CHF   #HTN    Recommendations:  - plan for cardiac cath today   - continue heparin gtt or until LHC  - start losartan after cath    - c/w DAPT with aspirin 81mg and plavix 90mg   - c/w atorvastatin 40mg  - c/w lasix 40mg qd     - strict I/Os, daily weights, K >4, Mg >2    *** Recommendations are preliminary until cosigned by the attending.     Assessment and Plan:   78F with PMHx HTN, HLD, DM p/w shortness of breath on exertion. Pt states SOB started acutely last week while shopping. Pt with mild b/l rales and peripheral edema. EKG with anterolateral septal q waves with sub-1mm ST elevations, not meeting STEMI criteria. Trop 862>856. CXR without acute consolidation. No suspicion of acute ACS at this time however possibly had ischemic event a week ago. Possible atypical presentation in elderly female with hx of DM. Concern for new heart failure in setting of NSTEMI. Echo as above, showing EF 20-25% with wall motion abnormalities as well as LV thrombus.     #NSTEMI  #suspected new CHF   #HTN    Recommendations:  - plan for cardiac cath today, likely just diagnostic  - please order nuclear viability study  - c/w lasix 40mg IV BID today  - start losartan 25mg qd after cath    - continue heparin gtt or until LHC  - c/w DAPT with aspirin 81mg and plavix 90mg   - c/w atorvastatin 40mg  - strict I/Os, daily weights, K >4, Mg >2    *** Recommendations are preliminary until cosigned by the attending.

## 2023-11-28 NOTE — DISCHARGE NOTE PROVIDER - NSDCFUADDAPPT_GEN_ALL_CORE_FT
STAR patient  APPTS ARE READY TO BE MADE: [ ] YES    Best Family or Patient Contact (if needed):    Additional Information about above appointments (if needed):    1: Dr Carolin Covington PCP within one week  2:   3:     Other comments or requests:    STAR patient  APPTS ARE READY TO BE MADE: [ ] YES    Best Family or Patient Contact (if needed):    Additional Information about above appointments (if needed):    1: Dr Carolin Covington PCP within one week  2: Coumadin testing at PCP or Cardiology office  3:     Other comments or requests:    STAR patient  APPTS ARE READY TO BE MADE: [ ] YES    Best Family or Patient Contact (if needed):    Additional Information about above appointments (if needed):    1: Dr Carolin Covington PCP within one week  2: Coumadin testing at PCP or Cardiology office  3:     Other comments or requests:   Patient was previously scheduled for an appointment on  12/13/2023 at 11:30am at [address] [place] [address] with Dr. Adria Coe.    Patient was scheduled for an appointment on 12/13/23 2:30pm at [address] [facility], [address], [place] with Dr. Acosta Mahmood. Cole the son the caregiver was advised of appointment details.     STAR patient  APPTS ARE READY TO BE MADE: [ ] YES    Best Family or Patient Contact (if needed):    Additional Information about above appointments (if needed):    1: Dr Carolin Covington PCP within one week  2: Coumadin testing at PCP or Cardiology office  3:     Other comments or requests:   Patient was previously scheduled for an appointment on  12/13/2023 at 11:30am at [address] [place] [address] with Dr. Adria Coe.    Patient was scheduled for an appointment on 12/13/23 2:30pm at [address] [facility], [address], [place] with Dr. Acosta Mahmood. Cole the son the caregiver was advised of appointment details.     STAR patient  APPTS ARE READY TO BE MADE: [ ] YES    Best Family or Patient Contact (if needed):    Additional Information about above appointments (if needed):    1: Dr Carolin Covington PCP within one week  2: Coumadin testing at PCP or Cardiology office  3:     Other comments or requests:   Patient was previously scheduled for an appointment on  12/13/2023 at 11:30am at [address] [place] [address] with Dr. Adria Coe.    Patient was scheduled for an appointment on 12/13/23 2:30pm at [address] [facility], [address], [place] with Dr. Acosta Mahmood. Cole the son the caregiver was advised of appointment details.

## 2023-11-28 NOTE — DISCHARGE NOTE PROVIDER - NSFOLLOWUPCLINICS_GEN_ALL_ED_FT
Cardiology at [facility]  Cardiology  [address] [place] [address]  Phone: [phone]  Fax:   Scheduled Appointment: 12/8/2023      Cardiology at [facility]  Cardiology  [address] [place] [address]  Phone: [phone]  Fax:   Scheduled Appointment: 12/8/2023      Cardiology at [facility]  Cardiology  [address] [place] [address]  Phone: [phone]  Fax:   Scheduled Appointment: 12/8/2023

## 2023-11-29 LAB
ANION GAP SERPL CALC-SCNC: 11 MMOL/L — SIGNIFICANT CHANGE UP (ref 7–14)
ANION GAP SERPL CALC-SCNC: 11 MMOL/L — SIGNIFICANT CHANGE UP (ref 7–14)
APTT BLD: 47.9 SEC — HIGH (ref 24.5–35.6)
APTT BLD: 47.9 SEC — HIGH (ref 24.5–35.6)
APTT BLD: 61.5 SEC — HIGH (ref 24.5–35.6)
APTT BLD: 61.5 SEC — HIGH (ref 24.5–35.6)
APTT BLD: 67.1 SEC — HIGH (ref 24.5–35.6)
APTT BLD: 67.1 SEC — HIGH (ref 24.5–35.6)
BUN SERPL-MCNC: 23 MG/DL — SIGNIFICANT CHANGE UP (ref 7–23)
BUN SERPL-MCNC: 23 MG/DL — SIGNIFICANT CHANGE UP (ref 7–23)
CALCIUM SERPL-MCNC: 8.9 MG/DL — SIGNIFICANT CHANGE UP (ref 8.4–10.5)
CALCIUM SERPL-MCNC: 8.9 MG/DL — SIGNIFICANT CHANGE UP (ref 8.4–10.5)
CHLORIDE SERPL-SCNC: 105 MMOL/L — SIGNIFICANT CHANGE UP (ref 98–107)
CHLORIDE SERPL-SCNC: 105 MMOL/L — SIGNIFICANT CHANGE UP (ref 98–107)
CO2 SERPL-SCNC: 24 MMOL/L — SIGNIFICANT CHANGE UP (ref 22–31)
CO2 SERPL-SCNC: 24 MMOL/L — SIGNIFICANT CHANGE UP (ref 22–31)
CREAT SERPL-MCNC: 0.82 MG/DL — SIGNIFICANT CHANGE UP (ref 0.5–1.3)
CREAT SERPL-MCNC: 0.82 MG/DL — SIGNIFICANT CHANGE UP (ref 0.5–1.3)
EGFR: 73 ML/MIN/1.73M2 — SIGNIFICANT CHANGE UP
EGFR: 73 ML/MIN/1.73M2 — SIGNIFICANT CHANGE UP
GLUCOSE BLDC GLUCOMTR-MCNC: 216 MG/DL — HIGH (ref 70–99)
GLUCOSE BLDC GLUCOMTR-MCNC: 216 MG/DL — HIGH (ref 70–99)
GLUCOSE BLDC GLUCOMTR-MCNC: 220 MG/DL — HIGH (ref 70–99)
GLUCOSE BLDC GLUCOMTR-MCNC: 220 MG/DL — HIGH (ref 70–99)
GLUCOSE BLDC GLUCOMTR-MCNC: 227 MG/DL — HIGH (ref 70–99)
GLUCOSE BLDC GLUCOMTR-MCNC: 227 MG/DL — HIGH (ref 70–99)
GLUCOSE BLDC GLUCOMTR-MCNC: 285 MG/DL — HIGH (ref 70–99)
GLUCOSE BLDC GLUCOMTR-MCNC: 285 MG/DL — HIGH (ref 70–99)
GLUCOSE SERPL-MCNC: 190 MG/DL — HIGH (ref 70–99)
GLUCOSE SERPL-MCNC: 190 MG/DL — HIGH (ref 70–99)
HCT VFR BLD CALC: 29 % — LOW (ref 34.5–45)
HCT VFR BLD CALC: 29 % — LOW (ref 34.5–45)
HCT VFR BLD CALC: 31.1 % — LOW (ref 34.5–45)
HCT VFR BLD CALC: 31.1 % — LOW (ref 34.5–45)
HGB BLD-MCNC: 10.1 G/DL — LOW (ref 11.5–15.5)
HGB BLD-MCNC: 10.1 G/DL — LOW (ref 11.5–15.5)
HGB BLD-MCNC: 9.6 G/DL — LOW (ref 11.5–15.5)
HGB BLD-MCNC: 9.6 G/DL — LOW (ref 11.5–15.5)
MAGNESIUM SERPL-MCNC: 2.1 MG/DL — SIGNIFICANT CHANGE UP (ref 1.6–2.6)
MAGNESIUM SERPL-MCNC: 2.1 MG/DL — SIGNIFICANT CHANGE UP (ref 1.6–2.6)
MCHC RBC-ENTMCNC: 29.5 PG — SIGNIFICANT CHANGE UP (ref 27–34)
MCHC RBC-ENTMCNC: 29.5 PG — SIGNIFICANT CHANGE UP (ref 27–34)
MCHC RBC-ENTMCNC: 29.9 PG — SIGNIFICANT CHANGE UP (ref 27–34)
MCHC RBC-ENTMCNC: 29.9 PG — SIGNIFICANT CHANGE UP (ref 27–34)
MCHC RBC-ENTMCNC: 32.5 GM/DL — SIGNIFICANT CHANGE UP (ref 32–36)
MCHC RBC-ENTMCNC: 32.5 GM/DL — SIGNIFICANT CHANGE UP (ref 32–36)
MCHC RBC-ENTMCNC: 33.1 GM/DL — SIGNIFICANT CHANGE UP (ref 32–36)
MCHC RBC-ENTMCNC: 33.1 GM/DL — SIGNIFICANT CHANGE UP (ref 32–36)
MCV RBC AUTO: 89.2 FL — SIGNIFICANT CHANGE UP (ref 80–100)
MCV RBC AUTO: 89.2 FL — SIGNIFICANT CHANGE UP (ref 80–100)
MCV RBC AUTO: 92 FL — SIGNIFICANT CHANGE UP (ref 80–100)
MCV RBC AUTO: 92 FL — SIGNIFICANT CHANGE UP (ref 80–100)
NRBC # BLD: 0 /100 WBCS — SIGNIFICANT CHANGE UP (ref 0–0)
NRBC # FLD: 0 K/UL — SIGNIFICANT CHANGE UP (ref 0–0)
NRBC # FLD: 0 K/UL — SIGNIFICANT CHANGE UP (ref 0–0)
NRBC # FLD: 0.02 K/UL — HIGH (ref 0–0)
NRBC # FLD: 0.02 K/UL — HIGH (ref 0–0)
PHOSPHATE SERPL-MCNC: 2.9 MG/DL — SIGNIFICANT CHANGE UP (ref 2.5–4.5)
PHOSPHATE SERPL-MCNC: 2.9 MG/DL — SIGNIFICANT CHANGE UP (ref 2.5–4.5)
PLATELET # BLD AUTO: 311 K/UL — SIGNIFICANT CHANGE UP (ref 150–400)
PLATELET # BLD AUTO: 311 K/UL — SIGNIFICANT CHANGE UP (ref 150–400)
PLATELET # BLD AUTO: 326 K/UL — SIGNIFICANT CHANGE UP (ref 150–400)
PLATELET # BLD AUTO: 326 K/UL — SIGNIFICANT CHANGE UP (ref 150–400)
POTASSIUM SERPL-MCNC: 3.8 MMOL/L — SIGNIFICANT CHANGE UP (ref 3.5–5.3)
POTASSIUM SERPL-MCNC: 3.8 MMOL/L — SIGNIFICANT CHANGE UP (ref 3.5–5.3)
POTASSIUM SERPL-SCNC: 3.8 MMOL/L — SIGNIFICANT CHANGE UP (ref 3.5–5.3)
POTASSIUM SERPL-SCNC: 3.8 MMOL/L — SIGNIFICANT CHANGE UP (ref 3.5–5.3)
RBC # BLD: 3.25 M/UL — LOW (ref 3.8–5.2)
RBC # BLD: 3.25 M/UL — LOW (ref 3.8–5.2)
RBC # BLD: 3.38 M/UL — LOW (ref 3.8–5.2)
RBC # BLD: 3.38 M/UL — LOW (ref 3.8–5.2)
RBC # FLD: 14.4 % — SIGNIFICANT CHANGE UP (ref 10.3–14.5)
RBC # FLD: 14.4 % — SIGNIFICANT CHANGE UP (ref 10.3–14.5)
RBC # FLD: 14.6 % — HIGH (ref 10.3–14.5)
RBC # FLD: 14.6 % — HIGH (ref 10.3–14.5)
SODIUM SERPL-SCNC: 140 MMOL/L — SIGNIFICANT CHANGE UP (ref 135–145)
SODIUM SERPL-SCNC: 140 MMOL/L — SIGNIFICANT CHANGE UP (ref 135–145)
WBC # BLD: 7.21 K/UL — SIGNIFICANT CHANGE UP (ref 3.8–10.5)
WBC # BLD: 7.21 K/UL — SIGNIFICANT CHANGE UP (ref 3.8–10.5)
WBC # BLD: 7.34 K/UL — SIGNIFICANT CHANGE UP (ref 3.8–10.5)
WBC # BLD: 7.34 K/UL — SIGNIFICANT CHANGE UP (ref 3.8–10.5)
WBC # FLD AUTO: 7.21 K/UL — SIGNIFICANT CHANGE UP (ref 3.8–10.5)
WBC # FLD AUTO: 7.21 K/UL — SIGNIFICANT CHANGE UP (ref 3.8–10.5)
WBC # FLD AUTO: 7.34 K/UL — SIGNIFICANT CHANGE UP (ref 3.8–10.5)
WBC # FLD AUTO: 7.34 K/UL — SIGNIFICANT CHANGE UP (ref 3.8–10.5)

## 2023-11-29 PROCEDURE — 78452 HT MUSCLE IMAGE SPECT MULT: CPT | Mod: 26

## 2023-11-29 RX ORDER — SACUBITRIL AND VALSARTAN 24; 26 MG/1; MG/1
1 TABLET, FILM COATED ORAL
Refills: 0 | Status: DISCONTINUED | OUTPATIENT
Start: 2023-11-29 | End: 2023-11-29

## 2023-11-29 RX ORDER — METOPROLOL TARTRATE 50 MG
25 TABLET ORAL DAILY
Refills: 0 | Status: DISCONTINUED | OUTPATIENT
Start: 2023-11-29 | End: 2023-11-29

## 2023-11-29 RX ORDER — METOPROLOL TARTRATE 50 MG
25 TABLET ORAL DAILY
Refills: 0 | Status: DISCONTINUED | OUTPATIENT
Start: 2023-11-29 | End: 2023-12-06

## 2023-11-29 RX ORDER — SACUBITRIL AND VALSARTAN 24; 26 MG/1; MG/1
1 TABLET, FILM COATED ORAL
Refills: 0 | Status: DISCONTINUED | OUTPATIENT
Start: 2023-11-29 | End: 2023-11-30

## 2023-11-29 RX ORDER — FUROSEMIDE 40 MG
40 TABLET ORAL DAILY
Refills: 0 | Status: DISCONTINUED | OUTPATIENT
Start: 2023-11-29 | End: 2023-12-05

## 2023-11-29 RX ADMIN — HEPARIN SODIUM 1400 UNIT(S)/HR: 5000 INJECTION INTRAVENOUS; SUBCUTANEOUS at 08:02

## 2023-11-29 RX ADMIN — ATORVASTATIN CALCIUM 80 MILLIGRAM(S): 80 TABLET, FILM COATED ORAL at 21:29

## 2023-11-29 RX ADMIN — Medication 40 MILLIGRAM(S): at 05:49

## 2023-11-29 RX ADMIN — HEPARIN SODIUM 1400 UNIT(S)/HR: 5000 INJECTION INTRAVENOUS; SUBCUTANEOUS at 09:24

## 2023-11-29 RX ADMIN — HEPARIN SODIUM 1400 UNIT(S)/HR: 5000 INJECTION INTRAVENOUS; SUBCUTANEOUS at 17:36

## 2023-11-29 RX ADMIN — Medication 2: at 09:10

## 2023-11-29 RX ADMIN — LOSARTAN POTASSIUM 12.5 MILLIGRAM(S): 100 TABLET, FILM COATED ORAL at 05:49

## 2023-11-29 RX ADMIN — HEPARIN SODIUM 1400 UNIT(S)/HR: 5000 INJECTION INTRAVENOUS; SUBCUTANEOUS at 02:14

## 2023-11-29 RX ADMIN — Medication 2: at 17:40

## 2023-11-29 RX ADMIN — SACUBITRIL AND VALSARTAN 1 TABLET(S): 24; 26 TABLET, FILM COATED ORAL at 18:58

## 2023-11-29 RX ADMIN — Medication 81 MILLIGRAM(S): at 11:39

## 2023-11-29 RX ADMIN — CLOPIDOGREL BISULFATE 75 MILLIGRAM(S): 75 TABLET, FILM COATED ORAL at 11:40

## 2023-11-29 RX ADMIN — Medication 3: at 12:26

## 2023-11-29 RX ADMIN — HEPARIN SODIUM 1400 UNIT(S)/HR: 5000 INJECTION INTRAVENOUS; SUBCUTANEOUS at 16:44

## 2023-11-29 RX ADMIN — HEPARIN SODIUM 3000 UNIT(S): 5000 INJECTION INTRAVENOUS; SUBCUTANEOUS at 02:22

## 2023-11-29 RX ADMIN — HEPARIN SODIUM 1400 UNIT(S)/HR: 5000 INJECTION INTRAVENOUS; SUBCUTANEOUS at 19:27

## 2023-11-29 NOTE — PROGRESS NOTE ADULT - SUBJECTIVE AND OBJECTIVE BOX
Patient is a 78y old  Female who presents with a chief complaint of Chest pain, SOB (29 Nov 2023 09:30)    Date of servie : 11-29-23 @ 14:21  INTERVAL HPI/OVERNIGHT EVENTS:  T(C): 36.8 (11-29-23 @ 05:49), Max: 36.8 (11-29-23 @ 05:49)  HR: 81 (11-29-23 @ 05:49) (81 - 85)  BP: 111/60 (11-29-23 @ 05:49) (104/59 - 128/64)  RR: 18 (11-29-23 @ 05:49) (18 - 18)  SpO2: 97% (11-29-23 @ 05:49) (97% - 100%)  Wt(kg): --  I&O's Summary    28 Nov 2023 07:01  -  29 Nov 2023 07:00  --------------------------------------------------------  IN: 542 mL / OUT: 360 mL / NET: 182 mL        LABS:                        10.1   7.21  )-----------( 326      ( 29 Nov 2023 05:35 )             31.1     11-29    140  |  105  |  23  ----------------------------<  190<H>  3.8   |  24  |  0.82    Ca    8.9      29 Nov 2023 05:35  Phos  2.9     11-29  Mg     2.10     11-29    TPro  6.6  /  Alb  3.3  /  TBili  0.3  /  DBili  x   /  AST  16  /  ALT  23  /  AlkPhos  63  11-28    PTT - ( 29 Nov 2023 08:59 )  PTT:67.1 sec  Urinalysis Basic - ( 29 Nov 2023 05:35 )    Color: x / Appearance: x / SG: x / pH: x  Gluc: 190 mg/dL / Ketone: x  / Bili: x / Urobili: x   Blood: x / Protein: x / Nitrite: x   Leuk Esterase: x / RBC: x / WBC x   Sq Epi: x / Non Sq Epi: x / Bacteria: x      CAPILLARY BLOOD GLUCOSE      POCT Blood Glucose.: 285 mg/dL (29 Nov 2023 11:41)  POCT Blood Glucose.: 216 mg/dL (29 Nov 2023 08:28)  POCT Blood Glucose.: 198 mg/dL (28 Nov 2023 21:51)  POCT Blood Glucose.: 213 mg/dL (28 Nov 2023 17:01)        Urinalysis Basic - ( 29 Nov 2023 05:35 )    Color: x / Appearance: x / SG: x / pH: x  Gluc: 190 mg/dL / Ketone: x  / Bili: x / Urobili: x   Blood: x / Protein: x / Nitrite: x   Leuk Esterase: x / RBC: x / WBC x   Sq Epi: x / Non Sq Epi: x / Bacteria: x        MEDICATIONS  (STANDING):  aspirin enteric coated 81 milliGRAM(s) Oral daily  atorvastatin 80 milliGRAM(s) Oral at bedtime  clopidogrel Tablet 75 milliGRAM(s) Oral daily  dextrose 5%. 1000 milliLiter(s) (50 mL/Hr) IV Continuous <Continuous>  dextrose 5%. 1000 milliLiter(s) (100 mL/Hr) IV Continuous <Continuous>  dextrose 50% Injectable 25 Gram(s) IV Push once  dextrose 50% Injectable 12.5 Gram(s) IV Push once  dextrose 50% Injectable 25 Gram(s) IV Push once  furosemide    Tablet 40 milliGRAM(s) Oral daily  glucagon  Injectable 1 milliGRAM(s) IntraMuscular once  heparin  Infusion. 1200 Unit(s)/Hr (12 mL/Hr) IV Continuous <Continuous>  insulin lispro (ADMELOG) corrective regimen sliding scale   SubCutaneous three times a day before meals  metoprolol succinate ER 25 milliGRAM(s) Oral daily  sacubitril 24 mG/valsartan 26 mG 1 Tablet(s) Oral two times a day    MEDICATIONS  (PRN):  acetaminophen     Tablet .. 650 milliGRAM(s) Oral every 6 hours PRN Temp greater or equal to 38C (100.4F), Mild Pain (1 - 3)  aluminum hydroxide/magnesium hydroxide/simethicone Suspension 30 milliLiter(s) Oral every 4 hours PRN Dyspepsia  dextrose Oral Gel 15 Gram(s) Oral once PRN Blood Glucose LESS THAN 70 milliGRAM(s)/deciliter  heparin   Injectable 6000 Unit(s) IV Push every 6 hours PRN For aPTT less than 40  heparin   Injectable 3000 Unit(s) IV Push every 6 hours PRN For aPTT between 40 - 57  melatonin 3 milliGRAM(s) Oral at bedtime PRN Insomnia  melatonin 3 milliGRAM(s) Oral once PRN Insomnia  ondansetron Injectable 4 milliGRAM(s) IV Push every 8 hours PRN Nausea and/or Vomiting          PHYSICAL EXAM:  GENERAL: NAD, well-groomed, well-developed  HEAD:  Atraumatic, Normocephalic  CHEST/LUNG: Clear to percussion bilaterally; No rales, rhonchi, wheezing, or rubs  HEART: Regular rate and rhythm; No murmurs, rubs, or gallops  ABDOMEN: Soft, Nontender, Nondistended; Bowel sounds present  EXTREMITIES:  2+ Peripheral Pulses, No clubbing, cyanosis, or edema  LYMPH: No lymphadenopathy noted  SKIN: No rashes or lesions    Care Discussed with Consultants/Other Providers [ ] YES  [ ] NO

## 2023-11-29 NOTE — PROGRESS NOTE ADULT - ASSESSMENT
Assessment and Plan:   78F with PMHx HTN, HLD, DM p/w shortness of breath on exertion. Pt states SOB started acutely last week while shopping. Pt with mild b/l rales and peripheral edema. EKG with anterolateral septal q waves with sub-1mm ST elevations, not meeting STEMI criteria. Trop 862>856. CXR without acute consolidation. No suspicion of acute ACS at this time however possibly had ischemic event a week ago. Possible atypical presentation in elderly female with hx of DM. New heart failure in setting of NSTEMI. Echo as above, showing EF 20-25% with wall motion abnormalities as well as LV thrombus. Diagnostic LHC as above, showing 95% stenosis in proximal and 90% stenosis in mid LAD. Plan for viability test today prior to further PCI.    #NSTEMI  #new HFrEF  #LV thrombus   #HTN    Recommendations:   - DC losartan and start Entresto this evening   - c/w lasix 40mg qd    - c/w DAPT with aspirin 81mg and plavix 90mg   - c/w atorvastatin 40mg  - continue heparin gtt, will require warfarin after viability study pending further PCI   - strict I/Os, daily weights, K >4, Mg >2    -----   Assessment and Plan:   78F with PMHx HTN, HLD, DM p/w shortness of breath on exertion. Pt states SOB started acutely last week while shopping. Pt with mild b/l rales and peripheral edema. EKG with anterolateral septal q waves with sub-1mm ST elevations, not meeting STEMI criteria. Trop 862>856. CXR without acute consolidation. No suspicion of acute ACS at this time however possibly had ischemic event a week ago. Possible atypical presentation in elderly female with hx of DM. New heart failure in setting of NSTEMI. Echo as above, showing EF 20-25% with wall motion abnormalities as well as LV thrombus. Diagnostic LHC as above, showing 95% stenosis in proximal and 90% stenosis in mid LAD. Plan for viability test today prior to further PCI.    #NSTEMI  #new HFrEF  #LV thrombus   #HTN    Recommendations:   - DC losartan and start Entresto this evening   - c/w lasix 40mg qd    - c/w DAPT with aspirin 81mg and plavix 90mg   - c/w atorvastatin 40mg  - continue heparin gtt, will require warfarin after viability study pending plan for further PCI   - strict I/Os, daily weights, K >4, Mg >2    -----   Assessment and Plan:   78F with PMHx HTN, HLD, DM p/w shortness of breath on exertion. Pt states SOB started acutely last week while shopping. Pt with mild b/l rales and peripheral edema. EKG with anterolateral septal q waves with sub-1mm ST elevations, not meeting STEMI criteria. Trop 862>856. CXR without acute consolidation. No suspicion of acute ACS at this time however possibly had ischemic event a week ago. Possible atypical presentation in elderly female with hx of DM. New heart failure in setting of NSTEMI. Echo as above, showing EF 20-25% with wall motion abnormalities as well as LV thrombus. Diagnostic LHC as above, showing 95% stenosis in proximal and 90% stenosis in mid LAD. Plan for viability test today prior to further PCI.    #NSTEMI  #new HFrEF  #LV thrombus   #HTN    Recommendations:   - f/up viability study, ordered; no caffeine prior  - DC losartan and start Entresto 24-26mg BID this evening   - please start metop succ 25mg qd today  - please stop lasix IV, and start lasix 40mg po qd tomorrow  - continue heparin gtt for LV thrombus, will require warfarin after viability study pending plan for further PCI   - c/w DAPT with aspirin 81mg and plavix 90mg   - c/w atorvastatin 40mg  - strict I/Os, daily weights, K >4, Mg >2    -----

## 2023-11-29 NOTE — PROGRESS NOTE ADULT - SUBJECTIVE AND OBJECTIVE BOX
Cardiology Progress Note  ------------------------------------------------------------------------------------------    SUBJECTIVE/EVENTS: Pt feels at baseline. Denies any more shortness of breath. No other acute complaints.  - Tele:     -------------------------------------------------------------------------------------------  ROS:  CV: chest pain (-), palpitation (-), orthopnea (-), PND (-), edema (-)  PULM: SOB (-), cough (-), wheezing (-), hemoptysis (-).   CONST: fever (-), chills (-) or fatigue (-)  GI: abdominal distension (-), abdominal pain (-) , nausea/vomiting (-), hematemesis, (-), melena (-), hematochezia (-)  : dysuria (-), frequency (-), hematuria (-).   NEURO: numbness (-), weakness (-), dizziness (-)  MSK: myalgia (-), joint pain (-)   SKIN: itching (-), rash (-)  HEENT:  visual changes (-); vertigo or throat pain (-);  neck stiffness (-)   Psych: change in mood (-), anxiety (-), depression (-)     All other review of systems is negative unless indicated above.   -------------------------------------------------------------------------------------------  VITALS:  T(F): 98.2 (), Max: 98.2 ()  HR: 81 () (81 - 85)  BP: 111/60 () (104/59 - 128/64)  RR: 18 ()  SpO2: 97% ()  I&O's Summary    2023 07:01  -  2023 07:00  --------------------------------------------------------  IN: 542 mL / OUT: 360 mL / NET: 182 mL      ------------------------------------------------------------------------------------------  PHYSICAL EXAM:  GEN: NAD, sitting in bed  HEENT: NCAT, EOMI  CV: RRR, Ns1/s2, no m/r/g, JVP not elevated  RESP: +fine rales b/l, no wheezing   ABD: soft, nt, nd  EXT: warm, 2+ pulses, no edema  SKIN: no visible lesions, rashes  NEURO: AAOx3, no focal deficits  PSYCH: Calm, cooperative  -------------------------------------------------------------------------------------------  LABS:                          10.1   7.21  )-----------( 326      ( 2023 05:35 )             31.1         140  |  105  |  23  ----------------------------<  190<H>  3.8   |  24  |  0.82    Ca    8.9      2023 05:35  Phos  2.9       Mg     2.10         TPro  6.6  /  Alb  3.3  /  TBili  0.3  /  DBili  x   /  AST  16  /  ALT  23  /  AlkPhos  63      PT/INR - ( 2023 10:56 )   PT: 13.4 sec;   INR: 1.20 ratio         PTT - ( 2023 08:59 )  PTT:67.1 sec  CARDIAC MARKERS ( 2023 13:27 )  856 ng/L / x     / x     / x     / x     / x      CARDIAC MARKERS ( 2023 10:56 )  862 ng/L / x     / x     / x     / x     / x          Total Cholesterol: 108  LDL: --  HDL: 31  T        -------------------------------------------------------------------------------------------  Meds:  acetaminophen     Tablet .. 650 milliGRAM(s) Oral every 6 hours PRN  aluminum hydroxide/magnesium hydroxide/simethicone Suspension 30 milliLiter(s) Oral every 4 hours PRN  aspirin enteric coated 81 milliGRAM(s) Oral daily  atorvastatin 80 milliGRAM(s) Oral at bedtime  clopidogrel Tablet 75 milliGRAM(s) Oral daily  dextrose 5%. 1000 milliLiter(s) IV Continuous <Continuous>  dextrose 5%. 1000 milliLiter(s) IV Continuous <Continuous>  dextrose 50% Injectable 25 Gram(s) IV Push once  dextrose 50% Injectable 12.5 Gram(s) IV Push once  dextrose 50% Injectable 25 Gram(s) IV Push once  dextrose Oral Gel 15 Gram(s) Oral once PRN  furosemide   Injectable 40 milliGRAM(s) IV Push two times a day  glucagon  Injectable 1 milliGRAM(s) IntraMuscular once  heparin   Injectable 6000 Unit(s) IV Push every 6 hours PRN  heparin   Injectable 3000 Unit(s) IV Push every 6 hours PRN  heparin  Infusion. 1200 Unit(s)/Hr IV Continuous <Continuous>  insulin lispro (ADMELOG) corrective regimen sliding scale   SubCutaneous three times a day before meals  losartan 12.5 milliGRAM(s) Oral daily  melatonin 3 milliGRAM(s) Oral at bedtime PRN  melatonin 3 milliGRAM(s) Oral once PRN  ondansetron Injectable 4 milliGRAM(s) IV Push every 8 hours PRN    -------------------------------------------------------------------------------------------  Cardiovascular Diagnostic Testing:          Study Date:     2023   Name:           YUSUF SAWYER   :            1945   (78 years)   Gender:         female   MR#:            [id number]   MPI#:           [id number]   Patient Class:  Inpatient     Cath Lab Report    Diagnostic Cardiologist:       Miladys Kelly MD   Fellow:                        Kristen Santos MD   Referring Physician:           Josiah Resendez MD     Procedures Performed   Procedures:               1.    Arterial Access - Right Radial     2.    Diagnostic Coronary Angiography     Indications:                Myocardial infarction without ST elevation  (NSTEMI)  Lab Visit Indication:      ACS greater than 24 hours     Diagnostic Conclusions:     Diagnostic cath showed severe disease in the mid and proximal LAD.  There was VIANEY 2 flow in the vessel. Given late clinical  presentation, Q waves on ECG, LV dysfunction and LV thrombus would  recommend assessing for viability prior to any  revascularization attempts.    Recommendations:     - Viability Study    -GDMT for Heart failure.      Presentation:   Late presentation MI, likely missed STEMI. Q waves on ECG anterior      Procedure Narrative:   The risks and alternatives of the procedures and conscious sedation  were explained to the patient and informed consent was  obtained. The patient was brought to the cath lab and placed on the  exam table.  Access   Right radial artery:   The puncture site was infiltrated with 2% Lidocaine. Vascular access  was obtained using modified seldinger technique and a 6  Fr. Radial Glidesheath Slender was advanced into the vessel.  Hemostasis/Sheath Status: Hemostasis was successful using  TR Band.      Coronary Angiography   Left Coronary System:   A 5 Fr. DxTerity TRA ULTRA 4.0 was positioned into the vessel ostia  under fluoroscopic guidance. Contrast injections were  performed using hand injection. Right Coronary System:   A 5 Fr. DxTerity TRA ULTRA 4.0 was positioned into the vessel ostia  under fluoroscopic guidance. Contrast injections were  performed using hand injection.    Diagnostic Findings:     Patient: YUSUF SAWYER           MRN: [id number]  Study Date: 2023   02:27 PM      Page 1 of 4          Coronary Angiography   The coronary circulation is left dominant.      LM   Left main artery: Angiography shows no disease.      LAD   Proximal left anterior descending: There is a 95 % stenosis. Mid left  anterior descending: There is a 90 % stenosis. VIANEY Flow  2.      CX   Circumflex: The segment is large. Angiography shows minor  irregularities.    RCA   Right coronary artery: The segment is small, non-dominant. Angiography  shows minor irregularities.    History and Risk Factors:   Hypertension:                                Yes   Dyslipidemia:                                Yes   Prior MI:                                    No   Prior PCI:                                   No   Family History of Premature CAD:             No   Cerebrovascular Disease:                     No   Peripheral Arterial Disease:                 No   Prior CABG:                                  No   Tobacco Use:                                 Never   Cardiac Arrest Out of Hospital:              No   Cardiac Arrest at Transferring Facility:     No   Prior Heart Failure:                         No   Diabetes:                                    Yes   CSHA ([place] Study of Health and Aging)    5 - Mildly Frail   Frailty Scale:     X-Ray:   Diagnostic Flouro time:       1.7 min.                     Exam record  DAP:           3513.70 cGycm2  Interventional Flouro time:                                Exam fluoro  DAP:           746.70 cGycm2  Total Flouro Time:            1.7 min.                     Exam total  DAP:            4260.40  cGycm2  Exam Total Dose:              502 mGy     Exam Start Time:   02:27 PM   Exam End Time:     02:37 PM   Exam Duration:     10 min     Contrast:   Description                      Dose          Unit        Serial No.   Omnipaque 350mg 100ml              22.000   BX   Contrast Bottle     Patient: YUSUF SAWYER           MRN: [id number]  Study Date: 2023   02:27 PM      Page 2 of 4          Medication:   Description                  Dose, Unit, Route, Time   fentaNYL  50 mCg/mL          25.0, mcg, IV, 14:24:20   Injectable (Sublimaze)   midazolam  1 mG/mL           1.0, mg, IV, 14:24:24   Injectable (Versed)   lidocaine 2 % Injectable     3.0, ml, Subcut, 14:27:11   (Xylocaine)   verapamil 2.5 mG/mL          2.5, mg, IA, 14:27:59   Injectable   heparin (porcine) 1,000      3000.0, units, IA, 14:28:09   Unit(s)/mL Injectable     sodium chloride 0.9% IVPB    15.0, ml per hr, IV, 14:36:08     Inventory:   Description                  Quantity     Gavi#        Reference  No.    Serial No.      Lot No.       CDM  Angio Pack       1.000        [id number]   [id number]  [id number]*    720   .035 Radial Guidewire        1.000        [id number]   [id number]  [id number]*    594   Sodium Heparin               1.000        [id number]  [id number]          [id number]*    490   Omnipaque 350mg 100ml        1.000        [id number]   [id number]  [id number]*  Contrast Bottle                           139   6 Fr. Radial Glidesheath     1.000        [id number]     [id number]*  Slender                       208   5 Fr. DxTerity TRA ULTRA     1.000        [id number]   [id number]  [id number]*  4.0                                       561   [employer] 27cm      1.000        [id number]   3527  [id number]*  Radial Band                         881     Hemodynamic Pressures:   Phase          Location           [mmHg]                [mmHg]  [mmHg]            HR  Baseline       Ao                   s       95                d  53                m       63         77  Flow Calculations, Phase: Baseline   CO                                HR  O2Insp  CI                                PO2  O2Exp  SV                                VO2                  221.40 ml/min  O2(ExAir)  SVI                               A-VO2  Hb  Shunts:   Qs                                Qs Index   Qp                                Qp Index  Qp/Qs  EPBF                              EPBF Index   L-R                               L-R Index   R-L                               R-L Index     Patient: YUSUF ASWYER           MRN: [id number]  Study Date: 2023   02:27 PM      Page 3 of 4          Conscious sedation was administered and monitored by Cardiology  nursing staff,  under my direct supervision when applicable.     Electronically signed by Miladys Kelly MD on 2023 at 05:29 PM   (No Signature Object)     Patient: YUSUF SAWYER           MRN: [id number]  Study Date: 2023   02:27 PM      Page 4 of 4          TRANSTHORACIC ECHOCARDIOGRAM REPORT  ________________________________________________________________________________                                      _______       *Report Contains Critical Result*       Pt. Name:       YUSUF SAWYER Study Date:    2023  MRN:            [id number]           YOB: 1945  Accession #:    [id number]           Age:           78 years  Account#:       [id number]            Gender:        F  Heart Rate:                         Height: 63.00 in (160.02 cm)  Rhythm:                             Weight:        163.00 lb (73.94 kg)  Blood Pressure: 104/56 mmHg         BSA/BMI:       1.77 m² / 28.87 kg/m²  ________________________________________________________________________________________  Referring Physician:    Abdelrahman Owens MD  Interpreting Physician: Johan Perez M.D.  Primary Sonographer:    [facility]    CPT:               ECHO TTE WO CON COMP W DOPP - 32938.m  Indication(s):     Abnormal electrocardiogram ECG/EKG - R94.31; Dyspnea,                     unspecified - R06.00  Procedure:         Transthoracic echocardiogram with 2-D, M-mode and complete                     spectral and color flow Doppler.  Ordering Location: [facility]  Study Information: Image quality for this study is good.    _______________________________________________________________________________________     CONCLUSIONS:      1. The left ventricular cavity is normal. Left ventricular wall thickness is normal. Left ventricular systolic function is severely decreased with an ejection fraction visually estimated at 20 to 25%. There are regional wall motion abnormalities present. There is mild (grade 1) left ventricular diastolic dysfunction. There is a left ventricular thrombus. A large (~ 2.7 cm X 1.9 cm) left ventricular apical thrombus is visualized. Hypokinesis of the apex, mid to distal septum, and mid to distal anterior wall.   2. Normal right ventricular cavity size and systolic function.   3. Structurally normal mitral valve with normal leaflet excursion. There is calcification of the mitral valve annulus. There is mild to moderate mitral regurgitation.   4. No prior echocardiogram is available for comparison.    ________________________________________________________________________________________  *Report Contains Critical Result*  Critical Findings: Intracardiac Thrombus. Josiah Resendez M.D. was informed of the  findings by telephone on 2023 at 8:00:00 AM.    ________________________________________________________________________________________  FINDINGS:     Left Ventricle:  The left ventricular cavity is normal. Left ventricular wall thickness is normal. Left ventricular systolic function is severely decreased with an ejection fraction visually estimated at 20 to 25%. There are regional wall motion abnormalities present. There is mild (grade 1) left ventricular diastolic dysfunction. There is a left ventricular thrombus. A large (~ 2.7 cm X 1.9 cm) left ventricular apical thrombus is visualized. Hypokinesis of the apex, mid to distal septum, and mid to distal anterior wall.     Right Ventricle:  The right ventricular cavity is normal in size and normal systolic function. Tricuspid annular plane systolic excursion (TAPSE) is 1.6 cm (normal >=1.7 cm).     Left Atrium:  The left atrium is normal with an indexed volume of 32.43 ml/m².     Right Atrium:  The right atrium is normal in size.     Aortic Valve:  The aortic valve appears trileaflet with normal systolic excursion. There is calcification of the aortic valve leaflets. There is no evidence of aortic regurgitation.     Mitral Valve:  Structurally normal mitral valve with normal leaflet excursion. There is calcification of the mitral valve annulus. There is mild to moderate mitral regurgitation.     Tricuspid Valve:  Structurally normal tricuspid valve with normal leaflet excursion. There is mild tricuspid regurgitation.     Pulmonic Valve:  Structurally normal pulmonic valve with normal leaflet excursion. There is mild pulmonic regurgitation.     Aorta:  The aortic annulus and aortic root appear normal in size. The aortic root at the sinuses of Valsalva is normal in size, measuring 2.70 cm (indexed 1.52 cm/m²). The aortic diameter at the sinotubular junction is normal in size, measuring 2.1 cm. The ascending aorta diameter is normal in size, measuring 2.70 cm (indexed 1.52 cm/m²).     Pericardium:  No pericardial effusion seen.     Systemic Veins:  The inferior vena cava is normal in size (normal <2.1cm) with abnormal inspiratory collapse (abnormal <50%) consistent with mildly elevated right atrial pressure (~8, range 5-10mmHg).  ____________________________________________________________________  QUANTITATIVE DATA:  Left Ventricle Measurements: (Indexed to BSA)     IVSd (2D):   0.8 cm  LVPWd (2D):  0.8 cm  LVIDd (2D):  5.0 cm  LVIDs (2D):  4.0 cm  LV Mass:     136 g  76.6 g/m²  Visualized LV EF%: 20 to 25%     MV E Vmax:    0.68 m/s  MV A Vmax:    0.99 m/s  MV E/A:       0.68  e' lateral:   8.16 cm/s  e' medial:    5.22 cm/s  E/e' lateral: 8.28  E/e' medial:  12.95  E/e' Average: 10.10  MV DT:        194 msec    Aorta Measurements: (normal range) (Indexed to BSA)     Sinuses of Valsalva: 2.70 cm (2.7 - 3.3 cm)  Ao ST Junct:         2.1 cm  Ao Ascprox:         2.70 cm       Left Atrium Measurements: (Indexed to BSA)  LA Diam 2D:        3.80 cm  LA Vol s, MOD A4C: 48.00 ml.  LA Vol s, MOD A2C: 66.60 ml.  LA Vol s, MOD BP:  57.50 ml  32.43 ml/m²    Right Ventricle Measurements:     TAPSE:     1.6 cm  TV Larissa. S':       8.49 cm/s  RV Base (RVID1):  3.1 cm  RV Mid (RVID2):   2.3 cm  RV Major (RVID3): 8.3 cm       LVOT / RVOT/ Qp/Qs Data: (Indexed to BSA)  LVOT Diameter: 2.00 cm  LVOT Vmax:     0.79 m/s  LVOT VTI:      15.00 cm  LVOT SV:      47.1 ml  26.58 ml/m²    Mitral Valve Measurements:     MV E Vmax: 0.7 m/s         MR Vmax:          5.24 m/s  MV A Vmax: 1.0 m/s         MR VTI:           185.00 cm  MV E/A:    0.7             MR Mean Gradient: 57.0 mmHg      MR Peak Gradient: 109.8 mmHg                             MR PISA Radius:   0.60 cm                             MR Aliasing Adryan:  30.80 cm/s                             MR Regurg Fract:  0.52 %       Tricuspid Valve Measurements:     RA Pressure: 8 mmHg    ________________________________________________________________________________________  Electronically signed on 2023 at 8:32:36 AM by Johan Perez M.D.         *** Final ***   Cardiology Progress Note  ------------------------------------------------------------------------------------------    SUBJECTIVE/EVENTS: Pt feels at baseline. Denies any more shortness of breath. No other acute complaints.    -------------------------------------------------------------------------------------------  ROS:  CV: chest pain (-), palpitation (-), orthopnea (-), PND (-), edema (-)  PULM: SOB (-), cough (-), wheezing (-), hemoptysis (-).   CONST: fever (-), chills (-) or fatigue (-)  GI: abdominal distension (-), abdominal pain (-) , nausea/vomiting (-), hematemesis, (-), melena (-), hematochezia (-)  : dysuria (-), frequency (-), hematuria (-).   NEURO: numbness (-), weakness (-), dizziness (-)  MSK: myalgia (-), joint pain (-)   SKIN: itching (-), rash (-)  HEENT:  visual changes (-); vertigo or throat pain (-);  neck stiffness (-)   Psych: change in mood (-), anxiety (-), depression (-)     All other review of systems is negative unless indicated above.   -------------------------------------------------------------------------------------------  VITALS:  T(F): 98.2 (), Max: 98.2 ()  HR: 81 () (81 - 85)  BP: 111/60 () (104/59 - 128/64)  RR: 18 ()  SpO2: 97% ()  I&O's Summary    2023 07:01  -  2023 07:00  --------------------------------------------------------  IN: 542 mL / OUT: 360 mL / NET: 182 mL      ------------------------------------------------------------------------------------------  PHYSICAL EXAM:  GEN: NAD, sitting in bed  HEENT: NCAT, EOMI  CV: RRR, Ns1/s2, no m/r/g, JVP not elevated  RESP: +fine rales b/l, no wheezing   ABD: soft, nt, nd  EXT: warm, 2+ pulses, no edema  SKIN: no visible lesions, rashes  NEURO: AAOx3, no focal deficits  PSYCH: Calm, cooperative  -------------------------------------------------------------------------------------------  LABS:                          10.1   7.21  )-----------( 326      ( 2023 05:35 )             31.1         140  |  105  |  23  ----------------------------<  190<H>  3.8   |  24  |  0.82    Ca    8.9      2023 05:35  Phos  2.9       Mg     2.10         TPro  6.6  /  Alb  3.3  /  TBili  0.3  /  DBili  x   /  AST  16  /  ALT  23  /  AlkPhos  63      PT/INR - ( 2023 10:56 )   PT: 13.4 sec;   INR: 1.20 ratio         PTT - ( 2023 08:59 )  PTT:67.1 sec  CARDIAC MARKERS ( 2023 13:27 )  856 ng/L / x     / x     / x     / x     / x      CARDIAC MARKERS ( 2023 10:56 )  862 ng/L / x     / x     / x     / x     / x          Total Cholesterol: 108  LDL: --  HDL: 31  T        -------------------------------------------------------------------------------------------  Meds:  acetaminophen     Tablet .. 650 milliGRAM(s) Oral every 6 hours PRN  aluminum hydroxide/magnesium hydroxide/simethicone Suspension 30 milliLiter(s) Oral every 4 hours PRN  aspirin enteric coated 81 milliGRAM(s) Oral daily  atorvastatin 80 milliGRAM(s) Oral at bedtime  clopidogrel Tablet 75 milliGRAM(s) Oral daily  dextrose 5%. 1000 milliLiter(s) IV Continuous <Continuous>  dextrose 5%. 1000 milliLiter(s) IV Continuous <Continuous>  dextrose 50% Injectable 25 Gram(s) IV Push once  dextrose 50% Injectable 12.5 Gram(s) IV Push once  dextrose 50% Injectable 25 Gram(s) IV Push once  dextrose Oral Gel 15 Gram(s) Oral once PRN  furosemide   Injectable 40 milliGRAM(s) IV Push two times a day  glucagon  Injectable 1 milliGRAM(s) IntraMuscular once  heparin   Injectable 6000 Unit(s) IV Push every 6 hours PRN  heparin   Injectable 3000 Unit(s) IV Push every 6 hours PRN  heparin  Infusion. 1200 Unit(s)/Hr IV Continuous <Continuous>  insulin lispro (ADMELOG) corrective regimen sliding scale   SubCutaneous three times a day before meals  losartan 12.5 milliGRAM(s) Oral daily  melatonin 3 milliGRAM(s) Oral at bedtime PRN  melatonin 3 milliGRAM(s) Oral once PRN  ondansetron Injectable 4 milliGRAM(s) IV Push every 8 hours PRN    -------------------------------------------------------------------------------------------  Cardiovascular Diagnostic Testing:          Study Date:     2023   Name:           YUSUF SAWYER   :            1945   (78 years)   Gender:         female   MR#:            [id number]   MPI#:           [id number]   Patient Class:  Inpatient     Cath Lab Report    Diagnostic Cardiologist:       Miladys Kelly MD   Fellow:                        Kristen Santos MD   Referring Physician:           Josiah Resendez MD     Procedures Performed   Procedures:               1.    Arterial Access - Right Radial     2.    Diagnostic Coronary Angiography     Indications:                Myocardial infarction without ST elevation  (NSTEMI)  Lab Visit Indication:      ACS greater than 24 hours     Diagnostic Conclusions:     Diagnostic cath showed severe disease in the mid and proximal LAD.  There was VIANEY 2 flow in the vessel. Given late clinical  presentation, Q waves on ECG, LV dysfunction and LV thrombus would  recommend assessing for viability prior to any  revascularization attempts.    Recommendations:     - Viability Study    -GDMT for Heart failure.      Presentation:   Late presentation MI, likely missed STEMI. Q waves on ECG anterior      Procedure Narrative:   The risks and alternatives of the procedures and conscious sedation  were explained to the patient and informed consent was  obtained. The patient was brought to the cath lab and placed on the  exam table.  Access   Right radial artery:   The puncture site was infiltrated with 2% Lidocaine. Vascular access  was obtained using modified seldinger technique and a 6  Fr. Radial Glidesheath Slender was advanced into the vessel.  Hemostasis/Sheath Status: Hemostasis was successful using  TR Band.      Coronary Angiography   Left Coronary System:   A 5 Fr. DxTerity TRA ULTRA 4.0 was positioned into the vessel ostia  under fluoroscopic guidance. Contrast injections were  performed using hand injection. Right Coronary System:   A 5 Fr. DxTerity TRA ULTRA 4.0 was positioned into the vessel ostia  under fluoroscopic guidance. Contrast injections were  performed using hand injection.    Diagnostic Findings:     Patient: YUSUF SAWYER           MRN: [id number]  Study Date: 2023   02:27 PM      Page 1 of 4          Coronary Angiography   The coronary circulation is left dominant.      LM   Left main artery: Angiography shows no disease.      LAD   Proximal left anterior descending: There is a 95 % stenosis. Mid left  anterior descending: There is a 90 % stenosis. VIANEY Flow  2.      CX   Circumflex: The segment is large. Angiography shows minor  irregularities.    RCA   Right coronary artery: The segment is small, non-dominant. Angiography  shows minor irregularities.    History and Risk Factors:   Hypertension:                                Yes   Dyslipidemia:                                Yes   Prior MI:                                    No   Prior PCI:                                   No   Family History of Premature CAD:             No   Cerebrovascular Disease:                     No   Peripheral Arterial Disease:                 No   Prior CABG:                                  No   Tobacco Use:                                 Never   Cardiac Arrest Out of Hospital:              No   Cardiac Arrest at Transferring Facility:     No   Prior Heart Failure:                         No   Diabetes:                                    Yes   CSHA ([place] Study of Health and Aging)    5 - Mildly Frail   Frailty Scale:     X-Ray:   Diagnostic Flouro time:       1.7 min.                     Exam record  DAP:           3513.70 cGycm2  Interventional Flouro time:                                Exam fluoro  DAP:           746.70 cGycm2  Total Flouro Time:            1.7 min.                     Exam total  DAP:            4260.40  cGycm2  Exam Total Dose:              502 mGy     Exam Start Time:   02:27 PM   Exam End Time:     02:37 PM   Exam Duration:     10 min     Contrast:   Description                      Dose          Unit        Serial No.   Omnipaque 350mg 100ml              22.000   BX   Contrast Bottle     Patient: YUSUF SAWYER           MRN: [id number]  Study Date: 2023   02:27 PM      Page 2 of 4          Medication:   Description                  Dose, Unit, Route, Time   fentaNYL  50 mCg/mL          25.0, mcg, IV, 14:24:20   Injectable (Sublimaze)   midazolam  1 mG/mL           1.0, mg, IV, 14:24:24   Injectable (Versed)   lidocaine 2 % Injectable     3.0, ml, Subcut, 14:27:11   (Xylocaine)   verapamil 2.5 mG/mL          2.5, mg, IA, 14:27:59   Injectable   heparin (porcine) 1,000      3000.0, units, IA, 14:28:09   Unit(s)/mL Injectable     sodium chloride 0.9% IVPB    15.0, ml per hr, IV, 14:36:08     Inventory:   Description                  Quantity     Gavi#        Reference  No.    Serial No.      Lot No.       CDM  Angio Pack       1.000        [id number]   [id number]  [id number]*    720   .035 Radial Guidewire        1.000        [id number]   [id number]  [id number]*    594   Sodium Heparin               1.000        [id number]  [id number]          [id number]*    490   Omnipaque 350mg 100ml        1.000        [id number]   [id number]  [id number]*  Contrast Bottle                           139   6 Fr. Radial Glidesheath     1.000        [id number]     [id number]*  Slender                       208   5 Fr. DxTerity TRA ULTRA     1.000        [id number]   [id number]  [id number]*  4.0                                       561   Vascular Solutions 27cm      1.000        [id number]   3527  [id number]*  Radial Band                         881     Hemodynamic Pressures:   Phase          Location           [mmHg]                [mmHg]  [mmHg]            HR  Baseline       Ao                   s       95                d  53                m       63         77  Flow Calculations, Phase: Baseline   CO                                HR  O2Insp  CI                                PO2  O2Exp  SV                                VO2                  221.40 ml/min  O2(ExAir)  SVI                               A-VO2  Hb  Shunts:   Qs                                Qs Index   Qp                                Qp Index  Qp/Qs  EPBF                              EPBF Index   L-R                               L-R Index   R-L                               R-L Index     Patient: YUSUF SAWYER           MRN: [id number]  Study Date: 2023   02:27 PM      Page 3 of 4          Conscious sedation was administered and monitored by Cardiology  nursing staff,  under my direct supervision when applicable.     Electronically signed by Miladys Kelly MD on 2023 at 05:29 PM   (No Signature Object)     Patient: YUSUF SAWYER           MRN: [id number]  Study Date: 2023   02:27 PM      Page 4 of 4          TRANSTHORACIC ECHOCARDIOGRAM REPORT  ________________________________________________________________________________                                      _______       *Report Contains Critical Result*       Pt. Name:       YUSUF SAWYER Study Date:    2023  MRN:            [id number]           YOB: 1945  Accession #:    [id number]           Age:           78 years  Account#:       [id number]            Gender:        F  Heart Rate:                         Height: 63.00 in (160.02 cm)  Rhythm:                             Weight:        163.00 lb (73.94 kg)  Blood Pressure: 104/56 mmHg         BSA/BMI:       1.77 m² / 28.87 kg/m²  ________________________________________________________________________________________  Referring Physician:    Abdelrahman Owens MD  Interpreting Physician: Johan Perez M.D.  Primary Sonographer:    Lauren R. Finkelstein [facility]    CPT:               ECHO TTE WO CON COMP W DOPP - 40477.m  Indication(s):     Abnormal electrocardiogram ECG/EKG - R94.31; Dyspnea,                     unspecified - R06.00  Procedure:         Transthoracic echocardiogram with 2-D, M-mode and complete                     spectral and color flow Doppler.  Ordering Location: [facility]  Study Information: Image quality for this study is good.    _______________________________________________________________________________________     CONCLUSIONS:      1. The left ventricular cavity is normal. Left ventricular wall thickness is normal. Left ventricular systolic function is severely decreased with an ejection fraction visually estimated at 20 to 25%. There are regional wall motion abnormalities present. There is mild (grade 1) left ventricular diastolic dysfunction. There is a left ventricular thrombus. A large (~ 2.7 cm X 1.9 cm) left ventricular apical thrombus is visualized. Hypokinesis of the apex, mid to distal septum, and mid to distal anterior wall.   2. Normal right ventricular cavity size and systolic function.   3. Structurally normal mitral valve with normal leaflet excursion. There is calcification of the mitral valve annulus. There is mild to moderate mitral regurgitation.   4. No prior echocardiogram is available for comparison.    ________________________________________________________________________________________  *Report Contains Critical Result*  Critical Findings: Intracardiac Thrombus. Josiah Resendez M.D. was informed of the  findings by telephone on 2023 at 8:00:00 AM.    ________________________________________________________________________________________  FINDINGS:     Left Ventricle:  The left ventricular cavity is normal. Left ventricular wall thickness is normal. Left ventricular systolic function is severely decreased with an ejection fraction visually estimated at 20 to 25%. There are regional wall motion abnormalities present. There is mild (grade 1) left ventricular diastolic dysfunction. There is a left ventricular thrombus. A large (~ 2.7 cm X 1.9 cm) left ventricular apical thrombus is visualized. Hypokinesis of the apex, mid to distal septum, and mid to distal anterior wall.     Right Ventricle:  The right ventricular cavity is normal in size and normal systolic function. Tricuspid annular plane systolic excursion (TAPSE) is 1.6 cm (normal >=1.7 cm).     Left Atrium:  The left atrium is normal with an indexed volume of 32.43 ml/m².     Right Atrium:  The right atrium is normal in size.     Aortic Valve:  The aortic valve appears trileaflet with normal systolic excursion. There is calcification of the aortic valve leaflets. There is no evidence of aortic regurgitation.     Mitral Valve:  Structurally normal mitral valve with normal leaflet excursion. There is calcification of the mitral valve annulus. There is mild to moderate mitral regurgitation.     Tricuspid Valve:  Structurally normal tricuspid valve with normal leaflet excursion. There is mild tricuspid regurgitation.     Pulmonic Valve:  Structurally normal pulmonic valve with normal leaflet excursion. There is mild pulmonic regurgitation.     Aorta:  The aortic annulus and aortic root appear normal in size. The aortic root at the sinuses of Valsalva is normal in size, measuring 2.70 cm (indexed 1.52 cm/m²). The aortic diameter at the sinotubular junction is normal in size, measuring 2.1 cm. The ascending aorta diameter is normal in size, measuring 2.70 cm (indexed 1.52 cm/m²).     Pericardium:  No pericardial effusion seen.     Systemic Veins:  The inferior vena cava is normal in size (normal <2.1cm) with abnormal inspiratory collapse (abnormal <50%) consistent with mildly elevated right atrial pressure (~8, range 5-10mmHg).  ____________________________________________________________________  QUANTITATIVE DATA:  Left Ventricle Measurements: (Indexed to BSA)     IVSd (2D):   0.8 cm  LVPWd (2D):  0.8 cm  LVIDd (2D):  5.0 cm  LVIDs (2D):  4.0 cm  LV Mass:     136 g  76.6 g/m²  Visualized LV EF%: 20 to 25%     MV E Vmax:    0.68 m/s  MV A Vmax:    0.99 m/s  MV E/A:       0.68  e' lateral:   8.16 cm/s  e' medial:    5.22 cm/s  E/e' lateral: 8.28  E/e' medial:  12.95  E/e' Average: 10.10  MV DT:        194 msec    Aorta Measurements: (normal range) (Indexed to BSA)     Sinuses of Valsalva: 2.70 cm (2.7 - 3.3 cm)  Ao ST Junct:         2.1 cm  Ao Ascprox:         2.70 cm       Left Atrium Measurements: (Indexed to BSA)  LA Diam 2D:        3.80 cm  LA Vol s, MOD A4C: 48.00 ml.  LA Vol s, MOD A2C: 66.60 ml.  LA Vol s, MOD BP:  57.50 ml  32.43 ml/m²    Right Ventricle Measurements:     TAPSE:     1.6 cm  TV Larissa. S':       8.49 cm/s  RV Base (RVID1):  3.1 cm  RV Mid (RVID2):   2.3 cm  RV Major (RVID3): 8.3 cm       LVOT / RVOT/ Qp/Qs Data: (Indexed to BSA)  LVOT Diameter: 2.00 cm  LVOT Vmax:     0.79 m/s  LVOT VTI:      15.00 cm  LVOT SV:      47.1 ml  26.58 ml/m²    Mitral Valve Measurements:     MV E Vmax: 0.7 m/s         MR Vmax:          5.24 m/s  MV A Vmax: 1.0 m/s         MR VTI:           185.00 cm  MV E/A:    0.7             MR Mean Gradient: 57.0 mmHg      MR Peak Gradient: 109.8 mmHg                             MR PISA Radius:   0.60 cm                             MR Aliasing Adryan:  30.80 cm/s                             MR Regurg Fract:  0.52 %       Tricuspid Valve Measurements:     RA Pressure: 8 mmHg    ________________________________________________________________________________________  Electronically signed on 2023 at 8:32:36 AM by Johan Perez M.D.         *** Final ***

## 2023-11-29 NOTE — PROGRESS NOTE ADULT - ATTENDING COMMENTS
agree with fellow's assessment.   Awaiting viability study to guide revascularization  Start on metoprolol XL and Transition to Entresto. Please do not hold GDMT unless SBP is less than 85-90

## 2023-11-29 NOTE — PROGRESS NOTE ADULT - ASSESSMENT
78-year-old female with past medical history significant for hypertension, hyperlipidemia, diabetes on metformin presented with SOB, found to have elevated trops and EKG changes, consistent with NSTEMI.         Problem/Plan - 1:  ·  Problem: NSTEMI (non-ST elevation myocardial infarction).   ·  Plan: Patient with SOB, found to have elevated trops and EKG changes   -consistent with NSTEMI   -Cardiology fu   -S/p heparin gtt, ASA and plavix load   -C/w DAPT and heparin gtt   -C/w Atorvastatin 80 mg daily   -F/u with TTE   - cath today     Problem/Plan - 2:  ·  Problem: Acute HF (heart failure).   ·  Plan: Exam concerning for volume overload in the setting of NSTEMI   -proBNP elevated   -Cardiology recs appreciated   -F/u with TTE   -C/w IV lasix 40 mg daily   -GDMT after TTE     Problem/Plan - 3:  ·  Problem: Essential hypertension.   ·  Plan: C/w lisinopril 5 mg daily.    Problem/Plan - 4:  ·  Problem: Hyperlipidemia.   ·  Plan: C/w atorvastatin 80 mg daily   -F/u with lipid panel.    Problem/Plan - 5:  ·  Problem: Preventive measure.   ·  Plan: DVT ppx heparin gtt.

## 2023-11-30 LAB
ANION GAP SERPL CALC-SCNC: 11 MMOL/L — SIGNIFICANT CHANGE UP (ref 7–14)
ANION GAP SERPL CALC-SCNC: 11 MMOL/L — SIGNIFICANT CHANGE UP (ref 7–14)
APTT BLD: 75.5 SEC — HIGH (ref 24.5–35.6)
APTT BLD: 75.5 SEC — HIGH (ref 24.5–35.6)
BUN SERPL-MCNC: 22 MG/DL — SIGNIFICANT CHANGE UP (ref 7–23)
BUN SERPL-MCNC: 22 MG/DL — SIGNIFICANT CHANGE UP (ref 7–23)
CALCIUM SERPL-MCNC: 8.7 MG/DL — SIGNIFICANT CHANGE UP (ref 8.4–10.5)
CALCIUM SERPL-MCNC: 8.7 MG/DL — SIGNIFICANT CHANGE UP (ref 8.4–10.5)
CHLORIDE SERPL-SCNC: 103 MMOL/L — SIGNIFICANT CHANGE UP (ref 98–107)
CHLORIDE SERPL-SCNC: 103 MMOL/L — SIGNIFICANT CHANGE UP (ref 98–107)
CO2 SERPL-SCNC: 24 MMOL/L — SIGNIFICANT CHANGE UP (ref 22–31)
CO2 SERPL-SCNC: 24 MMOL/L — SIGNIFICANT CHANGE UP (ref 22–31)
CREAT SERPL-MCNC: 0.72 MG/DL — SIGNIFICANT CHANGE UP (ref 0.5–1.3)
CREAT SERPL-MCNC: 0.72 MG/DL — SIGNIFICANT CHANGE UP (ref 0.5–1.3)
EGFR: 85 ML/MIN/1.73M2 — SIGNIFICANT CHANGE UP
EGFR: 85 ML/MIN/1.73M2 — SIGNIFICANT CHANGE UP
GLUCOSE BLDC GLUCOMTR-MCNC: 190 MG/DL — HIGH (ref 70–99)
GLUCOSE BLDC GLUCOMTR-MCNC: 190 MG/DL — HIGH (ref 70–99)
GLUCOSE BLDC GLUCOMTR-MCNC: 246 MG/DL — HIGH (ref 70–99)
GLUCOSE BLDC GLUCOMTR-MCNC: 246 MG/DL — HIGH (ref 70–99)
GLUCOSE BLDC GLUCOMTR-MCNC: 267 MG/DL — HIGH (ref 70–99)
GLUCOSE BLDC GLUCOMTR-MCNC: 267 MG/DL — HIGH (ref 70–99)
GLUCOSE BLDC GLUCOMTR-MCNC: 291 MG/DL — HIGH (ref 70–99)
GLUCOSE BLDC GLUCOMTR-MCNC: 291 MG/DL — HIGH (ref 70–99)
GLUCOSE SERPL-MCNC: 193 MG/DL — HIGH (ref 70–99)
GLUCOSE SERPL-MCNC: 193 MG/DL — HIGH (ref 70–99)
HCT VFR BLD CALC: 30.7 % — LOW (ref 34.5–45)
HCT VFR BLD CALC: 30.7 % — LOW (ref 34.5–45)
HGB BLD-MCNC: 9.9 G/DL — LOW (ref 11.5–15.5)
HGB BLD-MCNC: 9.9 G/DL — LOW (ref 11.5–15.5)
INR BLD: 1.15 RATIO — SIGNIFICANT CHANGE UP (ref 0.85–1.18)
INR BLD: 1.15 RATIO — SIGNIFICANT CHANGE UP (ref 0.85–1.18)
MAGNESIUM SERPL-MCNC: 2 MG/DL — SIGNIFICANT CHANGE UP (ref 1.6–2.6)
MAGNESIUM SERPL-MCNC: 2 MG/DL — SIGNIFICANT CHANGE UP (ref 1.6–2.6)
MCHC RBC-ENTMCNC: 29.2 PG — SIGNIFICANT CHANGE UP (ref 27–34)
MCHC RBC-ENTMCNC: 29.2 PG — SIGNIFICANT CHANGE UP (ref 27–34)
MCHC RBC-ENTMCNC: 32.2 GM/DL — SIGNIFICANT CHANGE UP (ref 32–36)
MCHC RBC-ENTMCNC: 32.2 GM/DL — SIGNIFICANT CHANGE UP (ref 32–36)
MCV RBC AUTO: 90.6 FL — SIGNIFICANT CHANGE UP (ref 80–100)
MCV RBC AUTO: 90.6 FL — SIGNIFICANT CHANGE UP (ref 80–100)
NRBC # BLD: 0 /100 WBCS — SIGNIFICANT CHANGE UP (ref 0–0)
NRBC # BLD: 0 /100 WBCS — SIGNIFICANT CHANGE UP (ref 0–0)
NRBC # FLD: 0.02 K/UL — HIGH (ref 0–0)
NRBC # FLD: 0.02 K/UL — HIGH (ref 0–0)
PHOSPHATE SERPL-MCNC: 2.3 MG/DL — LOW (ref 2.5–4.5)
PHOSPHATE SERPL-MCNC: 2.3 MG/DL — LOW (ref 2.5–4.5)
PLATELET # BLD AUTO: 323 K/UL — SIGNIFICANT CHANGE UP (ref 150–400)
PLATELET # BLD AUTO: 323 K/UL — SIGNIFICANT CHANGE UP (ref 150–400)
POTASSIUM SERPL-MCNC: 3.8 MMOL/L — SIGNIFICANT CHANGE UP (ref 3.5–5.3)
POTASSIUM SERPL-MCNC: 3.8 MMOL/L — SIGNIFICANT CHANGE UP (ref 3.5–5.3)
POTASSIUM SERPL-SCNC: 3.8 MMOL/L — SIGNIFICANT CHANGE UP (ref 3.5–5.3)
POTASSIUM SERPL-SCNC: 3.8 MMOL/L — SIGNIFICANT CHANGE UP (ref 3.5–5.3)
PROTHROM AB SERPL-ACNC: 12.9 SEC — SIGNIFICANT CHANGE UP (ref 9.5–13)
PROTHROM AB SERPL-ACNC: 12.9 SEC — SIGNIFICANT CHANGE UP (ref 9.5–13)
RBC # BLD: 3.39 M/UL — LOW (ref 3.8–5.2)
RBC # BLD: 3.39 M/UL — LOW (ref 3.8–5.2)
RBC # FLD: 14.6 % — HIGH (ref 10.3–14.5)
RBC # FLD: 14.6 % — HIGH (ref 10.3–14.5)
SODIUM SERPL-SCNC: 138 MMOL/L — SIGNIFICANT CHANGE UP (ref 135–145)
SODIUM SERPL-SCNC: 138 MMOL/L — SIGNIFICANT CHANGE UP (ref 135–145)
WBC # BLD: 7.47 K/UL — SIGNIFICANT CHANGE UP (ref 3.8–10.5)
WBC # BLD: 7.47 K/UL — SIGNIFICANT CHANGE UP (ref 3.8–10.5)
WBC # FLD AUTO: 7.47 K/UL — SIGNIFICANT CHANGE UP (ref 3.8–10.5)
WBC # FLD AUTO: 7.47 K/UL — SIGNIFICANT CHANGE UP (ref 3.8–10.5)

## 2023-11-30 RX ORDER — SACUBITRIL AND VALSARTAN 24; 26 MG/1; MG/1
1 TABLET, FILM COATED ORAL
Refills: 0 | Status: DISCONTINUED | OUTPATIENT
Start: 2023-11-30 | End: 2023-12-06

## 2023-11-30 RX ORDER — POTASSIUM CHLORIDE 20 MEQ
40 PACKET (EA) ORAL ONCE
Refills: 0 | Status: COMPLETED | OUTPATIENT
Start: 2023-11-30 | End: 2023-11-30

## 2023-11-30 RX ORDER — WARFARIN SODIUM 2.5 MG/1
10 TABLET ORAL ONCE
Refills: 0 | Status: COMPLETED | OUTPATIENT
Start: 2023-11-30 | End: 2023-11-30

## 2023-11-30 RX ADMIN — HEPARIN SODIUM 1400 UNIT(S)/HR: 5000 INJECTION INTRAVENOUS; SUBCUTANEOUS at 08:24

## 2023-11-30 RX ADMIN — Medication 40 MILLIGRAM(S): at 09:04

## 2023-11-30 RX ADMIN — HEPARIN SODIUM 1400 UNIT(S)/HR: 5000 INJECTION INTRAVENOUS; SUBCUTANEOUS at 13:49

## 2023-11-30 RX ADMIN — SACUBITRIL AND VALSARTAN 1 TABLET(S): 24; 26 TABLET, FILM COATED ORAL at 17:26

## 2023-11-30 RX ADMIN — CLOPIDOGREL BISULFATE 75 MILLIGRAM(S): 75 TABLET, FILM COATED ORAL at 12:59

## 2023-11-30 RX ADMIN — WARFARIN SODIUM 10 MILLIGRAM(S): 2.5 TABLET ORAL at 21:39

## 2023-11-30 RX ADMIN — Medication 81 MILLIGRAM(S): at 13:00

## 2023-11-30 RX ADMIN — ATORVASTATIN CALCIUM 80 MILLIGRAM(S): 80 TABLET, FILM COATED ORAL at 21:39

## 2023-11-30 RX ADMIN — Medication 40 MILLIEQUIVALENT(S): at 23:05

## 2023-11-30 RX ADMIN — Medication 1: at 09:03

## 2023-11-30 RX ADMIN — Medication 3: at 17:28

## 2023-11-30 RX ADMIN — Medication 2: at 12:52

## 2023-11-30 RX ADMIN — HEPARIN SODIUM 1400 UNIT(S)/HR: 5000 INJECTION INTRAVENOUS; SUBCUTANEOUS at 08:21

## 2023-11-30 RX ADMIN — HEPARIN SODIUM 1400 UNIT(S)/HR: 5000 INJECTION INTRAVENOUS; SUBCUTANEOUS at 20:20

## 2023-11-30 NOTE — PROGRESS NOTE ADULT - ASSESSMENT
78-year-old female with past medical history significant for hypertension, hyperlipidemia, diabetes on metformin presented with SOB, found to have elevated trops and EKG changes, consistent with NSTEMI.         Problem/Plan - 1:  ·  Problem: NSTEMI (non-ST elevation myocardial infarction).   ·  Plan: Patient with SOB, found to have elevated trops and EKG changes   -consistent with NSTEMI   -Cardiology fu   -S/p heparin gtt, ASA and plavix load   -C/w DAPT and heparin gtt   -C/w Atorvastatin 80 mg daily   -F/u with TTE   - sp cath   - continue heparin gtt for LV thrombus, will require warfarin after viability study pending plan for further PCI       Problem/Plan - 2:  ·  Problem: Acute HF (heart failure).   ·  Plan: Exam concerning for volume overload in the setting of NSTEMI   -proBNP elevated   -Cardiology recs appreciated   -F/u with TTE   -C/w IV lasix 40 mg daily   -GDMT after TTE     Problem/Plan - 3:  ·  Problem: Essential hypertension.   ·  Plan: C/w lisinopril 5 mg daily.    Problem/Plan - 4:  ·  Problem: Hyperlipidemia.   ·  Plan: C/w atorvastatin 80 mg daily   -F/u with lipid panel.    Problem/Plan - 5:  ·  Problem: Preventive measure.   ·  Plan: DVT ppx heparin gtt.

## 2023-11-30 NOTE — CHART NOTE - NSCHARTNOTEFT_GEN_A_CORE
PA note     Called by nurse for 3 beats of NSVT seen on tele   PT is asymptomatic  Vital signs  Nonsustained V-Tach   Caffeine free diet ordered  consider increasing Beta Blocker  KCL 40 meq X 1 ordered   Monitor electrolytes, Will keep K > 4.0  & Mg > 2  Will continue to monitor

## 2023-11-30 NOTE — PROGRESS NOTE ADULT - ASSESSMENT
Assessment and Plan:   78F with PMHx HTN, HLD, DM p/w shortness of breath on exertion. Pt states SOB started acutely last week while shopping. Pt with mild b/l rales and peripheral edema. EKG with anterolateral septal q waves with sub-1mm ST elevations, not meeting STEMI criteria. Trop 862>856. CXR without acute consolidation. No suspicion of acute ACS at this time however possibly had ischemic event a week ago. Possible atypical presentation in elderly female with hx of DM. New heart failure in setting of NSTEMI. Echo as above, showing EF 20-25% with wall motion abnormalities as well as LV thrombus. Diagnostic LHC as above, showing 95% stenosis in proximal and 90% stenosis in mid LAD. Part 1 of viability test completed yesterday, Part 2 to be done today and will determine candidacy for PCI.    #NSTEMI  #new HFrEF  #LV thrombus   #HTN    Recommendations:   - f/up viability study  - c/w Entresto 24-26mg BID   - c/w metop succ 25mg qd   - c/w lasix 40mg po qd tomorrow  - continue heparin gtt for LV thrombus, will require warfarin after viability study pending plan for further PCI   - c/w DAPT with aspirin 81mg and plavix 90mg   - c/w atorvastatin 40mg  - strict I/Os, daily weights, K >4, Mg >2    -----

## 2023-11-30 NOTE — PROGRESS NOTE ADULT - SUBJECTIVE AND OBJECTIVE BOX
Cardiology Progress Note  ------------------------------------------------------------------------------------------    SUBJECTIVE/EVENTS: Pt feels at baseline. Denies any more shortness of breath. No other acute complaints.      -------------------------------------------------------------------------------------------  ROS:  CV: chest pain (-), palpitation (-), orthopnea (-), PND (-), edema (-)  PULM: SOB (-), cough (-), wheezing (-), hemoptysis (-).   CONST: fever (-), chills (-) or fatigue (-)  GI: abdominal distension (-), abdominal pain (-) , nausea/vomiting (-), hematemesis, (-), melena (-), hematochezia (-)  : dysuria (-), frequency (-), hematuria (-).   NEURO: numbness (-), weakness (-), dizziness (-)  MSK: myalgia (-), joint pain (-)   SKIN: itching (-), rash (-)  HEENT:  visual changes (-); vertigo or throat pain (-);  neck stiffness (-)   Psych: change in mood (-), anxiety (-), depression (-)     All other review of systems is negative unless indicated above.   -------------------------------------------------------------------------------------------  VITALS:  T(F): 98.2 (), Max: 98.2 ()  HR: 77 () (77 - 81)  BP: 99/53 () (99/53 - 108/58)  RR: 19 ()  SpO2: 100% ()  I&O's Summary    ------------------------------------------------------------------------------------------  PHYSICAL EXAM:  GEN: NAD, sitting in bed  HEENT: NCAT, EOMI  CV: RRR, Ns1/s2, no m/r/g, JVP not elevated  RESP: CTA B/l, no w/r/r  ABD: soft, nt, nd  EXT: warm, 2+ pulses, no edema  SKIN: no visible lesions, rashes  NEURO: AAOx3, no focal deficits  PSYCH: Calm, cooperative  -------------------------------------------------------------------------------------------  LABS:                          9.9    7.47  )-----------( 323      ( 2023 06:10 )             30.7     11-30    138  |  103  |  22  ----------------------------<  193<H>  3.8   |  24  |  0.72    Ca    8.7      2023 06:10  Phos  2.3     -  Mg     2.00           PT/INR - ( 2023 06:10 )   PT: 12.9 sec;   INR: 1.15 ratio         PTT - ( 2023 06:10 )  PTT:75.5 sec  CARDIAC MARKERS ( 2023 13:27 )  856 ng/L / x     / x     / x     / x     / x      CARDIAC MARKERS ( 2023 10:56 )  862 ng/L / x     / x     / x     / x     / x          Total Cholesterol: 108  LDL: --  HDL: 31  T        -------------------------------------------------------------------------------------------  Meds:  acetaminophen     Tablet .. 650 milliGRAM(s) Oral every 6 hours PRN  aluminum hydroxide/magnesium hydroxide/simethicone Suspension 30 milliLiter(s) Oral every 4 hours PRN  aspirin enteric coated 81 milliGRAM(s) Oral daily  atorvastatin 80 milliGRAM(s) Oral at bedtime  clopidogrel Tablet 75 milliGRAM(s) Oral daily  dextrose 5%. 1000 milliLiter(s) IV Continuous <Continuous>  dextrose 5%. 1000 milliLiter(s) IV Continuous <Continuous>  dextrose 50% Injectable 25 Gram(s) IV Push once  dextrose 50% Injectable 12.5 Gram(s) IV Push once  dextrose 50% Injectable 25 Gram(s) IV Push once  dextrose Oral Gel 15 Gram(s) Oral once PRN  furosemide    Tablet 40 milliGRAM(s) Oral daily  glucagon  Injectable 1 milliGRAM(s) IntraMuscular once  heparin   Injectable 6000 Unit(s) IV Push every 6 hours PRN  heparin   Injectable 3000 Unit(s) IV Push every 6 hours PRN  heparin  Infusion. 1200 Unit(s)/Hr IV Continuous <Continuous>  insulin lispro (ADMELOG) corrective regimen sliding scale   SubCutaneous three times a day before meals  melatonin 3 milliGRAM(s) Oral at bedtime PRN  melatonin 3 milliGRAM(s) Oral once PRN  metoprolol succinate ER 25 milliGRAM(s) Oral daily  ondansetron Injectable 4 milliGRAM(s) IV Push every 8 hours PRN  sacubitril 24 mG/valsartan 26 mG 1 Tablet(s) Oral two times a day      -------------------------------------------------------------------------------------------  Cardiovascular Diagnostic Testing:        Study Date:     2023   Name:           YUSUF SAWYER   :            1945   (78 years)   Gender:         female   MR#:            [id number]   MPI#:           [id number]   Patient Class:  Inpatient     Cath Lab Report    Diagnostic Cardiologist:       Miladys Kelly MD   Fellow:                        Kristen Santos MD   Referring Physician:           Josiah Resendez MD     Procedures Performed   Procedures:               1.    Arterial Access - Right Radial     2.    Diagnostic Coronary Angiography     Indications:                Myocardial infarction without ST elevation  (NSTEMI)  Lab Visit Indication:      ACS greater than 24 hours     Diagnostic Conclusions:     Diagnostic cath showed severe disease in the mid and proximal LAD.  There was VIANEY 2 flow in the vessel. Given late clinical  presentation, Q waves on ECG, LV dysfunction and LV thrombus would  recommend assessing for viability prior to any  revascularization attempts.    Recommendations:     - Viability Study    -GDMT for Heart failure.      Presentation:   Late presentation MI, likely missed STEMI. Q waves on ECG anterior      Procedure Narrative:   The risks and alternatives of the procedures and conscious sedation  were explained to the patient and informed consent was  obtained. The patient was brought to the cath lab and placed on the  exam table.  Access   Right radial artery:   The puncture site was infiltrated with 2% Lidocaine. Vascular access  was obtained using modified seldinger technique and a 6  Fr. Radial Glidesheath Slender was advanced into the vessel.  Hemostasis/Sheath Status: Hemostasis was successful using  TR Band.      Coronary Angiography   Left Coronary System:   A 5 Fr. DxTerity TRA ULTRA 4.0 was positioned into the vessel ostia  under fluoroscopic guidance. Contrast injections were  performed using hand injection. Right Coronary System:   A 5 Fr. DxTerity TRA ULTRA 4.0 was positioned into the vessel ostia  under fluoroscopic guidance. Contrast injections were  performed using hand injection.    Diagnostic Findings:     Patient: YUSUF SAWYER           MRN: [id number]  Study Date: 2023   02:27 PM      Page 1 of 4          Coronary Angiography   The coronary circulation is left dominant.      LM   Left main artery: Angiography shows no disease.      LAD   Proximal left anterior descending: There is a 95 % stenosis. Mid left  anterior descending: There is a 90 % stenosis. VIANEY Flow  2.      CX   Circumflex: The segment is large. Angiography shows minor  irregularities.    RCA   Right coronary artery: The segment is small, non-dominant. Angiography  shows minor irregularities.    History and Risk Factors:   Hypertension:                                Yes   Dyslipidemia:                                Yes   Prior MI:                                    No   Prior PCI:                                   No   Family History of Premature CAD:             No   Cerebrovascular Disease:                     No   Peripheral Arterial Disease:                 No   Prior CABG:                                  No   Tobacco Use:                                 Never   Cardiac Arrest Out of Hospital:              No   Cardiac Arrest at Transferring Facility:     No   Prior Heart Failure:                         No   Diabetes:                                    Yes   CSHA ([place] Study of Health and Aging)    5 - Mildly Frail   Frailty Scale:     X-Ray:   Diagnostic Flouro time:       1.7 min.                     Exam record  DAP:           3513.70 cGycm2  Interventional Flouro time:                                Exam fluoro  DAP:           746.70 cGycm2  Total Flouro Time:            1.7 min.                     Exam total  DAP:            4260.40  cGycm2  Exam Total Dose:              502 mGy     Exam Start Time:   02:27 PM   Exam End Time:     02:37 PM   Exam Duration:     10 min     Contrast:   Description                      Dose          Unit        Serial No.   Omnipaque 350mg 100ml              22.000   BX   Contrast Bottle     Patient: YUSUF SAWYER           MRN: [id number]  Study Date: 2023   02:27 PM      Page 2 of 4          Medication:   Description                  Dose, Unit, Route, Time   fentaNYL  50 mCg/mL          25.0, mcg, IV, 14:24:20   Injectable (Sublimaze)   midazolam  1 mG/mL           1.0, mg, IV, 14:24:24   Injectable (Versed)   lidocaine 2 % Injectable     3.0, ml, Subcut, 14:27:11   (Xylocaine)   verapamil 2.5 mG/mL          2.5, mg, IA, 14:27:59   Injectable   heparin (porcine) 1,000      3000.0, units, IA, 14:28:09   Unit(s)/mL Injectable     sodium chloride 0.9% IVPB    15.0, ml per hr, IV, 14:36:08     Inventory:   Description                  Quantity     Peoplesoft#        Reference  No.    Serial No.      Lot No.       CDM  Angio Pack       1.000        [id number]   [id number]  [id number]*    720   .035 Radial Guidewire        1.000        [id number]   [id number]  [id number]*    594   Sodium Heparin               1.000        [id number]  [id number]          [id number]*    490   Omnipaque 350mg 100ml        1.000        [id number]   [id number]  [id number]*  Contrast Bottle                           139   6 Fr. Radial Glidesheath     1.000        [id number]     [id number]*  Slender                       208   5 Fr. DxTerity TRA ULTRA     1.000        [id number]   [id number]  [id number]*  4.0                                       561   [employer] 27cm      1.000        [id number]   3527  [id number]*  Radial Band                         881     Hemodynamic Pressures:   Phase          Location           [mmHg]                [mmHg]  [mmHg]            HR  Baseline       Ao                   s       95                d  53                m       63         77  Flow Calculations, Phase: Baseline   CO                                HR  O2Insp  CI                                PO2  O2Exp  SV                                VO2                  221.40 ml/min  O2(ExAir)  SVI                               A-VO2  Hb  Shunts:   Qs                                Qs Index   Qp                                Qp Index  Qp/Qs  EPBF                              EPBF Index   L-R                               L-R Index   R-L                               R-L Index     Patient: YUSUF SAWYER           MRN: [id number]  Study Date: 2023   02:27 PM      Page 3 of 4      Conscious sedation was administered and monitored by Cardiology  nursing staff,  under my direct supervision when applicable.     Electronically signed by Miladys Kelly MD on 2023 at 05:29 PM   (No Signature Object)     Patient: YUSUF SAWYER           MRN: [id number]  Study Date: 2023   02:27 PM      Page 4 of 4        TRANSTHORACIC ECHOCARDIOGRAM REPORT  ________________________________________________________________________________                                      _______       *Report Contains Critical Result*       Pt. Name:       YUSUF SAWYER Study Date:    2023  MRN:            [id number]           YOB: 1945  Accession #:    [id number]           Age:           78 years  Account#:       [id number]            Gender:        F  Heart Rate:                         Height: 63.00 in (160.02 cm)  Rhythm:                             Weight:        163.00 lb (73.94 kg)  Blood Pressure: 104/56 mmHg         BSA/BMI:       1.77 m² / 28.87 kg/m²  ________________________________________________________________________________________  Referring Physician:    Abdelrahman Owens MD  Interpreting Physician: Johan Perez M.D.  Primary Sonographer:    [facility]    CPT:               ECHO TTE WO CON COMP W DOPP - 19366.m  Indication(s):     Abnormal electrocardiogram ECG/EKG - R94.31; Dyspnea,                     unspecified - R06.00  Procedure:         Transthoracic echocardiogram with 2-D, M-mode and complete                     spectral and color flow Doppler.  Ordering Location: [facility]  Study Information: Image quality for this study is good.    _______________________________________________________________________________________     CONCLUSIONS:      1. The left ventricular cavity is normal. Left ventricular wall thickness is normal. Left ventricular systolic function is severely decreased with an ejection fraction visually estimated at 20 to 25%. There are regional wall motion abnormalities present. There is mild (grade 1) left ventricular diastolic dysfunction. There is a left ventricular thrombus. A large (~ 2.7 cm X 1.9 cm) left ventricular apical thrombus is visualized. Hypokinesis of the apex, mid to distal septum, and mid to distal anterior wall.   2. Normal right ventricular cavity size and systolic function.   3. Structurally normal mitral valve with normal leaflet excursion. There is calcification of the mitral valve annulus. There is mild to moderate mitral regurgitation.   4. No prior echocardiogram is available for comparison.    ________________________________________________________________________________________  *Report Contains Critical Result*  Critical Findings: Intracardiac Thrombus. Josiah Resendez M.D. was informed of the  findings by telephone on 2023 at 8:00:00 AM.    ________________________________________________________________________________________  FINDINGS:     Left Ventricle:  The left ventricular cavity is normal. Left ventricular wall thickness is normal. Left ventricular systolic function is severely decreased with an ejection fraction visually estimated at 20 to 25%. There are regional wall motion abnormalities present. There is mild (grade 1) left ventricular diastolic dysfunction. There is a left ventricular thrombus. A large (~ 2.7 cm X 1.9 cm) left ventricular apical thrombus is visualized. Hypokinesis of the apex, mid to distal septum, and mid to distal anterior wall.     Right Ventricle:  The right ventricular cavity is normal in size and normal systolic function. Tricuspid annular plane systolic excursion (TAPSE) is 1.6 cm (normal >=1.7 cm).     Left Atrium:  The left atrium is normal with an indexed volume of 32.43 ml/m².     Right Atrium:  The right atrium is normal in size.     Aortic Valve:  The aortic valve appears trileaflet with normal systolic excursion. There is calcification of the aortic valve leaflets. There is no evidence of aortic regurgitation.     Mitral Valve:  Structurally normal mitral valve with normal leaflet excursion. There is calcification of the mitral valve annulus. There is mild to moderate mitral regurgitation.     Tricuspid Valve:  Structurally normal tricuspid valve with normal leaflet excursion. There is mild tricuspid regurgitation.     Pulmonic Valve:  Structurally normal pulmonic valve with normal leaflet excursion. There is mild pulmonic regurgitation.     Aorta:  The aortic annulus and aortic root appear normal in size. The aortic root at the sinuses of Valsalva is normal in size, measuring 2.70 cm (indexed 1.52 cm/m²). The aortic diameter at the sinotubular junction is normal in size, measuring 2.1 cm. The ascending aorta diameter is normal in size, measuring 2.70 cm (indexed 1.52 cm/m²).     Pericardium:  No pericardial effusion seen.     Systemic Veins:  The inferior vena cava is normal in size (normal <2.1cm) with abnormal inspiratory collapse (abnormal <50%) consistent with mildly elevated right atrial pressure (~8, range 5-10mmHg).  ____________________________________________________________________  QUANTITATIVE DATA:  Left Ventricle Measurements: (Indexed to BSA)     IVSd (2D):   0.8 cm  LVPWd (2D):  0.8 cm  LVIDd (2D):  5.0 cm  LVIDs (2D):  4.0 cm  LV Mass:     136 g  76.6 g/m²  Visualized LV EF%: 20 to 25%     MV E Vmax:    0.68 m/s  MV A Vmax:    0.99 m/s  MV E/A:       0.68  e' lateral:   8.16 cm/s  e' medial:    5.22 cm/s  E/e' lateral: 8.28  E/e' medial:  12.95  E/e' Average: 10.10  MV DT:        194 msec    Aorta Measurements: (normal range) (Indexed to BSA)     Sinuses of Valsalva: 2.70 cm (2.7 - 3.3 cm)  Ao ST Junct:         2.1 cm  Ao Ascprox:         2.70 cm       Left Atrium Measurements: (Indexed to BSA)  LA Diam 2D:        3.80 cm  LA Vol s, MOD A4C: 48.00 ml.  LA Vol s, MOD A2C: 66.60 ml.  LA Vol s, MOD BP:  57.50 ml  32.43 ml/m²    Right Ventricle Measurements:     TAPSE:     1.6 cm  TV Larissa. S':       8.49 cm/s  RV Base (RVID1):  3.1 cm  RV Mid (RVID2):   2.3 cm  RV Major (RVID3): 8.3 cm       LVOT / RVOT/ Qp/Qs Data: (Indexed to BSA)  LVOT Diameter: 2.00 cm  LVOT Vmax:     0.79 m/s  LVOT VTI:      15.00 cm  LVOT SV:      47.1 ml  26.58 ml/m²    Mitral Valve Measurements:     MV E Vmax: 0.7 m/s         MR Vmax:          5.24 m/s  MV A Vmax: 1.0 m/s         MR VTI:           185.00 cm  MV E/A:    0.7             MR Mean Gradient: 57.0 mmHg      MR Peak Gradient: 109.8 mmHg                             MR PISA Radius:   0.60 cm                             MR Aliasing Adryan:  30.80 cm/s                             MR Regurg Fract:  0.52 %       Tricuspid Valve Measurements:     RA Pressure: 8 mmHg    ________________________________________________________________________________________  Electronically signed on 2023 at 8:32:36 AM by Johan Perez M.D.       *** Final ***       Cardiology Progress Note  ------------------------------------------------------------------------------------------    SUBJECTIVE/EVENTS: Pt feels at baseline. Denies any more shortness of breath. No other acute complaints.      -------------------------------------------------------------------------------------------  ROS:  CV: chest pain (-), palpitation (-), orthopnea (-), PND (-), edema (-)  PULM: SOB (-), cough (-), wheezing (-), hemoptysis (-).   CONST: fever (-), chills (-) or fatigue (-)  GI: abdominal distension (-), abdominal pain (-) , nausea/vomiting (-), hematemesis, (-), melena (-), hematochezia (-)  : dysuria (-), frequency (-), hematuria (-).   NEURO: numbness (-), weakness (-), dizziness (-)  MSK: myalgia (-), joint pain (-)   SKIN: itching (-), rash (-)  HEENT:  visual changes (-); vertigo or throat pain (-);  neck stiffness (-)   Psych: change in mood (-), anxiety (-), depression (-)     All other review of systems is negative unless indicated above.   -------------------------------------------------------------------------------------------  VITALS:  T(F): 98.2 (), Max: 98.2 ()  HR: 77 () (77 - 81)  BP: 99/53 () (99/53 - 108/58)  RR: 19 ()  SpO2: 100% ()  I&O's Summary    ------------------------------------------------------------------------------------------  PHYSICAL EXAM:  GEN: NAD, sitting in bed  HEENT: NCAT, EOMI  CV: RRR, Ns1/s2, no m/r/g, JVP not elevated  RESP: CTA B/l, no w/r/r  ABD: soft, nt, nd  EXT: warm, 2+ pulses, no edema  SKIN: no visible lesions, rashes  NEURO: AAOx3, no focal deficits  PSYCH: Calm, cooperative  -------------------------------------------------------------------------------------------  LABS:                          9.9    7.47  )-----------( 323      ( 2023 06:10 )             30.7     11-30    138  |  103  |  22  ----------------------------<  193<H>  3.8   |  24  |  0.72    Ca    8.7      2023 06:10  Phos  2.3     -  Mg     2.00           PT/INR - ( 2023 06:10 )   PT: 12.9 sec;   INR: 1.15 ratio         PTT - ( 2023 06:10 )  PTT:75.5 sec  CARDIAC MARKERS ( 2023 13:27 )  856 ng/L / x     / x     / x     / x     / x      CARDIAC MARKERS ( 2023 10:56 )  862 ng/L / x     / x     / x     / x     / x          Total Cholesterol: 108  LDL: --  HDL: 31  T        -------------------------------------------------------------------------------------------  Meds:  acetaminophen     Tablet .. 650 milliGRAM(s) Oral every 6 hours PRN  aluminum hydroxide/magnesium hydroxide/simethicone Suspension 30 milliLiter(s) Oral every 4 hours PRN  aspirin enteric coated 81 milliGRAM(s) Oral daily  atorvastatin 80 milliGRAM(s) Oral at bedtime  clopidogrel Tablet 75 milliGRAM(s) Oral daily  dextrose 5%. 1000 milliLiter(s) IV Continuous <Continuous>  dextrose 5%. 1000 milliLiter(s) IV Continuous <Continuous>  dextrose 50% Injectable 25 Gram(s) IV Push once  dextrose 50% Injectable 12.5 Gram(s) IV Push once  dextrose 50% Injectable 25 Gram(s) IV Push once  dextrose Oral Gel 15 Gram(s) Oral once PRN  furosemide    Tablet 40 milliGRAM(s) Oral daily  glucagon  Injectable 1 milliGRAM(s) IntraMuscular once  heparin   Injectable 6000 Unit(s) IV Push every 6 hours PRN  heparin   Injectable 3000 Unit(s) IV Push every 6 hours PRN  heparin  Infusion. 1200 Unit(s)/Hr IV Continuous <Continuous>  insulin lispro (ADMELOG) corrective regimen sliding scale   SubCutaneous three times a day before meals  melatonin 3 milliGRAM(s) Oral at bedtime PRN  melatonin 3 milliGRAM(s) Oral once PRN  metoprolol succinate ER 25 milliGRAM(s) Oral daily  ondansetron Injectable 4 milliGRAM(s) IV Push every 8 hours PRN  sacubitril 24 mG/valsartan 26 mG 1 Tablet(s) Oral two times a day      -------------------------------------------------------------------------------------------  Cardiovascular Diagnostic Testing:        Study Date:     2023   Name:           YUSUF SAWYER   :            1945   (78 years)   Gender:         female   MR#:            [id number]   MPI#:           [id number]   Patient Class:  Inpatient     Cath Lab Report    Diagnostic Cardiologist:       Miladys Kelly MD   Fellow:                        Kristen Santos MD   Referring Physician:           Josiah Resendez MD     Procedures Performed   Procedures:               1.    Arterial Access - Right Radial     2.    Diagnostic Coronary Angiography     Indications:                Myocardial infarction without ST elevation  (NSTEMI)  Lab Visit Indication:      ACS greater than 24 hours     Diagnostic Conclusions:     Diagnostic cath showed severe disease in the mid and proximal LAD.  There was VIANEY 2 flow in the vessel. Given late clinical  presentation, Q waves on ECG, LV dysfunction and LV thrombus would  recommend assessing for viability prior to any  revascularization attempts.    Recommendations:     - Viability Study    -GDMT for Heart failure.      Presentation:   Late presentation MI, likely missed STEMI. Q waves on ECG anterior      Procedure Narrative:   The risks and alternatives of the procedures and conscious sedation  were explained to the patient and informed consent was  obtained. The patient was brought to the cath lab and placed on the  exam table.  Access   Right radial artery:   The puncture site was infiltrated with 2% Lidocaine. Vascular access  was obtained using modified seldinger technique and a 6  Fr. Radial Glidesheath Slender was advanced into the vessel.  Hemostasis/Sheath Status: Hemostasis was successful using  TR Band.      Coronary Angiography   Left Coronary System:   A 5 Fr. DxTerity TRA ULTRA 4.0 was positioned into the vessel ostia  under fluoroscopic guidance. Contrast injections were  performed using hand injection. Right Coronary System:   A 5 Fr. DxTerity TRA ULTRA 4.0 was positioned into the vessel ostia  under fluoroscopic guidance. Contrast injections were  performed using hand injection.    Diagnostic Findings:     Patient: YUSUF SAWYER           MRN: [id number]  Study Date: 2023   02:27 PM      Page 1 of 4          Coronary Angiography   The coronary circulation is left dominant.      LM   Left main artery: Angiography shows no disease.      LAD   Proximal left anterior descending: There is a 95 % stenosis. Mid left  anterior descending: There is a 90 % stenosis. VIANEY Flow  2.      CX   Circumflex: The segment is large. Angiography shows minor  irregularities.    RCA   Right coronary artery: The segment is small, non-dominant. Angiography  shows minor irregularities.    History and Risk Factors:   Hypertension:                                Yes   Dyslipidemia:                                Yes   Prior MI:                                    No   Prior PCI:                                   No   Family History of Premature CAD:             No   Cerebrovascular Disease:                     No   Peripheral Arterial Disease:                 No   Prior CABG:                                  No   Tobacco Use:                                 Never   Cardiac Arrest Out of Hospital:              No   Cardiac Arrest at Transferring Facility:     No   Prior Heart Failure:                         No   Diabetes:                                    Yes   CSHA ([place] Study of Health and Aging)    5 - Mildly Frail   Frailty Scale:     X-Ray:   Diagnostic Flouro time:       1.7 min.                     Exam record  DAP:           3513.70 cGycm2  Interventional Flouro time:                                Exam fluoro  DAP:           746.70 cGycm2  Total Flouro Time:            1.7 min.                     Exam total  DAP:            4260.40  cGycm2  Exam Total Dose:              502 mGy     Exam Start Time:   02:27 PM   Exam End Time:     02:37 PM   Exam Duration:     10 min     Contrast:   Description                      Dose          Unit        Serial No.   Omnipaque 350mg 100ml              22.000   BX   Contrast Bottle     Patient: YUSUF SAWYER           MRN: [id number]  Study Date: 2023   02:27 PM      Page 2 of 4          Medication:   Description                  Dose, Unit, Route, Time   fentaNYL  50 mCg/mL          25.0, mcg, IV, 14:24:20   Injectable (Sublimaze)   midazolam  1 mG/mL           1.0, mg, IV, 14:24:24   Injectable (Versed)   lidocaine 2 % Injectable     3.0, ml, Subcut, 14:27:11   (Xylocaine)   verapamil 2.5 mG/mL          2.5, mg, IA, 14:27:59   Injectable   heparin (porcine) 1,000      3000.0, units, IA, 14:28:09   Unit(s)/mL Injectable     sodium chloride 0.9% IVPB    15.0, ml per hr, IV, 14:36:08     Inventory:   Description                  Quantity     Peoplesoft#        Reference  No.    Serial No.      Lot No.       CDM  Angio Pack       1.000        [id number]   [id number]  [id number]*    720   .035 Radial Guidewire        1.000        [id number]   [id number]  [id number]*    594   Sodium Heparin               1.000        [id number]  [id number]          [id number]*    490   Omnipaque 350mg 100ml        1.000        [id number]   [id number]  [id number]*  Contrast Bottle                           139   6 Fr. Radial Glidesheath     1.000        [id number]     [id number]*  Slender                       208   5 Fr. DxTerity TRA ULTRA     1.000        [id number]   [id number]  [id number]*  4.0                                       561   Vascular Solutions 27cm      1.000        [id number]   3527  [id number]*  Radial Band                         881     Hemodynamic Pressures:   Phase          Location           [mmHg]                [mmHg]  [mmHg]            HR  Baseline       Ao                   s       95                d  53                m       63         77  Flow Calculations, Phase: Baseline   CO                                HR  O2Insp  CI                                PO2  O2Exp  SV                                VO2                  221.40 ml/min  O2(ExAir)  SVI                               A-VO2  Hb  Shunts:   Qs                                Qs Index   Qp                                Qp Index  Qp/Qs  EPBF                              EPBF Index   L-R                               L-R Index   R-L                               R-L Index     Patient: YUSUF SAWYER           MRN: [id number]  Study Date: 2023   02:27 PM      Page 3 of 4      Conscious sedation was administered and monitored by Cardiology  nursing staff,  under my direct supervision when applicable.     Electronically signed by Miladys Kelly MD on 2023 at 05:29 PM   (No Signature Object)     Patient: YUSUF SAWYER           MRN: [id number]  Study Date: 2023   02:27 PM      Page 4 of 4        TRANSTHORACIC ECHOCARDIOGRAM REPORT  ________________________________________________________________________________                                      _______       *Report Contains Critical Result*       Pt. Name:       YUSUF SAWYER Study Date:    2023  MRN:            [id number]           YOB: 1945  Accession #:    [id number]           Age:           78 years  Account#:       [id number]            Gender:        F  Heart Rate:                         Height: 63.00 in (160.02 cm)  Rhythm:                             Weight:        163.00 lb (73.94 kg)  Blood Pressure: 104/56 mmHg         BSA/BMI:       1.77 m² / 28.87 kg/m²  ________________________________________________________________________________________  Referring Physician:    Abdelrahman Owens MD  Interpreting Physician: Johan Perez M.D.  Primary Sonographer:    Lauren R. Finkelstein [facility]    CPT:               ECHO TTE WO CON COMP W DOPP - 81678.m  Indication(s):     Abnormal electrocardiogram ECG/EKG - R94.31; Dyspnea,                     unspecified - R06.00  Procedure:         Transthoracic echocardiogram with 2-D, M-mode and complete                     spectral and color flow Doppler.  Ordering Location: [facility]  Study Information: Image quality for this study is good.    _______________________________________________________________________________________     CONCLUSIONS:      1. The left ventricular cavity is normal. Left ventricular wall thickness is normal. Left ventricular systolic function is severely decreased with an ejection fraction visually estimated at 20 to 25%. There are regional wall motion abnormalities present. There is mild (grade 1) left ventricular diastolic dysfunction. There is a left ventricular thrombus. A large (~ 2.7 cm X 1.9 cm) left ventricular apical thrombus is visualized. Hypokinesis of the apex, mid to distal septum, and mid to distal anterior wall.   2. Normal right ventricular cavity size and systolic function.   3. Structurally normal mitral valve with normal leaflet excursion. There is calcification of the mitral valve annulus. There is mild to moderate mitral regurgitation.   4. No prior echocardiogram is available for comparison.    ________________________________________________________________________________________  *Report Contains Critical Result*  Critical Findings: Intracardiac Thrombus. Josiah Resendez M.D. was informed of the  findings by telephone on 2023 at 8:00:00 AM.    ________________________________________________________________________________________  FINDINGS:     Left Ventricle:  The left ventricular cavity is normal. Left ventricular wall thickness is normal. Left ventricular systolic function is severely decreased with an ejection fraction visually estimated at 20 to 25%. There are regional wall motion abnormalities present. There is mild (grade 1) left ventricular diastolic dysfunction. There is a left ventricular thrombus. A large (~ 2.7 cm X 1.9 cm) left ventricular apical thrombus is visualized. Hypokinesis of the apex, mid to distal septum, and mid to distal anterior wall.     Right Ventricle:  The right ventricular cavity is normal in size and normal systolic function. Tricuspid annular plane systolic excursion (TAPSE) is 1.6 cm (normal >=1.7 cm).     Left Atrium:  The left atrium is normal with an indexed volume of 32.43 ml/m².     Right Atrium:  The right atrium is normal in size.     Aortic Valve:  The aortic valve appears trileaflet with normal systolic excursion. There is calcification of the aortic valve leaflets. There is no evidence of aortic regurgitation.     Mitral Valve:  Structurally normal mitral valve with normal leaflet excursion. There is calcification of the mitral valve annulus. There is mild to moderate mitral regurgitation.     Tricuspid Valve:  Structurally normal tricuspid valve with normal leaflet excursion. There is mild tricuspid regurgitation.     Pulmonic Valve:  Structurally normal pulmonic valve with normal leaflet excursion. There is mild pulmonic regurgitation.     Aorta:  The aortic annulus and aortic root appear normal in size. The aortic root at the sinuses of Valsalva is normal in size, measuring 2.70 cm (indexed 1.52 cm/m²). The aortic diameter at the sinotubular junction is normal in size, measuring 2.1 cm. The ascending aorta diameter is normal in size, measuring 2.70 cm (indexed 1.52 cm/m²).     Pericardium:  No pericardial effusion seen.     Systemic Veins:  The inferior vena cava is normal in size (normal <2.1cm) with abnormal inspiratory collapse (abnormal <50%) consistent with mildly elevated right atrial pressure (~8, range 5-10mmHg).  ____________________________________________________________________  QUANTITATIVE DATA:  Left Ventricle Measurements: (Indexed to BSA)     IVSd (2D):   0.8 cm  LVPWd (2D):  0.8 cm  LVIDd (2D):  5.0 cm  LVIDs (2D):  4.0 cm  LV Mass:     136 g  76.6 g/m²  Visualized LV EF%: 20 to 25%     MV E Vmax:    0.68 m/s  MV A Vmax:    0.99 m/s  MV E/A:       0.68  e' lateral:   8.16 cm/s  e' medial:    5.22 cm/s  E/e' lateral: 8.28  E/e' medial:  12.95  E/e' Average: 10.10  MV DT:        194 msec    Aorta Measurements: (normal range) (Indexed to BSA)     Sinuses of Valsalva: 2.70 cm (2.7 - 3.3 cm)  Ao ST Junct:         2.1 cm  Ao Ascprox:         2.70 cm       Left Atrium Measurements: (Indexed to BSA)  LA Diam 2D:        3.80 cm  LA Vol s, MOD A4C: 48.00 ml.  LA Vol s, MOD A2C: 66.60 ml.  LA Vol s, MOD BP:  57.50 ml  32.43 ml/m²    Right Ventricle Measurements:     TAPSE:     1.6 cm  TV Larissa. S':       8.49 cm/s  RV Base (RVID1):  3.1 cm  RV Mid (RVID2):   2.3 cm  RV Major (RVID3): 8.3 cm       LVOT / RVOT/ Qp/Qs Data: (Indexed to BSA)  LVOT Diameter: 2.00 cm  LVOT Vmax:     0.79 m/s  LVOT VTI:      15.00 cm  LVOT SV:      47.1 ml  26.58 ml/m²    Mitral Valve Measurements:     MV E Vmax: 0.7 m/s         MR Vmax:          5.24 m/s  MV A Vmax: 1.0 m/s         MR VTI:           185.00 cm  MV E/A:    0.7             MR Mean Gradient: 57.0 mmHg      MR Peak Gradient: 109.8 mmHg                             MR PISA Radius:   0.60 cm                             MR Aliasing Adryan:  30.80 cm/s                             MR Regurg Fract:  0.52 %       Tricuspid Valve Measurements:     RA Pressure: 8 mmHg    ________________________________________________________________________________________  Electronically signed on 2023 at 8:32:36 AM by Johan Perez M.D.       *** Final ***       Cardiology Progress Note  ------------------------------------------------------------------------------------------    SUBJECTIVE/EVENTS: Pt feels at baseline. Denies any more shortness of breath. No other acute complaints.      -------------------------------------------------------------------------------------------  ROS:  CV: chest pain (-), palpitation (-), orthopnea (-), PND (-), edema (-)  PULM: SOB (-), cough (-), wheezing (-), hemoptysis (-).   CONST: fever (-), chills (-) or fatigue (-)  GI: abdominal distension (-), abdominal pain (-) , nausea/vomiting (-), hematemesis, (-), melena (-), hematochezia (-)  : dysuria (-), frequency (-), hematuria (-).   NEURO: numbness (-), weakness (-), dizziness (-)  MSK: myalgia (-), joint pain (-)   SKIN: itching (-), rash (-)  HEENT:  visual changes (-); vertigo or throat pain (-);  neck stiffness (-)   Psych: change in mood (-), anxiety (-), depression (-)     All other review of systems is negative unless indicated above.   -------------------------------------------------------------------------------------------  VITALS:  T(F): 98.2 (), Max: 98.2 ()  HR: 77 () (77 - 81)  BP: 99/53 () (99/53 - 108/58)  RR: 19 ()  SpO2: 100% ()  I&O's Summary    ------------------------------------------------------------------------------------------  PHYSICAL EXAM:  GEN: NAD, sitting in bed  HEENT: NCAT, EOMI  CV: RRR, Ns1/s2, no m/r/g, JVP not elevated  RESP: CTA B/l, no w/r/r  ABD: soft, nt, nd  EXT: warm, 2+ pulses, no edema  SKIN: no visible lesions, rashes  NEURO: AAOx3, no focal deficits  PSYCH: Calm, cooperative  -------------------------------------------------------------------------------------------  LABS:                          9.9    7.47  )-----------( 323      ( 2023 06:10 )             30.7     11-30    138  |  103  |  22  ----------------------------<  193<H>  3.8   |  24  |  0.72    Ca    8.7      2023 06:10  Phos  2.3     -  Mg     2.00           PT/INR - ( 2023 06:10 )   PT: 12.9 sec;   INR: 1.15 ratio         PTT - ( 2023 06:10 )  PTT:75.5 sec  CARDIAC MARKERS ( 2023 13:27 )  856 ng/L / x     / x     / x     / x     / x      CARDIAC MARKERS ( 2023 10:56 )  862 ng/L / x     / x     / x     / x     / x          Total Cholesterol: 108  LDL: --  HDL: 31  T        -------------------------------------------------------------------------------------------  Meds:  acetaminophen     Tablet .. 650 milliGRAM(s) Oral every 6 hours PRN  aluminum hydroxide/magnesium hydroxide/simethicone Suspension 30 milliLiter(s) Oral every 4 hours PRN  aspirin enteric coated 81 milliGRAM(s) Oral daily  atorvastatin 80 milliGRAM(s) Oral at bedtime  clopidogrel Tablet 75 milliGRAM(s) Oral daily  dextrose 5%. 1000 milliLiter(s) IV Continuous <Continuous>  dextrose 5%. 1000 milliLiter(s) IV Continuous <Continuous>  dextrose 50% Injectable 25 Gram(s) IV Push once  dextrose 50% Injectable 12.5 Gram(s) IV Push once  dextrose 50% Injectable 25 Gram(s) IV Push once  dextrose Oral Gel 15 Gram(s) Oral once PRN  furosemide    Tablet 40 milliGRAM(s) Oral daily  glucagon  Injectable 1 milliGRAM(s) IntraMuscular once  heparin   Injectable 6000 Unit(s) IV Push every 6 hours PRN  heparin   Injectable 3000 Unit(s) IV Push every 6 hours PRN  heparin  Infusion. 1200 Unit(s)/Hr IV Continuous <Continuous>  insulin lispro (ADMELOG) corrective regimen sliding scale   SubCutaneous three times a day before meals  melatonin 3 milliGRAM(s) Oral at bedtime PRN  melatonin 3 milliGRAM(s) Oral once PRN  metoprolol succinate ER 25 milliGRAM(s) Oral daily  ondansetron Injectable 4 milliGRAM(s) IV Push every 8 hours PRN  sacubitril 24 mG/valsartan 26 mG 1 Tablet(s) Oral two times a day      -------------------------------------------------------------------------------------------  Cardiovascular Diagnostic Testing:        Study Date:     2023   Name:           YUSUF SAWYER   :            1945   (78 years)   Gender:         female   MR#:            [id number]   MPI#:           [id number]   Patient Class:  Inpatient     Cath Lab Report    Diagnostic Cardiologist:       Miladys Kelly MD   Fellow:                        Kristen Santos MD   Referring Physician:           Josiah Resendez MD     Procedures Performed   Procedures:               1.    Arterial Access - Right Radial     2.    Diagnostic Coronary Angiography     Indications:                Myocardial infarction without ST elevation  (NSTEMI)  Lab Visit Indication:      ACS greater than 24 hours     Diagnostic Conclusions:     Diagnostic cath showed severe disease in the mid and proximal LAD.  There was VIANEY 2 flow in the vessel. Given late clinical  presentation, Q waves on ECG, LV dysfunction and LV thrombus would  recommend assessing for viability prior to any  revascularization attempts.    Recommendations:     - Viability Study    -GDMT for Heart failure.      Presentation:   Late presentation MI, likely missed STEMI. Q waves on ECG anterior      Procedure Narrative:   The risks and alternatives of the procedures and conscious sedation  were explained to the patient and informed consent was  obtained. The patient was brought to the cath lab and placed on the  exam table.  Access   Right radial artery:   The puncture site was infiltrated with 2% Lidocaine. Vascular access  was obtained using modified seldinger technique and a 6  Fr. Radial Glidesheath Slender was advanced into the vessel.  Hemostasis/Sheath Status: Hemostasis was successful using  TR Band.      Coronary Angiography   Left Coronary System:   A 5 Fr. DxTerity TRA ULTRA 4.0 was positioned into the vessel ostia  under fluoroscopic guidance. Contrast injections were  performed using hand injection. Right Coronary System:   A 5 Fr. DxTerity TRA ULTRA 4.0 was positioned into the vessel ostia  under fluoroscopic guidance. Contrast injections were  performed using hand injection.    Diagnostic Findings:     Patient: YUSUF SAWYER           MRN: [id number]  Study Date: 2023   02:27 PM      Page 1 of 4          Coronary Angiography   The coronary circulation is left dominant.      LM   Left main artery: Angiography shows no disease.      LAD   Proximal left anterior descending: There is a 95 % stenosis. Mid left  anterior descending: There is a 90 % stenosis. VIANEY Flow  2.      CX   Circumflex: The segment is large. Angiography shows minor  irregularities.    RCA   Right coronary artery: The segment is small, non-dominant. Angiography  shows minor irregularities.    History and Risk Factors:   Hypertension:                                Yes   Dyslipidemia:                                Yes   Prior MI:                                    No   Prior PCI:                                   No   Family History of Premature CAD:             No   Cerebrovascular Disease:                     No   Peripheral Arterial Disease:                 No   Prior CABG:                                  No   Tobacco Use:                                 Never   Cardiac Arrest Out of Hospital:              No   Cardiac Arrest at Transferring Facility:     No   Prior Heart Failure:                         No   Diabetes:                                    Yes   CSHA ([place] Study of Health and Aging)    5 - Mildly Frail   Frailty Scale:     X-Ray:   Diagnostic Flouro time:       1.7 min.                     Exam record  DAP:           3513.70 cGycm2  Interventional Flouro time:                                Exam fluoro  DAP:           746.70 cGycm2  Total Flouro Time:            1.7 min.                     Exam total  DAP:            4260.40  cGycm2  Exam Total Dose:              502 mGy     Exam Start Time:   02:27 PM   Exam End Time:     02:37 PM   Exam Duration:     10 min     Contrast:   Description                      Dose          Unit        Serial No.   Omnipaque 350mg 100ml              22.000   BX   Contrast Bottle     Patient: YUSUF SAWYER           MRN: [id number]  Study Date: 2023   02:27 PM      Page 2 of 4          Medication:   Description                  Dose, Unit, Route, Time   fentaNYL  50 mCg/mL          25.0, mcg, IV, 14:24:20   Injectable (Sublimaze)   midazolam  1 mG/mL           1.0, mg, IV, 14:24:24   Injectable (Versed)   lidocaine 2 % Injectable     3.0, ml, Subcut, 14:27:11   (Xylocaine)   verapamil 2.5 mG/mL          2.5, mg, IA, 14:27:59   Injectable   heparin (porcine) 1,000      3000.0, units, IA, 14:28:09   Unit(s)/mL Injectable     sodium chloride 0.9% IVPB    15.0, ml per hr, IV, 14:36:08     Inventory:   Description                  Quantity     Peoplesoft#        Reference  No.    Serial No.      Lot No.       CDM  Angio Pack       1.000        [id number]   [id number]  [id number]*    720   .035 Radial Guidewire        1.000        [id number]   [id number]  [id number]*    594   Sodium Heparin               1.000        [id number]  [id number]          [id number]*    490   Omnipaque 350mg 100ml        1.000        [id number]   [id number]  [id number]*  Contrast Bottle                           139   6 Fr. Radial Glidesheath     1.000        [id number]     [id number]*  Slender                       208   5 Fr. DxTerity TRA ULTRA     1.000        [id number]   [id number]  [id number]*  4.0                                       561   Vascular Solutions 27cm      1.000        [id number]   3527  [id number]*  Radial Band                         881     Hemodynamic Pressures:   Phase          Location           [mmHg]                [mmHg]  [mmHg]            HR  Baseline       Ao                   s       95                d  53                m       63         77  Flow Calculations, Phase: Baseline   CO                                HR  O2Insp  CI                                PO2  O2Exp  SV                                VO2                  221.40 ml/min  O2(ExAir)  SVI                               A-VO2  Hb  Shunts:   Qs                                Qs Index   Qp                                Qp Index  Qp/Qs  EPBF                              EPBF Index   L-R                               L-R Index   R-L                               R-L Index     Patient: YUSUF SAWYER           MRN: [id number]  Study Date: 2023   02:27 PM      Page 3 of 4      Conscious sedation was administered and monitored by Cardiology  nursing staff,  under my direct supervision when applicable.     Electronically signed by Miladys Kelly MD on 2023 at 05:29 PM   (No Signature Object)     Patient: YUSUF SAWYER           MRN: [id number]  Study Date: 2023   02:27 PM      Page 4 of 4        TRANSTHORACIC ECHOCARDIOGRAM REPORT  ________________________________________________________________________________                                      _______       *Report Contains Critical Result*       Pt. Name:       YUSUF SAWYER Study Date:    2023  MRN:            [id number]           YOB: 1945  Accession #:    [id number]           Age:           78 years  Account#:       [id number]            Gender:        F  Heart Rate:                         Height: 63.00 in (160.02 cm)  Rhythm:                             Weight:        163.00 lb (73.94 kg)  Blood Pressure: 104/56 mmHg         BSA/BMI:       1.77 m² / 28.87 kg/m²  ________________________________________________________________________________________  Referring Physician:    Abdelrahman Owens MD  Interpreting Physician: Johan Perez M.D.  Primary Sonographer:    Lauren R. Finkelstein [facility]    CPT:               ECHO TTE WO CON COMP W DOPP - 12507.m  Indication(s):     Abnormal electrocardiogram ECG/EKG - R94.31; Dyspnea,                     unspecified - R06.00  Procedure:         Transthoracic echocardiogram with 2-D, M-mode and complete                     spectral and color flow Doppler.  Ordering Location: [facility]  Study Information: Image quality for this study is good.    _______________________________________________________________________________________     CONCLUSIONS:      1. The left ventricular cavity is normal. Left ventricular wall thickness is normal. Left ventricular systolic function is severely decreased with an ejection fraction visually estimated at 20 to 25%. There are regional wall motion abnormalities present. There is mild (grade 1) left ventricular diastolic dysfunction. There is a left ventricular thrombus. A large (~ 2.7 cm X 1.9 cm) left ventricular apical thrombus is visualized. Hypokinesis of the apex, mid to distal septum, and mid to distal anterior wall.   2. Normal right ventricular cavity size and systolic function.   3. Structurally normal mitral valve with normal leaflet excursion. There is calcification of the mitral valve annulus. There is mild to moderate mitral regurgitation.   4. No prior echocardiogram is available for comparison.    ________________________________________________________________________________________  *Report Contains Critical Result*  Critical Findings: Intracardiac Thrombus. Josiah Resendez M.D. was informed of the  findings by telephone on 2023 at 8:00:00 AM.    ________________________________________________________________________________________  FINDINGS:     Left Ventricle:  The left ventricular cavity is normal. Left ventricular wall thickness is normal. Left ventricular systolic function is severely decreased with an ejection fraction visually estimated at 20 to 25%. There are regional wall motion abnormalities present. There is mild (grade 1) left ventricular diastolic dysfunction. There is a left ventricular thrombus. A large (~ 2.7 cm X 1.9 cm) left ventricular apical thrombus is visualized. Hypokinesis of the apex, mid to distal septum, and mid to distal anterior wall.     Right Ventricle:  The right ventricular cavity is normal in size and normal systolic function. Tricuspid annular plane systolic excursion (TAPSE) is 1.6 cm (normal >=1.7 cm).     Left Atrium:  The left atrium is normal with an indexed volume of 32.43 ml/m².     Right Atrium:  The right atrium is normal in size.     Aortic Valve:  The aortic valve appears trileaflet with normal systolic excursion. There is calcification of the aortic valve leaflets. There is no evidence of aortic regurgitation.     Mitral Valve:  Structurally normal mitral valve with normal leaflet excursion. There is calcification of the mitral valve annulus. There is mild to moderate mitral regurgitation.     Tricuspid Valve:  Structurally normal tricuspid valve with normal leaflet excursion. There is mild tricuspid regurgitation.     Pulmonic Valve:  Structurally normal pulmonic valve with normal leaflet excursion. There is mild pulmonic regurgitation.     Aorta:  The aortic annulus and aortic root appear normal in size. The aortic root at the sinuses of Valsalva is normal in size, measuring 2.70 cm (indexed 1.52 cm/m²). The aortic diameter at the sinotubular junction is normal in size, measuring 2.1 cm. The ascending aorta diameter is normal in size, measuring 2.70 cm (indexed 1.52 cm/m²).     Pericardium:  No pericardial effusion seen.     Systemic Veins:  The inferior vena cava is normal in size (normal <2.1cm) with abnormal inspiratory collapse (abnormal <50%) consistent with mildly elevated right atrial pressure (~8, range 5-10mmHg).  ____________________________________________________________________  QUANTITATIVE DATA:  Left Ventricle Measurements: (Indexed to BSA)     IVSd (2D):   0.8 cm  LVPWd (2D):  0.8 cm  LVIDd (2D):  5.0 cm  LVIDs (2D):  4.0 cm  LV Mass:     136 g  76.6 g/m²  Visualized LV EF%: 20 to 25%     MV E Vmax:    0.68 m/s  MV A Vmax:    0.99 m/s  MV E/A:       0.68  e' lateral:   8.16 cm/s  e' medial:    5.22 cm/s  E/e' lateral: 8.28  E/e' medial:  12.95  E/e' Average: 10.10  MV DT:        194 msec    Aorta Measurements: (normal range) (Indexed to BSA)     Sinuses of Valsalva: 2.70 cm (2.7 - 3.3 cm)  Ao ST Junct:         2.1 cm  Ao Ascprox:         2.70 cm       Left Atrium Measurements: (Indexed to BSA)  LA Diam 2D:        3.80 cm  LA Vol s, MOD A4C: 48.00 ml.  LA Vol s, MOD A2C: 66.60 ml.  LA Vol s, MOD BP:  57.50 ml  32.43 ml/m²    Right Ventricle Measurements:     TAPSE:     1.6 cm  TV Larissa. S':       8.49 cm/s  RV Base (RVID1):  3.1 cm  RV Mid (RVID2):   2.3 cm  RV Major (RVID3): 8.3 cm       LVOT / RVOT/ Qp/Qs Data: (Indexed to BSA)  LVOT Diameter: 2.00 cm  LVOT Vmax:     0.79 m/s  LVOT VTI:      15.00 cm  LVOT SV:      47.1 ml  26.58 ml/m²    Mitral Valve Measurements:     MV E Vmax: 0.7 m/s         MR Vmax:          5.24 m/s  MV A Vmax: 1.0 m/s         MR VTI:           185.00 cm  MV E/A:    0.7             MR Mean Gradient: 57.0 mmHg      MR Peak Gradient: 109.8 mmHg                             MR PISA Radius:   0.60 cm                             MR Aliasing Adryan:  30.80 cm/s                             MR Regurg Fract:  0.52 %       Tricuspid Valve Measurements:     RA Pressure: 8 mmHg    ________________________________________________________________________________________  Electronically signed on 2023 at 8:32:36 AM by Johan Perez M.D.       *** Final ***

## 2023-11-30 NOTE — PROGRESS NOTE ADULT - SUBJECTIVE AND OBJECTIVE BOX
Patient is a 78y old  Female who presents with a chief complaint of Chest pain, SOB (30 Nov 2023 09:39)    Date of servie : 11-30-23 @ 15:56  INTERVAL HPI/OVERNIGHT EVENTS:  T(C): 36.6 (11-30-23 @ 13:59), Max: 36.8 (11-30-23 @ 05:07)  HR: 85 (11-30-23 @ 13:59) (77 - 85)  BP: 101/57 (11-30-23 @ 13:59) (99/53 - 113/59)  RR: 18 (11-30-23 @ 13:59) (18 - 19)  SpO2: 100% (11-30-23 @ 13:59) (98% - 100%)  Wt(kg): --  I&O's Summary      LABS:                        9.9    7.47  )-----------( 323      ( 30 Nov 2023 06:10 )             30.7     11-30    138  |  103  |  22  ----------------------------<  193<H>  3.8   |  24  |  0.72    Ca    8.7      30 Nov 2023 06:10  Phos  2.3     11-30  Mg     2.00     11-30      PT/INR - ( 30 Nov 2023 06:10 )   PT: 12.9 sec;   INR: 1.15 ratio         PTT - ( 30 Nov 2023 06:10 )  PTT:75.5 sec  Urinalysis Basic - ( 30 Nov 2023 06:10 )    Color: x / Appearance: x / SG: x / pH: x  Gluc: 193 mg/dL / Ketone: x  / Bili: x / Urobili: x   Blood: x / Protein: x / Nitrite: x   Leuk Esterase: x / RBC: x / WBC x   Sq Epi: x / Non Sq Epi: x / Bacteria: x      CAPILLARY BLOOD GLUCOSE      POCT Blood Glucose.: 246 mg/dL (30 Nov 2023 11:58)  POCT Blood Glucose.: 190 mg/dL (30 Nov 2023 08:31)  POCT Blood Glucose.: 227 mg/dL (29 Nov 2023 21:39)  POCT Blood Glucose.: 220 mg/dL (29 Nov 2023 16:53)        Urinalysis Basic - ( 30 Nov 2023 06:10 )    Color: x / Appearance: x / SG: x / pH: x  Gluc: 193 mg/dL / Ketone: x  / Bili: x / Urobili: x   Blood: x / Protein: x / Nitrite: x   Leuk Esterase: x / RBC: x / WBC x   Sq Epi: x / Non Sq Epi: x / Bacteria: x        MEDICATIONS  (STANDING):  aspirin enteric coated 81 milliGRAM(s) Oral daily  atorvastatin 80 milliGRAM(s) Oral at bedtime  clopidogrel Tablet 75 milliGRAM(s) Oral daily  dextrose 5%. 1000 milliLiter(s) (50 mL/Hr) IV Continuous <Continuous>  dextrose 5%. 1000 milliLiter(s) (100 mL/Hr) IV Continuous <Continuous>  dextrose 50% Injectable 25 Gram(s) IV Push once  dextrose 50% Injectable 12.5 Gram(s) IV Push once  dextrose 50% Injectable 25 Gram(s) IV Push once  furosemide    Tablet 40 milliGRAM(s) Oral daily  glucagon  Injectable 1 milliGRAM(s) IntraMuscular once  heparin  Infusion. 1200 Unit(s)/Hr (12 mL/Hr) IV Continuous <Continuous>  insulin lispro (ADMELOG) corrective regimen sliding scale   SubCutaneous three times a day before meals  metoprolol succinate ER 25 milliGRAM(s) Oral daily  sacubitril 24 mG/valsartan 26 mG 1 Tablet(s) Oral two times a day    MEDICATIONS  (PRN):  acetaminophen     Tablet .. 650 milliGRAM(s) Oral every 6 hours PRN Temp greater or equal to 38C (100.4F), Mild Pain (1 - 3)  aluminum hydroxide/magnesium hydroxide/simethicone Suspension 30 milliLiter(s) Oral every 4 hours PRN Dyspepsia  dextrose Oral Gel 15 Gram(s) Oral once PRN Blood Glucose LESS THAN 70 milliGRAM(s)/deciliter  heparin   Injectable 6000 Unit(s) IV Push every 6 hours PRN For aPTT less than 40  heparin   Injectable 3000 Unit(s) IV Push every 6 hours PRN For aPTT between 40 - 57  melatonin 3 milliGRAM(s) Oral at bedtime PRN Insomnia  melatonin 3 milliGRAM(s) Oral once PRN Insomnia  ondansetron Injectable 4 milliGRAM(s) IV Push every 8 hours PRN Nausea and/or Vomiting          PHYSICAL EXAM:  GENERAL: NAD, well-groomed, well-developed  HEAD:  Atraumatic, Normocephalic  CHEST/LUNG: Clear to percussion bilaterally; No rales, rhonchi, wheezing, or rubs  HEART: Regular rate and rhythm; No murmurs, rubs, or gallops  ABDOMEN: Soft, Nontender, Nondistended; Bowel sounds present  EXTREMITIES:  edema +  Care Discussed with Consultants/Other Providers [x ] YES  [ ] NO

## 2023-11-30 NOTE — CHART NOTE - NSCHARTNOTEFT_GEN_A_CORE
Medicine PA Note    Received a call from Cardiology. Pt had a viability study that shows no viability with an EF of 24%; therefore, no plans for a staged PCI and recommended medical management. Will continue the Heparin gtt with a bridge to coumadin. Plan is for an INR of 2-3. Will continue to monitor and coumadin will be first dosed tonight.    Mejia Gallo PA-C  x [phone]

## 2023-12-01 ENCOUNTER — TRANSCRIPTION ENCOUNTER (OUTPATIENT)
Age: 78
End: 2023-12-01

## 2023-12-01 LAB
ANION GAP SERPL CALC-SCNC: 11 MMOL/L — SIGNIFICANT CHANGE UP (ref 7–14)
ANION GAP SERPL CALC-SCNC: 11 MMOL/L — SIGNIFICANT CHANGE UP (ref 7–14)
APTT BLD: 71.4 SEC — HIGH (ref 24.5–35.6)
APTT BLD: 71.4 SEC — HIGH (ref 24.5–35.6)
BASOPHILS # BLD AUTO: 0.03 K/UL — SIGNIFICANT CHANGE UP (ref 0–0.2)
BASOPHILS # BLD AUTO: 0.03 K/UL — SIGNIFICANT CHANGE UP (ref 0–0.2)
BASOPHILS NFR BLD AUTO: 0.4 % — SIGNIFICANT CHANGE UP (ref 0–2)
BASOPHILS NFR BLD AUTO: 0.4 % — SIGNIFICANT CHANGE UP (ref 0–2)
BUN SERPL-MCNC: 18 MG/DL — SIGNIFICANT CHANGE UP (ref 7–23)
BUN SERPL-MCNC: 18 MG/DL — SIGNIFICANT CHANGE UP (ref 7–23)
CALCIUM SERPL-MCNC: 8.6 MG/DL — SIGNIFICANT CHANGE UP (ref 8.4–10.5)
CALCIUM SERPL-MCNC: 8.6 MG/DL — SIGNIFICANT CHANGE UP (ref 8.4–10.5)
CHLORIDE SERPL-SCNC: 106 MMOL/L — SIGNIFICANT CHANGE UP (ref 98–107)
CHLORIDE SERPL-SCNC: 106 MMOL/L — SIGNIFICANT CHANGE UP (ref 98–107)
CO2 SERPL-SCNC: 21 MMOL/L — LOW (ref 22–31)
CO2 SERPL-SCNC: 21 MMOL/L — LOW (ref 22–31)
CREAT SERPL-MCNC: 0.66 MG/DL — SIGNIFICANT CHANGE UP (ref 0.5–1.3)
CREAT SERPL-MCNC: 0.66 MG/DL — SIGNIFICANT CHANGE UP (ref 0.5–1.3)
EGFR: 89 ML/MIN/1.73M2 — SIGNIFICANT CHANGE UP
EGFR: 89 ML/MIN/1.73M2 — SIGNIFICANT CHANGE UP
EOSINOPHIL # BLD AUTO: 0.19 K/UL — SIGNIFICANT CHANGE UP (ref 0–0.5)
EOSINOPHIL # BLD AUTO: 0.19 K/UL — SIGNIFICANT CHANGE UP (ref 0–0.5)
EOSINOPHIL NFR BLD AUTO: 2.3 % — SIGNIFICANT CHANGE UP (ref 0–6)
EOSINOPHIL NFR BLD AUTO: 2.3 % — SIGNIFICANT CHANGE UP (ref 0–6)
GLUCOSE BLDC GLUCOMTR-MCNC: 213 MG/DL — HIGH (ref 70–99)
GLUCOSE BLDC GLUCOMTR-MCNC: 213 MG/DL — HIGH (ref 70–99)
GLUCOSE BLDC GLUCOMTR-MCNC: 258 MG/DL — HIGH (ref 70–99)
GLUCOSE BLDC GLUCOMTR-MCNC: 258 MG/DL — HIGH (ref 70–99)
GLUCOSE BLDC GLUCOMTR-MCNC: 274 MG/DL — HIGH (ref 70–99)
GLUCOSE BLDC GLUCOMTR-MCNC: 274 MG/DL — HIGH (ref 70–99)
GLUCOSE BLDC GLUCOMTR-MCNC: 355 MG/DL — HIGH (ref 70–99)
GLUCOSE SERPL-MCNC: 215 MG/DL — HIGH (ref 70–99)
GLUCOSE SERPL-MCNC: 215 MG/DL — HIGH (ref 70–99)
HCT VFR BLD CALC: 30.5 % — LOW (ref 34.5–45)
HCT VFR BLD CALC: 30.5 % — LOW (ref 34.5–45)
HGB BLD-MCNC: 9.9 G/DL — LOW (ref 11.5–15.5)
HGB BLD-MCNC: 9.9 G/DL — LOW (ref 11.5–15.5)
IANC: 5.26 K/UL — SIGNIFICANT CHANGE UP (ref 1.8–7.4)
IANC: 5.26 K/UL — SIGNIFICANT CHANGE UP (ref 1.8–7.4)
IMM GRANULOCYTES NFR BLD AUTO: 0.6 % — SIGNIFICANT CHANGE UP (ref 0–0.9)
IMM GRANULOCYTES NFR BLD AUTO: 0.6 % — SIGNIFICANT CHANGE UP (ref 0–0.9)
INR BLD: 1.18 RATIO — SIGNIFICANT CHANGE UP (ref 0.85–1.18)
INR BLD: 1.18 RATIO — SIGNIFICANT CHANGE UP (ref 0.85–1.18)
LYMPHOCYTES # BLD AUTO: 2.07 K/UL — SIGNIFICANT CHANGE UP (ref 1–3.3)
LYMPHOCYTES # BLD AUTO: 2.07 K/UL — SIGNIFICANT CHANGE UP (ref 1–3.3)
LYMPHOCYTES # BLD AUTO: 25 % — SIGNIFICANT CHANGE UP (ref 13–44)
LYMPHOCYTES # BLD AUTO: 25 % — SIGNIFICANT CHANGE UP (ref 13–44)
MAGNESIUM SERPL-MCNC: 2 MG/DL — SIGNIFICANT CHANGE UP (ref 1.6–2.6)
MAGNESIUM SERPL-MCNC: 2 MG/DL — SIGNIFICANT CHANGE UP (ref 1.6–2.6)
MCHC RBC-ENTMCNC: 29.6 PG — SIGNIFICANT CHANGE UP (ref 27–34)
MCHC RBC-ENTMCNC: 29.6 PG — SIGNIFICANT CHANGE UP (ref 27–34)
MCHC RBC-ENTMCNC: 32.5 GM/DL — SIGNIFICANT CHANGE UP (ref 32–36)
MCHC RBC-ENTMCNC: 32.5 GM/DL — SIGNIFICANT CHANGE UP (ref 32–36)
MCV RBC AUTO: 91.3 FL — SIGNIFICANT CHANGE UP (ref 80–100)
MCV RBC AUTO: 91.3 FL — SIGNIFICANT CHANGE UP (ref 80–100)
MONOCYTES # BLD AUTO: 0.68 K/UL — SIGNIFICANT CHANGE UP (ref 0–0.9)
MONOCYTES # BLD AUTO: 0.68 K/UL — SIGNIFICANT CHANGE UP (ref 0–0.9)
MONOCYTES NFR BLD AUTO: 8.2 % — SIGNIFICANT CHANGE UP (ref 2–14)
MONOCYTES NFR BLD AUTO: 8.2 % — SIGNIFICANT CHANGE UP (ref 2–14)
NEUTROPHILS # BLD AUTO: 5.26 K/UL — SIGNIFICANT CHANGE UP (ref 1.8–7.4)
NEUTROPHILS # BLD AUTO: 5.26 K/UL — SIGNIFICANT CHANGE UP (ref 1.8–7.4)
NEUTROPHILS NFR BLD AUTO: 63.5 % — SIGNIFICANT CHANGE UP (ref 43–77)
NEUTROPHILS NFR BLD AUTO: 63.5 % — SIGNIFICANT CHANGE UP (ref 43–77)
NRBC # BLD: 0 /100 WBCS — SIGNIFICANT CHANGE UP (ref 0–0)
NRBC # BLD: 0 /100 WBCS — SIGNIFICANT CHANGE UP (ref 0–0)
NRBC # FLD: 0 K/UL — SIGNIFICANT CHANGE UP (ref 0–0)
NRBC # FLD: 0 K/UL — SIGNIFICANT CHANGE UP (ref 0–0)
PHOSPHATE SERPL-MCNC: 2.3 MG/DL — LOW (ref 2.5–4.5)
PHOSPHATE SERPL-MCNC: 2.3 MG/DL — LOW (ref 2.5–4.5)
PLATELET # BLD AUTO: 314 K/UL — SIGNIFICANT CHANGE UP (ref 150–400)
PLATELET # BLD AUTO: 314 K/UL — SIGNIFICANT CHANGE UP (ref 150–400)
POTASSIUM SERPL-MCNC: 4.9 MMOL/L — SIGNIFICANT CHANGE UP (ref 3.5–5.3)
POTASSIUM SERPL-MCNC: 4.9 MMOL/L — SIGNIFICANT CHANGE UP (ref 3.5–5.3)
POTASSIUM SERPL-SCNC: 4.9 MMOL/L — SIGNIFICANT CHANGE UP (ref 3.5–5.3)
POTASSIUM SERPL-SCNC: 4.9 MMOL/L — SIGNIFICANT CHANGE UP (ref 3.5–5.3)
PROTHROM AB SERPL-ACNC: 13.1 SEC — HIGH (ref 9.5–13)
PROTHROM AB SERPL-ACNC: 13.1 SEC — HIGH (ref 9.5–13)
RBC # BLD: 3.34 M/UL — LOW (ref 3.8–5.2)
RBC # BLD: 3.34 M/UL — LOW (ref 3.8–5.2)
RBC # FLD: 14.6 % — HIGH (ref 10.3–14.5)
RBC # FLD: 14.6 % — HIGH (ref 10.3–14.5)
SODIUM SERPL-SCNC: 138 MMOL/L — SIGNIFICANT CHANGE UP (ref 135–145)
SODIUM SERPL-SCNC: 138 MMOL/L — SIGNIFICANT CHANGE UP (ref 135–145)
WBC # BLD: 8.28 K/UL — SIGNIFICANT CHANGE UP (ref 3.8–10.5)
WBC # BLD: 8.28 K/UL — SIGNIFICANT CHANGE UP (ref 3.8–10.5)
WBC # FLD AUTO: 8.28 K/UL — SIGNIFICANT CHANGE UP (ref 3.8–10.5)
WBC # FLD AUTO: 8.28 K/UL — SIGNIFICANT CHANGE UP (ref 3.8–10.5)

## 2023-12-01 PROCEDURE — 99233 SBSQ HOSP IP/OBS HIGH 50: CPT

## 2023-12-01 RX ORDER — WARFARIN SODIUM 2.5 MG/1
7.5 TABLET ORAL ONCE
Refills: 0 | Status: COMPLETED | OUTPATIENT
Start: 2023-12-01 | End: 2023-12-01

## 2023-12-01 RX ADMIN — Medication 3: at 17:25

## 2023-12-01 RX ADMIN — Medication 40 MILLIGRAM(S): at 05:58

## 2023-12-01 RX ADMIN — HEPARIN SODIUM 1400 UNIT(S)/HR: 5000 INJECTION INTRAVENOUS; SUBCUTANEOUS at 20:10

## 2023-12-01 RX ADMIN — Medication 100 MILLIGRAM(S): at 21:20

## 2023-12-01 RX ADMIN — CLOPIDOGREL BISULFATE 75 MILLIGRAM(S): 75 TABLET, FILM COATED ORAL at 11:14

## 2023-12-01 RX ADMIN — Medication 3: at 09:10

## 2023-12-01 RX ADMIN — SACUBITRIL AND VALSARTAN 1 TABLET(S): 24; 26 TABLET, FILM COATED ORAL at 17:29

## 2023-12-01 RX ADMIN — HEPARIN SODIUM 1400 UNIT(S)/HR: 5000 INJECTION INTRAVENOUS; SUBCUTANEOUS at 08:16

## 2023-12-01 RX ADMIN — HEPARIN SODIUM 1400 UNIT(S)/HR: 5000 INJECTION INTRAVENOUS; SUBCUTANEOUS at 08:18

## 2023-12-01 RX ADMIN — Medication 5: at 12:31

## 2023-12-01 RX ADMIN — SACUBITRIL AND VALSARTAN 1 TABLET(S): 24; 26 TABLET, FILM COATED ORAL at 05:57

## 2023-12-01 RX ADMIN — WARFARIN SODIUM 7.5 MILLIGRAM(S): 2.5 TABLET ORAL at 21:21

## 2023-12-01 RX ADMIN — Medication 25 MILLIGRAM(S): at 05:57

## 2023-12-01 RX ADMIN — HEPARIN SODIUM 1400 UNIT(S)/HR: 5000 INJECTION INTRAVENOUS; SUBCUTANEOUS at 12:39

## 2023-12-01 RX ADMIN — ATORVASTATIN CALCIUM 80 MILLIGRAM(S): 80 TABLET, FILM COATED ORAL at 21:20

## 2023-12-01 NOTE — DISCHARGE NOTE NURSING/CASE MANAGEMENT/SOCIAL WORK - PATIENT PORTAL LINK FT
You can access the FollowMyHealth Patient Portal offered by [facility] by registering at the following website: [email or web address]. By joining [employer]’s FollowMyHealth portal, you will also be able to view your health information using other applications (apps) compatible with our system. You can access the FollowMyHealth Patient Portal offered by [facility] by registering at the following website: [email or web address]. By joining [employer]’s FollowMyHealth portal, you will also be able to view your health information using other applications (apps) compatible with our system. You can access the FollowMyHealth Patient Portal offered by [facility] by registering at the following website: [email or web address]. By joining [employer]’s FollowMyHealth portal, you will also be able to view your health information using other applications (apps) compatible with our system.

## 2023-12-01 NOTE — PROGRESS NOTE ADULT - ASSESSMENT
Assessment and Plan:   78F with PMHx HTN, HLD, DM p/w shortness of breath on exertion. Pt states SOB started acutely last week while shopping. Pt with mild b/l rales and peripheral edema. EKG with anterolateral septal q waves with sub-1mm ST elevations, not meeting STEMI criteria. Trop [id number]. CXR without acute consolidation. No suspicion of acute ACS at this time however likely had ischemic event a week ago. Atypical presentation of MI likely as pt is elderly female with hx of DM. New heart failure in setting of NSTEMI. Echo as above, showing EF 20-25% with wall motion abnormalities as well as LV thrombus. Diagnostic LHC as above, showing 95% stenosis in proximal and 90% stenosis in mid LAD. Cardiac viability test as above, pt poor candidate for PCI. Started on warfarin yesterday.     #NSTEMI  #new HFrEF  #CAD   #LV thrombus   #HTN    Recommendations:     - Start empagliflozin today    - c/w warfarin and heparin bridging with daily INR, goal 2-3 (OK for outpatient lovenox bridge)  - c/w Entresto 24-26mg BID   - c/w metop succ 25mg qd   - c/w lasix 40mg po qd     - c/w DAPT with aspirin 81mg and plavix 90mg   - c/w atorvastatin 40mg  - strict I/Os, daily weights, K >4, Mg >2  - No further recs from Cardiology perspective, please arrange follow-up with Dr. Miladys Kelly within 2 weeks of DC  - Cardiology team to sign off  -----    *** Recommendations are preliminary until cosigned by the attending. Assessment and Plan:   78F with PMHx HTN, HLD, DM p/w shortness of breath on exertion. Pt states SOB started acutely last week while shopping. Pt with mild b/l rales and peripheral edema. EKG with anterolateral septal q waves with sub-1mm ST elevations, not meeting STEMI criteria. Trop [id number]. CXR without acute consolidation. No suspicion of acute ACS at this time however likely had ischemic event a week ago. Atypical presentation of MI likely as pt is elderly female with hx of DM. New heart failure in setting of NSTEMI. Echo as above, showing EF 20-25% with wall motion abnormalities as well as LV thrombus. Diagnostic LHC as above, showing 95% stenosis in proximal and 90% stenosis in mid LAD. Cardiac viability test as above, pt poor candidate for PCI. Started on warfarin yesterday.     #NSTEMI  #new HFrEF  #CAD   #LV thrombus   #HTN    Recommendations:     - Start empagliflozin today    - STOP aspirin  - c/w plavix 90mg  - c/w warfarin and heparin bridging with daily INR, goal 2-3 (OK for outpatient lovenox bridge)  - c/w Entresto 24-26mg BID   - c/w metop succ 25mg qd   - c/w lasix 40mg po qd       - c/w atorvastatin 40mg  - strict I/Os, daily weights, K >4, Mg >2  - No further recs from Cardiology perspective, please arrange follow-up with Dr. Miladys Kelly within 2 weeks of DC  - Cardiology team to sign off  -----    *** Recommendations are preliminary until cosigned by the attending.

## 2023-12-01 NOTE — PROGRESS NOTE ADULT - SUBJECTIVE AND OBJECTIVE BOX
Cardiology Progress Note  ------------------------------------------------------------------------------------------    SUBJECTIVE/EVENTS: Pt feels well, at baseline, with no acute complaints at this time. Denies any more shortness of breath, or chest pain.     -------------------------------------------------------------------------------------------  ROS:  CV: chest pain (-), palpitation (-), orthopnea (-), PND (-), edema (-)  PULM: SOB (-), cough (-), wheezing (-), hemoptysis (-).   CONST: fever (-), chills (-) or fatigue (-)  GI: abdominal distension (-), abdominal pain (-) , nausea/vomiting (-), hematemesis, (-), melena (-), hematochezia (-)  : dysuria (-), frequency (-), hematuria (-).   NEURO: numbness (-), weakness (-), dizziness (-)  MSK: myalgia (-), joint pain (-)   SKIN: itching (-), rash (-)  HEENT:  visual changes (-); vertigo or throat pain (-);  neck stiffness (-)   Psych: change in mood (-), anxiety (-), depression (-)     All other review of systems is negative unless indicated above.   -------------------------------------------------------------------------------------------  VITALS:  T(F): 98.4 (), Max: 98.4 ()  HR: 84 () (80 - 85)  BP: 101/53 () (100/52 - 105/63)  RR: 17 ()  SpO2: 98% ()  I&O's Summary    2023 07:01  -  01 Dec 2023 07:00  --------------------------------------------------------  IN: 214 mL / OUT: 0 mL / NET: 214 mL      ------------------------------------------------------------------------------------------  PHYSICAL EXAM:  GEN: NAD, sitting in bed  HEENT: NCAT, EOMI  CV: RRR, Ns1/s2, no m/r/g, JVP not elevated  RESP: CTA B/l, no w/r/r  ABD: soft, nt, nd  EXT: warm, 2+ pulses, no edema  SKIN: no visible lesions, rashes  NEURO: AAOx3, no focal deficits  PSYCH: Calm, cooperative    -------------------------------------------------------------------------------------------  LABS:                          9.9    8.28  )-----------( 314      ( 01 Dec 2023 06:28 )             30.5     12    138  |  106  |  18  ----------------------------<  215<H>  4.9   |  21<L>  |  0.66    Ca    8.6      01 Dec 2023 06:28  Phos  2.3       Mg     2.00           PT/INR - ( 01 Dec 2023 06:28 )   PT: 13.1 sec;   INR: 1.18 ratio         PTT - ( 01 Dec 2023 06:28 )  PTT:71.4 sec  CARDIAC MARKERS ( 2023 13:27 )  856 ng/L / x     / x     / x     / x     / x      CARDIAC MARKERS ( 2023 10:56 )  862 ng/L / x     / x     / x     / x     / x                -------------------------------------------------------------------------------------------  Meds:  acetaminophen     Tablet .. 650 milliGRAM(s) Oral every 6 hours PRN  aluminum hydroxide/magnesium hydroxide/simethicone Suspension 30 milliLiter(s) Oral every 4 hours PRN  aspirin enteric coated 81 milliGRAM(s) Oral daily  atorvastatin 80 milliGRAM(s) Oral at bedtime  clopidogrel Tablet 75 milliGRAM(s) Oral daily  dextrose 5%. 1000 milliLiter(s) IV Continuous <Continuous>  dextrose 5%. 1000 milliLiter(s) IV Continuous <Continuous>  dextrose 50% Injectable 25 Gram(s) IV Push once  dextrose 50% Injectable 12.5 Gram(s) IV Push once  dextrose 50% Injectable 25 Gram(s) IV Push once  dextrose Oral Gel 15 Gram(s) Oral once PRN  furosemide    Tablet 40 milliGRAM(s) Oral daily  glucagon  Injectable 1 milliGRAM(s) IntraMuscular once  heparin   Injectable 6000 Unit(s) IV Push every 6 hours PRN  heparin   Injectable 3000 Unit(s) IV Push every 6 hours PRN  heparin  Infusion. 1200 Unit(s)/Hr IV Continuous <Continuous>  insulin lispro (ADMELOG) corrective regimen sliding scale   SubCutaneous three times a day before meals  melatonin 3 milliGRAM(s) Oral at bedtime PRN  melatonin 3 milliGRAM(s) Oral once PRN  metoprolol succinate ER 25 milliGRAM(s) Oral daily  ondansetron Injectable 4 milliGRAM(s) IV Push every 8 hours PRN  sacubitril 24 mG/valsartan 26 mG 1 Tablet(s) Oral two times a day  warfarin 7.5 milliGRAM(s) Oral once    -------------------------------------------------------------------------------------------  Cardiovascular Diagnostic Testing:      Nuclear Cardiac Viability Report         Patient: YUSUF SAWYER           Study Date: 2023           MRN: [id number]                 Birth Date/Age: 1945 Age: 78 years      Access #: [id number]                         Gender: F     Order Loc: [id number]                              Height: 160.0 cm/ 63.0 in  Request Phys: [id number] Damaris Rahman         Weight: 73.00 kg/ 160.93 lb     Procedure: Viability Imaging               BSA/ BMI: 1.76 m²/ 28.52 kg/m²    Indication: Chest Pain- R07.9         Admiss Status: Inpatient    Fellow/ACP: Concepcion PEREZ                Fellow/ACP:    ---------------------------------------------------------------------------------------------------------------------------------------------------------  Procedure Code: MYOCARDIAL SPECT MULTIPLE - 17320.m;TC 99M SESTAMIBI PER DOSE -                  .m;TC 99M SESTAMIBI PER DOSE 2nd - .m    ---------------------------------------------------------------------------------------------------------------------------------------------------------  History:        78 year old woman with a history of STEMI sent for viability                  testing.  Risk Factors:   Hypertension, diabetes and dyslipidemia.  Image Quality:  Fair  Artifact:      Hepatic tracer uptake.  Study Comments: [facility]: 23, severe disease in the mid and proximal LAD. Given                  late clinical presentation, Q waves on ECG, LV dysfunction and                  LV thrombus would recommend assessing for viability prior to any                  revascularization attempts.95 % pLAD, 90% mLAD.                  TTE: 23, LV cavity size and wall thickness is normal. LV                  systolic function is severely decreased with EF visually         estimated 20 to 25%. There is mild (grade 1) left ventricular                  diastolic dysfunction. A large (~ 2.7 cm X 1.9 cm) left                  ventricular apical thrombus is visualized. There is hypokinesis                  of the apex, mid to distal septum, and mid to distal anterior                  wall. Normal RV cavity size and systolic function. Structurally                  normal mitral valve with normal leaflet excursion. There is                  calcification of the mitral valve annulus. There is mild to                  moderate mitral regurgitation.  Medications:    Asa, plavix, heparin, lasix, metoprolol, entresto, atorvastatin.  Allergies:      None    --------------------------------------------------------------------------------------------------------------------------------------------------------Conclusions:   1. Qualitative Perfusion:      - medium-sized, severe at rest defect(s) in the inferior and inferoseptal wall that is partially reversible suggestive of infarct with partial viability. - large-sized, severe at rest defect(s) in the apical and anteroseptal walls that are fixed suggestive of an infarct with no evicence of viability.   2. The resting left ventricular EF% is 24 %. The resting enddiastolic volume is 202 ml and systolic volume is 154 ml.   3. Normal right ventricular function. There is no right ventricular dilation.    ---------------------------------------------------------------------------------------------------------------------------------------------------------     ---------------------------------------------------------------------------------------------------------------------------------------------------------  Procedure:  The patient was given 7.50 mCi of TC-99M Sestamibi intravenously at rest on 2023 at 2:00:00 PM. Approximately 60 minutes later, gated solid state SPECT images were obtained, with patient in supine position. The patient received 0.4 mg NTG SL followed after five minutes by another0.4 mg NTG SL on 23. Ten minutes later, 7.39 mCi of TC-99M Sestamibi was given intravenously on 2023 at 10:30:00 AM. After 60 minutes, gated solid state SPECT images were obtained and assessed for myocardial perfusion and function, with patient in supine position and prone position.  ---------------------------------------------------------------------------------------------------------------------------------------------------------  Perfusion:  Qualitative Findings:  There is a medium-sized, severe at rest defect(s) in the inferior and inferoseptal wall that is partially reversible suggestive of infarct with partial viability. There are large-sized, severe at rest defect(s) in the apical and anteroseptal walls that are fixed suggestive of an infarct with no evicence of viability.     Left Ventricular Motion: There is apical dyskinesis (aneurysm). The mid to distal anteroseptal walls are akinesis. The best motion is in the lateral wall.  ---------------------------------------------------------------------------------------------------------------------------------------------------------     Ventricular Function: The left ventricle is severely reduced in function and severely enlarged in size. The resting left ventricular EF% is 24 %. The resting end diastolic volume is 202 ml and systolic volume is 154 ml. There is normal right ventricular function. There is no right ventricular dilation.     Stress Supervising Provider: Electronically signed by Jose Camargo MD  Interpreting Provider: Electronically signed on 2023 at 3:50:11 PM by Jose Camargo MD         *** Final ***          Study Date:     2023   Name:           YUSUF SAWYER   :            1945   (78 years)   Gender:         female   MR#:            [id number]   MPI#:           [id number]   Patient Class:  Inpatient     Cath Lab Report    Diagnostic Cardiologist:       Miladys Kelly MD   Fellow:                        Kristen Santos MD   Referring Physician:           Josiah Resendez MD     Procedures Performed   Procedures:               1.    Arterial Access - Right Radial     2.    Diagnostic Coronary Angiography     Indications:                Myocardial infarction without ST elevation  (NSTEMI)  Lab Visit Indication:      ACS greater than 24 hours     Diagnostic Conclusions:     Diagnostic cath showed severe disease in the mid and proximal LAD.  There was VIANEY 2 flow in the vessel. Given late clinical  presentation, Q waves on ECG, LV dysfunction and LV thrombus would  recommend assessing for viability prior to any  revascularization attempts.    Recommendations:     - Viability Study    -GDMT for Heart failure.      Presentation:   Late presentation MI, likely missed STEMI. Q waves on ECG anterior      Procedure Narrative:   The risks and alternatives of the procedures and conscious sedation  were explained to the patient and informed consent was  obtained. The patient was brought to the cath lab and placed on the  exam table.  Access   Right radial artery:   The puncture site was infiltrated with 2% Lidocaine. Vascular access  was obtained using modified seldinger technique and a 6  Fr. Radial Glidesheath Slender was advanced into the vessel.  Hemostasis/Sheath Status: Hemostasis was successful using  TR Band.      Coronary Angiography   Left Coronary System:   A 5 Fr. DxTerity TRA ULTRA 4.0 was positioned into the vessel ostia  under fluoroscopic guidance. Contrast injections were  performed using hand injection. Right Coronary System:   A 5 Fr. DxTerity TRA ULTRA 4.0 was positioned into the vessel ostia  under fluoroscopic guidance. Contrast injections were  performed using hand injection.    Diagnostic Findings:     Patient: YUSUF SAWYER           MRN: [id number]  Study Date: 2023   02:27 PM      Page 1 of 4          Coronary Angiography   The coronary circulation is left dominant.      LM   Left main artery: Angiography shows no disease.      LAD   Proximal left anterior descending: There is a 95 % stenosis. Mid left  anterior descending: There is a 90 % stenosis. VIANEY Flow  2.      CX   Circumflex: The segment is large. Angiography shows minor  irregularities.    RCA   Right coronary artery: The segment is small, non-dominant. Angiography  shows minor irregularities.    History and Risk Factors:   Hypertension:                                Yes   Dyslipidemia:                                Yes   Prior MI:                                    No   Prior PCI:                                   No   Family History of Premature CAD:             No   Cerebrovascular Disease:                     No   Peripheral Arterial Disease:                 No   Prior CABG:                                  No   Tobacco Use:                                 Never   Cardiac Arrest Out of Hospital:              No   Cardiac Arrest at Transferring Facility:     No   Prior Heart Failure:                         No   Diabetes:                                    Yes   CSHA ([place] Study of Health and Aging)    5 - Mildly Frail   Frailty Scale:     X-Ray:   Diagnostic Flouro time:       1.7 min.                     Exam record  DAP:           3513.70 cGycm2  Interventional Flouro time:                                Exam fluoro  DAP:           746.70 cGycm2  Total Flouro Time:            1.7 min.                     Exam total  DAP:            4260.40  cGycm2  Exam Total Dose:              502 mGy     Exam Start Time:   02:27 PM   Exam End Time:     02:37 PM   Exam Duration:     10 min     Contrast:   Description                      Dose          Unit        Serial No.   Omnipaque 350mg 100ml              22.000   BX   Contrast Bottle     Patient: YUSUF SAWYER           MRN: [id number]  Study Date: 2023   02:27 PM      Page 2 of 4          Medication:   Description                  Dose, Unit, Route, Time   fentaNYL  50 mCg/mL          25.0, mcg, IV, 14:24:20   Injectable (Sublimaze)   midazolam  1 mG/mL           1.0, mg, IV, 14:24:24   Injectable (Versed)   lidocaine 2 % Injectable     3.0, ml, Subcut, 14:27:11   (Xylocaine)   verapamil 2.5 mG/mL          2.5, mg, IA, 14:27:59   Injectable   heparin (porcine) 1,000      3000.0, units, IA, 14:28:09   Unit(s)/mL Injectable     sodium chloride 0.9% IVPB    15.0, ml per hr, IV, 14:36:08     Inventory:   Description                  Quantity     [employer]#        Reference  No.    Serial No.      Lot No.       CDM  Angio Pack       1.000        [id number]   [id number]  [id number]*    720   .035 Radial Guidewire        1.000        [id number]   [id number]  [id number]*    594   Sodium Heparin               1.000        [id number]  [id number]          [id number]*    490   Omnipaque 350mg 100ml        1.000        [id number]   [id number]  [id number]*  Contrast Bottle                           139   6 Fr. Radial Glidesheath     1.000        [id number]     [id number]*  Slender                       208   5 Fr. DxTerity TRA ULTRA     1.000        [id number]   [id number]  [id number]*  4.0                                       561   [employer] 27cm      1.000        [id number]   3527  [id number]*  Radial Band                         881     Hemodynamic Pressures:   Phase          Location           [mmHg]                [mmHg]  [mmHg]            HR  Baseline       Ao                   s       95                d  53                m       63         77  Flow Calculations, Phase: Baseline   CO                                HR  O2Insp  CI                                PO2  O2Exp  SV                                VO2                  221.40 ml/min  O2(ExAir)  SVI                               A-VO2  Hb  Shunts:   Qs                                Qs Index   Qp                                Qp Index  Qp/Qs  EPBF                              EPBF Index   L-R                               L-R Index   R-L                               R-L Index     Patient: YUSUF SAWYER           MRN: [id number]  Study Date: 2023   02:27 PM      Page 3 of 4      Conscious sedation was administered and monitored by Cardiology  nursing staff,  under my direct supervision when applicable.     Electronically signed by Miladys Kelly MD on 2023 at 05:29 PM   (No Signature Object)     Patient: YUSUF SAWYER           MRN: [id number]  Study Date: 2023   02:27 PM      Page 4 of 4

## 2023-12-01 NOTE — DISCHARGE NOTE NURSING/CASE MANAGEMENT/SOCIAL WORK - NSDCFUADDAPPT_GEN_ALL_CORE_FT
[facility] patient  APPTS ARE READY TO BE MADE: [ ] YES    Best Family or Patient Contact (if needed):    Additional Information about above appointments (if needed):    1: Dr Carolin Covington PCP within one week  2:   3:     Other comments or requests:

## 2023-12-01 NOTE — DISCHARGE NOTE NURSING/CASE MANAGEMENT/SOCIAL WORK - NSDCPEFALRISK_GEN_ALL_CORE
For information on Fall & Injury Prevention, visit: [email or web address] OR  [email or web address]-injury OR  https://www.cdc.gov/steadi/patient.html For information on Fall & Injury Prevention, visit: [email or web address]://[email or web address]-[email or web address] OR  [email or web address]://[email or web address].[email or web address]/[email or web address]-[email or web address]-[email or web address]-avoid-injury OR  https://www.cdc.gov/steadi/patient.html For information on Fall & Injury Prevention, visit: [email or web address]://[email or web address]-[email or web address]-mobility OR  [email or web address]-[email or web address]-[email or web address]-injury OR  https://www.cdc.gov/steadi/patient.html

## 2023-12-01 NOTE — PROGRESS NOTE ADULT - ATTENDING COMMENTS
agree with fellow assessment.   viability study results noted and discussed with interventionalist - no intervention at this time.   Continue on Plavix, warfarin (lovenox or heparin bridge) for ACS and LV thrombus   For GDMT, continue metoprolol and Entresto. Add SGLT2i   on discharge, patient should follow up with Dr Kelly (Cardiologist at [facility])   no further cardiac testing or procedures planned

## 2023-12-01 NOTE — PROGRESS NOTE ADULT - ASSESSMENT
78-year-old female with past medical history significant for hypertension, hyperlipidemia, diabetes on metformin presented with SOB, found to have elevated trops and EKG changes, consistent with NSTEMI.         Problem/Plan - 1:  ·  Problem: NSTEMI (non-ST elevation myocardial infarction).   ·  Plan: Patient with SOB, found to have elevated trops and EKG changes   -consistent with NSTEMI   -Cardiology fu   -S/p heparin gtt, ASA and plavix load   -C/w DAPT and heparin gtt   -C/w Atorvastatin 80 mg daily   - sp cath   - continue heparin gtt and coumadin  for LV thrombus      Problem/Plan - 2:  ·  Problem: Acute HF (heart failure).   ·  Plan: telemonitor   -Cardiology recs appreciated   -F/u with TTE    - diuresis as per cards     Problem/Plan - 3:  ·  Problem: Essential hypertension.   ·  Plan: C/w lisinopril 5 mg daily.    Problem/Plan - 4:  ·  Problem: Hyperlipidemia.   ·  Plan: C/w atorvastatin 80 mg daily     Problem/Plan - 5:  ·  Problem: Preventive measure.   ·  Plan: DVT ppx heparin gtt.

## 2023-12-01 NOTE — PROGRESS NOTE ADULT - SUBJECTIVE AND OBJECTIVE BOX
Patient is a 78y old  Female who presents with a chief complaint of Chest pain, SOB (01 Dec 2023 09:17)    Date of servie : 12-01-23 @ 17:57  INTERVAL HPI/OVERNIGHT EVENTS:  T(C): 36.9 (12-01-23 @ 13:22), Max: 36.9 (12-01-23 @ 05:56)  HR: 85 (12-01-23 @ 13:22) (84 - 85)  BP: 109/63 (12-01-23 @ 13:22) (100/52 - 109/63)  RR: 18 (12-01-23 @ 13:22) (17 - 18)  SpO2: 99% (12-01-23 @ 13:22) (98% - 100%)  Wt(kg): --  I&O's Summary    30 Nov 2023 07:01  -  01 Dec 2023 07:00  --------------------------------------------------------  IN: 214 mL / OUT: 0 mL / NET: 214 mL        LABS:                        9.9    8.28  )-----------( 314      ( 01 Dec 2023 06:28 )             30.5     12-01    138  |  106  |  18  ----------------------------<  215<H>  4.9   |  21<L>  |  0.66    Ca    8.6      01 Dec 2023 06:28  Phos  2.3     12-01  Mg     2.00     12-01      PT/INR - ( 01 Dec 2023 06:28 )   PT: 13.1 sec;   INR: 1.18 ratio         PTT - ( 01 Dec 2023 06:28 )  PTT:71.4 sec  Urinalysis Basic - ( 01 Dec 2023 06:28 )    Color: x / Appearance: x / SG: x / pH: x  Gluc: 215 mg/dL / Ketone: x  / Bili: x / Urobili: x   Blood: x / Protein: x / Nitrite: x   Leuk Esterase: x / RBC: x / WBC x   Sq Epi: x / Non Sq Epi: x / Bacteria: x      CAPILLARY BLOOD GLUCOSE      POCT Blood Glucose.: 274 mg/dL (01 Dec 2023 17:18)  POCT Blood Glucose.: 355 mg/dL (01 Dec 2023 12:07)  POCT Blood Glucose.: 355 mg/dL (01 Dec 2023 12:05)  POCT Blood Glucose.: 258 mg/dL (01 Dec 2023 08:59)  POCT Blood Glucose.: 267 mg/dL (30 Nov 2023 21:33)        Urinalysis Basic - ( 01 Dec 2023 06:28 )    Color: x / Appearance: x / SG: x / pH: x  Gluc: 215 mg/dL / Ketone: x  / Bili: x / Urobili: x   Blood: x / Protein: x / Nitrite: x   Leuk Esterase: x / RBC: x / WBC x   Sq Epi: x / Non Sq Epi: x / Bacteria: x        MEDICATIONS  (STANDING):  atorvastatin 80 milliGRAM(s) Oral at bedtime  clopidogrel Tablet 75 milliGRAM(s) Oral daily  dextrose 5%. 1000 milliLiter(s) (50 mL/Hr) IV Continuous <Continuous>  dextrose 5%. 1000 milliLiter(s) (100 mL/Hr) IV Continuous <Continuous>  dextrose 50% Injectable 12.5 Gram(s) IV Push once  dextrose 50% Injectable 25 Gram(s) IV Push once  dextrose 50% Injectable 25 Gram(s) IV Push once  furosemide    Tablet 40 milliGRAM(s) Oral daily  glucagon  Injectable 1 milliGRAM(s) IntraMuscular once  heparin  Infusion. 1200 Unit(s)/Hr (12 mL/Hr) IV Continuous <Continuous>  insulin lispro (ADMELOG) corrective regimen sliding scale   SubCutaneous three times a day before meals  metoprolol succinate ER 25 milliGRAM(s) Oral daily  sacubitril 24 mG/valsartan 26 mG 1 Tablet(s) Oral two times a day  warfarin 7.5 milliGRAM(s) Oral once    MEDICATIONS  (PRN):  acetaminophen     Tablet .. 650 milliGRAM(s) Oral every 6 hours PRN Temp greater or equal to 38C (100.4F), Mild Pain (1 - 3)  aluminum hydroxide/magnesium hydroxide/simethicone Suspension 30 milliLiter(s) Oral every 4 hours PRN Dyspepsia  dextrose Oral Gel 15 Gram(s) Oral once PRN Blood Glucose LESS THAN 70 milliGRAM(s)/deciliter  heparin   Injectable 6000 Unit(s) IV Push every 6 hours PRN For aPTT less than 40  heparin   Injectable 3000 Unit(s) IV Push every 6 hours PRN For aPTT between 40 - 57  melatonin 3 milliGRAM(s) Oral at bedtime PRN Insomnia  melatonin 3 milliGRAM(s) Oral once PRN Insomnia  ondansetron Injectable 4 milliGRAM(s) IV Push every 8 hours PRN Nausea and/or Vomiting          PHYSICAL EXAM:  GENERAL: NAD, well-groomed, well-developed  HEAD:  Atraumatic, Normocephalic  CHEST/LUNG: Clear to percussion bilaterally; No rales, rhonchi, wheezing, or rubs  HEART: Regular rate and rhythm; No murmurs, rubs, or gallops  ABDOMEN: Soft, Nontender, Nondistended; Bowel sounds present  EXTREMITIES:  2+ Peripheral Pulses, No clubbing, cyanosis, or edema  LYMPH: No lymphadenopathy noted  SKIN: No rashes or lesions    Care Discussed with Consultants/Other Providers [ ] YES  [ ] NO

## 2023-12-02 LAB
ANION GAP SERPL CALC-SCNC: 10 MMOL/L — SIGNIFICANT CHANGE UP (ref 7–14)
ANION GAP SERPL CALC-SCNC: 10 MMOL/L — SIGNIFICANT CHANGE UP (ref 7–14)
APTT BLD: 106.4 SEC — HIGH (ref 24.5–35.6)
APTT BLD: 106.4 SEC — HIGH (ref 24.5–35.6)
APTT BLD: 33 SEC — SIGNIFICANT CHANGE UP (ref 24.5–35.6)
APTT BLD: 33 SEC — SIGNIFICANT CHANGE UP (ref 24.5–35.6)
BASOPHILS # BLD AUTO: 0.04 K/UL — SIGNIFICANT CHANGE UP (ref 0–0.2)
BASOPHILS # BLD AUTO: 0.04 K/UL — SIGNIFICANT CHANGE UP (ref 0–0.2)
BASOPHILS NFR BLD AUTO: 0.6 % — SIGNIFICANT CHANGE UP (ref 0–2)
BASOPHILS NFR BLD AUTO: 0.6 % — SIGNIFICANT CHANGE UP (ref 0–2)
BUN SERPL-MCNC: 21 MG/DL — SIGNIFICANT CHANGE UP (ref 7–23)
BUN SERPL-MCNC: 21 MG/DL — SIGNIFICANT CHANGE UP (ref 7–23)
CALCIUM SERPL-MCNC: 8.7 MG/DL — SIGNIFICANT CHANGE UP (ref 8.4–10.5)
CALCIUM SERPL-MCNC: 8.7 MG/DL — SIGNIFICANT CHANGE UP (ref 8.4–10.5)
CHLORIDE SERPL-SCNC: 104 MMOL/L — SIGNIFICANT CHANGE UP (ref 98–107)
CHLORIDE SERPL-SCNC: 104 MMOL/L — SIGNIFICANT CHANGE UP (ref 98–107)
CO2 SERPL-SCNC: 23 MMOL/L — SIGNIFICANT CHANGE UP (ref 22–31)
CO2 SERPL-SCNC: 23 MMOL/L — SIGNIFICANT CHANGE UP (ref 22–31)
CREAT SERPL-MCNC: 0.69 MG/DL — SIGNIFICANT CHANGE UP (ref 0.5–1.3)
CREAT SERPL-MCNC: 0.69 MG/DL — SIGNIFICANT CHANGE UP (ref 0.5–1.3)
EGFR: 88 ML/MIN/1.73M2 — SIGNIFICANT CHANGE UP
EGFR: 88 ML/MIN/1.73M2 — SIGNIFICANT CHANGE UP
EOSINOPHIL # BLD AUTO: 0.22 K/UL — SIGNIFICANT CHANGE UP (ref 0–0.5)
EOSINOPHIL # BLD AUTO: 0.22 K/UL — SIGNIFICANT CHANGE UP (ref 0–0.5)
EOSINOPHIL NFR BLD AUTO: 3.2 % — SIGNIFICANT CHANGE UP (ref 0–6)
EOSINOPHIL NFR BLD AUTO: 3.2 % — SIGNIFICANT CHANGE UP (ref 0–6)
GLUCOSE BLDC GLUCOMTR-MCNC: 212 MG/DL — HIGH (ref 70–99)
GLUCOSE BLDC GLUCOMTR-MCNC: 212 MG/DL — HIGH (ref 70–99)
GLUCOSE BLDC GLUCOMTR-MCNC: 226 MG/DL — HIGH (ref 70–99)
GLUCOSE BLDC GLUCOMTR-MCNC: 226 MG/DL — HIGH (ref 70–99)
GLUCOSE BLDC GLUCOMTR-MCNC: 234 MG/DL — HIGH (ref 70–99)
GLUCOSE BLDC GLUCOMTR-MCNC: 234 MG/DL — HIGH (ref 70–99)
GLUCOSE BLDC GLUCOMTR-MCNC: 258 MG/DL — HIGH (ref 70–99)
GLUCOSE BLDC GLUCOMTR-MCNC: 258 MG/DL — HIGH (ref 70–99)
GLUCOSE SERPL-MCNC: 209 MG/DL — HIGH (ref 70–99)
GLUCOSE SERPL-MCNC: 209 MG/DL — HIGH (ref 70–99)
HCT VFR BLD CALC: 29.8 % — LOW (ref 34.5–45)
HCT VFR BLD CALC: 29.8 % — LOW (ref 34.5–45)
HGB BLD-MCNC: 9.7 G/DL — LOW (ref 11.5–15.5)
HGB BLD-MCNC: 9.7 G/DL — LOW (ref 11.5–15.5)
IANC: 4.36 K/UL — SIGNIFICANT CHANGE UP (ref 1.8–7.4)
IANC: 4.36 K/UL — SIGNIFICANT CHANGE UP (ref 1.8–7.4)
IMM GRANULOCYTES NFR BLD AUTO: 0.7 % — SIGNIFICANT CHANGE UP (ref 0–0.9)
IMM GRANULOCYTES NFR BLD AUTO: 0.7 % — SIGNIFICANT CHANGE UP (ref 0–0.9)
INR BLD: 2.05 RATIO — HIGH (ref 0.85–1.18)
INR BLD: 2.05 RATIO — HIGH (ref 0.85–1.18)
LYMPHOCYTES # BLD AUTO: 1.76 K/UL — SIGNIFICANT CHANGE UP (ref 1–3.3)
LYMPHOCYTES # BLD AUTO: 1.76 K/UL — SIGNIFICANT CHANGE UP (ref 1–3.3)
LYMPHOCYTES # BLD AUTO: 25.3 % — SIGNIFICANT CHANGE UP (ref 13–44)
LYMPHOCYTES # BLD AUTO: 25.3 % — SIGNIFICANT CHANGE UP (ref 13–44)
MAGNESIUM SERPL-MCNC: 2 MG/DL — SIGNIFICANT CHANGE UP (ref 1.6–2.6)
MAGNESIUM SERPL-MCNC: 2 MG/DL — SIGNIFICANT CHANGE UP (ref 1.6–2.6)
MCHC RBC-ENTMCNC: 29.7 PG — SIGNIFICANT CHANGE UP (ref 27–34)
MCHC RBC-ENTMCNC: 29.7 PG — SIGNIFICANT CHANGE UP (ref 27–34)
MCHC RBC-ENTMCNC: 32.6 GM/DL — SIGNIFICANT CHANGE UP (ref 32–36)
MCHC RBC-ENTMCNC: 32.6 GM/DL — SIGNIFICANT CHANGE UP (ref 32–36)
MCV RBC AUTO: 91.1 FL — SIGNIFICANT CHANGE UP (ref 80–100)
MCV RBC AUTO: 91.1 FL — SIGNIFICANT CHANGE UP (ref 80–100)
MONOCYTES # BLD AUTO: 0.54 K/UL — SIGNIFICANT CHANGE UP (ref 0–0.9)
MONOCYTES # BLD AUTO: 0.54 K/UL — SIGNIFICANT CHANGE UP (ref 0–0.9)
MONOCYTES NFR BLD AUTO: 7.7 % — SIGNIFICANT CHANGE UP (ref 2–14)
MONOCYTES NFR BLD AUTO: 7.7 % — SIGNIFICANT CHANGE UP (ref 2–14)
NEUTROPHILS # BLD AUTO: 4.36 K/UL — SIGNIFICANT CHANGE UP (ref 1.8–7.4)
NEUTROPHILS # BLD AUTO: 4.36 K/UL — SIGNIFICANT CHANGE UP (ref 1.8–7.4)
NEUTROPHILS NFR BLD AUTO: 62.5 % — SIGNIFICANT CHANGE UP (ref 43–77)
NEUTROPHILS NFR BLD AUTO: 62.5 % — SIGNIFICANT CHANGE UP (ref 43–77)
NRBC # BLD: 0 /100 WBCS — SIGNIFICANT CHANGE UP (ref 0–0)
NRBC # BLD: 0 /100 WBCS — SIGNIFICANT CHANGE UP (ref 0–0)
NRBC # FLD: 0 K/UL — SIGNIFICANT CHANGE UP (ref 0–0)
NRBC # FLD: 0 K/UL — SIGNIFICANT CHANGE UP (ref 0–0)
PHOSPHATE SERPL-MCNC: 2.3 MG/DL — LOW (ref 2.5–4.5)
PHOSPHATE SERPL-MCNC: 2.3 MG/DL — LOW (ref 2.5–4.5)
PLATELET # BLD AUTO: 304 K/UL — SIGNIFICANT CHANGE UP (ref 150–400)
PLATELET # BLD AUTO: 304 K/UL — SIGNIFICANT CHANGE UP (ref 150–400)
POTASSIUM SERPL-MCNC: 4.5 MMOL/L — SIGNIFICANT CHANGE UP (ref 3.5–5.3)
POTASSIUM SERPL-MCNC: 4.5 MMOL/L — SIGNIFICANT CHANGE UP (ref 3.5–5.3)
POTASSIUM SERPL-SCNC: 4.5 MMOL/L — SIGNIFICANT CHANGE UP (ref 3.5–5.3)
POTASSIUM SERPL-SCNC: 4.5 MMOL/L — SIGNIFICANT CHANGE UP (ref 3.5–5.3)
PROTHROM AB SERPL-ACNC: 22.7 SEC — HIGH (ref 9.5–13)
PROTHROM AB SERPL-ACNC: 22.7 SEC — HIGH (ref 9.5–13)
RBC # BLD: 3.27 M/UL — LOW (ref 3.8–5.2)
RBC # BLD: 3.27 M/UL — LOW (ref 3.8–5.2)
RBC # FLD: 14.7 % — HIGH (ref 10.3–14.5)
RBC # FLD: 14.7 % — HIGH (ref 10.3–14.5)
SODIUM SERPL-SCNC: 137 MMOL/L — SIGNIFICANT CHANGE UP (ref 135–145)
SODIUM SERPL-SCNC: 137 MMOL/L — SIGNIFICANT CHANGE UP (ref 135–145)
WBC # BLD: 6.97 K/UL — SIGNIFICANT CHANGE UP (ref 3.8–10.5)
WBC # BLD: 6.97 K/UL — SIGNIFICANT CHANGE UP (ref 3.8–10.5)
WBC # FLD AUTO: 6.97 K/UL — SIGNIFICANT CHANGE UP (ref 3.8–10.5)
WBC # FLD AUTO: 6.97 K/UL — SIGNIFICANT CHANGE UP (ref 3.8–10.5)

## 2023-12-02 RX ORDER — WARFARIN SODIUM 2.5 MG/1
7.5 TABLET ORAL ONCE
Refills: 0 | Status: DISCONTINUED | OUTPATIENT
Start: 2023-12-02 | End: 2023-12-02

## 2023-12-02 RX ORDER — WARFARIN SODIUM 2.5 MG/1
5 TABLET ORAL ONCE
Refills: 0 | Status: COMPLETED | OUTPATIENT
Start: 2023-12-02 | End: 2023-12-02

## 2023-12-02 RX ADMIN — Medication 2: at 17:53

## 2023-12-02 RX ADMIN — WARFARIN SODIUM 5 MILLIGRAM(S): 2.5 TABLET ORAL at 21:40

## 2023-12-02 RX ADMIN — HEPARIN SODIUM 1200 UNIT(S)/HR: 5000 INJECTION INTRAVENOUS; SUBCUTANEOUS at 07:32

## 2023-12-02 RX ADMIN — Medication 40 MILLIGRAM(S): at 06:20

## 2023-12-02 RX ADMIN — HEPARIN SODIUM 1400 UNIT(S)/HR: 5000 INJECTION INTRAVENOUS; SUBCUTANEOUS at 06:55

## 2023-12-02 RX ADMIN — Medication 25 MILLIGRAM(S): at 06:20

## 2023-12-02 RX ADMIN — Medication 2: at 08:45

## 2023-12-02 RX ADMIN — Medication 3: at 13:04

## 2023-12-02 RX ADMIN — CLOPIDOGREL BISULFATE 75 MILLIGRAM(S): 75 TABLET, FILM COATED ORAL at 11:13

## 2023-12-02 RX ADMIN — SACUBITRIL AND VALSARTAN 1 TABLET(S): 24; 26 TABLET, FILM COATED ORAL at 17:54

## 2023-12-02 RX ADMIN — ATORVASTATIN CALCIUM 80 MILLIGRAM(S): 80 TABLET, FILM COATED ORAL at 21:40

## 2023-12-02 RX ADMIN — SACUBITRIL AND VALSARTAN 1 TABLET(S): 24; 26 TABLET, FILM COATED ORAL at 06:20

## 2023-12-02 NOTE — PROGRESS NOTE ADULT - ASSESSMENT
78-year-old female with past medical history significant for hypertension, hyperlipidemia, diabetes on metformin presented with SOB, found to have elevated trops and EKG changes, consistent with NSTEMI.         Problem/Plan - 1:  ·  Problem: NSTEMI (non-ST elevation myocardial infarction).   ·  Plan: Patient with SOB, found to have elevated trops and EKG changes   -consistent with NSTEMI   -Cardiology fu   -C/w DAPT and heparin gtt   -C/w Atorvastatin 80 mg daily   - sp cath   - continue coumadin  for LV thrombus      Problem/Plan - 2:  ·  Problem: Acute HF (heart failure).   ·  Plan: telemonitor   -Cardiology recs appreciated   - diuresis as per cards     Problem/Plan - 3:  ·  Problem: Essential hypertension.   ·  Plan: C/w lisinopril 5 mg daily.    Problem/Plan - 4:  ·  Problem: Hyperlipidemia.   ·  Plan: C/w atorvastatin 80 mg daily   -F/u with lipid panel.    Problem/Plan - 5:  ·  Problem: Preventive measure.   ·  Plan: DVT ppx on coumadin

## 2023-12-02 NOTE — PROGRESS NOTE ADULT - SUBJECTIVE AND OBJECTIVE BOX
Patient is a 78y old  Female who presents with a chief complaint of Chest pain, SOB (01 Dec 2023 17:56)    Date of servie : 12-02-23 @ 18:11  INTERVAL HPI/OVERNIGHT EVENTS:  T(C): 37.1 (12-02-23 @ 13:07), Max: 37.1 (12-02-23 @ 13:07)  HR: 84 (12-02-23 @ 13:07) (82 - 85)  BP: 115/57 (12-02-23 @ 13:07) (101/53 - 115/57)  RR: 16 (12-02-23 @ 13:07) (16 - 18)  SpO2: 100% (12-02-23 @ 13:07) (99% - 100%)  Wt(kg): --  I&O's Summary    01 Dec 2023 07:01  -  02 Dec 2023 07:00  --------------------------------------------------------  IN: 240 mL / OUT: 0 mL / NET: 240 mL        LABS:                        9.7    6.97  )-----------( 304      ( 02 Dec 2023 05:06 )             29.8     12-02    137  |  104  |  21  ----------------------------<  209<H>  4.5   |  23  |  0.69    Ca    8.7      02 Dec 2023 05:06  Phos  2.3     12-02  Mg     2.00     12-02      PT/INR - ( 02 Dec 2023 05:06 )   PT: 22.7 sec;   INR: 2.05 ratio         PTT - ( 02 Dec 2023 14:07 )  PTT:33.0 sec  Urinalysis Basic - ( 02 Dec 2023 05:06 )    Color: x / Appearance: x / SG: x / pH: x  Gluc: 209 mg/dL / Ketone: x  / Bili: x / Urobili: x   Blood: x / Protein: x / Nitrite: x   Leuk Esterase: x / RBC: x / WBC x   Sq Epi: x / Non Sq Epi: x / Bacteria: x      CAPILLARY BLOOD GLUCOSE      POCT Blood Glucose.: 226 mg/dL (02 Dec 2023 17:31)  POCT Blood Glucose.: 258 mg/dL (02 Dec 2023 12:40)  POCT Blood Glucose.: 234 mg/dL (02 Dec 2023 08:29)  POCT Blood Glucose.: 213 mg/dL (01 Dec 2023 22:00)        Urinalysis Basic - ( 02 Dec 2023 05:06 )    Color: x / Appearance: x / SG: x / pH: x  Gluc: 209 mg/dL / Ketone: x  / Bili: x / Urobili: x   Blood: x / Protein: x / Nitrite: x   Leuk Esterase: x / RBC: x / WBC x   Sq Epi: x / Non Sq Epi: x / Bacteria: x        MEDICATIONS  (STANDING):  atorvastatin 80 milliGRAM(s) Oral at bedtime  clopidogrel Tablet 75 milliGRAM(s) Oral daily  dextrose 5%. 1000 milliLiter(s) (50 mL/Hr) IV Continuous <Continuous>  dextrose 5%. 1000 milliLiter(s) (100 mL/Hr) IV Continuous <Continuous>  dextrose 50% Injectable 12.5 Gram(s) IV Push once  dextrose 50% Injectable 25 Gram(s) IV Push once  dextrose 50% Injectable 25 Gram(s) IV Push once  furosemide    Tablet 40 milliGRAM(s) Oral daily  glucagon  Injectable 1 milliGRAM(s) IntraMuscular once  insulin lispro (ADMELOG) corrective regimen sliding scale   SubCutaneous three times a day before meals  metoprolol succinate ER 25 milliGRAM(s) Oral daily  sacubitril 24 mG/valsartan 26 mG 1 Tablet(s) Oral two times a day  warfarin 5 milliGRAM(s) Oral once    MEDICATIONS  (PRN):  acetaminophen     Tablet .. 650 milliGRAM(s) Oral every 6 hours PRN Temp greater or equal to 38C (100.4F), Mild Pain (1 - 3)  aluminum hydroxide/magnesium hydroxide/simethicone Suspension 30 milliLiter(s) Oral every 4 hours PRN Dyspepsia  dextrose Oral Gel 15 Gram(s) Oral once PRN Blood Glucose LESS THAN 70 milliGRAM(s)/deciliter  melatonin 3 milliGRAM(s) Oral at bedtime PRN Insomnia  melatonin 3 milliGRAM(s) Oral once PRN Insomnia  ondansetron Injectable 4 milliGRAM(s) IV Push every 8 hours PRN Nausea and/or Vomiting          PHYSICAL EXAM:  GENERAL: NAD, well-groomed, well-developed  HEAD:  Atraumatic, Normocephalic  CHEST/LUNG: Clear to percussion bilaterally; No rales, rhonchi, wheezing, or rubs  HEART: Regular rate and rhythm; No murmurs, rubs, or gallops  ABDOMEN: Soft, Nontender, Nondistended; Bowel sounds present  EXTREMITIES:  2+ Peripheral Pulses, No clubbing, cyanosis, or edema  LYMPH: No lymphadenopathy noted  SKIN: No rashes or lesions    Care Discussed with Consultants/Other Providers [x ] YES  [ ] NO

## 2023-12-03 LAB
ANION GAP SERPL CALC-SCNC: 10 MMOL/L — SIGNIFICANT CHANGE UP (ref 7–14)
ANION GAP SERPL CALC-SCNC: 10 MMOL/L — SIGNIFICANT CHANGE UP (ref 7–14)
APTT BLD: 34.8 SEC — SIGNIFICANT CHANGE UP (ref 24.5–35.6)
APTT BLD: 34.8 SEC — SIGNIFICANT CHANGE UP (ref 24.5–35.6)
BASOPHILS # BLD AUTO: 0.06 K/UL — SIGNIFICANT CHANGE UP (ref 0–0.2)
BASOPHILS # BLD AUTO: 0.06 K/UL — SIGNIFICANT CHANGE UP (ref 0–0.2)
BASOPHILS NFR BLD AUTO: 0.9 % — SIGNIFICANT CHANGE UP (ref 0–2)
BASOPHILS NFR BLD AUTO: 0.9 % — SIGNIFICANT CHANGE UP (ref 0–2)
BUN SERPL-MCNC: 19 MG/DL — SIGNIFICANT CHANGE UP (ref 7–23)
BUN SERPL-MCNC: 19 MG/DL — SIGNIFICANT CHANGE UP (ref 7–23)
CALCIUM SERPL-MCNC: 8.9 MG/DL — SIGNIFICANT CHANGE UP (ref 8.4–10.5)
CALCIUM SERPL-MCNC: 8.9 MG/DL — SIGNIFICANT CHANGE UP (ref 8.4–10.5)
CHLORIDE SERPL-SCNC: 105 MMOL/L — SIGNIFICANT CHANGE UP (ref 98–107)
CHLORIDE SERPL-SCNC: 105 MMOL/L — SIGNIFICANT CHANGE UP (ref 98–107)
CK MB BLD-MCNC: 2.7 % — HIGH (ref 0–2.5)
CK MB BLD-MCNC: 2.7 % — HIGH (ref 0–2.5)
CK MB BLD-MCNC: 6.7 % — HIGH (ref 0–2.5)
CK MB BLD-MCNC: 6.7 % — HIGH (ref 0–2.5)
CK MB CFR SERPL CALC: 1.5 NG/ML — SIGNIFICANT CHANGE UP
CK MB CFR SERPL CALC: 1.5 NG/ML — SIGNIFICANT CHANGE UP
CK MB CFR SERPL CALC: 27.8 NG/ML — HIGH
CK MB CFR SERPL CALC: 27.8 NG/ML — HIGH
CK SERPL-CCNC: 415 U/L — HIGH (ref 25–170)
CK SERPL-CCNC: 415 U/L — HIGH (ref 25–170)
CK SERPL-CCNC: 56 U/L — SIGNIFICANT CHANGE UP (ref 25–170)
CK SERPL-CCNC: 56 U/L — SIGNIFICANT CHANGE UP (ref 25–170)
CO2 SERPL-SCNC: 23 MMOL/L — SIGNIFICANT CHANGE UP (ref 22–31)
CO2 SERPL-SCNC: 23 MMOL/L — SIGNIFICANT CHANGE UP (ref 22–31)
CREAT SERPL-MCNC: 0.7 MG/DL — SIGNIFICANT CHANGE UP (ref 0.5–1.3)
CREAT SERPL-MCNC: 0.7 MG/DL — SIGNIFICANT CHANGE UP (ref 0.5–1.3)
EGFR: 88 ML/MIN/1.73M2 — SIGNIFICANT CHANGE UP
EGFR: 88 ML/MIN/1.73M2 — SIGNIFICANT CHANGE UP
EOSINOPHIL # BLD AUTO: 0.19 K/UL — SIGNIFICANT CHANGE UP (ref 0–0.5)
EOSINOPHIL # BLD AUTO: 0.19 K/UL — SIGNIFICANT CHANGE UP (ref 0–0.5)
EOSINOPHIL NFR BLD AUTO: 2.8 % — SIGNIFICANT CHANGE UP (ref 0–6)
EOSINOPHIL NFR BLD AUTO: 2.8 % — SIGNIFICANT CHANGE UP (ref 0–6)
GLUCOSE BLDC GLUCOMTR-MCNC: 201 MG/DL — HIGH (ref 70–99)
GLUCOSE BLDC GLUCOMTR-MCNC: 201 MG/DL — HIGH (ref 70–99)
GLUCOSE BLDC GLUCOMTR-MCNC: 253 MG/DL — HIGH (ref 70–99)
GLUCOSE BLDC GLUCOMTR-MCNC: 253 MG/DL — HIGH (ref 70–99)
GLUCOSE BLDC GLUCOMTR-MCNC: 265 MG/DL — HIGH (ref 70–99)
GLUCOSE BLDC GLUCOMTR-MCNC: 265 MG/DL — HIGH (ref 70–99)
GLUCOSE BLDC GLUCOMTR-MCNC: 311 MG/DL — HIGH (ref 70–99)
GLUCOSE BLDC GLUCOMTR-MCNC: 311 MG/DL — HIGH (ref 70–99)
GLUCOSE BLDC GLUCOMTR-MCNC: 325 MG/DL — HIGH (ref 70–99)
GLUCOSE BLDC GLUCOMTR-MCNC: 325 MG/DL — HIGH (ref 70–99)
GLUCOSE SERPL-MCNC: 200 MG/DL — HIGH (ref 70–99)
GLUCOSE SERPL-MCNC: 200 MG/DL — HIGH (ref 70–99)
HCT VFR BLD CALC: 30.9 % — LOW (ref 34.5–45)
HCT VFR BLD CALC: 30.9 % — LOW (ref 34.5–45)
HGB BLD-MCNC: 9.8 G/DL — LOW (ref 11.5–15.5)
HGB BLD-MCNC: 9.8 G/DL — LOW (ref 11.5–15.5)
IANC: 4.31 K/UL — SIGNIFICANT CHANGE UP (ref 1.8–7.4)
IANC: 4.31 K/UL — SIGNIFICANT CHANGE UP (ref 1.8–7.4)
IMM GRANULOCYTES NFR BLD AUTO: 0.7 % — SIGNIFICANT CHANGE UP (ref 0–0.9)
IMM GRANULOCYTES NFR BLD AUTO: 0.7 % — SIGNIFICANT CHANGE UP (ref 0–0.9)
INR BLD: 3.51 RATIO — HIGH (ref 0.85–1.18)
INR BLD: 3.51 RATIO — HIGH (ref 0.85–1.18)
LYMPHOCYTES # BLD AUTO: 1.71 K/UL — SIGNIFICANT CHANGE UP (ref 1–3.3)
LYMPHOCYTES # BLD AUTO: 1.71 K/UL — SIGNIFICANT CHANGE UP (ref 1–3.3)
LYMPHOCYTES # BLD AUTO: 25.1 % — SIGNIFICANT CHANGE UP (ref 13–44)
LYMPHOCYTES # BLD AUTO: 25.1 % — SIGNIFICANT CHANGE UP (ref 13–44)
MAGNESIUM SERPL-MCNC: 2.1 MG/DL — SIGNIFICANT CHANGE UP (ref 1.6–2.6)
MAGNESIUM SERPL-MCNC: 2.1 MG/DL — SIGNIFICANT CHANGE UP (ref 1.6–2.6)
MCHC RBC-ENTMCNC: 29.3 PG — SIGNIFICANT CHANGE UP (ref 27–34)
MCHC RBC-ENTMCNC: 29.3 PG — SIGNIFICANT CHANGE UP (ref 27–34)
MCHC RBC-ENTMCNC: 31.7 GM/DL — LOW (ref 32–36)
MCHC RBC-ENTMCNC: 31.7 GM/DL — LOW (ref 32–36)
MCV RBC AUTO: 92.2 FL — SIGNIFICANT CHANGE UP (ref 80–100)
MCV RBC AUTO: 92.2 FL — SIGNIFICANT CHANGE UP (ref 80–100)
MONOCYTES # BLD AUTO: 0.49 K/UL — SIGNIFICANT CHANGE UP (ref 0–0.9)
MONOCYTES # BLD AUTO: 0.49 K/UL — SIGNIFICANT CHANGE UP (ref 0–0.9)
MONOCYTES NFR BLD AUTO: 7.2 % — SIGNIFICANT CHANGE UP (ref 2–14)
MONOCYTES NFR BLD AUTO: 7.2 % — SIGNIFICANT CHANGE UP (ref 2–14)
NEUTROPHILS # BLD AUTO: 4.31 K/UL — SIGNIFICANT CHANGE UP (ref 1.8–7.4)
NEUTROPHILS # BLD AUTO: 4.31 K/UL — SIGNIFICANT CHANGE UP (ref 1.8–7.4)
NEUTROPHILS NFR BLD AUTO: 63.3 % — SIGNIFICANT CHANGE UP (ref 43–77)
NEUTROPHILS NFR BLD AUTO: 63.3 % — SIGNIFICANT CHANGE UP (ref 43–77)
NRBC # BLD: 0 /100 WBCS — SIGNIFICANT CHANGE UP (ref 0–0)
NRBC # BLD: 0 /100 WBCS — SIGNIFICANT CHANGE UP (ref 0–0)
NRBC # FLD: 0 K/UL — SIGNIFICANT CHANGE UP (ref 0–0)
NRBC # FLD: 0 K/UL — SIGNIFICANT CHANGE UP (ref 0–0)
PHOSPHATE SERPL-MCNC: 2.9 MG/DL — SIGNIFICANT CHANGE UP (ref 2.5–4.5)
PHOSPHATE SERPL-MCNC: 2.9 MG/DL — SIGNIFICANT CHANGE UP (ref 2.5–4.5)
PLATELET # BLD AUTO: 307 K/UL — SIGNIFICANT CHANGE UP (ref 150–400)
PLATELET # BLD AUTO: 307 K/UL — SIGNIFICANT CHANGE UP (ref 150–400)
POTASSIUM SERPL-MCNC: 4.6 MMOL/L — SIGNIFICANT CHANGE UP (ref 3.5–5.3)
POTASSIUM SERPL-MCNC: 4.6 MMOL/L — SIGNIFICANT CHANGE UP (ref 3.5–5.3)
POTASSIUM SERPL-SCNC: 4.6 MMOL/L — SIGNIFICANT CHANGE UP (ref 3.5–5.3)
POTASSIUM SERPL-SCNC: 4.6 MMOL/L — SIGNIFICANT CHANGE UP (ref 3.5–5.3)
PROTHROM AB SERPL-ACNC: 38.2 SEC — HIGH (ref 9.5–13)
PROTHROM AB SERPL-ACNC: 38.2 SEC — HIGH (ref 9.5–13)
RBC # BLD: 3.35 M/UL — LOW (ref 3.8–5.2)
RBC # BLD: 3.35 M/UL — LOW (ref 3.8–5.2)
RBC # FLD: 14.8 % — HIGH (ref 10.3–14.5)
RBC # FLD: 14.8 % — HIGH (ref 10.3–14.5)
SODIUM SERPL-SCNC: 138 MMOL/L — SIGNIFICANT CHANGE UP (ref 135–145)
SODIUM SERPL-SCNC: 138 MMOL/L — SIGNIFICANT CHANGE UP (ref 135–145)
TROPONIN T, HIGH SENSITIVITY RESULT: 1054 NG/L — CRITICAL HIGH
TROPONIN T, HIGH SENSITIVITY RESULT: 1054 NG/L — CRITICAL HIGH
TROPONIN T, HIGH SENSITIVITY RESULT: 484 NG/L — CRITICAL HIGH
TROPONIN T, HIGH SENSITIVITY RESULT: 484 NG/L — CRITICAL HIGH
WBC # BLD: 6.81 K/UL — SIGNIFICANT CHANGE UP (ref 3.8–10.5)
WBC # BLD: 6.81 K/UL — SIGNIFICANT CHANGE UP (ref 3.8–10.5)
WBC # FLD AUTO: 6.81 K/UL — SIGNIFICANT CHANGE UP (ref 3.8–10.5)
WBC # FLD AUTO: 6.81 K/UL — SIGNIFICANT CHANGE UP (ref 3.8–10.5)

## 2023-12-03 PROCEDURE — 93010 ELECTROCARDIOGRAM REPORT: CPT

## 2023-12-03 RX ORDER — FUROSEMIDE 40 MG
40 TABLET ORAL ONCE
Refills: 0 | Status: COMPLETED | OUTPATIENT
Start: 2023-12-03 | End: 2023-12-03

## 2023-12-03 RX ORDER — ASPIRIN/CALCIUM CARB/MAGNESIUM 324 MG
324 TABLET ORAL ONCE
Refills: 0 | Status: COMPLETED | OUTPATIENT
Start: 2023-12-03 | End: 2023-12-03

## 2023-12-03 RX ORDER — POLYETHYLENE GLYCOL 3350 17 G/17G
17 POWDER, FOR SOLUTION ORAL DAILY
Refills: 0 | Status: DISCONTINUED | OUTPATIENT
Start: 2023-12-03 | End: 2023-12-06

## 2023-12-03 RX ORDER — INSULIN LISPRO 100/ML
VIAL (ML) SUBCUTANEOUS AT BEDTIME
Refills: 0 | Status: DISCONTINUED | OUTPATIENT
Start: 2023-12-03 | End: 2023-12-06

## 2023-12-03 RX ADMIN — ATORVASTATIN CALCIUM 80 MILLIGRAM(S): 80 TABLET, FILM COATED ORAL at 23:41

## 2023-12-03 RX ADMIN — Medication 25 MILLIGRAM(S): at 06:21

## 2023-12-03 RX ADMIN — Medication 1: at 23:41

## 2023-12-03 RX ADMIN — Medication 2: at 09:09

## 2023-12-03 RX ADMIN — Medication 4: at 13:15

## 2023-12-03 RX ADMIN — SACUBITRIL AND VALSARTAN 1 TABLET(S): 24; 26 TABLET, FILM COATED ORAL at 06:21

## 2023-12-03 RX ADMIN — SACUBITRIL AND VALSARTAN 1 TABLET(S): 24; 26 TABLET, FILM COATED ORAL at 17:29

## 2023-12-03 RX ADMIN — Medication 3: at 17:28

## 2023-12-03 RX ADMIN — POLYETHYLENE GLYCOL 3350 17 GRAM(S): 17 POWDER, FOR SOLUTION ORAL at 14:42

## 2023-12-03 RX ADMIN — CLOPIDOGREL BISULFATE 75 MILLIGRAM(S): 75 TABLET, FILM COATED ORAL at 13:16

## 2023-12-03 RX ADMIN — Medication 40 MILLIGRAM(S): at 06:21

## 2023-12-03 NOTE — PROGRESS NOTE ADULT - SUBJECTIVE AND OBJECTIVE BOX
Patient is a 78y old  Female who presents with a chief complaint of Chest pain, SOB (02 Dec 2023 18:11)    Date of servie : 12-03-23 @ 16:46  INTERVAL HPI/OVERNIGHT EVENTS:  T(C): 36.7 (12-03-23 @ 06:00), Max: 36.7 (12-02-23 @ 21:30)  HR: 85 (12-03-23 @ 06:00) (73 - 85)  BP: 104/55 (12-03-23 @ 06:00) (99/58 - 109/52)  RR: 16 (12-03-23 @ 06:00) (16 - 16)  SpO2: 100% (12-03-23 @ 06:00) (99% - 100%)  Wt(kg): --  I&O's Summary    02 Dec 2023 07:01  -  03 Dec 2023 07:00  --------------------------------------------------------  IN: 240 mL / OUT: 250 mL / NET: -10 mL        LABS:                        9.8    6.81  )-----------( 307      ( 03 Dec 2023 06:06 )             30.9     12-03    138  |  105  |  19  ----------------------------<  200<H>  4.6   |  23  |  0.70    Ca    8.9      03 Dec 2023 06:06  Phos  2.9     12-03  Mg     2.10     12-03      PT/INR - ( 03 Dec 2023 06:06 )   PT: 38.2 sec;   INR: 3.51 ratio         PTT - ( 03 Dec 2023 06:06 )  PTT:34.8 sec  Urinalysis Basic - ( 03 Dec 2023 06:06 )    Color: x / Appearance: x / SG: x / pH: x  Gluc: 200 mg/dL / Ketone: x  / Bili: x / Urobili: x   Blood: x / Protein: x / Nitrite: x   Leuk Esterase: x / RBC: x / WBC x   Sq Epi: x / Non Sq Epi: x / Bacteria: x      CAPILLARY BLOOD GLUCOSE      POCT Blood Glucose.: 325 mg/dL (03 Dec 2023 12:54)  POCT Blood Glucose.: 311 mg/dL (03 Dec 2023 10:59)  POCT Blood Glucose.: 201 mg/dL (03 Dec 2023 09:08)  POCT Blood Glucose.: 212 mg/dL (02 Dec 2023 22:22)  POCT Blood Glucose.: 226 mg/dL (02 Dec 2023 17:31)        Urinalysis Basic - ( 03 Dec 2023 06:06 )    Color: x / Appearance: x / SG: x / pH: x  Gluc: 200 mg/dL / Ketone: x  / Bili: x / Urobili: x   Blood: x / Protein: x / Nitrite: x   Leuk Esterase: x / RBC: x / WBC x   Sq Epi: x / Non Sq Epi: x / Bacteria: x        MEDICATIONS  (STANDING):  atorvastatin 80 milliGRAM(s) Oral at bedtime  clopidogrel Tablet 75 milliGRAM(s) Oral daily  dextrose 5%. 1000 milliLiter(s) (50 mL/Hr) IV Continuous <Continuous>  dextrose 5%. 1000 milliLiter(s) (100 mL/Hr) IV Continuous <Continuous>  dextrose 50% Injectable 12.5 Gram(s) IV Push once  dextrose 50% Injectable 25 Gram(s) IV Push once  dextrose 50% Injectable 25 Gram(s) IV Push once  furosemide    Tablet 40 milliGRAM(s) Oral daily  glucagon  Injectable 1 milliGRAM(s) IntraMuscular once  insulin lispro (ADMELOG) corrective regimen sliding scale   SubCutaneous three times a day before meals  metoprolol succinate ER 25 milliGRAM(s) Oral daily  polyethylene glycol 3350 17 Gram(s) Oral daily  sacubitril 24 mG/valsartan 26 mG 1 Tablet(s) Oral two times a day    MEDICATIONS  (PRN):  acetaminophen     Tablet .. 650 milliGRAM(s) Oral every 6 hours PRN Temp greater or equal to 38C (100.4F), Mild Pain (1 - 3)  aluminum hydroxide/magnesium hydroxide/simethicone Suspension 30 milliLiter(s) Oral every 4 hours PRN Dyspepsia  dextrose Oral Gel 15 Gram(s) Oral once PRN Blood Glucose LESS THAN 70 milliGRAM(s)/deciliter  melatonin 3 milliGRAM(s) Oral at bedtime PRN Insomnia  melatonin 3 milliGRAM(s) Oral once PRN Insomnia  ondansetron Injectable 4 milliGRAM(s) IV Push every 8 hours PRN Nausea and/or Vomiting          PHYSICAL EXAM:  GENERAL: NAD, well-groomed, well-developed  HEAD:  Atraumatic, Normocephalic  CHEST/LUNG: Clear to percussion bilaterally; No rales, rhonchi, wheezing, or rubs  HEART: Regular rate and rhythm; No murmurs, rubs, or gallops  ABDOMEN: Soft, Nontender, Nondistended; Bowel sounds present  EXTREMITIES:  2+ Peripheral Pulses, No clubbing, cyanosis, or edema  LYMPH: No lymphadenopathy noted  SKIN: No rashes or lesions    Care Discussed with Consultants/Other Providers [ ] YES  [ ] NO

## 2023-12-03 NOTE — CHART NOTE - NSCHARTNOTEFT_GEN_A_CORE
ACP Medicine Night Coverage ACP Medicine Night Coverage    On chart review, cardiac enzymes noted to be ACP Medicine Night Coverage    On chart review, troponin noted to be increased from 454 to 1054 at 20:15. VSS, patient asymptomatic; denies CP or SOB.   Discussed results and repeat EKG with on call cardiology NP.   Recommended 324 mg ASA load now, 81 mg ASA QD x7days.   Additionally, recommended lasix 40 mg IVP, CXR, daily EKG's as well as troponin q6 to trend until peak.    Beverly Kimbrough PA-C  Department of Medicine   [id number] [facility] Medicine Night Coverage    On chart review, troponin noted to be increased from 454 to 1054 at 20:15. VSS, patient asymptomatic; denies CP or SOB.   Discussed results and repeat EKG with on call cardiology NP.   Recommended 324 mg ASA load now, 81 mg ASA QD x7days.   Additionally, recommended lasix 40 mg IVP, CXR, daily EKG's as well as troponin q6 to trend until peak.    Beverly Kimbrough PA-C  Department of Medicine   [id number] [facility] Night Coverage    On chart review, troponin noted to be increased from 454 to 1054 at 20:15. VSS, patient asymptomatic; denies CP  Discussed results and repeat EKG with on call cardiology NP.   Recommended 324 mg ASA load now, 81 mg ASA QD x7days.   Additionally, recommended lasix 40 mg IVP, CXR, daily EKG's as well as troponin q6 to trend until peak.    Beverly Kimbrough PA-C  Department of Medicine   [id number] [facility] Night Coverage    On chart review, troponin noted to be increased from 454 to 1054 at 20:15. VSS, patient asymptomatic; denies CP  Discussed results and repeat EKG with on call cardiology NP.   Recommended 324 mg ASA load now, 81 mg ASA QD x7days.   Additionally, recommended lasix 40 mg IVP, CXR, daily EKG's as well as troponin q6 to trend until peak.    Beverly Kimbrough PA-C  Department of Medicine   [id number] [facility] Medicine Night Coverage    On chart review, troponin noted to be increased from 454 to 1054 at 20:15. VSS, patient asymptomatic; denies CP.  Discussed results and repeat EKG with on call cardiology NP.   Recommended 324 mg ASA load now, 81 mg ASA QD x7days.   Additionally, recommended lasix 40 mg IVP, CXR, daily EKG's as well as troponin q6 to trend until peak.    Beverly Kimbrough PA-C  Department of Medicine   [id number] [facility] Medicine Night Coverage    On chart review, troponin noted to be increased from 454 to 1054 at 20:15. VSS, patient asymptomatic; denies CP.  Discussed results and repeat EKG with on call cardiology NP.   Recommended 324 mg ASA load now, 81 mg ASA QD x7days.   Additionally, recommended lasix 40 mg IVP, CXR, daily EKG's as well as troponin q6 to trend until peak.    Beverly Kimbrough PA-C  Department of Medicine   [id number] [facility] Night Coverage    On chart review, troponin noted to be increased from 454 to 1054 at 20:15. VSS, patient asymptomatic; denies CP.  Discussed results and EKG with on call cardiology NP. Recommended 324 mg ASA load now, 81 mg ASA QD x7days. Additionally daily EKG's, trend troponin q6.      Beverly Kimbrough PA-C  Department of Medicine   [id number] [facility] Night Coverage    On chart review, troponin noted to be increased from 454 to 1054 at 20:15. VSS, patient asymptomatic; denies CP.  Discussed results and EKG with on call cardiology NP. Recommended 324 mg ASA load now, 81 mg ASA QD x7days. Additionally daily EKG's, trend troponin q6.      Beverly Kimbrough PA-C  Department of Medicine   [id number] [facility] Night Coverage    On chart review, troponin noted to be increased from 454 to 1054 at 20:15. VSS, patient asymptomatic; denies CP.  Discussed results and EKG with on call cardiology NP. Recommended 324 mg ASA load now, 81 mg ASA QD x7days. Continue Plavix daily. Additionally daily EKG's, trend troponin q6.    Beverly Kimbrough PA-C  Department of Medicine   [id number] [facility] Night Coverage    On chart review, troponin noted to be increased from 454 to 1054 at 20:15. VSS, patient asymptomatic; denies CP.  Discussed results and EKG with on call cardiology NP. Recommended 324 mg ASA load now, 81 mg ASA QD x7days. Continue Plavix daily. Additionally daily EKG's, trend troponin q6.    Beverly Kimbrough PA-C  Department of Medicine   [id number]

## 2023-12-03 NOTE — CONSULT NOTE ADULT - ASSESSMENT
78F with PMHx HTN, HLD, DM, [place] p/w shortness of breath on exertion x1 week. EKG with anterolateral septal q waves with sub-1mm ST elevations, not meeting STEMI criteria. Trop 862-->856. Cards consulted for late presentation NSTEMI, deemed to be atypical presentation of MI likely iso pt being elderly female with hx of DM. New heart failure in setting of NSTEMI. Echo showing EF 20-25% with wall motion abnormalities and LV thrombus. LHC done showing 95% pLAD and 90% mLAD. Cardiac viability test performed, showing poor candidate for PCI and patient started on medical therapy ASA and Plavix. In the setting of LV thrombus, warfarin was started and ASA was stopped.     On 12/3 warfarin held iso high INR, patient with RRT syncopal episode during bowel movement, trop 484-->1056, cardiology reconsulted. New EKG with sub-1mm JAMIN not meeting STEMI criteria, similar to presentation EKG.     #STEMI  - Patient presented with SOB. Initial set cardiac enzymes trop 862-->856-->484  - LHC done showing 95% pLAD and 90% mLAD  - Cardiac viability test performed, showing poor candidate for PCI and patient initially started on medical therapy  - prior ECHO showing LV thrombus Warfarin bridged and ASA D/C'd   - On 12/3 trop elevated to 1056, CK 56-->415, CKMB 1.5-->27.8, cardiology reconsulted   - Patient denying chest pain   - Given new rising troponin on 12/3, re-load with  now, after 24 hours initiate daily ASA 81mg x7 days  - Continue Plavix daily dose   - Continue Warfarin dose  - After 7 days on triple therapy (ASA, Plavix, Warfarin), can D/C ASA and continue with dual therapy (warfarin and Plavix)  - Trend troponin q6-8hr to peak  - Serial EKG to assess for ST changes   - Low fat DASH diet     #HF  - Patient p/w SOB, ECHO found EF 20-25%   - Upon exam on 12/3 patient with SOB, cough, WILLIS, orthopnea, rhales and rhonci on exam   - Recommend ordering CXR now   - Continuous cardiac monitoring to r/o arrhythmias  - Diuresis: patient currently on Lasix 40mg PO daily. Recommend Lasix IVP 40mg now, reassess diuresis needs in a.m. to assess  - Avoid laying patient flat, cautious use of IVF   - Trend BMP 2/2 diuresis and replete as needed. Re-check pro BNP in 48 hrs   - Daily weight monitoring, Strict I/O  - Empagliflozin recommended on prior cardiology consult   - HF consult in a.m.     Case dicussed and reviewed with Fellow: Edd Bradford 78F with PMHx HTN, HLD, DM, [place] p/w shortness of breath on exertion x1 week. EKG with anterolateral septal q waves with sub-1mm ST elevations, not meeting STEMI criteria. Trop 862-->856. Cards consulted for late presentation NSTEMI, deemed to be atypical presentation of MI likely iso pt being elderly female with hx of DM. New heart failure in setting of NSTEMI. Echo showing EF 20-25% with wall motion abnormalities and LV thrombus. LHC done showing 95% pLAD and 90% mLAD. Cardiac viability test performed, showing poor candidate for PCI and patient started on medical therapy ASA and Plavix. In the setting of LV thrombus, warfarin was started and ASA was stopped.     On 12/3 warfarin held iso high INR, patient with RRT syncopal episode during bowel movement, trop 484-->1056, cardiology reconsulted. New EKG with sub-1mm JAMIN not meeting STEMI criteria, similar to presentation EKG.     #STEMI  - Patient presented with SOB. Initial set cardiac enzymes trop 862-->856-->484  - LHC done showing 95% pLAD and 90% mLAD  - Cardiac viability test performed, showing poor candidate for PCI and patient initially started on medical therapy  - prior ECHO showing LV thrombus Warfarin bridged and ASA D/C'd   - On 12/3 trop elevated to 1056, CK 56-->415, CKMB 1.5-->27.8, cardiology reconsulted   - Patient denying chest pain   - Given new rising troponin on 12/3, re-load with  now, after 24 hours initiate daily ASA 81mg x7 days  - Continue Plavix daily dose   - Continue Warfarin dose  - After 7 days on triple therapy (ASA, Plavix, Warfarin), can D/C ASA and continue with dual therapy (warfarin and Plavix)  - Trend troponin q6-8hr to peak  - Serial EKG to assess for ST changes   - Low fat DASH diet     #HF  - Patient p/w SOB, ECHO found EF 20-25%   - Upon exam on 12/3 patient with SOB, cough, WILLIS, orthopnea, rhales and rhonci on exam   - Recommend ordering CXR now   - Continuous cardiac monitoring to r/o arrhythmias  - Diuresis: patient currently on Lasix 40mg PO daily. Recommend Lasix IVP 40mg now, reassess diuresis needs in a.m. to assess  - Avoid laying patient flat, cautious use of IVF   - Trend BMP 2/2 diuresis and replete as needed. Re-check pro BNP in 48 hrs   - Daily weight monitoring, Strict I/O  - Empagliflozin recommended on prior cardiology consult   - HF consult in a.m.     Case dicussed and reviewed with Fellow: Edd Bradford

## 2023-12-03 NOTE — CONSULT NOTE ADULT - NS ATTEND AMEND GEN_ALL_CORE FT
The patient was seen and examined with the Cardiology Consultation Teaching Service.     We were reconsulted after the patient had an episode of syncope while in the bathroom.  She reports that she generally felt unwell after the event, but cannot recall specifics.  No chest pain or dyspnea during the event.    Mild residual dyspnea. Orthopnea has resolved.    No chest pain  No PND  No palpitations or dizziness     PMH/PSH:  Coronary artery disease  Myocardial infarction  LV thrombus on warfarin  Diabetes  Hypertension  Dyslipidemia    Comfortable-appearing woman in no acute distress  Alert and oriented  Afebrile  Vital signs stable  BP borderline low normal  JVP is not elevated  Basilar rales  Normal heart sounds  Extremities are warm and perfused  No peripheral edema     Stable normocytic anemia Hb 9.8  GFR 88    hs-troponin 1720  CK-MB 29    Echocardiography demonstrated severe LV systolic dysfunction, LVEF 20-25%, with apical, septal and anterior wall regional wall motion abnormalities, LV thrombus is present. No significant valve dysfunction.    Nuclear viability study demonstrated a large area of scar in the apical and septal walls and a medium-sized partially reversible defect in the inferior wall.    Impression and Recommendations:   78-year-old woman with diabetes, hypertension and dyslipidemia who presented initially with dyspnea on exertion, found to have anteroseptal Q waves and elevated hs-troponin concerning for late presentation non-JAMIN ACS. Coronary angiography demonstrated severe disease in the LAD and CX. LVEF was noted to be severely reduced, but myocardial viability study demonstrated no viability in the LAD distribution. LV thrombus was noted.     The patient's episode of syncope is clinically consistent with a vagal episode. Monitoring on telemetry demonstrates a transient episode of bradycardia with significant MN prolongation, also consistent with increased vagal tone. Cardiac biomarkers were rechecked and noted to be increased, but the patient is without chest pain.    No plan for repeat cardiac catheterization at this time. We will continue medical therapy for this patient. I would continue the combination of warfarin and clopidogrel without aspirin, and continue to avoid triple therapy. Continue atorvastatin 80mg daily.    The patient has some evidence of mild pulmonary congestion on examination. I would give her a one time dose of intravenous diuretics for a net negative 1L today, and then continue her maintenance oral diuretics, which may end up needing to be increased. Continue metoprolol and sacubitril-valsartan.     Will continue to monitor for evidence of current ischemia.    Sharan Jimenez MD  Cardiology  [id number] The patient was seen and examined with the Cardiology Consultation Teaching Service.     We were reconsulted after the patient had an episode of syncope while in the bathroom.  She reports that she generally felt unwell after the event, but cannot recall specifics.  No chest pain or dyspnea during the event.    Mild residual dyspnea. Orthopnea has resolved.    No chest pain  No PND  No palpitations or dizziness     PMH/PSH:  Coronary artery disease  Myocardial infarction  LV thrombus on warfarin  Diabetes  Hypertension  Dyslipidemia    Comfortable-appearing woman in no acute distress  Alert and oriented  Afebrile  Vital signs stable  BP borderline low normal  JVP is not elevated  Basilar rales  Normal heart sounds  Extremities are warm and perfused  No peripheral edema     Stable normocytic anemia Hb 9.8  GFR 88    hs-troponin 1720  CK-MB 29    Echocardiography demonstrated severe LV systolic dysfunction, LVEF 20-25%, with apical, septal and anterior wall regional wall motion abnormalities, LV thrombus is present. No significant valve dysfunction.    Nuclear viability study demonstrated a large area of scar in the apical and septal walls and a medium-sized partially reversible defect in the inferior wall.    Impression and Recommendations:   78-year-old woman with diabetes, hypertension and dyslipidemia who presented initially with dyspnea on exertion, found to have anteroseptal Q waves and elevated hs-troponin concerning for late presentation non-JAMIN ACS. Coronary angiography demonstrated severe disease in the LAD and CX. LVEF was noted to be severely reduced, but myocardial viability study demonstrated no viability in the LAD distribution. LV thrombus was noted.     The patient's episode of syncope is clinically consistent with a vagal episode. Monitoring on telemetry demonstrates a transient episode of bradycardia with significant MA prolongation, also consistent with increased vagal tone. Cardiac biomarkers were rechecked and noted to be increased, but the patient is without chest pain.    No plan for repeat cardiac catheterization at this time. We will continue medical therapy for this patient. I would continue the combination of warfarin and clopidogrel without aspirin, and continue to avoid triple therapy. Continue atorvastatin 80mg daily.    The patient has some evidence of mild pulmonary congestion on examination. I would give her a one time dose of intravenous diuretics for a net negative 1L today, and then continue her maintenance oral diuretics, which may end up needing to be increased. Continue metoprolol and sacubitril-valsartan.     Will continue to monitor for evidence of current ischemia.    Sharan Jimenez MD  Cardiology  [id number] The patient was seen and examined with the Cardiology Consultation Teaching Service.     We were reconsulted after the patient had an episode of syncope while in the bathroom.  She reports that she generally felt unwell after the event, but cannot recall specifics.  No chest pain or dyspnea during the event.    Mild residual dyspnea. Orthopnea has resolved.    No chest pain  No PND  No palpitations or dizziness     PMH/PSH:  Coronary artery disease  Myocardial infarction  LV thrombus on warfarin  Diabetes  Hypertension  Dyslipidemia    Comfortable-appearing woman in no acute distress  Alert and oriented  Afebrile  Vital signs stable  BP borderline low normal  JVP is not elevated  Basilar rales  Normal heart sounds  Extremities are warm and perfused  No peripheral edema     Stable normocytic anemia Hb 9.8  GFR 88    hs-troponin 1720  CK-MB 29    Echocardiography demonstrated severe LV systolic dysfunction, LVEF 20-25%, with apical, septal and anterior wall regional wall motion abnormalities, LV thrombus is present. No significant valve dysfunction.    Nuclear viability study demonstrated a large area of scar in the apical and septal walls and a medium-sized partially reversible defect in the inferior wall.    Impression and Recommendations:   78-year-old woman with diabetes, hypertension and dyslipidemia who presented initially with dyspnea on exertion, found to have anteroseptal Q waves and elevated hs-troponin concerning for late presentation non-JAMIN ACS. Coronary angiography demonstrated severe disease in the proximal and mid LAD, more suggested of late presenting JAMIN-ACS.  LVEF was noted to be severely reduced, but myocardial viability study demonstrated no viability in the LAD distribution. LV thrombus was noted.     The patient's episode of syncope is clinically consistent with a vagal episode. Monitoring on telemetry demonstrates a transient episode of bradycardia with significant TX prolongation, also consistent with increased vagal tone. Cardiac biomarkers were rechecked and noted to be increased, but the patient is without chest pain.    No plan for repeat cardiac catheterization at this time. We will continue medical therapy for this patient. I would continue the combination of warfarin and clopidogrel without aspirin, and continue to avoid triple therapy. Continue atorvastatin 80mg daily.    The patient has some evidence of mild pulmonary congestion on examination. I would give her a one time dose of intravenous diuretics for a net negative 1L today, and then continue her maintenance oral diuretics, which may end up needing to be increased. Continue metoprolol and sacubitril-valsartan.     Will continue to monitor for evidence of current ischemia.    Sharan Jimenez MD  Cardiology  [phone] The patient was seen and examined with the Cardiology Consultation Teaching Service.     We were reconsulted after the patient had an episode of syncope while in the bathroom.  She reports that she generally felt unwell after the event, but cannot recall specifics.  No chest pain or dyspnea during the event.    Mild residual dyspnea. Orthopnea has resolved.    No chest pain  No PND  No palpitations or dizziness     PMH/PSH:  Coronary artery disease  Myocardial infarction  LV thrombus on warfarin  Diabetes  Hypertension  Dyslipidemia    Comfortable-appearing woman in no acute distress  Alert and oriented  Afebrile  Vital signs stable  BP borderline low normal  JVP is not elevated  Basilar rales  Normal heart sounds  Extremities are warm and perfused  No peripheral edema     Stable normocytic anemia Hb 9.8  GFR 88    hs-troponin 1720  CK-MB 29    Echocardiography demonstrated severe LV systolic dysfunction, LVEF 20-25%, with apical, septal and anterior wall regional wall motion abnormalities, LV thrombus is present. No significant valve dysfunction.    Nuclear viability study demonstrated a large area of scar in the apical and septal walls and a medium-sized partially reversible defect in the inferior wall.    Impression and Recommendations:   78-year-old woman with diabetes, hypertension and dyslipidemia who presented initially with dyspnea on exertion, found to have anteroseptal Q waves and elevated hs-troponin concerning for late presentation non-JAMIN ACS. Coronary angiography demonstrated severe disease in the proximal and mid LAD, more suggested of late presenting JAMIN-ACS.  LVEF was noted to be severely reduced, but myocardial viability study demonstrated no viability in the LAD distribution. LV thrombus was noted.     The patient's episode of syncope is clinically consistent with a vagal episode. Monitoring on telemetry demonstrates a transient episode of bradycardia with significant ND prolongation, also consistent with increased vagal tone. Cardiac biomarkers were rechecked and noted to be increased, but the patient is without chest pain.    No plan for repeat cardiac catheterization at this time. We will continue medical therapy for this patient. I would continue the combination of warfarin and clopidogrel without aspirin, and continue to avoid triple therapy. Continue atorvastatin 80mg daily.    The patient has some evidence of mild pulmonary congestion on examination. I would give her a one time dose of intravenous diuretics for a net negative 1L today, and then continue her maintenance oral diuretics, which may end up needing to be increased. Continue metoprolol and sacubitril-valsartan.     Will continue to monitor for evidence of current ischemia.    Sharan Jimenez MD  Cardiology  [phone]

## 2023-12-03 NOTE — CONSULT NOTE ADULT - SUBJECTIVE AND OBJECTIVE BOX
HPI:  78F with PMHx HTN, HLD, DM, [place] p/w shortness of breath on exertion x1 week. Pt states SOB started acutely last week while shopping. On admission pt with mild b/l rales and peripheral edema. EKG with anterolateral septal q waves with sub-1mm ST elevations, not meeting STEMI criteria. Trop 862-->856. Initial CXR without acute consolidation. Cards consulted for late presentation NSTEMI, deemed to be atypical presentation of MI likely iso pt being elderly female with hx of DM. New heart failure in setting of NSTEMI. Echo showing EF 20-25% with wall motion abnormalities and LV thrombus. LHC done showing 95% pLAD and 90% mLAD. Cardiac viability test performed, showing poor candidate for PCI and patient started on medical therapy ASA and Plavix. In the setting of LV thrombus, warfarin was started and ASA was stopped.     On 12/3 warfarin held iso high INR, patient with RRT syncopal episode during bowel movement, trop 484-->1056, cardiology reconsulted. New EKG with sub-1mm JAMIN not meeting STEMI criteria, similar to presentation EKG.     Allergies  No Known Allergies    PAST MEDICAL & SURGICAL HISTORY:  DMII (diabetes mellitus, type 2)  Hyperlipidemia  HTN (hypertension)  Vitamin D deficiency  [place] (hard of hearing)  History of cholecystectomy  S/P right cataract extraction    FAMILY HISTORY:  No pertinent family history in first degree relatives    MEDICATIONS  aspirin  chewable 324 milliGRAM(s) Oral once  clopidogrel Tablet 75 milliGRAM(s) Oral daily  furosemide    Tablet 40 milliGRAM(s) Oral daily  furosemide   Injectable 40 milliGRAM(s) IV Push once  metoprolol succinate ER 25 milliGRAM(s) Oral daily  sacubitril 24 mG/valsartan 26 mG 1 Tablet(s) Oral two times a day  acetaminophen     Tablet .. 650 milliGRAM(s) Oral every 6 hours PRN  melatonin 3 milliGRAM(s) Oral at bedtime PRN  melatonin 3 milliGRAM(s) Oral once PRN  ondansetron Injectable 4 milliGRAM(s) IV Push every 8 hours PRN  aluminum hydroxide/magnesium hydroxide/simethicone Suspension 30 milliLiter(s) Oral every 4 hours PRN  polyethylene glycol 3350 17 Gram(s) Oral daily  atorvastatin 80 milliGRAM(s) Oral at bedtime  dextrose 50% Injectable 12.5 Gram(s) IV Push once  dextrose 50% Injectable 25 Gram(s) IV Push once  dextrose 50% Injectable 25 Gram(s) IV Push once  dextrose Oral Gel 15 Gram(s) Oral once PRN  glucagon  Injectable 1 milliGRAM(s) IntraMuscular once  insulin lispro (ADMELOG) corrective regimen sliding scale   SubCutaneous three times a day before meals  insulin lispro (ADMELOG) corrective regimen sliding scale   SubCutaneous at bedtime  dextrose 5%. 1000 milliLiter(s) IV Continuous <Continuous>  dextrose 5%. 1000 milliLiter(s) IV Continuous <Continuous>    Drug Dosing Weight  Height (cm): 160 (2023 13:09)  Weight (kg): 73.799 (2023 13:09)  BMI (kg/m2): 28.8 (2023 13:09)  BSA (m2): 1.77 (2023 13:09)  Daily     Daily Weight in k.2 (03 Dec 2023 06:00)    LABORATORY VALUES	 	                9.8    6.81  )-----------( 307      ( 03 Dec 2023 06:06 )             30.9       138  |  105  |  19  ----------------------------<  200<H>  4.6   |  23  |  0.70      137  |  104  |  21  ----------------------------<  209<H>  4.5   |  23  |  0.69    Ca    8.9      03 Dec 2023 06:06  Ca    8.7      02 Dec 2023 05:06  Phos  2.9       Phos  2.3       Mg     2.10       Mg     2.00       Prothrombin Time, Plasma: 38.2 sec ( @ 06:06)    CARDIAC MARKERS ( 03 Dec 2023 23:30 )  1720 ng/L / x     / x     / 464 U/L / x     / 29.4 ng/mL  CARDIAC MARKERS ( 03 Dec 2023 20:15 )  1054 ng/L / x     / x     / 415 U/L / x     / 27.8 ng/mL  CARDIAC MARKERS ( 03 Dec 2023 11:30 )  484 ng/L / x     / x     / 56 U/L / x     / 1.5 ng/mL  CARDIAC MARKERS ( 2023 13:27 )  856 ng/L / x     / x     / x     / x     / x      CARDIAC MARKERS ( 2023 10:56 )  862 ng/L / x     / x     / x     / x     / x         @ 06:00  Cholesterol, Serum - 108  Direct LDL- --  HDL Cholesterol, Serum- 31  Triglycerides, Serum- 117    Thyroid Stimulating Hormone, Serum: 2.02 uIU/mL ( @ 06:00)    Urinalysis Basic - ( 03 Dec 2023 06:06 )  Color: x / Appearance: x / SG: x / pH: x  Gluc: 200 mg/dL / Ketone: x  / Bili: x / Urobili: x   Blood: x / Protein: x / Nitrite: x   Leuk Esterase: x / RBC: x / WBC x   Sq Epi: x / Non Sq Epi: x / Bacteria: x    CAPILLARY BLOOD GLUCOSE  POCT Blood Glucose.: 265 mg/dL (03 Dec 2023 23:11)    ECG: NSR with TWI in lead aVL, sub-1mm JAMIN in leads V3-V5     RADIOLOGY:  CXR :  < from: Xray Chest 1 View- PORTABLE-Urgent (23 @ 13:14) >  No focal consolidations.    ECHO:  < from: TTE W or WO Ultrasound Enhancing Agent (23 @ 07:02) >   1. The left ventricular cavity is normal. Left ventricular wall thickness is normal. Left ventricular systolic function is severely decreased with an ejection fraction visually estimated at 20 to 25%. There are regional wall motion abnormalities present. There is mild (grade 1) left ventricular diastolic dysfunction. There is a left ventricular thrombus. A large (~ 2.7 cm X 1.9 cm) left ventricular apical thrombus is visualized. Hypokinesis of the apex, mid to distal septum, and mid to distal anterior wall.   2. Normal right ventricular cavity size and systolic function.   3. Structurally normal mitral valve with normal leaflet excursion. There is calcification of the mitral valve annulus. There is mild to moderate mitral regurgitation.   4. No prior echocardiogram is available for comparison.    Cath:  < from: Cardiac Catheterization (23 @ 14:27) >  Diagnostic cath showed severe disease in the mid and proximal LAD. There was VIANEY 2 flow in the vessel. Given late clinical presentation, Q waves on ECG, LV dysfunction and LV thrombus would recommend assessing for viability prior to any revascularization attempts.    Recommendations:   - Viability Study    - GDMT for Heart failure.    Presentation:  Late presentation MI, likely missed STEMI. Q waves on ECG anterior    Coronary Angiography  The coronary circulation is left dominant.    LM  Left main artery: Angiography shows no disease.    LAD  Proximal left anterior descending: There is a 95 % stenosis. Mid left anterior descending: There is a 90 % stenosis. VIANEY Flow 2.    CX Circumflex: The segment is large. Angiography shows minor irregularities.  RCA Right coronary artery: The segment is small, non-dominant. Angiography shows minor irregularities.    Viability study:   < from: Nuclear Cardiac Viability (23 @ 12:40) >  Study Comments: LH: 23, severe disease in the mid and proximal LAD. Given late clinical presentation, Q waves on ECG, LV dysfunction and LV thrombus would recommend assessing for viability prior to any revascularization attempts.95 % pLAD, 90% mLAD.  TTE: 23, LV cavity size and wall thickness is normal. LV systolic function is severely decreased with EF visually estimated 20 to 25%. There is mild (grade 1) left ventricular diastolic dysfunction. A large (~ 2.7 cm X 1.9 cm) left ventricular apical thrombus is visualized. There is hypokinesis of the apex, mid to distal septum, and mid to distal anterior wall. Normal RV cavity size and systolic function. Structurally normal mitral valve with normal leaflet excursion. There is calcification of the mitral valve annulus. There is mild to moderate mitral regurgitation.  Medications:    Asa, plavix, heparin, lasix, metoprolol, entresto, atorvastatin.  Allergies:      None  Conclusions:  1. Qualitative Perfusion:  - medium-sized, severe at rest defect(s) in the inferior and inferoseptal wall that is partially reversible suggestive of infarct with partial viability. - large-sized, severe at rest defect(s) in the apical and anteroseptal walls that are fixed suggestive of an infarct with no evicence of viability.  2. The resting left ventricular EF% is 24 %. The resting enddiastolic volume is 202 ml and systolic volume is 154 ml.  3. Normal right ventricular function. There is no right ventricular dilation.  There is a medium-sized, severe at rest defect(s) in the inferior and inferoseptal wall that is partially reversible suggestive of infarct with partial viability. There are large-sized, severe at rest defect(s) in the apical and anteroseptal walls that are fixed suggestive of an infarct with no evicence of viability.  Left Ventricular Motion: There is apical dyskinesis (aneurysm). The mid to distal anteroseptal walls are akinesis. The best motion is in the lateral wall.    CONSTITUTIONAL: No fevers, No chills, No fatigue, No weight gain  EYES: No vision changes   ENT: No congestion, No ear pain, No sore throat.  NECK: No pain, No stiffness  RESPIRATORY: + shortness of breath, + cough, No wheezing, No hemoptysis  CARDIOVASCULAR: No chest pain. No palpitations, + WILLIS, + orthopnea, + paroxysmal nocturnal dyspnea, No pleuritic pain  GASTROINTESTINAL: No abdominal pain, No nausea, No vomiting, No hematemesis, No diarrhea No constipation. No melena  GENITOURINARY: No dysuria, No frequency, No incontinence, No hematuria  NEUROLOGICAL: No dizziness, No lightheadedness, No syncope, No LOC, No headache, No numbness or weakness  MUSCULOSKELETAL: No Edema, No joint pain, No joint swelling.  PSYCHIATRIC: No anxiety, No depression  DERMATOLOGY: No diaphoresis. No itching, No rashes, No pressure ulcers  HEME/LYMPH: No easy bruising, or bleeding gums    Vital Signs  T(C): 36.8 (03 Dec 2023 22:55), Max: 36.8 (03 Dec 2023 17:54)  T(F): 98.3 (03 Dec 2023 22:55), Max: 98.3 (03 Dec 2023 17:54)  HR: 78 (03 Dec 2023 22:55) (78 - 85)  BP: 91/51 (03 Dec 2023 22:55) (91/51 - 104/55)  BP(mean): 63 (03 Dec 2023 17:54) (63 - 63)  RR: 20 (03 Dec 2023 22:55) (16 - 20)  SpO2: 98% (03 Dec 2023 22:55) (98% - 100%)    O2 Parameters below as of 03 Dec 2023 22:55  Patient On (Oxygen Delivery Method): room air    Appearance: NAD, no distress  HEENT: Moist Mucous Membranes  Cardiovascular: Regular rate and rhythm, Normal S1 S2, No JVD, No murmurs  Respiratory: Lungs + rales, + rhonchi, No wheezing. No tenderness to palpation  Gastrointestinal:  Soft, Non-tender, + BS  Neurologic: Non-focal, A&Ox3  Skin: Warm and dry, No rashes, No ecchymosis, No cyanosis  Musculoskeletal: No clubbing, No cyanosis, No edema  Vascular: Peripheral pulses palpable 2+ bilaterally  Psychiatry: Mood & affect appropriate HPI:  78F with PMHx HTN, HLD, DM, [place] p/w shortness of breath on exertion x1 week. Pt states SOB started acutely last week while shopping. On admission pt with mild b/l rales and peripheral edema. EKG with anterolateral septal q waves with sub-1mm ST elevations, not meeting STEMI criteria. Trop 862-->856. Initial CXR without acute consolidation. Cards consulted for late presentation NSTEMI, deemed to be atypical presentation of MI likely iso pt being elderly female with hx of DM. New heart failure in setting of NSTEMI. Echo showing EF 20-25% with wall motion abnormalities and LV thrombus. LHC done showing 95% pLAD and 90% mLAD. Cardiac viability test performed, showing poor candidate for PCI and patient started on medical therapy ASA and Plavix. In the setting of LV thrombus, warfarin was started and ASA was stopped.     On 12/3 warfarin held iso high INR, patient with RRT syncopal episode during bowel movement, trop 484-->1056, cardiology reconsulted. New EKG with sub-1mm JAMIN not meeting STEMI criteria, similar to presentation EKG.     Allergies  No Known Allergies    PAST MEDICAL & SURGICAL HISTORY:  DMII (diabetes mellitus, type 2)  Hyperlipidemia  HTN (hypertension)  Vitamin D deficiency  [place] (hard of hearing)  History of cholecystectomy  S/P right cataract extraction    FAMILY HISTORY:  No pertinent family history in first degree relatives    MEDICATIONS  aspirin  chewable 324 milliGRAM(s) Oral once  clopidogrel Tablet 75 milliGRAM(s) Oral daily  furosemide    Tablet 40 milliGRAM(s) Oral daily  furosemide   Injectable 40 milliGRAM(s) IV Push once  metoprolol succinate ER 25 milliGRAM(s) Oral daily  sacubitril 24 mG/valsartan 26 mG 1 Tablet(s) Oral two times a day  acetaminophen     Tablet .. 650 milliGRAM(s) Oral every 6 hours PRN  melatonin 3 milliGRAM(s) Oral at bedtime PRN  melatonin 3 milliGRAM(s) Oral once PRN  ondansetron Injectable 4 milliGRAM(s) IV Push every 8 hours PRN  aluminum hydroxide/magnesium hydroxide/simethicone Suspension 30 milliLiter(s) Oral every 4 hours PRN  polyethylene glycol 3350 17 Gram(s) Oral daily  atorvastatin 80 milliGRAM(s) Oral at bedtime  dextrose 50% Injectable 12.5 Gram(s) IV Push once  dextrose 50% Injectable 25 Gram(s) IV Push once  dextrose 50% Injectable 25 Gram(s) IV Push once  dextrose Oral Gel 15 Gram(s) Oral once PRN  glucagon  Injectable 1 milliGRAM(s) IntraMuscular once  insulin lispro (ADMELOG) corrective regimen sliding scale   SubCutaneous three times a day before meals  insulin lispro (ADMELOG) corrective regimen sliding scale   SubCutaneous at bedtime  dextrose 5%. 1000 milliLiter(s) IV Continuous <Continuous>  dextrose 5%. 1000 milliLiter(s) IV Continuous <Continuous>    Drug Dosing Weight  Height (cm): 160 (2023 13:09)  Weight (kg): 73.799 (2023 13:09)  BMI (kg/m2): 28.8 (2023 13:09)  BSA (m2): 1.77 (2023 13:09)  Daily     Daily Weight in k.2 (03 Dec 2023 06:00)    LABORATORY VALUES	 	                9.8    6.81  )-----------( 307      ( 03 Dec 2023 06:06 )             30.9       138  |  105  |  19  ----------------------------<  200<H>  4.6   |  23  |  0.70      137  |  104  |  21  ----------------------------<  209<H>  4.5   |  23  |  0.69    Ca    8.9      03 Dec 2023 06:06  Ca    8.7      02 Dec 2023 05:06  Phos  2.9       Phos  2.3       Mg     2.10       Mg     2.00       Prothrombin Time, Plasma: 38.2 sec ( @ 06:06)    CARDIAC MARKERS ( 03 Dec 2023 23:30 )  1720 ng/L / x     / x     / 464 U/L / x     / 29.4 ng/mL  CARDIAC MARKERS ( 03 Dec 2023 20:15 )  1054 ng/L / x     / x     / 415 U/L / x     / 27.8 ng/mL  CARDIAC MARKERS ( 03 Dec 2023 11:30 )  484 ng/L / x     / x     / 56 U/L / x     / 1.5 ng/mL  CARDIAC MARKERS ( 2023 13:27 )  856 ng/L / x     / x     / x     / x     / x      CARDIAC MARKERS ( 2023 10:56 )  862 ng/L / x     / x     / x     / x     / x         @ 06:00  Cholesterol, Serum - 108  Direct LDL- --  HDL Cholesterol, Serum- 31  Triglycerides, Serum- 117    Thyroid Stimulating Hormone, Serum: 2.02 uIU/mL ( @ 06:00)    Urinalysis Basic - ( 03 Dec 2023 06:06 )  Color: x / Appearance: x / SG: x / pH: x  Gluc: 200 mg/dL / Ketone: x  / Bili: x / Urobili: x   Blood: x / Protein: x / Nitrite: x   Leuk Esterase: x / RBC: x / WBC x   Sq Epi: x / Non Sq Epi: x / Bacteria: x    CAPILLARY BLOOD GLUCOSE  POCT Blood Glucose.: 265 mg/dL (03 Dec 2023 23:11)    ECG: NSR with TWI in lead aVL, sub-1mm JAMIN in leads V3-V5     RADIOLOGY:  CXR :  < from: Xray Chest 1 View- PORTABLE-Urgent (23 @ 13:14) >  No focal consolidations.    ECHO:  < from: TTE W or WO Ultrasound Enhancing Agent (23 @ 07:02) >   1. The left ventricular cavity is normal. Left ventricular wall thickness is normal. Left ventricular systolic function is severely decreased with an ejection fraction visually estimated at 20 to 25%. There are regional wall motion abnormalities present. There is mild (grade 1) left ventricular diastolic dysfunction. There is a left ventricular thrombus. A large (~ 2.7 cm X 1.9 cm) left ventricular apical thrombus is visualized. Hypokinesis of the apex, mid to distal septum, and mid to distal anterior wall.   2. Normal right ventricular cavity size and systolic function.   3. Structurally normal mitral valve with normal leaflet excursion. There is calcification of the mitral valve annulus. There is mild to moderate mitral regurgitation.   4. No prior echocardiogram is available for comparison.    Cath:  < from: Cardiac Catheterization (23 @ 14:27) >  Diagnostic cath showed severe disease in the mid and proximal LAD. There was VIANEY 2 flow in the vessel. Given late clinical presentation, Q waves on ECG, LV dysfunction and LV thrombus would recommend assessing for viability prior to any revascularization attempts.    Recommendations:   - Viability Study    - GDMT for Heart failure.    Presentation:  Late presentation MI, likely missed STEMI. Q waves on ECG anterior    Coronary Angiography  The coronary circulation is left dominant.    LM  Left main artery: Angiography shows no disease.    LAD  Proximal left anterior descending: There is a 95 % stenosis. Mid left anterior descending: There is a 90 % stenosis. VIANEY Flow 2.    CX Circumflex: The segment is large. Angiography shows minor irregularities.  RCA Right coronary artery: The segment is small, non-dominant. Angiography shows minor irregularities.    Viability study:   < from: Nuclear Cardiac Viability (23 @ 12:40) >  Study Comments: LH: 23, severe disease in the mid and proximal LAD. Given late clinical presentation, Q waves on ECG, LV dysfunction and LV thrombus would recommend assessing for viability prior to any revascularization attempts.95 % pLAD, 90% mLAD.  TTE: 23, LV cavity size and wall thickness is normal. LV systolic function is severely decreased with EF visually estimated 20 to 25%. There is mild (grade 1) left ventricular diastolic dysfunction. A large (~ 2.7 cm X 1.9 cm) left ventricular apical thrombus is visualized. There is hypokinesis of the apex, mid to distal septum, and mid to distal anterior wall. Normal RV cavity size and systolic function. Structurally normal mitral valve with normal leaflet excursion. There is calcification of the mitral valve annulus. There is mild to moderate mitral regurgitation.  Medications:    Asa, plavix, heparin, lasix, metoprolol, entresto, atorvastatin.  Allergies:      None  Conclusions:  1. Qualitative Perfusion:  - medium-sized, severe at rest defect(s) in the inferior and inferoseptal wall that is partially reversible suggestive of infarct with partial viability. - large-sized, severe at rest defect(s) in the apical and anteroseptal walls that are fixed suggestive of an infarct with no evicence of viability.  2. The resting left ventricular EF% is 24 %. The resting enddiastolic volume is 202 ml and systolic volume is 154 ml.  3. Normal right ventricular function. There is no right ventricular dilation.  There is a medium-sized, severe at rest defect(s) in the inferior and inferoseptal wall that is partially reversible suggestive of infarct with partial viability. There are large-sized, severe at rest defect(s) in the apical and anteroseptal walls that are fixed suggestive of an infarct with no evicence of viability.  Left Ventricular Motion: There is apical dyskinesis (aneurysm). The mid to distal anteroseptal walls are akinesis. The best motion is in the lateral wall.    CONSTITUTIONAL: No fevers, No chills, No fatigue, No weight gain  EYES: No vision changes   ENT: No congestion, No ear pain, No sore throat.  NECK: No pain, No stiffness  RESPIRATORY: + shortness of breath, + cough, No wheezing, No hemoptysis  CARDIOVASCULAR: No chest pain. No palpitations, + WILLIS, + orthopnea, + paroxysmal nocturnal dyspnea, No pleuritic pain  GASTROINTESTINAL: No abdominal pain, No nausea, No vomiting, No hematemesis, No diarrhea No constipation. No melena  GENITOURINARY: No dysuria, No frequency, No incontinence, No hematuria  NEUROLOGICAL: No dizziness, No lightheadedness, No syncope, No LOC, No headache, No numbness or weakness  MUSCULOSKELETAL: No Edema, No joint pain, No joint swelling.  PSYCHIATRIC: No anxiety, No depression  DERMATOLOGY: No diaphoresis. No itching, No rashes, No pressure ulcers  HEME/LYMPH: No easy bruising, or bleeding gums    Vital Signs  T(C): 36.8 (03 Dec 2023 22:55), Max: 36.8 (03 Dec 2023 17:54)  T(F): 98.3 (03 Dec 2023 22:55), Max: 98.3 (03 Dec 2023 17:54)  HR: 78 (03 Dec 2023 22:55) (78 - 85)  BP: 91/51 (03 Dec 2023 22:55) (91/51 - 104/55)  BP(mean): 63 (03 Dec 2023 17:54) (63 - 63)  RR: 20 (03 Dec 2023 22:55) (16 - 20)  SpO2: 98% (03 Dec 2023 22:55) (98% - 100%)    O2 Parameters below as of 03 Dec 2023 22:55  Patient On (Oxygen Delivery Method): room air    Appearance: NAD, no distress  HEENT: Moist Mucous Membranes  Cardiovascular: Regular rate and rhythm, Normal S1 S2, No JVD, No murmurs  Respiratory: Lungs + rales, + rhonchi, No wheezing. No tenderness to palpation  Gastrointestinal:  Soft, Non-tender, + BS  Neurologic: Non-focal, A&Ox3  Skin: Warm and dry, No rashes, No ecchymosis, No cyanosis  Musculoskeletal: No clubbing, No cyanosis, No edema  Vascular: Peripheral pulses palpable 2+ bilaterally  Psychiatry: Mood & affect appropriate

## 2023-12-03 NOTE — CHART NOTE - NSCHARTNOTEFT_GEN_A_CORE
Patient s/p RRT earlier today for an episode of presyncope while trying to have BM in bathroom.  RN got call that patient was cruz on tele to 40s and went to check on her.   She found her in the bathroom light headed and pale and lowered her gently to the floor.   As per RN, there was NO trauma, no head strike, and no fall and the whole thing was witnessed.   Patient herself denies any LOC or trauma as well.   She walked back to bed with help of nursing staff.   She reports feeling SOB in bathroom, similar to when she presented and had some cough that has been present intermittently for the past week or so.   She reports discomfort in her chest only when she coughs; she denies having any chest pain at rest or any chest pain at time of my evaluation.   Of note, history significant for recent NSTEMI, poor viability; on medical management per cardiology team.   EKG performed showing sinus with 1st deg AVB at 72bpm, nonspecific intraventricular block (old), with new TWI noted in V4-V6.   Cardiac enzymes sent showing normal CK with Troponin of 484 (decreased from 856 earlier in hospital stay).   EKG findings and cardiac enzymes [place] cardi[place] [id number]; will send another set of troponins to ensure stability; otherwise will have cardiology team follow up formally tomorrow. No change in management recommended at this time.   Suspect event likely vasovagal presyncope in setting trying to have BM. Patient also given 250cc LR bolus during rapid per MAR.   Vitals stable, symptoms resolved. Patient now stable, will continue to monitor closely.   Case and plan discussed with attending Dr. Rahman. Patient s/p RRT earlier today for an episode of presyncope while trying to have BM in bathroom.  RN got call that patient was cruz on tele to 40s and went to check on her.   She found her in the bathroom light headed and pale and lowered her gently to the floor.   As per RN, there was NO trauma, no head strike, and no fall and the whole thing was witnessed.   Patient herself denies any LOC or trauma as well.   She walked back to bed with help of nursing staff.   She reports feeling SOB in bathroom, similar to when she presented and had some cough that has been present intermittently for the past week or so.   She reports discomfort in her chest only when she coughs; she denies having any chest pain at rest or any chest pain at time of my evaluation.   Of note, history significant for recent NSTEMI, poor viability; on medical management per cardiology team.   EKG performed showing sinus with 1st deg AVB at 72bpm, nonspecific intraventricular block (old), with new TWI noted in V4-V6.   Cardiac enzymes sent showing normal CK with Troponin of 484 (decreased from 856 earlier in hospital stay).   EKG findings and cardiac enzymes [place] cardi[place] at [id number]; will send another set of troponins to ensure stability; otherwise will have cardiology team follow up formally tomorrow. No change in management recommended at this time.   Suspect event likely vasovagal presyncope in setting trying to have BM. Patient also given 250cc LR bolus during rapid per MAR.   Vitals stable, symptoms resolved. Patient now stable, will continue to monitor closely.   Case and plan discussed with attending Dr. Rahman. Patient s/p RRT earlier today for an episode of presyncope while trying to have BM in bathroom.  RN got call that patient was cruz on tele to 40s and went to check on her.   She found her in the bathroom light headed and pale and lowered her gently to the floor.   As per RN, there was NO trauma, no head strike, and no fall and the whole thing was witnessed.   Patient herself denies any LOC or trauma as well.   She walked back to bed with help of nursing staff.   She reports feeling SOB in bathroom, similar to when she presented and had some cough that has been present intermittently for the past week or so.   She reports discomfort in her chest only when she coughs; she denies having any chest pain at rest or any chest pain at time of my evaluation.   Of note, history significant for recent NSTEMI, poor viability; on medical management per cardiology team.   EKG performed showing sinus with 1st deg AVB at 72bpm, nonspecific intraventricular block (old), with new TWI noted in V4-V6.   Cardiac enzymes sent showing normal CK with Troponin of 484 (decreased from 856 earlier in hospital stay).   EKG findings and cardiac enzymes [place] cardi[place] [id number]; will send another set of troponins to ensure stability; otherwise will have cardiology team follow up formally tomorrow. No change in management recommended at this time.   Suspect event likely vasovagal presyncope in setting trying to have BM. Patient also given 250cc LR bolus during rapid per MAR.   Vitals stable, symptoms resolved. Patient now stable, will continue to monitor closely.   Case and plan discussed with attending Dr. Rahman. Patient s/p RRT earlier today for an episode of presyncope while trying to have BM in bathroom.  RN got call that patient was cruz on tele to 40s and went to check on her.   She found her in the bathroom light headed and pale and lowered her gently to the floor.   As per RN, there was NO trauma, no head strike, and no fall and the whole thing was witnessed.   Patient herself denies any LOC or trauma as well.   She walked back to bed with help of nursing staff.   She reports feeling SOB in bathroom, similar to when she presented and had some cough that has been present intermittently for the past week or so.   She reports discomfort in her chest only when she coughs; she denies having any chest pain at rest or any chest pain at time of my evaluation.   Of note, history significant for recent NSTEMI, poor viability; on medical management per cardiology team.   EKG performed showing sinus with 1st deg AVB at 72bpm, nonspecific intraventricular block (old), with new TWI noted in V4-V6.   Cardiac enzymes sent showing normal CK with Troponin of 484 (decreased from 856 earlier in hospital stay).   EKG findings and cardiac enzymes discussed [place] cardiology fellow at [id number] - as per cardiology: will send another set of troponins to ensure stability; otherwise will have cardiology team follow up formally tomorrow. No change in management recommended at this time.   Suspect event likely vasovagal presyncope in setting trying to have BM. Patient also given 250cc LR bolus during rapid per MAR.   Started on miralax daily for now to help soften stool.  Vitals stable, symptoms resolved. Patient now stable, will continue to monitor closely.   Case and plan discussed with attending Dr. Rahman. Patient s/p RRT earlier today for an episode of presyncope while trying to have BM in bathroom.  RN got call that patient was cruz on tele to 40s and went to check on her.   She found her in the bathroom light headed and pale and lowered her gently to the floor.   As per RN, there was NO trauma, no head strike, and no fall and the whole thing was witnessed.   Patient herself denies any LOC or trauma as well.   She walked back to bed with help of nursing staff.   She reports feeling SOB in bathroom, similar to when she presented and had some cough that has been present intermittently for the past week or so.   She reports discomfort in her chest only when she coughs; she denies having any chest pain at rest or any chest pain at time of my evaluation.   Of note, history significant for recent NSTEMI, poor viability; on medical management per cardiology team.   EKG performed showing sinus with 1st deg AVB at 72bpm, nonspecific intraventricular block (old), with new TWI noted in V4-V6.   Cardiac enzymes sent showing normal CK with Troponin of 484 (decreased from 856 earlier in hospital stay).   EKG findings and cardiac enzymes [place] cardi[place] [id number] - as per cardiology: will send another set of troponins to ensure stability; otherwise will have cardiology team follow up formally tomorrow. No change in management recommended at this time.   Suspect event likely vasovagal presyncope in setting trying to have BM. Patient also given 250cc LR bolus during rapid per MAR.   Started on miralax daily for now to help soften stool.  Vitals stable, symptoms resolved. Patient now stable, will continue to monitor closely.   Case and plan discussed with attending Dr. Rahman. Patient s/p RRT earlier today for an episode of presyncope while trying to have BM in bathroom.  RN got call that patient was cruz on tele to 40s and went to check on her.   She found her in the bathroom light headed and pale and lowered her gently to the floor.   As per RN, there was NO trauma, no head strike, and no fall and the whole thing was witnessed.   Patient herself denies any LOC or trauma as well.   She walked back to bed with help of nursing staff.   She reports feeling SOB in bathroom, similar to when she presented and had some cough that has been present intermittently for the past week or so.   She reports discomfort in her chest only when she coughs; she denies having any chest pain at rest or any chest pain at time of my evaluation.   Of note, history significant for recent NSTEMI, poor viability; on medical management per cardiology team.   EKG performed showing sinus with 1st deg AVB at 72bpm, nonspecific intraventricular block (old), with new TWI noted in V4-V6.   Cardiac enzymes sent showing normal CK with Troponin of 484 (decreased from 856 earlier in hospital stay).   EKG findings and cardiac enzymes discussed with [place] cardiology fellow at [id number] - as per cardiology: will send another set of troponins to ensure stability; otherwise will have cardiology team follow up formally tomorrow. No change in management recommended at this time.   Suspect event likely vasovagal presyncope in setting trying to have BM. Patient also given 250cc LR bolus during rapid per MAR.   Started on miralax daily for now to help soften stool.  Vitals stable, symptoms resolved. Patient now stable, will continue to monitor closely.   Case and plan discussed with attending Dr. Rahman.

## 2023-12-03 NOTE — CONSULT NOTE ADULT - SUPERVISING ATTENDING
The patient was seen on the morning of 12/4 with the teaching service. Please see my addendum to that note. Do not bill.

## 2023-12-03 NOTE — RAPID RESPONSE TEAM SUMMARY - NSSITUATIONBACKGROUNDRRT_GEN_ALL_CORE
78F with PMHx HTN, HLD, DM p/w shortness of breath on exertion admitted for NSTEMI, with cardiac cath  severe disease in mid and proximal LAD, cardiac viability without evidence of viability. RRT was called for pre-syncopal episode.       RN reports that she went to check on the patient after being notified by telemetry that patient was cruz to 40s. She arrived where she found patient sitting on the toilet where she appeared pale and weak so placed her on the floor and RRT was called. No fall, head strike or loss of consciousness.     On arrival, patient A&Ox3, appeared pale, and reports feeling weak and winded. She was trying to have a bowel movement when she felt suddenly felt weak and SOB which is when her RN found her. Patient was able to recount the event and denied LOC or head strike or fall.      Tele event reviewed showed sinus bradycardia to 40s. Initial BP was low taken immediately after the episode was low. . Repeat BP was 100/50. Rest of vitals T 98.7, HR 70s, SpO2 100% on room air Safe patient handling was called and patient was assisted back to bed.     Patient being followed by cardiology for late presenting MI, had LHC done which showed severe disease in mid and proximal LAD, cardiac viability without evidence of viability, EF 20-25% with WMA and LV thrombus. Not a PCI candidate and recommended medical management. EKG obtained and in comparison to initial EKG, new T wave inversions V4-V6.     Suspect vasovagal episode in setting of BM, less likely ACS. 250cc LR bolus given. Over course of the RRT patient reports feeling better and denied SOB or chest pain. Given witnessed episode without head strike or fall, can defer CTH. Cardiac enzymes sent. Given new T wave inversions, recommend team to discuss with cardiology.  78F with PMHx HTN, HLD, DM p/w shortness of breath on exertion admitted for NSTEMI, with cardiac cath  severe disease in mid and proximal LAD, cardiac viability without evidence of viability. RRT was called for pre-syncopal episode.       RN reports that she went to check on the patient after being notified by telemetry that patient was cruz to 40s. She arrived where she found patient sitting on the toilet where she appeared pale and weak so placed her on the floor and RRT was called. No fall, head strike or loss of consciousness.     On arrival, patient A&Ox3, appeared pale, and reports feeling weak and winded. She was trying to have a bowel movement when she felt suddenly felt weak and SOB which is when her RN found her. Patient was able to recount the event and denied LOC or head strike or fall.      Tele event reviewed showed sinus bradycardia to 40s. Initial BP was low taken immediately after the episode was low. . Repeat BP was 100/50. Rest of vitals T 98.7, HR 70s, SpO2 100% on room air Safe patient handling was called and patient was assisted back to bed.     Patient being followed by cardiology for late presenting MI, had LHC done which showed severe disease in mid and proximal LAD, cardiac viability without evidence of viability, EF 20-25% with WMA and LV thrombus. Not a PCI candidate and recommended medical management. EKG obtained and in comparison to initial EKG, new T wave inversions V4-V6.     Suspect vasovagal episode in setting of BM. 250cc LR bolus given. Over course of the RRT patient reports feeling better and denied SOB or chest pain. Given witnessed episode without head strike or fall, can defer CTH. Cardiac enzymes sent. Given new T wave inversions, recommend team to discuss with cardiology.

## 2023-12-04 LAB
ANION GAP SERPL CALC-SCNC: 12 MMOL/L — SIGNIFICANT CHANGE UP (ref 7–14)
ANION GAP SERPL CALC-SCNC: 12 MMOL/L — SIGNIFICANT CHANGE UP (ref 7–14)
APTT BLD: 36.7 SEC — HIGH (ref 24.5–35.6)
APTT BLD: 36.7 SEC — HIGH (ref 24.5–35.6)
BASOPHILS # BLD AUTO: 0.04 K/UL — SIGNIFICANT CHANGE UP (ref 0–0.2)
BASOPHILS # BLD AUTO: 0.04 K/UL — SIGNIFICANT CHANGE UP (ref 0–0.2)
BASOPHILS NFR BLD AUTO: 0.5 % — SIGNIFICANT CHANGE UP (ref 0–2)
BASOPHILS NFR BLD AUTO: 0.5 % — SIGNIFICANT CHANGE UP (ref 0–2)
BUN SERPL-MCNC: 19 MG/DL — SIGNIFICANT CHANGE UP (ref 7–23)
BUN SERPL-MCNC: 19 MG/DL — SIGNIFICANT CHANGE UP (ref 7–23)
CALCIUM SERPL-MCNC: 9.2 MG/DL — SIGNIFICANT CHANGE UP (ref 8.4–10.5)
CALCIUM SERPL-MCNC: 9.2 MG/DL — SIGNIFICANT CHANGE UP (ref 8.4–10.5)
CHLORIDE SERPL-SCNC: 102 MMOL/L — SIGNIFICANT CHANGE UP (ref 98–107)
CHLORIDE SERPL-SCNC: 102 MMOL/L — SIGNIFICANT CHANGE UP (ref 98–107)
CK MB BLD-MCNC: 5.8 % — HIGH (ref 0–2.5)
CK MB BLD-MCNC: 5.8 % — HIGH (ref 0–2.5)
CK MB BLD-MCNC: 6.2 % — HIGH (ref 0–2.5)
CK MB BLD-MCNC: 6.2 % — HIGH (ref 0–2.5)
CK MB BLD-MCNC: 6.3 % — HIGH (ref 0–2.5)
CK MB BLD-MCNC: 6.3 % — HIGH (ref 0–2.5)
CK MB CFR SERPL CALC: 21.3 NG/ML — HIGH
CK MB CFR SERPL CALC: 21.3 NG/ML — HIGH
CK MB CFR SERPL CALC: 25.7 NG/ML — HIGH
CK MB CFR SERPL CALC: 25.7 NG/ML — HIGH
CK MB CFR SERPL CALC: 29.4 NG/ML — HIGH
CK MB CFR SERPL CALC: 29.4 NG/ML — HIGH
CK SERPL-CCNC: 368 U/L — HIGH (ref 25–170)
CK SERPL-CCNC: 368 U/L — HIGH (ref 25–170)
CK SERPL-CCNC: 415 U/L — HIGH (ref 25–170)
CK SERPL-CCNC: 415 U/L — HIGH (ref 25–170)
CK SERPL-CCNC: 464 U/L — HIGH (ref 25–170)
CK SERPL-CCNC: 464 U/L — HIGH (ref 25–170)
CO2 SERPL-SCNC: 23 MMOL/L — SIGNIFICANT CHANGE UP (ref 22–31)
CO2 SERPL-SCNC: 23 MMOL/L — SIGNIFICANT CHANGE UP (ref 22–31)
CREAT SERPL-MCNC: 0.86 MG/DL — SIGNIFICANT CHANGE UP (ref 0.5–1.3)
CREAT SERPL-MCNC: 0.86 MG/DL — SIGNIFICANT CHANGE UP (ref 0.5–1.3)
EGFR: 69 ML/MIN/1.73M2 — SIGNIFICANT CHANGE UP
EGFR: 69 ML/MIN/1.73M2 — SIGNIFICANT CHANGE UP
EOSINOPHIL # BLD AUTO: 0.14 K/UL — SIGNIFICANT CHANGE UP (ref 0–0.5)
EOSINOPHIL # BLD AUTO: 0.14 K/UL — SIGNIFICANT CHANGE UP (ref 0–0.5)
EOSINOPHIL NFR BLD AUTO: 1.7 % — SIGNIFICANT CHANGE UP (ref 0–6)
EOSINOPHIL NFR BLD AUTO: 1.7 % — SIGNIFICANT CHANGE UP (ref 0–6)
GLUCOSE BLDC GLUCOMTR-MCNC: 175 MG/DL — HIGH (ref 70–99)
GLUCOSE BLDC GLUCOMTR-MCNC: 175 MG/DL — HIGH (ref 70–99)
GLUCOSE BLDC GLUCOMTR-MCNC: 204 MG/DL — HIGH (ref 70–99)
GLUCOSE BLDC GLUCOMTR-MCNC: 204 MG/DL — HIGH (ref 70–99)
GLUCOSE BLDC GLUCOMTR-MCNC: 206 MG/DL — HIGH (ref 70–99)
GLUCOSE BLDC GLUCOMTR-MCNC: 206 MG/DL — HIGH (ref 70–99)
GLUCOSE BLDC GLUCOMTR-MCNC: 207 MG/DL — HIGH (ref 70–99)
GLUCOSE BLDC GLUCOMTR-MCNC: 207 MG/DL — HIGH (ref 70–99)
GLUCOSE SERPL-MCNC: 192 MG/DL — HIGH (ref 70–99)
GLUCOSE SERPL-MCNC: 192 MG/DL — HIGH (ref 70–99)
HCT VFR BLD CALC: 30.6 % — LOW (ref 34.5–45)
HCT VFR BLD CALC: 30.6 % — LOW (ref 34.5–45)
HGB BLD-MCNC: 9.9 G/DL — LOW (ref 11.5–15.5)
HGB BLD-MCNC: 9.9 G/DL — LOW (ref 11.5–15.5)
IANC: 5.81 K/UL — SIGNIFICANT CHANGE UP (ref 1.8–7.4)
IANC: 5.81 K/UL — SIGNIFICANT CHANGE UP (ref 1.8–7.4)
IMM GRANULOCYTES NFR BLD AUTO: 0.7 % — SIGNIFICANT CHANGE UP (ref 0–0.9)
IMM GRANULOCYTES NFR BLD AUTO: 0.7 % — SIGNIFICANT CHANGE UP (ref 0–0.9)
INR BLD: 4.47 RATIO — HIGH (ref 0.85–1.18)
INR BLD: 4.47 RATIO — HIGH (ref 0.85–1.18)
LYMPHOCYTES # BLD AUTO: 1.55 K/UL — SIGNIFICANT CHANGE UP (ref 1–3.3)
LYMPHOCYTES # BLD AUTO: 1.55 K/UL — SIGNIFICANT CHANGE UP (ref 1–3.3)
LYMPHOCYTES # BLD AUTO: 18.9 % — SIGNIFICANT CHANGE UP (ref 13–44)
LYMPHOCYTES # BLD AUTO: 18.9 % — SIGNIFICANT CHANGE UP (ref 13–44)
MAGNESIUM SERPL-MCNC: 2.1 MG/DL — SIGNIFICANT CHANGE UP (ref 1.6–2.6)
MAGNESIUM SERPL-MCNC: 2.1 MG/DL — SIGNIFICANT CHANGE UP (ref 1.6–2.6)
MCHC RBC-ENTMCNC: 29.4 PG — SIGNIFICANT CHANGE UP (ref 27–34)
MCHC RBC-ENTMCNC: 29.4 PG — SIGNIFICANT CHANGE UP (ref 27–34)
MCHC RBC-ENTMCNC: 32.4 GM/DL — SIGNIFICANT CHANGE UP (ref 32–36)
MCHC RBC-ENTMCNC: 32.4 GM/DL — SIGNIFICANT CHANGE UP (ref 32–36)
MCV RBC AUTO: 90.8 FL — SIGNIFICANT CHANGE UP (ref 80–100)
MCV RBC AUTO: 90.8 FL — SIGNIFICANT CHANGE UP (ref 80–100)
MONOCYTES # BLD AUTO: 0.62 K/UL — SIGNIFICANT CHANGE UP (ref 0–0.9)
MONOCYTES # BLD AUTO: 0.62 K/UL — SIGNIFICANT CHANGE UP (ref 0–0.9)
MONOCYTES NFR BLD AUTO: 7.5 % — SIGNIFICANT CHANGE UP (ref 2–14)
MONOCYTES NFR BLD AUTO: 7.5 % — SIGNIFICANT CHANGE UP (ref 2–14)
NEUTROPHILS # BLD AUTO: 5.81 K/UL — SIGNIFICANT CHANGE UP (ref 1.8–7.4)
NEUTROPHILS # BLD AUTO: 5.81 K/UL — SIGNIFICANT CHANGE UP (ref 1.8–7.4)
NEUTROPHILS NFR BLD AUTO: 70.7 % — SIGNIFICANT CHANGE UP (ref 43–77)
NEUTROPHILS NFR BLD AUTO: 70.7 % — SIGNIFICANT CHANGE UP (ref 43–77)
NRBC # BLD: 0 /100 WBCS — SIGNIFICANT CHANGE UP (ref 0–0)
NRBC # BLD: 0 /100 WBCS — SIGNIFICANT CHANGE UP (ref 0–0)
NRBC # FLD: 0 K/UL — SIGNIFICANT CHANGE UP (ref 0–0)
NRBC # FLD: 0 K/UL — SIGNIFICANT CHANGE UP (ref 0–0)
NT-PROBNP SERPL-SCNC: HIGH PG/ML
NT-PROBNP SERPL-SCNC: HIGH PG/ML
PHOSPHATE SERPL-MCNC: 3.5 MG/DL — SIGNIFICANT CHANGE UP (ref 2.5–4.5)
PHOSPHATE SERPL-MCNC: 3.5 MG/DL — SIGNIFICANT CHANGE UP (ref 2.5–4.5)
PLATELET # BLD AUTO: 336 K/UL — SIGNIFICANT CHANGE UP (ref 150–400)
PLATELET # BLD AUTO: 336 K/UL — SIGNIFICANT CHANGE UP (ref 150–400)
POTASSIUM SERPL-MCNC: 4.6 MMOL/L — SIGNIFICANT CHANGE UP (ref 3.5–5.3)
POTASSIUM SERPL-MCNC: 4.6 MMOL/L — SIGNIFICANT CHANGE UP (ref 3.5–5.3)
POTASSIUM SERPL-SCNC: 4.6 MMOL/L — SIGNIFICANT CHANGE UP (ref 3.5–5.3)
POTASSIUM SERPL-SCNC: 4.6 MMOL/L — SIGNIFICANT CHANGE UP (ref 3.5–5.3)
PROTHROM AB SERPL-ACNC: 47.9 SEC — HIGH (ref 9.5–13)
PROTHROM AB SERPL-ACNC: 47.9 SEC — HIGH (ref 9.5–13)
RBC # BLD: 3.37 M/UL — LOW (ref 3.8–5.2)
RBC # BLD: 3.37 M/UL — LOW (ref 3.8–5.2)
RBC # FLD: 14.8 % — HIGH (ref 10.3–14.5)
RBC # FLD: 14.8 % — HIGH (ref 10.3–14.5)
SODIUM SERPL-SCNC: 137 MMOL/L — SIGNIFICANT CHANGE UP (ref 135–145)
SODIUM SERPL-SCNC: 137 MMOL/L — SIGNIFICANT CHANGE UP (ref 135–145)
TROPONIN T, HIGH SENSITIVITY RESULT: 1614 NG/L — CRITICAL HIGH
TROPONIN T, HIGH SENSITIVITY RESULT: 1614 NG/L — CRITICAL HIGH
TROPONIN T, HIGH SENSITIVITY RESULT: 1695 NG/L — CRITICAL HIGH
TROPONIN T, HIGH SENSITIVITY RESULT: 1695 NG/L — CRITICAL HIGH
TROPONIN T, HIGH SENSITIVITY RESULT: 1720 NG/L — CRITICAL HIGH
TROPONIN T, HIGH SENSITIVITY RESULT: 1720 NG/L — CRITICAL HIGH
WBC # BLD: 8.22 K/UL — SIGNIFICANT CHANGE UP (ref 3.8–10.5)
WBC # BLD: 8.22 K/UL — SIGNIFICANT CHANGE UP (ref 3.8–10.5)
WBC # FLD AUTO: 8.22 K/UL — SIGNIFICANT CHANGE UP (ref 3.8–10.5)
WBC # FLD AUTO: 8.22 K/UL — SIGNIFICANT CHANGE UP (ref 3.8–10.5)

## 2023-12-04 PROCEDURE — 71045 X-RAY EXAM CHEST 1 VIEW: CPT | Mod: 26

## 2023-12-04 PROCEDURE — 99232 SBSQ HOSP IP/OBS MODERATE 35: CPT | Mod: GC

## 2023-12-04 RX ORDER — ASPIRIN/CALCIUM CARB/MAGNESIUM 324 MG
81 TABLET ORAL DAILY
Refills: 0 | Status: DISCONTINUED | OUTPATIENT
Start: 2023-12-04 | End: 2023-12-04

## 2023-12-04 RX ORDER — FUROSEMIDE 40 MG
40 TABLET ORAL ONCE
Refills: 0 | Status: COMPLETED | OUTPATIENT
Start: 2023-12-04 | End: 2023-12-04

## 2023-12-04 RX ADMIN — SACUBITRIL AND VALSARTAN 1 TABLET(S): 24; 26 TABLET, FILM COATED ORAL at 06:48

## 2023-12-04 RX ADMIN — POLYETHYLENE GLYCOL 3350 17 GRAM(S): 17 POWDER, FOR SOLUTION ORAL at 12:42

## 2023-12-04 RX ADMIN — Medication 25 MILLIGRAM(S): at 06:48

## 2023-12-04 RX ADMIN — Medication 40 MILLIGRAM(S): at 12:42

## 2023-12-04 RX ADMIN — Medication 324 MILLIGRAM(S): at 00:26

## 2023-12-04 RX ADMIN — ATORVASTATIN CALCIUM 80 MILLIGRAM(S): 80 TABLET, FILM COATED ORAL at 21:28

## 2023-12-04 RX ADMIN — Medication 40 MILLIGRAM(S): at 18:31

## 2023-12-04 RX ADMIN — SACUBITRIL AND VALSARTAN 1 TABLET(S): 24; 26 TABLET, FILM COATED ORAL at 17:21

## 2023-12-04 RX ADMIN — Medication 2: at 12:42

## 2023-12-04 RX ADMIN — Medication 40 MILLIGRAM(S): at 00:26

## 2023-12-04 RX ADMIN — Medication 1: at 17:21

## 2023-12-04 RX ADMIN — Medication 2: at 08:53

## 2023-12-04 RX ADMIN — CLOPIDOGREL BISULFATE 75 MILLIGRAM(S): 75 TABLET, FILM COATED ORAL at 12:42

## 2023-12-04 NOTE — PROVIDER CONTACT NOTE (CHANGE IN STATUS NOTIFICATION) - RECOMMENDATIONS
Patient had STAT CXR done HOB 90 degrees 2 liter NC 02 Sat 100% Patient had STAT CXR done HOB 90 degrees 2 liter NC 02 Sat 100% EKG done no changes from previous one in chart.

## 2023-12-04 NOTE — PROGRESS NOTE ADULT - ATTENDING COMMENTS
The patient was seen and examined with the Cardiology Consultation Teaching Service.     We were reconsulted after the patient had an episode of syncope while in the bathroom.  She reports that she generally felt unwell after the event, but cannot recall specifics.  No chest pain or dyspnea during the event.    Mild residual dyspnea. Orthopnea has resolved.    No chest pain  No PND  No palpitations or dizziness     PMH/PSH:  Coronary artery disease  Myocardial infarction  LV thrombus on warfarin  Diabetes  Hypertension  Dyslipidemia    Comfortable-appearing woman in no acute distress  Alert and oriented  Afebrile  Vital signs stable  BP borderline low normal  JVP is not elevated  Basilar rales  Normal heart sounds  Extremities are warm and perfused  No peripheral edema     Stable normocytic anemia Hb 9.8  GFR 88    hs-troponin 1720  CK-MB 29    Echocardiography demonstrated severe LV systolic dysfunction, LVEF 20-25%, with apical, septal and anterior wall regional wall motion abnormalities, LV thrombus is present. No significant valve dysfunction.    Nuclear viability study demonstrated a large area of scar in the apical and septal walls and a medium-sized partially reversible defect in the inferior wall.    Impression and Recommendations:   78-year-old woman with diabetes, hypertension and dyslipidemia who presented initially with dyspnea on exertion, found to have anteroseptal Q waves and elevated hs-troponin concerning for late presentation non-JAMIN ACS. Coronary angiography demonstrated severe disease in the proximal and mid LAD, more suggested of late presenting JAMIN-ACS.  LVEF was noted to be severely reduced, but myocardial viability study demonstrated no viability in the LAD distribution. LV thrombus was noted.     The patient's episode of syncope is clinically consistent with a vagal episode. Monitoring on telemetry demonstrates a transient episode of bradycardia with significant AK prolongation, also consistent with increased vagal tone. Cardiac biomarkers were rechecked and noted to be increased, but the patient is without chest pain.    No plan for repeat cardiac catheterization at this time. We will continue medical therapy for this patient. I would continue the combination of warfarin and clopidogrel without aspirin, and continue to avoid triple therapy. Continue atorvastatin 80mg daily.    The patient has some evidence of mild pulmonary congestion on examination. I would give her a one time dose of intravenous diuretics for a net negative 1L today, and then continue her maintenance oral diuretics, which may end up needing to be increased. Continue metoprolol and sacubitril-valsartan.     Will continue to monitor for evidence of current ischemia.    Sharan Jimenez MD  Cardiology  [phone] The patient was seen and examined with the Cardiology Consultation Teaching Service.     We were reconsulted after the patient had an episode of syncope while in the bathroom.  She reports that she generally felt unwell after the event, but cannot recall specifics.  No chest pain or dyspnea during the event.    Mild residual dyspnea. Orthopnea has resolved.    No chest pain  No PND  No palpitations or dizziness     PMH/PSH:  Coronary artery disease  Myocardial infarction  LV thrombus on warfarin  Diabetes  Hypertension  Dyslipidemia    Comfortable-appearing woman in no acute distress  Alert and oriented  Afebrile  Vital signs stable  BP borderline low normal  JVP is not elevated  Basilar rales  Normal heart sounds  Extremities are warm and perfused  No peripheral edema     Stable normocytic anemia Hb 9.8  GFR 88    hs-troponin 1720  CK-MB 29    Echocardiography demonstrated severe LV systolic dysfunction, LVEF 20-25%, with apical, septal and anterior wall regional wall motion abnormalities, LV thrombus is present. No significant valve dysfunction.    Nuclear viability study demonstrated a large area of scar in the apical and septal walls and a medium-sized partially reversible defect in the inferior wall.    Impression and Recommendations:   78-year-old woman with diabetes, hypertension and dyslipidemia who presented initially with dyspnea on exertion, found to have anteroseptal Q waves and elevated hs-troponin concerning for late presentation non-JAMIN ACS. Coronary angiography demonstrated severe disease in the proximal and mid LAD, more suggested of late presenting JAMIN-ACS.  LVEF was noted to be severely reduced, but myocardial viability study demonstrated no viability in the LAD distribution. LV thrombus was noted.     The patient's episode of syncope is clinically consistent with a vagal episode. Monitoring on telemetry demonstrates a transient episode of bradycardia with significant SC prolongation, also consistent with increased vagal tone. Cardiac biomarkers were rechecked and noted to be increased, but the patient is without chest pain.    No plan for repeat cardiac catheterization at this time. We will continue medical therapy for this patient. I would continue the combination of warfarin and clopidogrel without aspirin, and continue to avoid triple therapy. Continue atorvastatin 80mg daily.    The patient has some evidence of mild pulmonary congestion on examination. I would give her a one time dose of intravenous diuretics for a net negative 1L today, and then continue her maintenance oral diuretics, which may end up needing to be increased. Continue metoprolol and sacubitril-valsartan.     Will continue to monitor for evidence of current ischemia.    Sharan Jimenez MD  Cardiology  [phone]

## 2023-12-04 NOTE — PROVIDER CONTACT NOTE (CHANGE IN STATUS NOTIFICATION) - ASSESSMENT
troponin 1058 troponin 1056 troponin 1054 from 2015 repeat troponin 1720 2330 troponin 1054 from 2015 repeat troponin 1720 2330 INR 3.51

## 2023-12-04 NOTE — PROVIDER CONTACT NOTE (CHANGE IN STATUS NOTIFICATION) - NAME OF MD/NP/PA/DO NOTIFIED:
Beverly Hernanedz [facility] [id number] [address] [facility] [id number]

## 2023-12-04 NOTE — PROVIDER CONTACT NOTE (CHANGE IN STATUS NOTIFICATION) - BACKGROUND
[place] hearing aide in place New CHF started on Entresto Lasix 40 mg po daily Large LV Thrombus s/p heparin to coumadin bridge. [place] hearing aide in place New CHF started on Entresto Lasix 40 mg po daily Large LV Thrombus s/p heparin to coumadin bridge. [place] hearing aide in place New CHF started on Entresto Lasix 40 mg po daily Large LV Thrombus s/p heparin to coumadin bridge. Coumadin 5mg po given 12/2 [place] hearing aide in place New CHF started on Entresto Lasix 40 mg po daily Large LV Thrombus s/p heparin to coumadin bridge. Coumadin 5mg po given 12/2

## 2023-12-04 NOTE — PROGRESS NOTE ADULT - SUBJECTIVE AND OBJECTIVE BOX
Cardiology Progress Note  ------------------------------------------------------------------------------------------    SUBJECTIVE/EVENTS:  - Tele: No events, reviewed event from 12/3 am, showing sinus bradycardia w/ mild prolongation of the PP and MS interval c/w vagal episode  - Overnight given lasix 40mg IV for mild volume overload and dyspnea    -------------------------------------------------------------------------------------------  ROS:  CV: chest pain (-), palpitation (-), orthopnea (-), PND (-), edema (-)  PULM: SOB (+), cough (-), wheezing (-), hemoptysis (-).   CONST: fever (-), chills (-) or fatigue (-)  GI: abdominal distension (-), abdominal pain (-) , nausea/vomiting (-), hematemesis, (-), melena (-), hematochezia (-)  : dysuria (-), frequency (-), hematuria (-).   NEURO: numbness (-), weakness (-), dizziness (-)  MSK: myalgia (-), joint pain (-)   SKIN: itching (-), rash (-)  HEENT:  visual changes (-); vertigo or throat pain (-);  neck stiffness (-)   Psych: change in mood (-), anxiety (-), depression (-)     All other review of systems is negative unless indicated above.   -------------------------------------------------------------------------------------------  VITALS:  T(F): 98 (12-04), Max: 98.4 (12-04)  HR: 72 (12-04) (72 - 78)  BP: 92/54 (12-04) (84/51 - 94/58)  RR: 18 (12-04)  SpO2: 100% (12-04)  I&O's Summary    03 Dec 2023 07:01  -  04 Dec 2023 07:00  --------------------------------------------------------  IN: 0 mL / OUT: 400 mL / NET: -400 mL      ------------------------------------------------------------------------------------------  PHYSICAL EXAM:  GEN: NAD, sitting in bed  HEENT: NCAT, EOMI  CV: RRR, Ns1/s2, no m/r/g, JVP not elevated  RESP: faint rales int he bases b/l, no w/r  ABD: soft, nt, nd  EXT: warm, 2+ pulses, no edema  SKIN: no visible lesions, rashes  NEURO: AAOx3, no focal deficits  PSYCH: Calm, cooperative  -------------------------------------------------------------------------------------------  LABS:                          9.9    8.22  )-----------( 336      ( 04 Dec 2023 05:30 )             30.6     12-04    137  |  102  |  19  ----------------------------<  192<H>  4.6   |  23  |  0.86    Ca    9.2      04 Dec 2023 05:30  Phos  3.5     12-04  Mg     2.10     12-04      PT/INR - ( 04 Dec 2023 05:30 )   PT: 47.9 sec;   INR: 4.47 ratio         PTT - ( 04 Dec 2023 05:30 )  PTT:36.7 sec  CARDIAC MARKERS ( 04 Dec 2023 05:30 )  1695 ng/L / x     / x     / 415 U/L / x     / 25.7 ng/mL  CARDIAC MARKERS ( 03 Dec 2023 23:30 )  1720 ng/L / x     / x     / 464 U/L / x     / 29.4 ng/mL  CARDIAC MARKERS ( 03 Dec 2023 20:15 )  1054 ng/L / x     / x     / 415 U/L / x     / 27.8 ng/mL  CARDIAC MARKERS ( 03 Dec 2023 11:30 )  484 ng/L / x     / x     / 56 U/L / x     / 1.5 ng/mL  CARDIAC MARKERS ( 27 Nov 2023 13:27 )  856 ng/L / x     / x     / x     / x     / x                -------------------------------------------------------------------------------------------  Meds:  acetaminophen     Tablet .. 650 milliGRAM(s) Oral every 6 hours PRN  aluminum hydroxide/magnesium hydroxide/simethicone Suspension 30 milliLiter(s) Oral every 4 hours PRN  aspirin  chewable 81 milliGRAM(s) Oral daily  atorvastatin 80 milliGRAM(s) Oral at bedtime  clopidogrel Tablet 75 milliGRAM(s) Oral daily  dextrose 5%. 1000 milliLiter(s) IV Continuous <Continuous>  dextrose 5%. 1000 milliLiter(s) IV Continuous <Continuous>  dextrose 50% Injectable 12.5 Gram(s) IV Push once  dextrose 50% Injectable 25 Gram(s) IV Push once  dextrose 50% Injectable 25 Gram(s) IV Push once  dextrose Oral Gel 15 Gram(s) Oral once PRN  furosemide    Tablet 40 milliGRAM(s) Oral daily  glucagon  Injectable 1 milliGRAM(s) IntraMuscular once  insulin lispro (ADMELOG) corrective regimen sliding scale   SubCutaneous three times a day before meals  insulin lispro (ADMELOG) corrective regimen sliding scale   SubCutaneous at bedtime  melatonin 3 milliGRAM(s) Oral at bedtime PRN  melatonin 3 milliGRAM(s) Oral once PRN  metoprolol succinate ER 25 milliGRAM(s) Oral daily  ondansetron Injectable 4 milliGRAM(s) IV Push every 8 hours PRN  polyethylene glycol 3350 17 Gram(s) Oral daily  sacubitril 24 mG/valsartan 26 mG 1 Tablet(s) Oral two times a day    -------------------------------------------------------------------------------------------  Cardiovascular Diagnostic Testing:      -------------------------------------------------------------------------------------------  Assessment and Plan:   78F with PMHx HTN, HLD, DM p/w shortness of breath on exertion for the past week that started acutely found to have anterior Q waves and elevated trop w/ new volume overload c/f late presentation anterior wall MI, now s/p LHC and viability showing large area of infarct anteriorly and also severe pLAD disease w/o intervention given viability. Pt also w/ new HFrEF 20-25% w/ anteroseptal WMA and large LV thrombus. Started on AC, and GDMT.    . Pt states SOB started acutely last week while shopping. Pt with mild b/l rales and peripheral edema. EKG with anterolateral septal q waves with sub-1mm ST elevations, not meeting STEMI criteria. Trop 862>856. CXR without acute consolidation. No suspicion of acute ACS at this time however likely had ischemic event a week ago. Atypical presentation of MI likely as pt is elderly female with hx of DM. New heart failure in setting of NSTEMI. Echo as above, showing EF 20-25% with wall motion abnormalities as well as LV thrombus. Diagnostic LHC as above, showing 95% stenosis in proximal and 90% stenosis in mid LAD. Cardiac viability test as above, pt poor candidate for PCI. Started on warfarin yesterday.     #NSTEMI  #new HFrEF  #CAD   #LV thrombus   #HTN  Picture c/w late presentation anterior wall MI, c/b newly rEF 20-25% w/ LV thrombus. Viability study showing large area of infarct in the apical and anteroseptal walls which correlates w/ pLAD disease. She also was noted to have medium size inferior and inferoseptal wall that is mixed infarct w/ partial viability. LHC w/ severe pLAD disease w/o intervention given viability findings. Started on GDMT.    Recommendations:   - please stop ASA as pt has supratherapeutic INR and is on clopidogrel and warfarin  - please continue w/ lasix 40mg IV BID today, will reassess tomorrow for transition to oral lasix  - please continue to trend trop and CKMB to peak, no plan for LHC at this time  - continue warfarin for goal INR 2-3  - continue clopidogrel 75mg qd  - continue sacubitril-valsartan 24-26mg BID, hold for SBP <90  - continue metop succ 25mg qd   - continue atorvastatin 40mg  - SGLT2i once closer to DC    *** Recommendations are preliminary until cosigned by the attending.    Abdelrahman Owens MD  Cardiology Fellow    For all New Consults and Questions:  [email or web address]   Login: [facility] Cardiology Progress Note  ------------------------------------------------------------------------------------------    SUBJECTIVE/EVENTS:  - Tele: No events, reviewed event from 12/3 am, showing sinus bradycardia w/ mild prolongation of the PP and CA interval c/w vagal episode  - Overnight given lasix 40mg IV for mild volume overload and dyspnea    -------------------------------------------------------------------------------------------  ROS:  CV: chest pain (-), palpitation (-), orthopnea (-), PND (-), edema (-)  PULM: SOB (+), cough (-), wheezing (-), hemoptysis (-).   CONST: fever (-), chills (-) or fatigue (-)  GI: abdominal distension (-), abdominal pain (-) , nausea/vomiting (-), hematemesis, (-), melena (-), hematochezia (-)  : dysuria (-), frequency (-), hematuria (-).   NEURO: numbness (-), weakness (-), dizziness (-)  MSK: myalgia (-), joint pain (-)   SKIN: itching (-), rash (-)  HEENT:  visual changes (-); vertigo or throat pain (-);  neck stiffness (-)   Psych: change in mood (-), anxiety (-), depression (-)     All other review of systems is negative unless indicated above.   -------------------------------------------------------------------------------------------  VITALS:  T(F): 98 (12-04), Max: 98.4 (12-04)  HR: 72 (12-04) (72 - 78)  BP: 92/54 (12-04) (84/51 - 94/58)  RR: 18 (12-04)  SpO2: 100% (12-04)  I&O's Summary    03 Dec 2023 07:01  -  04 Dec 2023 07:00  --------------------------------------------------------  IN: 0 mL / OUT: 400 mL / NET: -400 mL      ------------------------------------------------------------------------------------------  PHYSICAL EXAM:  GEN: NAD, sitting in bed  HEENT: NCAT, EOMI  CV: RRR, Ns1/s2, no m/r/g, JVP not elevated  RESP: faint rales int he bases b/l, no w/r  ABD: soft, nt, nd  EXT: warm, 2+ pulses, no edema  SKIN: no visible lesions, rashes  NEURO: AAOx3, no focal deficits  PSYCH: Calm, cooperative  -------------------------------------------------------------------------------------------  LABS:                          9.9    8.22  )-----------( 336      ( 04 Dec 2023 05:30 )             30.6     12-04    137  |  102  |  19  ----------------------------<  192<H>  4.6   |  23  |  0.86    Ca    9.2      04 Dec 2023 05:30  Phos  3.5     12-04  Mg     2.10     12-04      PT/INR - ( 04 Dec 2023 05:30 )   PT: 47.9 sec;   INR: 4.47 ratio         PTT - ( 04 Dec 2023 05:30 )  PTT:36.7 sec  CARDIAC MARKERS ( 04 Dec 2023 05:30 )  1695 ng/L / x     / x     / 415 U/L / x     / 25.7 ng/mL  CARDIAC MARKERS ( 03 Dec 2023 23:30 )  1720 ng/L / x     / x     / 464 U/L / x     / 29.4 ng/mL  CARDIAC MARKERS ( 03 Dec 2023 20:15 )  1054 ng/L / x     / x     / 415 U/L / x     / 27.8 ng/mL  CARDIAC MARKERS ( 03 Dec 2023 11:30 )  484 ng/L / x     / x     / 56 U/L / x     / 1.5 ng/mL  CARDIAC MARKERS ( 27 Nov 2023 13:27 )  856 ng/L / x     / x     / x     / x     / x                -------------------------------------------------------------------------------------------  Meds:  acetaminophen     Tablet .. 650 milliGRAM(s) Oral every 6 hours PRN  aluminum hydroxide/magnesium hydroxide/simethicone Suspension 30 milliLiter(s) Oral every 4 hours PRN  aspirin  chewable 81 milliGRAM(s) Oral daily  atorvastatin 80 milliGRAM(s) Oral at bedtime  clopidogrel Tablet 75 milliGRAM(s) Oral daily  dextrose 5%. 1000 milliLiter(s) IV Continuous <Continuous>  dextrose 5%. 1000 milliLiter(s) IV Continuous <Continuous>  dextrose 50% Injectable 12.5 Gram(s) IV Push once  dextrose 50% Injectable 25 Gram(s) IV Push once  dextrose 50% Injectable 25 Gram(s) IV Push once  dextrose Oral Gel 15 Gram(s) Oral once PRN  furosemide    Tablet 40 milliGRAM(s) Oral daily  glucagon  Injectable 1 milliGRAM(s) IntraMuscular once  insulin lispro (ADMELOG) corrective regimen sliding scale   SubCutaneous three times a day before meals  insulin lispro (ADMELOG) corrective regimen sliding scale   SubCutaneous at bedtime  melatonin 3 milliGRAM(s) Oral at bedtime PRN  melatonin 3 milliGRAM(s) Oral once PRN  metoprolol succinate ER 25 milliGRAM(s) Oral daily  ondansetron Injectable 4 milliGRAM(s) IV Push every 8 hours PRN  polyethylene glycol 3350 17 Gram(s) Oral daily  sacubitril 24 mG/valsartan 26 mG 1 Tablet(s) Oral two times a day    -------------------------------------------------------------------------------------------  Cardiovascular Diagnostic Testing:      -------------------------------------------------------------------------------------------  Assessment and Plan:   78F with PMHx HTN, HLD, DM p/w shortness of breath on exertion for the past week that started acutely found to have anterior Q waves and elevated trop w/ new volume overload c/f late presentation anterior wall MI, now s/p LHC and viability showing large area of infarct anteriorly and also severe pLAD disease w/o intervention given viability. Pt also w/ new HFrEF 20-25% w/ anteroseptal WMA and large LV thrombus. Started on AC, and GDMT.    . Pt states SOB started acutely last week while shopping. Pt with mild b/l rales and peripheral edema. EKG with anterolateral septal q waves with sub-1mm ST elevations, not meeting STEMI criteria. Trop 862>856. CXR without acute consolidation. No suspicion of acute ACS at this time however likely had ischemic event a week ago. Atypical presentation of MI likely as pt is elderly female with hx of DM. New heart failure in setting of NSTEMI. Echo as above, showing EF 20-25% with wall motion abnormalities as well as LV thrombus. Diagnostic LHC as above, showing 95% stenosis in proximal and 90% stenosis in mid LAD. Cardiac viability test as above, pt poor candidate for PCI. Started on warfarin yesterday.     #NSTEMI  #new HFrEF  #CAD   #LV thrombus   #HTN  Picture c/w late presentation anterior wall MI, c/b newly rEF 20-25% w/ LV thrombus. Viability study showing large area of infarct in the apical and anteroseptal walls which correlates w/ pLAD disease. She also was noted to have medium size inferior and inferoseptal wall that is mixed infarct w/ partial viability. LHC w/ severe pLAD disease w/o intervention given viability findings. Started on GDMT.    Recommendations:   - please stop ASA as pt has supratherapeutic INR and is on clopidogrel and warfarin  - please continue w/ lasix 40mg IV BID today, will reassess tomorrow for transition to oral lasix  - please continue to trend trop and CKMB to peak, no plan for LHC at this time  - continue warfarin for goal INR 2-3  - continue clopidogrel 75mg qd  - continue sacubitril-valsartan 24-26mg BID, hold for SBP <90  - continue metop succ 25mg qd   - continue atorvastatin 40mg  - SGLT2i once closer to DC    *** Recommendations are preliminary until cosigned by the attending.    Abdelrahman Owens MD  Cardiology Fellow    For all New Consults and Questions:  [email or web address]   Login: [email or web address]

## 2023-12-04 NOTE — PROGRESS NOTE ADULT - SUBJECTIVE AND OBJECTIVE BOX
Patient is a 78y old  Female who presents with a chief complaint of Chest pain, SOB (04 Dec 2023 11:48)    Date of servie : 12-04-23 @ 16:21  INTERVAL HPI/OVERNIGHT EVENTS:  T(C): 36.8 (12-04-23 @ 13:17), Max: 36.9 (12-04-23 @ 02:54)  HR: 74 (12-04-23 @ 13:17) (72 - 78)  BP: 94/57 (12-04-23 @ 13:17) (84/51 - 94/58)  RR: 18 (12-04-23 @ 13:17) (18 - 20)  SpO2: 100% (12-04-23 @ 13:17) (98% - 100%)  Wt(kg): --  I&O's Summary    03 Dec 2023 07:01  -  04 Dec 2023 07:00  --------------------------------------------------------  IN: 0 mL / OUT: 400 mL / NET: -400 mL        LABS:                        9.9    8.22  )-----------( 336      ( 04 Dec 2023 05:30 )             30.6     12-04    137  |  102  |  19  ----------------------------<  192<H>  4.6   |  23  |  0.86    Ca    9.2      04 Dec 2023 05:30  Phos  3.5     12-04  Mg     2.10     12-04      PT/INR - ( 04 Dec 2023 05:30 )   PT: 47.9 sec;   INR: 4.47 ratio         PTT - ( 04 Dec 2023 05:30 )  PTT:36.7 sec  Urinalysis Basic - ( 04 Dec 2023 05:30 )    Color: x / Appearance: x / SG: x / pH: x  Gluc: 192 mg/dL / Ketone: x  / Bili: x / Urobili: x   Blood: x / Protein: x / Nitrite: x   Leuk Esterase: x / RBC: x / WBC x   Sq Epi: x / Non Sq Epi: x / Bacteria: x      CAPILLARY BLOOD GLUCOSE      POCT Blood Glucose.: 207 mg/dL (04 Dec 2023 12:04)  POCT Blood Glucose.: 206 mg/dL (04 Dec 2023 08:23)  POCT Blood Glucose.: 265 mg/dL (03 Dec 2023 23:11)  POCT Blood Glucose.: 253 mg/dL (03 Dec 2023 17:10)        Urinalysis Basic - ( 04 Dec 2023 05:30 )    Color: x / Appearance: x / SG: x / pH: x  Gluc: 192 mg/dL / Ketone: x  / Bili: x / Urobili: x   Blood: x / Protein: x / Nitrite: x   Leuk Esterase: x / RBC: x / WBC x   Sq Epi: x / Non Sq Epi: x / Bacteria: x        MEDICATIONS  (STANDING):  atorvastatin 80 milliGRAM(s) Oral at bedtime  clopidogrel Tablet 75 milliGRAM(s) Oral daily  dextrose 5%. 1000 milliLiter(s) (50 mL/Hr) IV Continuous <Continuous>  dextrose 5%. 1000 milliLiter(s) (100 mL/Hr) IV Continuous <Continuous>  dextrose 50% Injectable 12.5 Gram(s) IV Push once  dextrose 50% Injectable 25 Gram(s) IV Push once  dextrose 50% Injectable 25 Gram(s) IV Push once  furosemide    Tablet 40 milliGRAM(s) Oral daily  glucagon  Injectable 1 milliGRAM(s) IntraMuscular once  insulin lispro (ADMELOG) corrective regimen sliding scale   SubCutaneous three times a day before meals  insulin lispro (ADMELOG) corrective regimen sliding scale   SubCutaneous at bedtime  metoprolol succinate ER 25 milliGRAM(s) Oral daily  polyethylene glycol 3350 17 Gram(s) Oral daily  sacubitril 24 mG/valsartan 26 mG 1 Tablet(s) Oral two times a day    MEDICATIONS  (PRN):  acetaminophen     Tablet .. 650 milliGRAM(s) Oral every 6 hours PRN Temp greater or equal to 38C (100.4F), Mild Pain (1 - 3)  aluminum hydroxide/magnesium hydroxide/simethicone Suspension 30 milliLiter(s) Oral every 4 hours PRN Dyspepsia  dextrose Oral Gel 15 Gram(s) Oral once PRN Blood Glucose LESS THAN 70 milliGRAM(s)/deciliter  melatonin 3 milliGRAM(s) Oral at bedtime PRN Insomnia  melatonin 3 milliGRAM(s) Oral once PRN Insomnia  ondansetron Injectable 4 milliGRAM(s) IV Push every 8 hours PRN Nausea and/or Vomiting          PHYSICAL EXAM:  GENERAL: NAD, well-groomed, well-developed  HEAD:  Atraumatic, Normocephalic  CHEST/LUNG: Clear to percussion bilaterally; No rales, rhonchi, wheezing, or rubs  HEART: Regular rate and rhythm; No murmurs, rubs, or gallops  ABDOMEN: Soft, Nontender, Nondistended; Bowel sounds present  EXTREMITIES:  2+ Peripheral Pulses, No clubbing, cyanosis, or edema  LYMPH: No lymphadenopathy noted  SKIN: No rashes or lesions    Care Discussed with Consultants/Other Providers [ ] YES  [ ] NO

## 2023-12-05 LAB
ANION GAP SERPL CALC-SCNC: 12 MMOL/L — SIGNIFICANT CHANGE UP (ref 7–14)
ANION GAP SERPL CALC-SCNC: 12 MMOL/L — SIGNIFICANT CHANGE UP (ref 7–14)
APTT BLD: 36.9 SEC — HIGH (ref 24.5–35.6)
APTT BLD: 36.9 SEC — HIGH (ref 24.5–35.6)
BUN SERPL-MCNC: 20 MG/DL — SIGNIFICANT CHANGE UP (ref 7–23)
BUN SERPL-MCNC: 20 MG/DL — SIGNIFICANT CHANGE UP (ref 7–23)
CALCIUM SERPL-MCNC: 9.1 MG/DL — SIGNIFICANT CHANGE UP (ref 8.4–10.5)
CALCIUM SERPL-MCNC: 9.1 MG/DL — SIGNIFICANT CHANGE UP (ref 8.4–10.5)
CHLORIDE SERPL-SCNC: 102 MMOL/L — SIGNIFICANT CHANGE UP (ref 98–107)
CHLORIDE SERPL-SCNC: 102 MMOL/L — SIGNIFICANT CHANGE UP (ref 98–107)
CO2 SERPL-SCNC: 24 MMOL/L — SIGNIFICANT CHANGE UP (ref 22–31)
CO2 SERPL-SCNC: 24 MMOL/L — SIGNIFICANT CHANGE UP (ref 22–31)
CREAT SERPL-MCNC: 0.84 MG/DL — SIGNIFICANT CHANGE UP (ref 0.5–1.3)
CREAT SERPL-MCNC: 0.84 MG/DL — SIGNIFICANT CHANGE UP (ref 0.5–1.3)
EGFR: 71 ML/MIN/1.73M2 — SIGNIFICANT CHANGE UP
EGFR: 71 ML/MIN/1.73M2 — SIGNIFICANT CHANGE UP
GLUCOSE BLDC GLUCOMTR-MCNC: 197 MG/DL — HIGH (ref 70–99)
GLUCOSE BLDC GLUCOMTR-MCNC: 197 MG/DL — HIGH (ref 70–99)
GLUCOSE BLDC GLUCOMTR-MCNC: 202 MG/DL — HIGH (ref 70–99)
GLUCOSE BLDC GLUCOMTR-MCNC: 202 MG/DL — HIGH (ref 70–99)
GLUCOSE BLDC GLUCOMTR-MCNC: 222 MG/DL — HIGH (ref 70–99)
GLUCOSE BLDC GLUCOMTR-MCNC: 222 MG/DL — HIGH (ref 70–99)
GLUCOSE BLDC GLUCOMTR-MCNC: 232 MG/DL — HIGH (ref 70–99)
GLUCOSE BLDC GLUCOMTR-MCNC: 232 MG/DL — HIGH (ref 70–99)
GLUCOSE SERPL-MCNC: 201 MG/DL — HIGH (ref 70–99)
GLUCOSE SERPL-MCNC: 201 MG/DL — HIGH (ref 70–99)
HCT VFR BLD CALC: 31.7 % — LOW (ref 34.5–45)
HCT VFR BLD CALC: 31.7 % — LOW (ref 34.5–45)
HGB BLD-MCNC: 10.3 G/DL — LOW (ref 11.5–15.5)
HGB BLD-MCNC: 10.3 G/DL — LOW (ref 11.5–15.5)
INR BLD: 3.52 RATIO — HIGH (ref 0.85–1.18)
INR BLD: 3.52 RATIO — HIGH (ref 0.85–1.18)
MCHC RBC-ENTMCNC: 29.3 PG — SIGNIFICANT CHANGE UP (ref 27–34)
MCHC RBC-ENTMCNC: 29.3 PG — SIGNIFICANT CHANGE UP (ref 27–34)
MCHC RBC-ENTMCNC: 32.5 GM/DL — SIGNIFICANT CHANGE UP (ref 32–36)
MCHC RBC-ENTMCNC: 32.5 GM/DL — SIGNIFICANT CHANGE UP (ref 32–36)
MCV RBC AUTO: 90.1 FL — SIGNIFICANT CHANGE UP (ref 80–100)
MCV RBC AUTO: 90.1 FL — SIGNIFICANT CHANGE UP (ref 80–100)
NRBC # BLD: 0 /100 WBCS — SIGNIFICANT CHANGE UP (ref 0–0)
NRBC # BLD: 0 /100 WBCS — SIGNIFICANT CHANGE UP (ref 0–0)
NRBC # FLD: 0 K/UL — SIGNIFICANT CHANGE UP (ref 0–0)
NRBC # FLD: 0 K/UL — SIGNIFICANT CHANGE UP (ref 0–0)
PLATELET # BLD AUTO: 348 K/UL — SIGNIFICANT CHANGE UP (ref 150–400)
PLATELET # BLD AUTO: 348 K/UL — SIGNIFICANT CHANGE UP (ref 150–400)
POTASSIUM SERPL-MCNC: 4.2 MMOL/L — SIGNIFICANT CHANGE UP (ref 3.5–5.3)
POTASSIUM SERPL-MCNC: 4.2 MMOL/L — SIGNIFICANT CHANGE UP (ref 3.5–5.3)
POTASSIUM SERPL-SCNC: 4.2 MMOL/L — SIGNIFICANT CHANGE UP (ref 3.5–5.3)
POTASSIUM SERPL-SCNC: 4.2 MMOL/L — SIGNIFICANT CHANGE UP (ref 3.5–5.3)
PROTHROM AB SERPL-ACNC: 38.3 SEC — HIGH (ref 9.5–13)
PROTHROM AB SERPL-ACNC: 38.3 SEC — HIGH (ref 9.5–13)
RBC # BLD: 3.52 M/UL — LOW (ref 3.8–5.2)
RBC # BLD: 3.52 M/UL — LOW (ref 3.8–5.2)
RBC # FLD: 15 % — HIGH (ref 10.3–14.5)
RBC # FLD: 15 % — HIGH (ref 10.3–14.5)
SODIUM SERPL-SCNC: 138 MMOL/L — SIGNIFICANT CHANGE UP (ref 135–145)
SODIUM SERPL-SCNC: 138 MMOL/L — SIGNIFICANT CHANGE UP (ref 135–145)
TROPONIN T, HIGH SENSITIVITY RESULT: 1362 NG/L — CRITICAL HIGH
TROPONIN T, HIGH SENSITIVITY RESULT: 1362 NG/L — CRITICAL HIGH
WBC # BLD: 7.74 K/UL — SIGNIFICANT CHANGE UP (ref 3.8–10.5)
WBC # BLD: 7.74 K/UL — SIGNIFICANT CHANGE UP (ref 3.8–10.5)
WBC # FLD AUTO: 7.74 K/UL — SIGNIFICANT CHANGE UP (ref 3.8–10.5)
WBC # FLD AUTO: 7.74 K/UL — SIGNIFICANT CHANGE UP (ref 3.8–10.5)

## 2023-12-05 PROCEDURE — 99232 SBSQ HOSP IP/OBS MODERATE 35: CPT | Mod: GC

## 2023-12-05 RX ORDER — SPIRONOLACTONE 25 MG/1
12.5 TABLET, FILM COATED ORAL DAILY
Refills: 0 | Status: DISCONTINUED | OUTPATIENT
Start: 2023-12-05 | End: 2023-12-06

## 2023-12-05 RX ORDER — FUROSEMIDE 40 MG
40 TABLET ORAL
Refills: 0 | Status: DISCONTINUED | OUTPATIENT
Start: 2023-12-05 | End: 2023-12-05

## 2023-12-05 RX ORDER — FUROSEMIDE 40 MG
40 TABLET ORAL
Refills: 0 | Status: DISCONTINUED | OUTPATIENT
Start: 2023-12-06 | End: 2023-12-06

## 2023-12-05 RX ORDER — FUROSEMIDE 40 MG
40 TABLET ORAL
Refills: 0 | Status: DISCONTINUED | OUTPATIENT
Start: 2023-12-05 | End: 2023-12-06

## 2023-12-05 RX ADMIN — Medication 2: at 08:32

## 2023-12-05 RX ADMIN — ATORVASTATIN CALCIUM 80 MILLIGRAM(S): 80 TABLET, FILM COATED ORAL at 21:36

## 2023-12-05 RX ADMIN — Medication 40 MILLIGRAM(S): at 05:27

## 2023-12-05 RX ADMIN — SACUBITRIL AND VALSARTAN 1 TABLET(S): 24; 26 TABLET, FILM COATED ORAL at 17:48

## 2023-12-05 RX ADMIN — Medication 25 MILLIGRAM(S): at 05:27

## 2023-12-05 RX ADMIN — Medication 40 MILLIGRAM(S): at 13:01

## 2023-12-05 RX ADMIN — POLYETHYLENE GLYCOL 3350 17 GRAM(S): 17 POWDER, FOR SOLUTION ORAL at 13:02

## 2023-12-05 RX ADMIN — Medication 2: at 17:49

## 2023-12-05 RX ADMIN — Medication 2: at 13:01

## 2023-12-05 RX ADMIN — SPIRONOLACTONE 12.5 MILLIGRAM(S): 25 TABLET, FILM COATED ORAL at 14:08

## 2023-12-05 RX ADMIN — CLOPIDOGREL BISULFATE 75 MILLIGRAM(S): 75 TABLET, FILM COATED ORAL at 13:02

## 2023-12-05 RX ADMIN — SACUBITRIL AND VALSARTAN 1 TABLET(S): 24; 26 TABLET, FILM COATED ORAL at 05:27

## 2023-12-05 NOTE — PROGRESS NOTE ADULT - ATTENDING COMMENTS
The patient was seen and examined with the Cardiology Consultation Teaching Service.     No overnight events    No chest pain  No dyspnea, orthopnea or PND  No palpitations or dizziness     PMH/PSH:  Coronary artery disease  Myocardial infarction  LV thrombus on warfarin  Diabetes  Hypertension  Dyslipidemia    Comfortable-appearing woman in no acute distress  Alert and oriented  Afebrile  Vital signs stable  BP borderline low normal  I/O net positive 0.2L  JVP is not elevated  Minimal basilar rales  Normal heart sounds  Extremities are warm and perfused  No peripheral edema     Stable normocytic anemia Hb 10.3  INR 3.5  GFR 71    hs-troponin peak 1720  CK-MB peak 29.4    Echocardiography demonstrated severe LV systolic dysfunction, LVEF 20-25%, with apical, septal and anterior wall regional wall motion abnormalities, LV thrombus is present. No significant valve dysfunction.    Nuclear viability study demonstrated a large area of scar in the apical and septal walls and a medium-sized partially reversible defect in the inferior wall.    Impression and Recommendations:   78-year-old woman with diabetes, hypertension and dyslipidemia who presented initially with dyspnea on exertion, found to have anteroseptal Q waves and elevated hs-troponin concerning for ACS. Coronary angiography demonstrated severe disease in the proximal and mid LAD, more suggestive of late presenting JAMIN-ACS.  LVEF was noted to be severely reduced, but myocardial viability study demonstrated no viability in the LAD distribution. LV thrombus was noted.     Continue medical therapy for this patient's coronary artery disease, myocardial infarction, reduced LV function and LV thrombus. Continue clopidogrel and warfarin. INR is decreasing. Would consider restarting warfarin at a lower dose either tonight or tomorrow.     Continue atorvastatin 80mg daily for her known coronary disease.  Continue sacubitril-valsartan and metoprolol succinate for her heart failure with reduced LV function.  Consider starting MRA in the context of a missed JAMIN-ACS, severely reduced LVEF, and diabetes.  Continue oral furosemide as a maintenance diuretic.     Consider initiation of SGLT2i as an outpatient.    Encourage ambulation. If the patient remains chest pain free and euvolemic, I suspect the patient will be ready for discharge tomorrow.     Sharan Jimenez MD FACC FACP  Cardiology  [phone] The patient was seen and examined with the Cardiology Consultation Teaching Service.     No overnight events    No chest pain  No dyspnea, orthopnea or PND  No palpitations or dizziness     PMH/PSH:  Coronary artery disease  Myocardial infarction  LV thrombus on warfarin  Diabetes  Hypertension  Dyslipidemia    Comfortable-appearing woman in no acute distress  Alert and oriented  Afebrile  Vital signs stable  BP borderline low normal  I/O net positive 0.2L  JVP is not elevated  Minimal basilar rales  Normal heart sounds  Extremities are warm and perfused  No peripheral edema     Stable normocytic anemia Hb 10.3  INR 3.5  GFR 71    hs-troponin peak 1720  CK-MB peak 29.4    Echocardiography demonstrated severe LV systolic dysfunction, LVEF 20-25%, with apical, septal and anterior wall regional wall motion abnormalities, LV thrombus is present. No significant valve dysfunction.    Nuclear viability study demonstrated a large area of scar in the apical and septal walls and a medium-sized partially reversible defect in the inferior wall.    Impression and Recommendations:   78-year-old woman with diabetes, hypertension and dyslipidemia who presented initially with dyspnea on exertion, found to have anteroseptal Q waves and elevated hs-troponin concerning for ACS. Coronary angiography demonstrated severe disease in the proximal and mid LAD, more suggestive of late presenting JAMIN-ACS.  LVEF was noted to be severely reduced, but myocardial viability study demonstrated no viability in the LAD distribution. LV thrombus was noted.     Continue medical therapy for this patient's coronary artery disease, myocardial infarction, reduced LV function and LV thrombus. Continue clopidogrel and warfarin. INR is decreasing. Would consider restarting warfarin at a lower dose either tonight or tomorrow.     Continue atorvastatin 80mg daily for her known coronary disease.  Continue sacubitril-valsartan and metoprolol succinate for her heart failure with reduced LV function.  Consider starting MRA in the context of a missed JAMIN-ACS, severely reduced LVEF, and diabetes.  Continue oral furosemide as a maintenance diuretic.     Consider initiation of SGLT2i as an outpatient.    Encourage ambulation. If the patient remains chest pain free and euvolemic, I suspect the patient will be ready for discharge tomorrow.     Sharan Jimenez MD FACC FACP  Cardiology  [phone]

## 2023-12-05 NOTE — DIETITIAN INITIAL EVALUATION ADULT - PERTINENT MEDS FT
atorvastatin   furosemide IV  ADMELOG corrective regimen sliding scale   polyethylene glycol   spironolactone   aluminum hydroxide/magnesium hydroxide/simethicone Suspension PRN  ondansetron IV PRN

## 2023-12-05 NOTE — DIETITIAN INITIAL EVALUATION ADULT - NSICDXPASTMEDICALHX_GEN_ALL_CORE_FT
PAST MEDICAL HISTORY:  DMII (diabetes mellitus, type 2)     [place] (hard of hearing)     HTN (hypertension)     Hyperlipidemia     Vitamin D deficiency      PAST MEDICAL HISTORY:  DMII (diabetes mellitus, type 2)     [place] (hard of hearing)     HTN (hypertension)     Hyperlipidemia     Vitamin D deficiency

## 2023-12-05 NOTE — DIETITIAN INITIAL EVALUATION ADULT - OTHER INFO
Per chart, pt is 78 year old female PMH HTN, HLD, type 2 DM presenting with SOB found to have NSTEMI. S/p C s/p TTE showed HFrEF 20-25% and large LV thrombus. Cardiology following.    Pt very [place], poor comprehension. Pt confirms NKFA, denies difficulties chewing/swallowing. Pt lives at home alone, independent with ADLs PTA. Pt has a son who is involved in care. History of type 2 DM, HbA1c (11/28) 6.4% indicating good control; medications PTA inclusive of Metformin 500 mg BID. Pt reports generally good appetite/PO intake PTA, recently has been more intentional with diet compliance.     Pt reports eating well in house thus far, tolerating diet. No food preferences vocalized at this time. Pt denies current GI distress, unknown last BM; ordered for Miralax 17 gm qD. Pt continues on diuretics and coumadin. Fingersticks elevated.  Per chart, pt is 78 year old female PMH HTN, HLD, type 2 DM presenting with SOB found to have NSTEMI. S/p C s/p TTE showed HFrEF 20-25% and large LV thrombus. Cardiology following.    Pt very [place], poor comprehension. Pt confirms NKFA, denies difficulties chewing/swallowing. Pt lives at home alone, independent with ADLs PTA. Pt has a son who is involved in care. History of type 2 DM, HbA1c (11/28) 6.4% indicating good control; medications PTA inclusive of Metformin 500 mg BID. Pt reports generally good appetite/PO intake PTA, recently has been more intentional with diet compliance.     Pt reports eating well in house thus far, tolerating diet. No food preferences vocalized at this time. Pt denies current GI distress, unknown last BM; ordered for Miralax 17 gm qD. Pt continues on diuretics and coumadin. Fingersticks elevated.

## 2023-12-05 NOTE — DIETITIAN INITIAL EVALUATION ADULT - PERSON TAUGHT/METHOD
Provided written and verbal coumadin and heart failure nutrition education. Topics include: limiting sodium/saturated fat/fluid intake, foods high and low in vitamin K, obtaining daily weights and monitoring changes with appropriate communication with MD.

## 2023-12-05 NOTE — DIETITIAN INITIAL EVALUATION ADULT - PERTINENT LABORATORY DATA
(12/5) Na 138, BUN 20, Cr 0.84, <H>, K+ 4.2  (11/28) HbA1c 6.4%<H>  POCT: (12/5) 202-232, (12/4) 175-207

## 2023-12-05 NOTE — PROGRESS NOTE ADULT - SUBJECTIVE AND OBJECTIVE BOX
Cardiology Progress Note  ------------------------------------------------------------------------------------------    SUBJECTIVE/EVENTS:  - y/d trop peaked 1700, CKMB peaked 28 y/d morning  - INR 4.5  - NAEO    -------------------------------------------------------------------------------------------  ROS:  CV: chest pain (-), palpitation (-), orthopnea (-), PND (-), edema (-)  PULM: SOB (-), cough (-), wheezing (-), hemoptysis (-).   CONST: fever (-), chills (-) or fatigue (-)  GI: abdominal distension (-), abdominal pain (-) , nausea/vomiting (-), hematemesis, (-), melena (-), hematochezia (-)  : dysuria (-), frequency (-), hematuria (-).   NEURO: numbness (-), weakness (-), dizziness (-)  MSK: myalgia (-), joint pain (-)   SKIN: itching (-), rash (-)  HEENT:  visual changes (-); vertigo or throat pain (-);  neck stiffness (-)   Psych: change in mood (-), anxiety (-), depression (-)     All other review of systems is negative unless indicated above.   -------------------------------------------------------------------------------------------  VITALS:  T(F): 97.7 (12-05), Max: 98.2 (12-04)  HR: 80 (12-05) (72 - 86)  BP: 96/55 (12-05) (93/54 - 96/56)  RR: 18 (12-05)  SpO2: 100% (12-05)  I&O's Summary    04 Dec 2023 07:01  -  05 Dec 2023 07:00  --------------------------------------------------------  IN: 200 mL / OUT: 0 mL / NET: 200 mL      ------------------------------------------------------------------------------------------  PHYSICAL EXAM:  GEN: NAD, sitting in bed  HEENT: NCAT, EOMI  CV: RRR, Ns1/s2, no m/r/g, JVP not elevated  RESP: CTA B/l, no w/r/r  ABD: soft, nt, nd  EXT: warm, 2+ pulses, no edema  SKIN: no visible lesions, rashes  NEURO: AAOx3, no focal deficits  PSYCH: Calm, cooperative  -------------------------------------------------------------------------------------------  LABS:                          9.9    8.22  )-----------( 336      ( 04 Dec 2023 05:30 )             30.6     12-04    137  |  102  |  19  ----------------------------<  192<H>  4.6   |  23  |  0.86    Ca    9.2      04 Dec 2023 05:30  Phos  3.5     12-04  Mg     2.10     12-04      PT/INR - ( 04 Dec 2023 05:30 )   PT: 47.9 sec;   INR: 4.47 ratio         PTT - ( 04 Dec 2023 05:30 )  PTT:36.7 sec  CARDIAC MARKERS ( 04 Dec 2023 11:47 )  1614 ng/L / x     / x     / 368 U/L / x     / 21.3 ng/mL  CARDIAC MARKERS ( 04 Dec 2023 05:30 )  1695 ng/L / x     / x     / 415 U/L / x     / 25.7 ng/mL  CARDIAC MARKERS ( 03 Dec 2023 23:30 )  1720 ng/L / x     / x     / 464 U/L / x     / 29.4 ng/mL  CARDIAC MARKERS ( 03 Dec 2023 20:15 )  1054 ng/L / x     / x     / 415 U/L / x     / 27.8 ng/mL  CARDIAC MARKERS ( 03 Dec 2023 11:30 )  484 ng/L / x     / x     / 56 U/L / x     / 1.5 ng/mL    -------------------------------------------------------------------------------------------  Meds:  acetaminophen     Tablet .. 650 milliGRAM(s) Oral every 6 hours PRN  aluminum hydroxide/magnesium hydroxide/simethicone Suspension 30 milliLiter(s) Oral every 4 hours PRN  atorvastatin 80 milliGRAM(s) Oral at bedtime  clopidogrel Tablet 75 milliGRAM(s) Oral daily  dextrose 5%. 1000 milliLiter(s) IV Continuous <Continuous>  dextrose 5%. 1000 milliLiter(s) IV Continuous <Continuous>  dextrose 50% Injectable 12.5 Gram(s) IV Push once  dextrose 50% Injectable 25 Gram(s) IV Push once  dextrose 50% Injectable 25 Gram(s) IV Push once  dextrose Oral Gel 15 Gram(s) Oral once PRN  furosemide    Tablet 40 milliGRAM(s) Oral daily  glucagon  Injectable 1 milliGRAM(s) IntraMuscular once  insulin lispro (ADMELOG) corrective regimen sliding scale   SubCutaneous three times a day before meals  insulin lispro (ADMELOG) corrective regimen sliding scale   SubCutaneous at bedtime  melatonin 3 milliGRAM(s) Oral at bedtime PRN  melatonin 3 milliGRAM(s) Oral once PRN  metoprolol succinate ER 25 milliGRAM(s) Oral daily  ondansetron Injectable 4 milliGRAM(s) IV Push every 8 hours PRN  polyethylene glycol 3350 17 Gram(s) Oral daily  sacubitril 24 mG/valsartan 26 mG 1 Tablet(s) Oral two times a day    -------------------------------------------------------------------------------------------  Cardiovascular Diagnostic Testing:      -------------------------------------------------------------------------------------------  Assessment and Plan:   Ms. Howell is a 79 yo F w/ HTN, HLD, and T2DM who presented w/ SOB that began acutely 1w prior to presentation found to have anterior Q waves, elevated trop and new signs of volume overload c/f late presentation anterior wall MI, now s/p LHC and viability showing large area of infarct anteriorly and also severe pLAD disease w/o intervention given viability. TTE showed HFrEF 20-25% w/ anteroseptal WMA and large LV thrombus. Started on AC, and GDMT.    #NSTEMI  #new HFrEF  #CAD   #LV thrombus   #HTN  Presentation c/w late presentation anterior wall MI, c/b newly rEF 20-25% w/ LV thrombus. Viability study showing large area of infarct in the apical and anteroseptal walls which correlates w/ pLAD disease. She also was noted to have medium size inferior and inferoseptal wall that is mixed infarct w/ partial viability. LHC w/ severe pLAD disease w/o intervention given viability findings. Started on GDMT. Pt w/ 1x vaso-vagal episode on 12/3 while using the bathroom, confirmed on tele to have bradycardic episode w/ prolonging IN c/w vagal tone, but subequently resolved and has since remained stable and asx.    Recommendations:   -   - continue warfarin for goal INR 2-3  - continue clopidogrel 75mg qd  - continue sacubitril-valsartan 24-26mg BID, hold for SBP <90  - continue metop succ 25mg qd, hold for HR <50  - continue atorvastatin 40mg  - SGLT2i once closer to DC    Note is incomplete    *** Recommendations are preliminary until cosigned by the attending.    Abdelrahman Owens MD  Cardiology Fellow    For all New Consults and Questions:  [email or web address]   Login: [facility] Progress Note  ------------------------------------------------------------------------------------------    SUBJECTIVE/EVENTS:  - y/d trop peaked 1700, CKMB peaked 28 y/d morning  - INR 4.5  - NAEO    -------------------------------------------------------------------------------------------  ROS:  CV: chest pain (-), palpitation (-), orthopnea (-), PND (-), edema (-)  PULM: SOB (-), cough (-), wheezing (-), hemoptysis (-).   CONST: fever (-), chills (-) or fatigue (-)  GI: abdominal distension (-), abdominal pain (-) , nausea/vomiting (-), hematemesis, (-), melena (-), hematochezia (-)  : dysuria (-), frequency (-), hematuria (-).   NEURO: numbness (-), weakness (-), dizziness (-)  MSK: myalgia (-), joint pain (-)   SKIN: itching (-), rash (-)  HEENT:  visual changes (-); vertigo or throat pain (-);  neck stiffness (-)   Psych: change in mood (-), anxiety (-), depression (-)     All other review of systems is negative unless indicated above.   -------------------------------------------------------------------------------------------  VITALS:  T(F): 97.7 (12-05), Max: 98.2 (12-04)  HR: 80 (12-05) (72 - 86)  BP: 96/55 (12-05) (93/54 - 96/56)  RR: 18 (12-05)  SpO2: 100% (12-05)  I&O's Summary    04 Dec 2023 07:01  -  05 Dec 2023 07:00  --------------------------------------------------------  IN: 200 mL / OUT: 0 mL / NET: 200 mL      ------------------------------------------------------------------------------------------  PHYSICAL EXAM:  GEN: NAD, sitting in bed  HEENT: NCAT, EOMI  CV: RRR, Ns1/s2, no m/r/g, JVP not elevated  RESP: CTA B/l, no w/r/r  ABD: soft, nt, nd  EXT: warm, 2+ pulses, no edema  SKIN: no visible lesions, rashes  NEURO: AAOx3, no focal deficits  PSYCH: Calm, cooperative  -------------------------------------------------------------------------------------------  LABS:                          9.9    8.22  )-----------( 336      ( 04 Dec 2023 05:30 )             30.6     12-04    137  |  102  |  19  ----------------------------<  192<H>  4.6   |  23  |  0.86    Ca    9.2      04 Dec 2023 05:30  Phos  3.5     12-04  Mg     2.10     12-04      PT/INR - ( 04 Dec 2023 05:30 )   PT: 47.9 sec;   INR: 4.47 ratio         PTT - ( 04 Dec 2023 05:30 )  PTT:36.7 sec  CARDIAC MARKERS ( 04 Dec 2023 11:47 )  1614 ng/L / x     / x     / 368 U/L / x     / 21.3 ng/mL  CARDIAC MARKERS ( 04 Dec 2023 05:30 )  1695 ng/L / x     / x     / 415 U/L / x     / 25.7 ng/mL  CARDIAC MARKERS ( 03 Dec 2023 23:30 )  1720 ng/L / x     / x     / 464 U/L / x     / 29.4 ng/mL  CARDIAC MARKERS ( 03 Dec 2023 20:15 )  1054 ng/L / x     / x     / 415 U/L / x     / 27.8 ng/mL  CARDIAC MARKERS ( 03 Dec 2023 11:30 )  484 ng/L / x     / x     / 56 U/L / x     / 1.5 ng/mL    -------------------------------------------------------------------------------------------  Meds:  acetaminophen     Tablet .. 650 milliGRAM(s) Oral every 6 hours PRN  aluminum hydroxide/magnesium hydroxide/simethicone Suspension 30 milliLiter(s) Oral every 4 hours PRN  atorvastatin 80 milliGRAM(s) Oral at bedtime  clopidogrel Tablet 75 milliGRAM(s) Oral daily  dextrose 5%. 1000 milliLiter(s) IV Continuous <Continuous>  dextrose 5%. 1000 milliLiter(s) IV Continuous <Continuous>  dextrose 50% Injectable 12.5 Gram(s) IV Push once  dextrose 50% Injectable 25 Gram(s) IV Push once  dextrose 50% Injectable 25 Gram(s) IV Push once  dextrose Oral Gel 15 Gram(s) Oral once PRN  furosemide    Tablet 40 milliGRAM(s) Oral daily  glucagon  Injectable 1 milliGRAM(s) IntraMuscular once  insulin lispro (ADMELOG) corrective regimen sliding scale   SubCutaneous three times a day before meals  insulin lispro (ADMELOG) corrective regimen sliding scale   SubCutaneous at bedtime  melatonin 3 milliGRAM(s) Oral at bedtime PRN  melatonin 3 milliGRAM(s) Oral once PRN  metoprolol succinate ER 25 milliGRAM(s) Oral daily  ondansetron Injectable 4 milliGRAM(s) IV Push every 8 hours PRN  polyethylene glycol 3350 17 Gram(s) Oral daily  sacubitril 24 mG/valsartan 26 mG 1 Tablet(s) Oral two times a day    -------------------------------------------------------------------------------------------  Cardiovascular Diagnostic Testing:      -------------------------------------------------------------------------------------------  Assessment and Plan:   Ms. Howell is a 77 yo F w/ HTN, HLD, and T2DM who presented w/ SOB that began acutely 1w prior to presentation found to have anterior Q waves, elevated trop and new signs of volume overload c/f late presentation anterior wall MI, now s/p LHC and viability showing large area of infarct anteriorly and also severe pLAD disease w/o intervention given viability. TTE showed HFrEF 20-25% w/ anteroseptal WMA and large LV thrombus. Started on AC, and GDMT.    #NSTEMI  #new HFrEF  #CAD   #LV thrombus   #HTN  Presentation c/w late presentation anterior wall MI, c/b newly rEF 20-25% w/ LV thrombus. Viability study showing large area of infarct in the apical and anteroseptal walls which correlates w/ pLAD disease. She also was noted to have medium size inferior and inferoseptal wall that is mixed infarct w/ partial viability. LHC w/ severe pLAD disease w/o intervention given viability findings. Started on GDMT. Pt w/ 1x vaso-vagal episode on 12/3 while using the bathroom, confirmed on tele to have bradycardic episode w/ prolonging MO c/w vagal tone, but subequently resolved and has since remained stable and asx.    Recommendations:   -   - continue warfarin for goal INR 2-3  - continue clopidogrel 75mg qd  - continue sacubitril-valsartan 24-26mg BID, hold for SBP <90  - continue metop succ 25mg qd, hold for HR <50  - continue atorvastatin 40mg  - SGLT2i once closer to DC    Note is incomplete    *** Recommendations are preliminary until cosigned by the attending.    Abdelrahman Owens MD  Cardiology Fellow    For all New Consults and Questions:  [email or web address]   Login: [facility] Progress Note  ------------------------------------------------------------------------------------------    SUBJECTIVE/EVENTS:  - y/d trop peaked 1700, CKMB peaked 28 y/d morning  - INR 4.5  - NAEO    -------------------------------------------------------------------------------------------  ROS:  CV: chest pain (-), palpitation (-), orthopnea (-), PND (-), edema (-)  PULM: SOB (-), cough (-), wheezing (-), hemoptysis (-).   CONST: fever (-), chills (-) or fatigue (-)  GI: abdominal distension (-), abdominal pain (-) , nausea/vomiting (-), hematemesis, (-), melena (-), hematochezia (-)  : dysuria (-), frequency (-), hematuria (-).   NEURO: numbness (-), weakness (-), dizziness (-)  MSK: myalgia (-), joint pain (-)   SKIN: itching (-), rash (-)  HEENT:  visual changes (-); vertigo or throat pain (-);  neck stiffness (-)   Psych: change in mood (-), anxiety (-), depression (-)     All other review of systems is negative unless indicated above.   -------------------------------------------------------------------------------------------  VITALS:  T(F): 97.7 (12-05), Max: 98.2 (12-04)  HR: 80 (12-05) (72 - 86)  BP: 96/55 (12-05) (93/54 - 96/56)  RR: 18 (12-05)  SpO2: 100% (12-05)  I&O's Summary    04 Dec 2023 07:01  -  05 Dec 2023 07:00  --------------------------------------------------------  IN: 200 mL / OUT: 0 mL / NET: 200 mL      ------------------------------------------------------------------------------------------  PHYSICAL EXAM:  GEN: NAD, sitting in bed  HEENT: NCAT, EOMI  CV: RRR, Ns1/s2, no m/r/g, JVP not elevated  RESP: CTA B/l, no w/r/r  ABD: soft, nt, nd  EXT: warm, 2+ pulses, no edema  SKIN: no visible lesions, rashes  NEURO: AAOx3, no focal deficits  PSYCH: Calm, cooperative  -------------------------------------------------------------------------------------------  LABS:                          9.9    8.22  )-----------( 336      ( 04 Dec 2023 05:30 )             30.6     12-04    137  |  102  |  19  ----------------------------<  192<H>  4.6   |  23  |  0.86    Ca    9.2      04 Dec 2023 05:30  Phos  3.5     12-04  Mg     2.10     12-04      PT/INR - ( 04 Dec 2023 05:30 )   PT: 47.9 sec;   INR: 4.47 ratio         PTT - ( 04 Dec 2023 05:30 )  PTT:36.7 sec  CARDIAC MARKERS ( 04 Dec 2023 11:47 )  1614 ng/L / x     / x     / 368 U/L / x     / 21.3 ng/mL  CARDIAC MARKERS ( 04 Dec 2023 05:30 )  1695 ng/L / x     / x     / 415 U/L / x     / 25.7 ng/mL  CARDIAC MARKERS ( 03 Dec 2023 23:30 )  1720 ng/L / x     / x     / 464 U/L / x     / 29.4 ng/mL  CARDIAC MARKERS ( 03 Dec 2023 20:15 )  1054 ng/L / x     / x     / 415 U/L / x     / 27.8 ng/mL  CARDIAC MARKERS ( 03 Dec 2023 11:30 )  484 ng/L / x     / x     / 56 U/L / x     / 1.5 ng/mL    -------------------------------------------------------------------------------------------  Meds:  acetaminophen     Tablet .. 650 milliGRAM(s) Oral every 6 hours PRN  aluminum hydroxide/magnesium hydroxide/simethicone Suspension 30 milliLiter(s) Oral every 4 hours PRN  atorvastatin 80 milliGRAM(s) Oral at bedtime  clopidogrel Tablet 75 milliGRAM(s) Oral daily  dextrose 5%. 1000 milliLiter(s) IV Continuous <Continuous>  dextrose 5%. 1000 milliLiter(s) IV Continuous <Continuous>  dextrose 50% Injectable 12.5 Gram(s) IV Push once  dextrose 50% Injectable 25 Gram(s) IV Push once  dextrose 50% Injectable 25 Gram(s) IV Push once  dextrose Oral Gel 15 Gram(s) Oral once PRN  furosemide    Tablet 40 milliGRAM(s) Oral daily  glucagon  Injectable 1 milliGRAM(s) IntraMuscular once  insulin lispro (ADMELOG) corrective regimen sliding scale   SubCutaneous three times a day before meals  insulin lispro (ADMELOG) corrective regimen sliding scale   SubCutaneous at bedtime  melatonin 3 milliGRAM(s) Oral at bedtime PRN  melatonin 3 milliGRAM(s) Oral once PRN  metoprolol succinate ER 25 milliGRAM(s) Oral daily  ondansetron Injectable 4 milliGRAM(s) IV Push every 8 hours PRN  polyethylene glycol 3350 17 Gram(s) Oral daily  sacubitril 24 mG/valsartan 26 mG 1 Tablet(s) Oral two times a day    -------------------------------------------------------------------------------------------  Cardiovascular Diagnostic Testing:      -------------------------------------------------------------------------------------------  Assessment and Plan:   Ms. Howell is a 79 yo F w/ HTN, HLD, and T2DM who presented w/ SOB that began acutely 1w prior to presentation found to have anterior Q waves, elevated trop and new signs of volume overload c/f late presentation anterior wall MI, now s/p LHC and viability showing large area of infarct anteriorly and also severe pLAD disease w/o intervention given viability. TTE showed HFrEF 20-25% w/ anteroseptal WMA and large LV thrombus. Started on AC, and GDMT.    #NSTEMI  #new HFrEF  #CAD   #LV thrombus   #HTN  Presentation c/w late presentation anterior wall MI, c/b newly rEF 20-25% w/ LV thrombus. Viability study showing large area of infarct in the apical and anteroseptal walls which correlates w/ pLAD disease. She also was noted to have medium size inferior and inferoseptal wall that is mixed infarct w/ partial viability. LHC w/ severe pLAD disease w/o intervention given viability findings. Started on GDMT. Pt w/ 1x vaso-vagal episode on 12/3 while using the bathroom, confirmed on tele to have bradycardic episode w/ prolonging CA c/w vagal tone, but subequently resolved and has since remained stable and asx.    Recommendations:   - please give furosemide 40mg IV BID today, can resume oral furosemide tomorrow  - continue warfarin for goal INR 2-3  - continue clopidogrel 75mg qd  - continue sacubitril-valsartan 24-26mg BID, hold for SBP <90  - continue metop succ 25mg qd, hold for HR <50  - continue atorvastatin 40mg  - SGLT2i once closer to DC    Note is incomplete    *** Recommendations are preliminary until cosigned by the attending.    Abdelrahman Owens MD  Cardiology Fellow    For all New Consults and Questions:  [email or web address]   Login: [facility] Progress Note  ------------------------------------------------------------------------------------------    SUBJECTIVE/EVENTS:  - y/d trop peaked 1700, CKMB peaked 28 y/d morning  - INR 4.5  - NAEO    -------------------------------------------------------------------------------------------  ROS:  CV: chest pain (-), palpitation (-), orthopnea (-), PND (-), edema (-)  PULM: SOB (-), cough (-), wheezing (-), hemoptysis (-).   CONST: fever (-), chills (-) or fatigue (-)  GI: abdominal distension (-), abdominal pain (-) , nausea/vomiting (-), hematemesis, (-), melena (-), hematochezia (-)  : dysuria (-), frequency (-), hematuria (-).   NEURO: numbness (-), weakness (-), dizziness (-)  MSK: myalgia (-), joint pain (-)   SKIN: itching (-), rash (-)  HEENT:  visual changes (-); vertigo or throat pain (-);  neck stiffness (-)   Psych: change in mood (-), anxiety (-), depression (-)     All other review of systems is negative unless indicated above.   -------------------------------------------------------------------------------------------  VITALS:  T(F): 97.7 (12-05), Max: 98.2 (12-04)  HR: 80 (12-05) (72 - 86)  BP: 96/55 (12-05) (93/54 - 96/56)  RR: 18 (12-05)  SpO2: 100% (12-05)  I&O's Summary    04 Dec 2023 07:01  -  05 Dec 2023 07:00  --------------------------------------------------------  IN: 200 mL / OUT: 0 mL / NET: 200 mL      ------------------------------------------------------------------------------------------  PHYSICAL EXAM:  GEN: NAD, sitting in bed  HEENT: NCAT, EOMI  CV: RRR, Ns1/s2, no m/r/g, JVP not elevated  RESP: CTA B/l, no w/r/r  ABD: soft, nt, nd  EXT: warm, 2+ pulses, no edema  SKIN: no visible lesions, rashes  NEURO: AAOx3, no focal deficits  PSYCH: Calm, cooperative  -------------------------------------------------------------------------------------------  LABS:                          9.9    8.22  )-----------( 336      ( 04 Dec 2023 05:30 )             30.6     12-04    137  |  102  |  19  ----------------------------<  192<H>  4.6   |  23  |  0.86    Ca    9.2      04 Dec 2023 05:30  Phos  3.5     12-04  Mg     2.10     12-04      PT/INR - ( 04 Dec 2023 05:30 )   PT: 47.9 sec;   INR: 4.47 ratio         PTT - ( 04 Dec 2023 05:30 )  PTT:36.7 sec  CARDIAC MARKERS ( 04 Dec 2023 11:47 )  1614 ng/L / x     / x     / 368 U/L / x     / 21.3 ng/mL  CARDIAC MARKERS ( 04 Dec 2023 05:30 )  1695 ng/L / x     / x     / 415 U/L / x     / 25.7 ng/mL  CARDIAC MARKERS ( 03 Dec 2023 23:30 )  1720 ng/L / x     / x     / 464 U/L / x     / 29.4 ng/mL  CARDIAC MARKERS ( 03 Dec 2023 20:15 )  1054 ng/L / x     / x     / 415 U/L / x     / 27.8 ng/mL  CARDIAC MARKERS ( 03 Dec 2023 11:30 )  484 ng/L / x     / x     / 56 U/L / x     / 1.5 ng/mL    -------------------------------------------------------------------------------------------  Meds:  acetaminophen     Tablet .. 650 milliGRAM(s) Oral every 6 hours PRN  aluminum hydroxide/magnesium hydroxide/simethicone Suspension 30 milliLiter(s) Oral every 4 hours PRN  atorvastatin 80 milliGRAM(s) Oral at bedtime  clopidogrel Tablet 75 milliGRAM(s) Oral daily  dextrose 5%. 1000 milliLiter(s) IV Continuous <Continuous>  dextrose 5%. 1000 milliLiter(s) IV Continuous <Continuous>  dextrose 50% Injectable 12.5 Gram(s) IV Push once  dextrose 50% Injectable 25 Gram(s) IV Push once  dextrose 50% Injectable 25 Gram(s) IV Push once  dextrose Oral Gel 15 Gram(s) Oral once PRN  furosemide    Tablet 40 milliGRAM(s) Oral daily  glucagon  Injectable 1 milliGRAM(s) IntraMuscular once  insulin lispro (ADMELOG) corrective regimen sliding scale   SubCutaneous three times a day before meals  insulin lispro (ADMELOG) corrective regimen sliding scale   SubCutaneous at bedtime  melatonin 3 milliGRAM(s) Oral at bedtime PRN  melatonin 3 milliGRAM(s) Oral once PRN  metoprolol succinate ER 25 milliGRAM(s) Oral daily  ondansetron Injectable 4 milliGRAM(s) IV Push every 8 hours PRN  polyethylene glycol 3350 17 Gram(s) Oral daily  sacubitril 24 mG/valsartan 26 mG 1 Tablet(s) Oral two times a day    -------------------------------------------------------------------------------------------  Cardiovascular Diagnostic Testing:      -------------------------------------------------------------------------------------------  Assessment and Plan:   Ms. Howell is a 77 yo F w/ HTN, HLD, and T2DM who presented w/ SOB that began acutely 1w prior to presentation found to have anterior Q waves, elevated trop and new signs of volume overload c/f late presentation anterior wall MI, now s/p LHC and viability showing large area of infarct anteriorly and also severe pLAD disease w/o intervention given viability. TTE showed HFrEF 20-25% w/ anteroseptal WMA and large LV thrombus. Started on AC, and GDMT.    #NSTEMI  #new HFrEF  #CAD   #LV thrombus   #HTN  Presentation c/w late presentation anterior wall MI, c/b newly rEF 20-25% w/ LV thrombus. Viability study showing large area of infarct in the apical and anteroseptal walls which correlates w/ pLAD disease. She also was noted to have medium size inferior and inferoseptal wall that is mixed infarct w/ partial viability. LHC w/ severe pLAD disease w/o intervention given viability findings. Started on GDMT. Pt w/ 1x vaso-vagal episode on 12/3 while using the bathroom, confirmed on tele to have bradycardic episode w/ prolonging FL c/w vagal tone, but subequently resolved and has since remained stable and asx.    Recommendations:   - please give furosemide 40mg IV BID today, can resume oral furosemide tomorrow  - continue warfarin for goal INR 2-3  - continue clopidogrel 75mg qd  - continue sacubitril-valsartan 24-26mg BID, hold for SBP <90  - continue metop succ 25mg qd, hold for HR <50  - continue atorvastatin 40mg  - SGLT2i once closer to DC    Note is incomplete    *** Recommendations are preliminary until cosigned by the attending.    Abdelrahman Owens MD  Cardiology Fellow    For all New Consults and Questions:  [email or web address]   Login: [facility] Progress Note  ------------------------------------------------------------------------------------------    SUBJECTIVE/EVENTS:  - y/d trop peaked 1700, CKMB peaked 28 y/d morning  - INR 4.5  - NAEO    -------------------------------------------------------------------------------------------  ROS:  CV: chest pain (-), palpitation (-), orthopnea (-), PND (-), edema (-)  PULM: SOB (-), cough (-), wheezing (-), hemoptysis (-).   CONST: fever (-), chills (-) or fatigue (-)  GI: abdominal distension (-), abdominal pain (-) , nausea/vomiting (-), hematemesis, (-), melena (-), hematochezia (-)  : dysuria (-), frequency (-), hematuria (-).   NEURO: numbness (-), weakness (-), dizziness (-)  MSK: myalgia (-), joint pain (-)   SKIN: itching (-), rash (-)  HEENT:  visual changes (-); vertigo or throat pain (-);  neck stiffness (-)   Psych: change in mood (-), anxiety (-), depression (-)     All other review of systems is negative unless indicated above.   -------------------------------------------------------------------------------------------  VITALS:  T(F): 97.7 (12-05), Max: 98.2 (12-04)  HR: 80 (12-05) (72 - 86)  BP: 96/55 (12-05) (93/54 - 96/56)  RR: 18 (12-05)  SpO2: 100% (12-05)  I&O's Summary    04 Dec 2023 07:01  -  05 Dec 2023 07:00  --------------------------------------------------------  IN: 200 mL / OUT: 0 mL / NET: 200 mL      ------------------------------------------------------------------------------------------  PHYSICAL EXAM:  GEN: NAD, sitting in bed  HEENT: NCAT, EOMI  CV: RRR, Ns1/s2, no m/r/g, JVP not elevated  RESP: CTA B/l, no w/r/r  ABD: soft, nt, nd  EXT: warm, 2+ pulses, no edema  SKIN: no visible lesions, rashes  NEURO: AAOx3, no focal deficits  PSYCH: Calm, cooperative  -------------------------------------------------------------------------------------------  LABS:                          9.9    8.22  )-----------( 336      ( 04 Dec 2023 05:30 )             30.6     12-04    137  |  102  |  19  ----------------------------<  192<H>  4.6   |  23  |  0.86    Ca    9.2      04 Dec 2023 05:30  Phos  3.5     12-04  Mg     2.10     12-04      PT/INR - ( 04 Dec 2023 05:30 )   PT: 47.9 sec;   INR: 4.47 ratio         PTT - ( 04 Dec 2023 05:30 )  PTT:36.7 sec  CARDIAC MARKERS ( 04 Dec 2023 11:47 )  1614 ng/L / x     / x     / 368 U/L / x     / 21.3 ng/mL  CARDIAC MARKERS ( 04 Dec 2023 05:30 )  1695 ng/L / x     / x     / 415 U/L / x     / 25.7 ng/mL  CARDIAC MARKERS ( 03 Dec 2023 23:30 )  1720 ng/L / x     / x     / 464 U/L / x     / 29.4 ng/mL  CARDIAC MARKERS ( 03 Dec 2023 20:15 )  1054 ng/L / x     / x     / 415 U/L / x     / 27.8 ng/mL  CARDIAC MARKERS ( 03 Dec 2023 11:30 )  484 ng/L / x     / x     / 56 U/L / x     / 1.5 ng/mL    -------------------------------------------------------------------------------------------  Meds:  acetaminophen     Tablet .. 650 milliGRAM(s) Oral every 6 hours PRN  aluminum hydroxide/magnesium hydroxide/simethicone Suspension 30 milliLiter(s) Oral every 4 hours PRN  atorvastatin 80 milliGRAM(s) Oral at bedtime  clopidogrel Tablet 75 milliGRAM(s) Oral daily  dextrose 5%. 1000 milliLiter(s) IV Continuous <Continuous>  dextrose 5%. 1000 milliLiter(s) IV Continuous <Continuous>  dextrose 50% Injectable 12.5 Gram(s) IV Push once  dextrose 50% Injectable 25 Gram(s) IV Push once  dextrose 50% Injectable 25 Gram(s) IV Push once  dextrose Oral Gel 15 Gram(s) Oral once PRN  furosemide    Tablet 40 milliGRAM(s) Oral daily  glucagon  Injectable 1 milliGRAM(s) IntraMuscular once  insulin lispro (ADMELOG) corrective regimen sliding scale   SubCutaneous three times a day before meals  insulin lispro (ADMELOG) corrective regimen sliding scale   SubCutaneous at bedtime  melatonin 3 milliGRAM(s) Oral at bedtime PRN  melatonin 3 milliGRAM(s) Oral once PRN  metoprolol succinate ER 25 milliGRAM(s) Oral daily  ondansetron Injectable 4 milliGRAM(s) IV Push every 8 hours PRN  polyethylene glycol 3350 17 Gram(s) Oral daily  sacubitril 24 mG/valsartan 26 mG 1 Tablet(s) Oral two times a day    -------------------------------------------------------------------------------------------  Cardiovascular Diagnostic Testing:      -------------------------------------------------------------------------------------------  Assessment and Plan:   Ms. Howell is a 79 yo F w/ HTN, HLD, and T2DM who presented w/ SOB that began acutely 1w prior to presentation found to have anterior Q waves, elevated trop and new signs of volume overload c/f late presentation anterior wall MI, now s/p LHC and viability showing large area of infarct anteriorly and also severe pLAD disease w/o intervention given viability. TTE showed HFrEF 20-25% w/ anteroseptal WMA and large LV thrombus. Started on AC, and GDMT.    #NSTEMI  #new HFrEF  #CAD   #LV thrombus   #HTN  Presentation c/w late presentation anterior wall MI, c/b newly rEF 20-25% w/ LV thrombus. Viability study showing large area of infarct in the apical and anteroseptal walls which correlates w/ pLAD disease. She also was noted to have medium size inferior and inferoseptal wall that is mixed infarct w/ partial viability. LHC w/ severe pLAD disease w/o intervention given viability findings. Started on GDMT. Pt w/ 1x vaso-vagal episode on 12/3 while using the bathroom, confirmed on tele to have bradycardic episode w/ prolonging IA c/w vagal tone, but subequently resolved and has since remained stable and asx.    Recommendations:   - please give furosemide 40mg IV BID today, can resume furosemide 40mg BID tomorrow  - continue warfarin for goal INR 2-3  - continue clopidogrel 75mg qd  - continue sacubitril-valsartan 24-26mg BID, hold for SBP <90  - continue metop succ 25mg qd, hold for HR <50  - continue atorvastatin 40mg  - Can start SGLT2i as outpt    *** Recommendations are preliminary until cosigned by the attending.    Abdelrahman Owens MD  Cardiology Fellow    For all New Consults and Questions:  [email or web address]   Login: [facility] Progress Note  ------------------------------------------------------------------------------------------    SUBJECTIVE/EVENTS:  - y/d trop peaked 1700, CKMB peaked 28 y/d morning  - INR 4.5  - NAEO    -------------------------------------------------------------------------------------------  ROS:  CV: chest pain (-), palpitation (-), orthopnea (-), PND (-), edema (-)  PULM: SOB (-), cough (-), wheezing (-), hemoptysis (-).   CONST: fever (-), chills (-) or fatigue (-)  GI: abdominal distension (-), abdominal pain (-) , nausea/vomiting (-), hematemesis, (-), melena (-), hematochezia (-)  : dysuria (-), frequency (-), hematuria (-).   NEURO: numbness (-), weakness (-), dizziness (-)  MSK: myalgia (-), joint pain (-)   SKIN: itching (-), rash (-)  HEENT:  visual changes (-); vertigo or throat pain (-);  neck stiffness (-)   Psych: change in mood (-), anxiety (-), depression (-)     All other review of systems is negative unless indicated above.   -------------------------------------------------------------------------------------------  VITALS:  T(F): 97.7 (12-05), Max: 98.2 (12-04)  HR: 80 (12-05) (72 - 86)  BP: 96/55 (12-05) (93/54 - 96/56)  RR: 18 (12-05)  SpO2: 100% (12-05)  I&O's Summary    04 Dec 2023 07:01  -  05 Dec 2023 07:00  --------------------------------------------------------  IN: 200 mL / OUT: 0 mL / NET: 200 mL      ------------------------------------------------------------------------------------------  PHYSICAL EXAM:  GEN: NAD, sitting in bed  HEENT: NCAT, EOMI  CV: RRR, Ns1/s2, no m/r/g, JVP not elevated  RESP: CTA B/l, no w/r/r  ABD: soft, nt, nd  EXT: warm, 2+ pulses, no edema  SKIN: no visible lesions, rashes  NEURO: AAOx3, no focal deficits  PSYCH: Calm, cooperative  -------------------------------------------------------------------------------------------  LABS:                          9.9    8.22  )-----------( 336      ( 04 Dec 2023 05:30 )             30.6     12-04    137  |  102  |  19  ----------------------------<  192<H>  4.6   |  23  |  0.86    Ca    9.2      04 Dec 2023 05:30  Phos  3.5     12-04  Mg     2.10     12-04      PT/INR - ( 04 Dec 2023 05:30 )   PT: 47.9 sec;   INR: 4.47 ratio         PTT - ( 04 Dec 2023 05:30 )  PTT:36.7 sec  CARDIAC MARKERS ( 04 Dec 2023 11:47 )  1614 ng/L / x     / x     / 368 U/L / x     / 21.3 ng/mL  CARDIAC MARKERS ( 04 Dec 2023 05:30 )  1695 ng/L / x     / x     / 415 U/L / x     / 25.7 ng/mL  CARDIAC MARKERS ( 03 Dec 2023 23:30 )  1720 ng/L / x     / x     / 464 U/L / x     / 29.4 ng/mL  CARDIAC MARKERS ( 03 Dec 2023 20:15 )  1054 ng/L / x     / x     / 415 U/L / x     / 27.8 ng/mL  CARDIAC MARKERS ( 03 Dec 2023 11:30 )  484 ng/L / x     / x     / 56 U/L / x     / 1.5 ng/mL    -------------------------------------------------------------------------------------------  Meds:  acetaminophen     Tablet .. 650 milliGRAM(s) Oral every 6 hours PRN  aluminum hydroxide/magnesium hydroxide/simethicone Suspension 30 milliLiter(s) Oral every 4 hours PRN  atorvastatin 80 milliGRAM(s) Oral at bedtime  clopidogrel Tablet 75 milliGRAM(s) Oral daily  dextrose 5%. 1000 milliLiter(s) IV Continuous <Continuous>  dextrose 5%. 1000 milliLiter(s) IV Continuous <Continuous>  dextrose 50% Injectable 12.5 Gram(s) IV Push once  dextrose 50% Injectable 25 Gram(s) IV Push once  dextrose 50% Injectable 25 Gram(s) IV Push once  dextrose Oral Gel 15 Gram(s) Oral once PRN  furosemide    Tablet 40 milliGRAM(s) Oral daily  glucagon  Injectable 1 milliGRAM(s) IntraMuscular once  insulin lispro (ADMELOG) corrective regimen sliding scale   SubCutaneous three times a day before meals  insulin lispro (ADMELOG) corrective regimen sliding scale   SubCutaneous at bedtime  melatonin 3 milliGRAM(s) Oral at bedtime PRN  melatonin 3 milliGRAM(s) Oral once PRN  metoprolol succinate ER 25 milliGRAM(s) Oral daily  ondansetron Injectable 4 milliGRAM(s) IV Push every 8 hours PRN  polyethylene glycol 3350 17 Gram(s) Oral daily  sacubitril 24 mG/valsartan 26 mG 1 Tablet(s) Oral two times a day    -------------------------------------------------------------------------------------------  Cardiovascular Diagnostic Testing:      -------------------------------------------------------------------------------------------  Assessment and Plan:   Ms. Howell is a 77 yo F w/ HTN, HLD, and T2DM who presented w/ SOB that began acutely 1w prior to presentation found to have anterior Q waves, elevated trop and new signs of volume overload c/f late presentation anterior wall MI, now s/p LHC and viability showing large area of infarct anteriorly and also severe pLAD disease w/o intervention given viability. TTE showed HFrEF 20-25% w/ anteroseptal WMA and large LV thrombus. Started on AC, and GDMT.    #NSTEMI  #new HFrEF  #CAD   #LV thrombus   #HTN  Presentation c/w late presentation anterior wall MI, c/b newly rEF 20-25% w/ LV thrombus. Viability study showing large area of infarct in the apical and anteroseptal walls which correlates w/ pLAD disease. She also was noted to have medium size inferior and inferoseptal wall that is mixed infarct w/ partial viability. LHC w/ severe pLAD disease w/o intervention given viability findings. Started on GDMT. Pt w/ 1x vaso-vagal episode on 12/3 while using the bathroom, confirmed on tele to have bradycardic episode w/ prolonging AK c/w vagal tone, but subequently resolved and has since remained stable and asx.    Recommendations:   - please give furosemide 40mg IV BID today, can resume furosemide 40mg BID tomorrow  - continue warfarin for goal INR 2-3  - continue clopidogrel 75mg qd  - continue sacubitril-valsartan 24-26mg BID, hold for SBP <90  - continue metop succ 25mg qd, hold for HR <50  - continue atorvastatin 40mg  - Can start SGLT2i as outpt    *** Recommendations are preliminary until cosigned by the attending.    Abdelrahman Owens MD  Cardiology Fellow    For all New Consults and Questions:  [email or web address]   Login: [facility] Progress Note  ------------------------------------------------------------------------------------------    SUBJECTIVE/EVENTS:  - y/d trop peaked 1700, CKMB peaked 28 y/d morning  - INR 4.5  - NAEO    -------------------------------------------------------------------------------------------  ROS:  CV: chest pain (-), palpitation (-), orthopnea (-), PND (-), edema (-)  PULM: SOB (-), cough (-), wheezing (-), hemoptysis (-).   CONST: fever (-), chills (-) or fatigue (-)  GI: abdominal distension (-), abdominal pain (-) , nausea/vomiting (-), hematemesis, (-), melena (-), hematochezia (-)  : dysuria (-), frequency (-), hematuria (-).   NEURO: numbness (-), weakness (-), dizziness (-)  MSK: myalgia (-), joint pain (-)   SKIN: itching (-), rash (-)  HEENT:  visual changes (-); vertigo or throat pain (-);  neck stiffness (-)   Psych: change in mood (-), anxiety (-), depression (-)     All other review of systems is negative unless indicated above.   -------------------------------------------------------------------------------------------  VITALS:  T(F): 97.7 (12-05), Max: 98.2 (12-04)  HR: 80 (12-05) (72 - 86)  BP: 96/55 (12-05) (93/54 - 96/56)  RR: 18 (12-05)  SpO2: 100% (12-05)  I&O's Summary    04 Dec 2023 07:01  -  05 Dec 2023 07:00  --------------------------------------------------------  IN: 200 mL / OUT: 0 mL / NET: 200 mL      ------------------------------------------------------------------------------------------  PHYSICAL EXAM:  GEN: NAD, sitting in bed  HEENT: NCAT, EOMI  CV: RRR, Ns1/s2, no m/r/g, JVP not elevated  RESP: CTA B/l, no w/r/r  ABD: soft, nt, nd  EXT: warm, 2+ pulses, no edema  SKIN: no visible lesions, rashes  NEURO: AAOx3, no focal deficits  PSYCH: Calm, cooperative  -------------------------------------------------------------------------------------------  LABS:                          9.9    8.22  )-----------( 336      ( 04 Dec 2023 05:30 )             30.6     12-04    137  |  102  |  19  ----------------------------<  192<H>  4.6   |  23  |  0.86    Ca    9.2      04 Dec 2023 05:30  Phos  3.5     12-04  Mg     2.10     12-04      PT/INR - ( 04 Dec 2023 05:30 )   PT: 47.9 sec;   INR: 4.47 ratio         PTT - ( 04 Dec 2023 05:30 )  PTT:36.7 sec  CARDIAC MARKERS ( 04 Dec 2023 11:47 )  1614 ng/L / x     / x     / 368 U/L / x     / 21.3 ng/mL  CARDIAC MARKERS ( 04 Dec 2023 05:30 )  1695 ng/L / x     / x     / 415 U/L / x     / 25.7 ng/mL  CARDIAC MARKERS ( 03 Dec 2023 23:30 )  1720 ng/L / x     / x     / 464 U/L / x     / 29.4 ng/mL  CARDIAC MARKERS ( 03 Dec 2023 20:15 )  1054 ng/L / x     / x     / 415 U/L / x     / 27.8 ng/mL  CARDIAC MARKERS ( 03 Dec 2023 11:30 )  484 ng/L / x     / x     / 56 U/L / x     / 1.5 ng/mL    -------------------------------------------------------------------------------------------  Meds:  acetaminophen     Tablet .. 650 milliGRAM(s) Oral every 6 hours PRN  aluminum hydroxide/magnesium hydroxide/simethicone Suspension 30 milliLiter(s) Oral every 4 hours PRN  atorvastatin 80 milliGRAM(s) Oral at bedtime  clopidogrel Tablet 75 milliGRAM(s) Oral daily  dextrose 5%. 1000 milliLiter(s) IV Continuous <Continuous>  dextrose 5%. 1000 milliLiter(s) IV Continuous <Continuous>  dextrose 50% Injectable 12.5 Gram(s) IV Push once  dextrose 50% Injectable 25 Gram(s) IV Push once  dextrose 50% Injectable 25 Gram(s) IV Push once  dextrose Oral Gel 15 Gram(s) Oral once PRN  furosemide    Tablet 40 milliGRAM(s) Oral daily  glucagon  Injectable 1 milliGRAM(s) IntraMuscular once  insulin lispro (ADMELOG) corrective regimen sliding scale   SubCutaneous three times a day before meals  insulin lispro (ADMELOG) corrective regimen sliding scale   SubCutaneous at bedtime  melatonin 3 milliGRAM(s) Oral at bedtime PRN  melatonin 3 milliGRAM(s) Oral once PRN  metoprolol succinate ER 25 milliGRAM(s) Oral daily  ondansetron Injectable 4 milliGRAM(s) IV Push every 8 hours PRN  polyethylene glycol 3350 17 Gram(s) Oral daily  sacubitril 24 mG/valsartan 26 mG 1 Tablet(s) Oral two times a day    -------------------------------------------------------------------------------------------  Cardiovascular Diagnostic Testing:      -------------------------------------------------------------------------------------------  Assessment and Plan:   Ms. Howell is a 79 yo F w/ HTN, HLD, and T2DM who presented w/ SOB that began acutely 1w prior to presentation found to have anterior Q waves, elevated trop and new signs of volume overload c/f late presentation anterior wall MI, now s/p LHC and viability showing large area of infarct anteriorly and also severe pLAD disease w/o intervention given viability. TTE showed HFrEF 20-25% w/ anteroseptal WMA and large LV thrombus. Started on AC, and GDMT.    #NSTEMI  #new HFrEF  #CAD   #LV thrombus   #HTN  Presentation c/w late presentation anterior wall MI, c/b newly rEF 20-25% w/ LV thrombus. Viability study showing large area of infarct in the apical and anteroseptal walls which correlates w/ pLAD disease. She also was noted to have medium size inferior and inferoseptal wall that is mixed infarct w/ partial viability. LHC w/ severe pLAD disease w/o intervention given viability findings. Started on GDMT. Pt w/ 1x vaso-vagal episode on 12/3 while using the bathroom, confirmed on tele to have bradycardic episode w/ prolonging OR c/w vagal tone, but subequently resolved and has since remained stable and asx.    Recommendations:   - please give furosemide 40mg IV BID today, can resume furosemide 40mg BID   - start spironolactone 12.5mg qd, hold for SBP <90  - continue warfarin for goal INR 2-3  - continue clopidogrel 75mg qd  - continue sacubitril-valsartan 24-26mg BID, hold for SBP <90  - continue metop succ 25mg qd, hold for HR <50  - continue atorvastatin 40mg  - Can start SGLT2i as outpt    *** Recommendations are preliminary until cosigned by the attending.    Abdelrahman Owens MD  Cardiology Fellow    For all New Consults and Questions:  [email or web address]   Login: [email or web address] Progress Note  ------------------------------------------------------------------------------------------    SUBJECTIVE/EVENTS:  - y/d trop peaked 1700, CKMB peaked 28 y/d morning  - INR 4.5  - NAEO    -------------------------------------------------------------------------------------------  ROS:  CV: chest pain (-), palpitation (-), orthopnea (-), PND (-), edema (-)  PULM: SOB (-), cough (-), wheezing (-), hemoptysis (-).   CONST: fever (-), chills (-) or fatigue (-)  GI: abdominal distension (-), abdominal pain (-) , nausea/vomiting (-), hematemesis, (-), melena (-), hematochezia (-)  : dysuria (-), frequency (-), hematuria (-).   NEURO: numbness (-), weakness (-), dizziness (-)  MSK: myalgia (-), joint pain (-)   SKIN: itching (-), rash (-)  HEENT:  visual changes (-); vertigo or throat pain (-);  neck stiffness (-)   Psych: change in mood (-), anxiety (-), depression (-)     All other review of systems is negative unless indicated above.   -------------------------------------------------------------------------------------------  VITALS:  T(F): 97.7 (12-05), Max: 98.2 (12-04)  HR: 80 (12-05) (72 - 86)  BP: 96/55 (12-05) (93/54 - 96/56)  RR: 18 (12-05)  SpO2: 100% (12-05)  I&O's Summary    04 Dec 2023 07:01  -  05 Dec 2023 07:00  --------------------------------------------------------  IN: 200 mL / OUT: 0 mL / NET: 200 mL      ------------------------------------------------------------------------------------------  PHYSICAL EXAM:  GEN: NAD, sitting in bed  HEENT: NCAT, EOMI  CV: RRR, Ns1/s2, no m/r/g, JVP not elevated  RESP: CTA B/l, no w/r/r  ABD: soft, nt, nd  EXT: warm, 2+ pulses, no edema  SKIN: no visible lesions, rashes  NEURO: AAOx3, no focal deficits  PSYCH: Calm, cooperative  -------------------------------------------------------------------------------------------  LABS:                          9.9    8.22  )-----------( 336      ( 04 Dec 2023 05:30 )             30.6     12-04    137  |  102  |  19  ----------------------------<  192<H>  4.6   |  23  |  0.86    Ca    9.2      04 Dec 2023 05:30  Phos  3.5     12-04  Mg     2.10     12-04      PT/INR - ( 04 Dec 2023 05:30 )   PT: 47.9 sec;   INR: 4.47 ratio         PTT - ( 04 Dec 2023 05:30 )  PTT:36.7 sec  CARDIAC MARKERS ( 04 Dec 2023 11:47 )  1614 ng/L / x     / x     / 368 U/L / x     / 21.3 ng/mL  CARDIAC MARKERS ( 04 Dec 2023 05:30 )  1695 ng/L / x     / x     / 415 U/L / x     / 25.7 ng/mL  CARDIAC MARKERS ( 03 Dec 2023 23:30 )  1720 ng/L / x     / x     / 464 U/L / x     / 29.4 ng/mL  CARDIAC MARKERS ( 03 Dec 2023 20:15 )  1054 ng/L / x     / x     / 415 U/L / x     / 27.8 ng/mL  CARDIAC MARKERS ( 03 Dec 2023 11:30 )  484 ng/L / x     / x     / 56 U/L / x     / 1.5 ng/mL    -------------------------------------------------------------------------------------------  Meds:  acetaminophen     Tablet .. 650 milliGRAM(s) Oral every 6 hours PRN  aluminum hydroxide/magnesium hydroxide/simethicone Suspension 30 milliLiter(s) Oral every 4 hours PRN  atorvastatin 80 milliGRAM(s) Oral at bedtime  clopidogrel Tablet 75 milliGRAM(s) Oral daily  dextrose 5%. 1000 milliLiter(s) IV Continuous <Continuous>  dextrose 5%. 1000 milliLiter(s) IV Continuous <Continuous>  dextrose 50% Injectable 12.5 Gram(s) IV Push once  dextrose 50% Injectable 25 Gram(s) IV Push once  dextrose 50% Injectable 25 Gram(s) IV Push once  dextrose Oral Gel 15 Gram(s) Oral once PRN  furosemide    Tablet 40 milliGRAM(s) Oral daily  glucagon  Injectable 1 milliGRAM(s) IntraMuscular once  insulin lispro (ADMELOG) corrective regimen sliding scale   SubCutaneous three times a day before meals  insulin lispro (ADMELOG) corrective regimen sliding scale   SubCutaneous at bedtime  melatonin 3 milliGRAM(s) Oral at bedtime PRN  melatonin 3 milliGRAM(s) Oral once PRN  metoprolol succinate ER 25 milliGRAM(s) Oral daily  ondansetron Injectable 4 milliGRAM(s) IV Push every 8 hours PRN  polyethylene glycol 3350 17 Gram(s) Oral daily  sacubitril 24 mG/valsartan 26 mG 1 Tablet(s) Oral two times a day    -------------------------------------------------------------------------------------------  Cardiovascular Diagnostic Testing:      -------------------------------------------------------------------------------------------  Assessment and Plan:   Ms. Howell is a 79 yo F w/ HTN, HLD, and T2DM who presented w/ SOB that began acutely 1w prior to presentation found to have anterior Q waves, elevated trop and new signs of volume overload c/f late presentation anterior wall MI, now s/p LHC and viability showing large area of infarct anteriorly and also severe pLAD disease w/o intervention given viability. TTE showed HFrEF 20-25% w/ anteroseptal WMA and large LV thrombus. Started on AC, and GDMT.    #NSTEMI  #new HFrEF  #CAD   #LV thrombus   #HTN  Presentation c/w late presentation anterior wall MI, c/b newly rEF 20-25% w/ LV thrombus. Viability study showing large area of infarct in the apical and anteroseptal walls which correlates w/ pLAD disease. She also was noted to have medium size inferior and inferoseptal wall that is mixed infarct w/ partial viability. LHC w/ severe pLAD disease w/o intervention given viability findings. Started on GDMT. Pt w/ 1x vaso-vagal episode on 12/3 while using the bathroom, confirmed on tele to have bradycardic episode w/ prolonging NE c/w vagal tone, but subequently resolved and has since remained stable and asx.    Recommendations:   - please give furosemide 40mg IV BID today, can resume furosemide 40mg BID   - start spironolactone 12.5mg qd, hold for SBP <90  - continue warfarin for goal INR 2-3  - continue clopidogrel 75mg qd  - continue sacubitril-valsartan 24-26mg BID, hold for SBP <90  - continue metop succ 25mg qd, hold for HR <50  - continue atorvastatin 40mg  - Can start SGLT2i as outpt    *** Recommendations are preliminary until cosigned by the attending.    Abdelrahman Owens MD  Cardiology Fellow    For all New Consults and Questions:  [email or web address]   Login: [email or web address]

## 2023-12-05 NOTE — PROGRESS NOTE ADULT - SUBJECTIVE AND OBJECTIVE BOX
Patient is a 78y old  Female who presents with a chief complaint of Other form of dyspnea     (05 Dec 2023 13:39)    Date of servie : 12-05-23 @ 15:58  INTERVAL HPI/OVERNIGHT EVENTS:  T(C): 36.7 (12-05-23 @ 14:13), Max: 36.8 (12-04-23 @ 21:34)  HR: 80 (12-05-23 @ 14:13) (72 - 86)  BP: 98/58 (12-05-23 @ 14:13) (90/52 - 98/58)  RR: 19 (12-05-23 @ 14:13) (17 - 19)  SpO2: 100% (12-05-23 @ 14:13) (97% - 100%)  Wt(kg): --  I&O's Summary    04 Dec 2023 07:01  -  05 Dec 2023 07:00  --------------------------------------------------------  IN: 200 mL / OUT: 0 mL / NET: 200 mL        LABS:                        10.3   7.74  )-----------( 348      ( 05 Dec 2023 07:00 )             31.7     12-05    138  |  102  |  20  ----------------------------<  201<H>  4.2   |  24  |  0.84    Ca    9.1      05 Dec 2023 07:00  Phos  3.5     12-04  Mg     2.10     12-04      PT/INR - ( 05 Dec 2023 07:00 )   PT: 38.3 sec;   INR: 3.52 ratio         PTT - ( 05 Dec 2023 07:00 )  PTT:36.9 sec  Urinalysis Basic - ( 05 Dec 2023 07:00 )    Color: x / Appearance: x / SG: x / pH: x  Gluc: 201 mg/dL / Ketone: x  / Bili: x / Urobili: x   Blood: x / Protein: x / Nitrite: x   Leuk Esterase: x / RBC: x / WBC x   Sq Epi: x / Non Sq Epi: x / Bacteria: x      CAPILLARY BLOOD GLUCOSE      POCT Blood Glucose.: 232 mg/dL (05 Dec 2023 12:16)  POCT Blood Glucose.: 202 mg/dL (05 Dec 2023 07:55)  POCT Blood Glucose.: 204 mg/dL (04 Dec 2023 22:00)  POCT Blood Glucose.: 175 mg/dL (04 Dec 2023 17:06)        Urinalysis Basic - ( 05 Dec 2023 07:00 )    Color: x / Appearance: x / SG: x / pH: x  Gluc: 201 mg/dL / Ketone: x  / Bili: x / Urobili: x   Blood: x / Protein: x / Nitrite: x   Leuk Esterase: x / RBC: x / WBC x   Sq Epi: x / Non Sq Epi: x / Bacteria: x        MEDICATIONS  (STANDING):  atorvastatin 80 milliGRAM(s) Oral at bedtime  clopidogrel Tablet 75 milliGRAM(s) Oral daily  dextrose 5%. 1000 milliLiter(s) (50 mL/Hr) IV Continuous <Continuous>  dextrose 5%. 1000 milliLiter(s) (100 mL/Hr) IV Continuous <Continuous>  dextrose 50% Injectable 12.5 Gram(s) IV Push once  dextrose 50% Injectable 25 Gram(s) IV Push once  dextrose 50% Injectable 25 Gram(s) IV Push once  furosemide   Injectable 40 milliGRAM(s) IV Push two times a day  glucagon  Injectable 1 milliGRAM(s) IntraMuscular once  insulin lispro (ADMELOG) corrective regimen sliding scale   SubCutaneous three times a day before meals  insulin lispro (ADMELOG) corrective regimen sliding scale   SubCutaneous at bedtime  metoprolol succinate ER 25 milliGRAM(s) Oral daily  polyethylene glycol 3350 17 Gram(s) Oral daily  sacubitril 24 mG/valsartan 26 mG 1 Tablet(s) Oral two times a day  spironolactone 12.5 milliGRAM(s) Oral daily    MEDICATIONS  (PRN):  acetaminophen     Tablet .. 650 milliGRAM(s) Oral every 6 hours PRN Temp greater or equal to 38C (100.4F), Mild Pain (1 - 3)  aluminum hydroxide/magnesium hydroxide/simethicone Suspension 30 milliLiter(s) Oral every 4 hours PRN Dyspepsia  dextrose Oral Gel 15 Gram(s) Oral once PRN Blood Glucose LESS THAN 70 milliGRAM(s)/deciliter  melatonin 3 milliGRAM(s) Oral once PRN Insomnia  melatonin 3 milliGRAM(s) Oral at bedtime PRN Insomnia  ondansetron Injectable 4 milliGRAM(s) IV Push every 8 hours PRN Nausea and/or Vomiting          PHYSICAL EXAM:  GENERAL: NAD, well-groomed, well-developed  HEAD:  Atraumatic, Normocephalic  CHEST/LUNG: Clear to percussion bilaterally; No rales, rhonchi, wheezing, or rubs  HEART: Regular rate and rhythm; No murmurs, rubs, or gallops  ABDOMEN: Soft, Nontender, Nondistended; Bowel sounds present  EXTREMITIES:  2+ Peripheral Pulses, No clubbing, cyanosis, or edema  LYMPH: No lymphadenopathy noted  SKIN: No rashes or lesions    Care Discussed with Consultants/Other Providers [ ] YES  [ ] NO

## 2023-12-05 NOTE — DIETITIAN INITIAL EVALUATION ADULT - OTHER CALCULATIONS
Ideal Body Weight: 115 lbs / 52.3 kg +/-10%   Current weight (12/5) 141.9 lbs / 64.4 kg. Dosing/admission weight (11/27) 162.4 lbs / 73.8 kg.    Weight history, per HIE: (9/27) 157 lbs, (7/21) 163 lbs, (3/27) 169 lbs.

## 2023-12-05 NOTE — DIETITIAN INITIAL EVALUATION ADULT - NAME AND PHONE
Silvia Donovan RDN, CDN #[id number]  Also available on [employer] Teams  Silvia Donovan RDN, CDN #[id number]  Also available on [employer] Teams

## 2023-12-06 ENCOUNTER — NON-APPOINTMENT (OUTPATIENT)
Age: 78
End: 2023-12-06

## 2023-12-06 VITALS
DIASTOLIC BLOOD PRESSURE: 51 MMHG | HEART RATE: 81 BPM | RESPIRATION RATE: 17 BRPM | SYSTOLIC BLOOD PRESSURE: 89 MMHG | TEMPERATURE: 99 F | OXYGEN SATURATION: 100 %

## 2023-12-06 LAB
ANION GAP SERPL CALC-SCNC: 13 MMOL/L — SIGNIFICANT CHANGE UP (ref 7–14)
ANION GAP SERPL CALC-SCNC: 13 MMOL/L — SIGNIFICANT CHANGE UP (ref 7–14)
APTT BLD: 33.9 SEC — SIGNIFICANT CHANGE UP (ref 24.5–35.6)
APTT BLD: 33.9 SEC — SIGNIFICANT CHANGE UP (ref 24.5–35.6)
BUN SERPL-MCNC: 27 MG/DL — HIGH (ref 7–23)
BUN SERPL-MCNC: 27 MG/DL — HIGH (ref 7–23)
CALCIUM SERPL-MCNC: 8.9 MG/DL — SIGNIFICANT CHANGE UP (ref 8.4–10.5)
CALCIUM SERPL-MCNC: 8.9 MG/DL — SIGNIFICANT CHANGE UP (ref 8.4–10.5)
CHLORIDE SERPL-SCNC: 102 MMOL/L — SIGNIFICANT CHANGE UP (ref 98–107)
CHLORIDE SERPL-SCNC: 102 MMOL/L — SIGNIFICANT CHANGE UP (ref 98–107)
CO2 SERPL-SCNC: 24 MMOL/L — SIGNIFICANT CHANGE UP (ref 22–31)
CO2 SERPL-SCNC: 24 MMOL/L — SIGNIFICANT CHANGE UP (ref 22–31)
CREAT SERPL-MCNC: 0.87 MG/DL — SIGNIFICANT CHANGE UP (ref 0.5–1.3)
CREAT SERPL-MCNC: 0.87 MG/DL — SIGNIFICANT CHANGE UP (ref 0.5–1.3)
EGFR: 68 ML/MIN/1.73M2 — SIGNIFICANT CHANGE UP
EGFR: 68 ML/MIN/1.73M2 — SIGNIFICANT CHANGE UP
GLUCOSE BLDC GLUCOMTR-MCNC: 198 MG/DL — HIGH (ref 70–99)
GLUCOSE BLDC GLUCOMTR-MCNC: 198 MG/DL — HIGH (ref 70–99)
GLUCOSE BLDC GLUCOMTR-MCNC: 210 MG/DL — HIGH (ref 70–99)
GLUCOSE BLDC GLUCOMTR-MCNC: 210 MG/DL — HIGH (ref 70–99)
GLUCOSE SERPL-MCNC: 168 MG/DL — HIGH (ref 70–99)
GLUCOSE SERPL-MCNC: 168 MG/DL — HIGH (ref 70–99)
HCT VFR BLD CALC: 31.7 % — LOW (ref 34.5–45)
HCT VFR BLD CALC: 31.7 % — LOW (ref 34.5–45)
HGB BLD-MCNC: 10.3 G/DL — LOW (ref 11.5–15.5)
HGB BLD-MCNC: 10.3 G/DL — LOW (ref 11.5–15.5)
INR BLD: 2.23 RATIO — HIGH (ref 0.85–1.18)
INR BLD: 2.23 RATIO — HIGH (ref 0.85–1.18)
MCHC RBC-ENTMCNC: 29.5 PG — SIGNIFICANT CHANGE UP (ref 27–34)
MCHC RBC-ENTMCNC: 29.5 PG — SIGNIFICANT CHANGE UP (ref 27–34)
MCHC RBC-ENTMCNC: 32.5 GM/DL — SIGNIFICANT CHANGE UP (ref 32–36)
MCHC RBC-ENTMCNC: 32.5 GM/DL — SIGNIFICANT CHANGE UP (ref 32–36)
MCV RBC AUTO: 90.8 FL — SIGNIFICANT CHANGE UP (ref 80–100)
MCV RBC AUTO: 90.8 FL — SIGNIFICANT CHANGE UP (ref 80–100)
NRBC # BLD: 0 /100 WBCS — SIGNIFICANT CHANGE UP (ref 0–0)
NRBC # BLD: 0 /100 WBCS — SIGNIFICANT CHANGE UP (ref 0–0)
NRBC # FLD: 0 K/UL — SIGNIFICANT CHANGE UP (ref 0–0)
NRBC # FLD: 0 K/UL — SIGNIFICANT CHANGE UP (ref 0–0)
PLATELET # BLD AUTO: 339 K/UL — SIGNIFICANT CHANGE UP (ref 150–400)
PLATELET # BLD AUTO: 339 K/UL — SIGNIFICANT CHANGE UP (ref 150–400)
POTASSIUM SERPL-MCNC: 4.1 MMOL/L — SIGNIFICANT CHANGE UP (ref 3.5–5.3)
POTASSIUM SERPL-MCNC: 4.1 MMOL/L — SIGNIFICANT CHANGE UP (ref 3.5–5.3)
POTASSIUM SERPL-SCNC: 4.1 MMOL/L — SIGNIFICANT CHANGE UP (ref 3.5–5.3)
POTASSIUM SERPL-SCNC: 4.1 MMOL/L — SIGNIFICANT CHANGE UP (ref 3.5–5.3)
PROTHROM AB SERPL-ACNC: 24.4 SEC — HIGH (ref 9.5–13)
PROTHROM AB SERPL-ACNC: 24.4 SEC — HIGH (ref 9.5–13)
RBC # BLD: 3.49 M/UL — LOW (ref 3.8–5.2)
RBC # BLD: 3.49 M/UL — LOW (ref 3.8–5.2)
RBC # FLD: 15.1 % — HIGH (ref 10.3–14.5)
RBC # FLD: 15.1 % — HIGH (ref 10.3–14.5)
SODIUM SERPL-SCNC: 139 MMOL/L — SIGNIFICANT CHANGE UP (ref 135–145)
SODIUM SERPL-SCNC: 139 MMOL/L — SIGNIFICANT CHANGE UP (ref 135–145)
WBC # BLD: 7.19 K/UL — SIGNIFICANT CHANGE UP (ref 3.8–10.5)
WBC # BLD: 7.19 K/UL — SIGNIFICANT CHANGE UP (ref 3.8–10.5)
WBC # FLD AUTO: 7.19 K/UL — SIGNIFICANT CHANGE UP (ref 3.8–10.5)
WBC # FLD AUTO: 7.19 K/UL — SIGNIFICANT CHANGE UP (ref 3.8–10.5)

## 2023-12-06 PROCEDURE — 99232 SBSQ HOSP IP/OBS MODERATE 35: CPT | Mod: GC

## 2023-12-06 RX ORDER — ATORVASTATIN CALCIUM 80 MG/1
1 TABLET, FILM COATED ORAL
Qty: 30 | Refills: 0
Start: 2023-12-06 | End: 2024-01-04

## 2023-12-06 RX ORDER — EMPAGLIFLOZIN 10 MG/1
1 TABLET, FILM COATED ORAL
Qty: 30 | Refills: 0
Start: 2023-12-06 | End: 2024-01-04

## 2023-12-06 RX ORDER — SACUBITRIL AND VALSARTAN 24; 26 MG/1; MG/1
1 TABLET, FILM COATED ORAL
Qty: 60 | Refills: 0
Start: 2023-12-06 | End: 2024-01-04

## 2023-12-06 RX ORDER — WARFARIN SODIUM 2.5 MG/1
1 TABLET ORAL
Qty: 30 | Refills: 0
Start: 2023-12-06 | End: 2024-01-04

## 2023-12-06 RX ORDER — EMPAGLIFLOZIN 10 MG/1
1 TABLET, FILM COATED ORAL
Qty: 4 | Refills: 0
Start: 2023-12-06 | End: 2024-01-04

## 2023-12-06 RX ORDER — FUROSEMIDE 40 MG
1 TABLET ORAL
Qty: 60 | Refills: 0
Start: 2023-12-06 | End: 2024-01-04

## 2023-12-06 RX ORDER — LISINOPRIL 2.5 MG/1
1 TABLET ORAL
Qty: 0 | Refills: 0 | DISCHARGE

## 2023-12-06 RX ORDER — SPIRONOLACTONE 25 MG/1
0.5 TABLET, FILM COATED ORAL
Qty: 15 | Refills: 0
Start: 2023-12-06 | End: 2024-01-04

## 2023-12-06 RX ORDER — SIMVASTATIN 20 MG/1
1 TABLET, FILM COATED ORAL
Qty: 0 | Refills: 0 | DISCHARGE

## 2023-12-06 RX ORDER — SPIRONOLACTONE 25 MG/1
1 TABLET, FILM COATED ORAL
Qty: 15 | Refills: 0 | DISCHARGE
Start: 2023-12-06 | End: 2024-01-04

## 2023-12-06 RX ORDER — METOPROLOL TARTRATE 50 MG
1 TABLET ORAL
Qty: 30 | Refills: 0
Start: 2023-12-06 | End: 2024-01-04

## 2023-12-06 RX ORDER — CLOPIDOGREL BISULFATE 75 MG/1
1 TABLET, FILM COATED ORAL
Qty: 90 | Refills: 0
Start: 2023-12-06 | End: 2024-03-04

## 2023-12-06 RX ADMIN — SACUBITRIL AND VALSARTAN 1 TABLET(S): 24; 26 TABLET, FILM COATED ORAL at 06:20

## 2023-12-06 RX ADMIN — SPIRONOLACTONE 12.5 MILLIGRAM(S): 25 TABLET, FILM COATED ORAL at 06:21

## 2023-12-06 RX ADMIN — Medication 40 MILLIGRAM(S): at 13:24

## 2023-12-06 RX ADMIN — POLYETHYLENE GLYCOL 3350 17 GRAM(S): 17 POWDER, FOR SOLUTION ORAL at 12:55

## 2023-12-06 RX ADMIN — Medication 1: at 08:52

## 2023-12-06 RX ADMIN — CLOPIDOGREL BISULFATE 75 MILLIGRAM(S): 75 TABLET, FILM COATED ORAL at 12:54

## 2023-12-06 RX ADMIN — Medication 2: at 12:54

## 2023-12-06 RX ADMIN — Medication 40 MILLIGRAM(S): at 06:20

## 2023-12-06 RX ADMIN — Medication 25 MILLIGRAM(S): at 06:20

## 2023-12-06 NOTE — PROGRESS NOTE ADULT - REASON FOR ADMISSION
Chest pain, SOB

## 2023-12-06 NOTE — PROGRESS NOTE ADULT - PROVIDER SPECIALTY LIST ADULT
Cardiology
Hospitalist
Hospitalist
Cardiology
Cardiology
Hospitalist
Cardiology
Hospitalist
Hospitalist
Cardiology
Hospitalist

## 2023-12-06 NOTE — PROGRESS NOTE ADULT - ATTENDING COMMENTS
The patient was seen and examined with the Cardiology Consultation Teaching Service.     No overnight events    No chest pain  No dyspnea, orthopnea or PND  No palpitations or dizziness     PMH/PSH:  Coronary artery disease  Myocardial infarction  LV thrombus on warfarin  Diabetes  Hypertension  Dyslipidemia    Comfortable-appearing woman in no acute distress  Alert and oriented  Afebrile  Vital signs stable  BP borderline low normal  JVP is not elevated  Clear lungs  Normal heart sounds  Extremities are warm and perfused  No peripheral edema     Stable normocytic anemia Hb 10.3  INR 2.2  GFR 68    hs-troponin peak 1720  CK-MB peak 29.4    Echocardiography demonstrated severe LV systolic dysfunction, LVEF 20-25%, with apical, septal and anterior wall regional wall motion abnormalities, LV thrombus is present. No significant valve dysfunction.    Nuclear viability study demonstrated a large area of scar in the apical and septal walls and a medium-sized partially reversible defect in the inferior wall.    Impression and Recommendations:   78-year-old woman with diabetes, hypertension and dyslipidemia who presented initially with dyspnea on exertion, found to have anteroseptal Q waves and elevated hs-troponin concerning for ACS. Coronary angiography demonstrated severe disease in the proximal and mid LAD, more suggestive of late presenting JAMIN-ACS.  LVEF was noted to be severely reduced, but myocardial viability study demonstrated no viability in the LAD distribution. LV thrombus was noted.     Continue medical therapy for this patient's coronary artery disease, myocardial infarction, reduced LV function and LV thrombus. Continue clopidogrel and warfarin. INR is therapeutic. The patient will need an INR check soon after discharge with adjustment of her warfarin dose.     Continue atorvastatin 80mg daily for her known coronary disease. Continue sacubitril-valsartan, metoprolol succinate, and spironolactone for her heart failure with reduced LV function and recent MI. Continue oral furosemide as a maintenance diuretic.     Consider initiation of SGLT2i as an outpatient.    The patient appears ready for discharge today from a cardiovascular perspective. She should follow-up in the [facility] with Dr. Miladys Kelly.     Sharan Jimenez MD FAC FACP  Cardiology  [phone] The patient was seen and examined with the Cardiology Consultation Teaching Service.     No overnight events    No chest pain  No dyspnea, orthopnea or PND  No palpitations or dizziness     PMH/PSH:  Coronary artery disease  Myocardial infarction  LV thrombus on warfarin  Diabetes  Hypertension  Dyslipidemia    Comfortable-appearing woman in no acute distress  Alert and oriented  Afebrile  Vital signs stable  BP borderline low normal  JVP is not elevated  Clear lungs  Normal heart sounds  Extremities are warm and perfused  No peripheral edema     Stable normocytic anemia Hb 10.3  INR 2.2  GFR 68    hs-troponin peak 1720  CK-MB peak 29.4    Echocardiography demonstrated severe LV systolic dysfunction, LVEF 20-25%, with apical, septal and anterior wall regional wall motion abnormalities, LV thrombus is present. No significant valve dysfunction.    Nuclear viability study demonstrated a large area of scar in the apical and septal walls and a medium-sized partially reversible defect in the inferior wall.    Impression and Recommendations:   78-year-old woman with diabetes, hypertension and dyslipidemia who presented initially with dyspnea on exertion, found to have anteroseptal Q waves and elevated hs-troponin concerning for ACS. Coronary angiography demonstrated severe disease in the proximal and mid LAD, more suggestive of late presenting JAMIN-ACS.  LVEF was noted to be severely reduced, but myocardial viability study demonstrated no viability in the LAD distribution. LV thrombus was noted.     Continue medical therapy for this patient's coronary artery disease, myocardial infarction, reduced LV function and LV thrombus. Continue clopidogrel and warfarin. INR is therapeutic. The patient will need an INR check soon after discharge with adjustment of her warfarin dose.     Continue atorvastatin 80mg daily for her known coronary disease. Continue sacubitril-valsartan, metoprolol succinate, and spironolactone for her heart failure with reduced LV function and recent MI. Continue oral furosemide as a maintenance diuretic.     Consider initiation of SGLT2i as an outpatient.    The patient appears ready for discharge today from a cardiovascular perspective. She should follow-up in the [facility] with Dr. Miladys Kelly.     Sharan Jimenez MD FAC FACP  Cardiology  [phone]

## 2023-12-06 NOTE — PROGRESS NOTE ADULT - SUBJECTIVE AND OBJECTIVE BOX
Cardiology Progress Note  ------------------------------------------------------------------------------------------    SUBJECTIVE/EVENTS:  - NAEO    -------------------------------------------------------------------------------------------  ROS:  CV: chest pain (-), palpitation (-), orthopnea (-), PND (-), edema (-)  PULM: SOB (-), cough (-), wheezing (-), hemoptysis (-).   CONST: fever (-), chills (-) or fatigue (-)  GI: abdominal distension (-), abdominal pain (-) , nausea/vomiting (-), hematemesis, (-), melena (-), hematochezia (-)  : dysuria (-), frequency (-), hematuria (-).   NEURO: numbness (-), weakness (-), dizziness (-)  MSK: myalgia (-), joint pain (-)   SKIN: itching (-), rash (-)  HEENT:  visual changes (-); vertigo or throat pain (-);  neck stiffness (-)   Psych: change in mood (-), anxiety (-), depression (-)     All other review of systems is negative unless indicated above.   -------------------------------------------------------------------------------------------  VITALS:  T(F): 98 (12-06), Max: 98.8 (12-05)  HR: 78 (12-06) (72 - 80)  BP: 96/58 (12-06) (90/52 - 98/58)  RR: 18 (12-06)  SpO2: 98% (12-06)  I&O's Summary    ------------------------------------------------------------------------------------------  PHYSICAL EXAM:  GEN: NAD, sitting in bed  HEENT: NCAT, EOMI  CV: RRR, Ns1/s2, no m/r/g, JVP not elevated  RESP: CTA B/l, no w/r/r  ABD: soft, nt, nd  EXT: warm, 2+ pulses, no edema  SKIN: no visible lesions, rashes  NEURO: AAOx3, no focal deficits  PSYCH: Calm, cooperative    -------------------------------------------------------------------------------------------  LABS:                          10.3   7.74  )-----------( 348      ( 05 Dec 2023 07:00 )             31.7     12-05    138  |  102  |  20  ----------------------------<  201<H>  4.2   |  24  |  0.84    Ca    9.1      05 Dec 2023 07:00      PT/INR - ( 05 Dec 2023 07:00 )   PT: 38.3 sec;   INR: 3.52 ratio         PTT - ( 05 Dec 2023 07:00 )  PTT:36.9 sec  CARDIAC MARKERS ( 05 Dec 2023 07:00 )  1362 ng/L / x     / x     / x     / x     / x      CARDIAC MARKERS ( 04 Dec 2023 11:47 )  1614 ng/L / x     / x     / 368 U/L / x     / 21.3 ng/mL  CARDIAC MARKERS ( 04 Dec 2023 05:30 )  1695 ng/L / x     / x     / 415 U/L / x     / 25.7 ng/mL  CARDIAC MARKERS ( 03 Dec 2023 23:30 )  1720 ng/L / x     / x     / 464 U/L / x     / 29.4 ng/mL  CARDIAC MARKERS ( 03 Dec 2023 20:15 )  1054 ng/L / x     / x     / 415 U/L / x     / 27.8 ng/mL            -------------------------------------------------------------------------------------------  Meds:  acetaminophen     Tablet .. 650 milliGRAM(s) Oral every 6 hours PRN  aluminum hydroxide/magnesium hydroxide/simethicone Suspension 30 milliLiter(s) Oral every 4 hours PRN  atorvastatin 80 milliGRAM(s) Oral at bedtime  clopidogrel Tablet 75 milliGRAM(s) Oral daily  dextrose 5%. 1000 milliLiter(s) IV Continuous <Continuous>  dextrose 5%. 1000 milliLiter(s) IV Continuous <Continuous>  dextrose 50% Injectable 12.5 Gram(s) IV Push once  dextrose 50% Injectable 25 Gram(s) IV Push once  dextrose 50% Injectable 25 Gram(s) IV Push once  dextrose Oral Gel 15 Gram(s) Oral once PRN  furosemide    Tablet 40 milliGRAM(s) Oral two times a day  glucagon  Injectable 1 milliGRAM(s) IntraMuscular once  insulin lispro (ADMELOG) corrective regimen sliding scale   SubCutaneous at bedtime  insulin lispro (ADMELOG) corrective regimen sliding scale   SubCutaneous three times a day before meals  melatonin 3 milliGRAM(s) Oral at bedtime PRN  melatonin 3 milliGRAM(s) Oral once PRN  metoprolol succinate ER 25 milliGRAM(s) Oral daily  ondansetron Injectable 4 milliGRAM(s) IV Push every 8 hours PRN  polyethylene glycol 3350 17 Gram(s) Oral daily  sacubitril 24 mG/valsartan 26 mG 1 Tablet(s) Oral two times a day  spironolactone 12.5 milliGRAM(s) Oral daily    -------------------------------------------------------------------------------------------  Cardiovascular Diagnostic Testing:      -------------------------------------------------------------------------------------------  Assessment and Plan:   Ms. Howell is a 77 yo F w/ HTN, HLD, and T2DM who presented w/ SOB that began acutely 1w prior to presentation found to have anterior Q waves, elevated trop and new signs of volume overload c/f late presentation anterior wall MI, now s/p LHC and viability showing large area of infarct anteriorly and also severe pLAD disease w/o intervention given viability. TTE showed HFrEF 20-25% w/ anteroseptal WMA and large LV thrombus. Started on AC, and GDMT.    #NSTEMI  #new HFrEF  #CAD   #LV thrombus   #HTN  Presentation c/w late presentation anterior wall MI, c/b newly rEF 20-25% w/ LV thrombus. Viability study showing large area of infarct in the apical and anteroseptal walls which correlates w/ pLAD disease. She also was noted to have medium size inferior and inferoseptal wall that is mixed infarct w/ partial viability. LHC w/ severe pLAD disease w/o intervention given viability findings. Started on GDMT. Pt w/ 1x vaso-vagal episode on 12/3 while using the bathroom, confirmed on tele to have bradycardic episode w/ prolonging OR c/w vagal tone, but subequently resolved and has since remained stable and asx.    Recommendations:   - please start empagliflozin 10mg qd, check w/ pharmacy for outpt insurance approval to continue at home  - continue furosemide 40mg po BID  - continue warfarin for goal INR 2-3  - continue clopidogrel 75mg qd  - continue sacubitril-valsartan 24-26mg BID, hold for SBP <90  - continue metop succ 25mg qd, hold for HR <50  - continue spironolactone 12.5mg qd, hold for SBP <90  - continue atorvastatin 40mg  - stable for cardiology perspective for DC once INR is therapeutic, INR 2-3  - please ensure pt has cardiology f/up within 2w of DC    *** Recommendations are preliminary until cosigned by the attending.    Abdelrahman Owens MD  Cardiology Fellow    For all New Consults and Questions:  [email or web address]   Login: [employer] Cardiology Progress Note  ------------------------------------------------------------------------------------------    SUBJECTIVE/EVENTS:  - NAEO    -------------------------------------------------------------------------------------------  ROS:  CV: chest pain (-), palpitation (-), orthopnea (-), PND (-), edema (-)  PULM: SOB (-), cough (-), wheezing (-), hemoptysis (-).   CONST: fever (-), chills (-) or fatigue (-)  GI: abdominal distension (-), abdominal pain (-) , nausea/vomiting (-), hematemesis, (-), melena (-), hematochezia (-)  : dysuria (-), frequency (-), hematuria (-).   NEURO: numbness (-), weakness (-), dizziness (-)  MSK: myalgia (-), joint pain (-)   SKIN: itching (-), rash (-)  HEENT:  visual changes (-); vertigo or throat pain (-);  neck stiffness (-)   Psych: change in mood (-), anxiety (-), depression (-)     All other review of systems is negative unless indicated above.   -------------------------------------------------------------------------------------------  VITALS:  T(F): 98 (12-06), Max: 98.8 (12-05)  HR: 78 (12-06) (72 - 80)  BP: 96/58 (12-06) (90/52 - 98/58)  RR: 18 (12-06)  SpO2: 98% (12-06)  I&O's Summary    ------------------------------------------------------------------------------------------  PHYSICAL EXAM:  GEN: NAD, sitting in bed  HEENT: NCAT, EOMI  CV: RRR, Ns1/s2, no m/r/g, JVP not elevated  RESP: CTA B/l, no w/r/r  ABD: soft, nt, nd  EXT: warm, 2+ pulses, no edema  SKIN: no visible lesions, rashes  NEURO: AAOx3, no focal deficits  PSYCH: Calm, cooperative    -------------------------------------------------------------------------------------------  LABS:                          10.3   7.74  )-----------( 348      ( 05 Dec 2023 07:00 )             31.7     12-05    138  |  102  |  20  ----------------------------<  201<H>  4.2   |  24  |  0.84    Ca    9.1      05 Dec 2023 07:00      PT/INR - ( 05 Dec 2023 07:00 )   PT: 38.3 sec;   INR: 3.52 ratio         PTT - ( 05 Dec 2023 07:00 )  PTT:36.9 sec  CARDIAC MARKERS ( 05 Dec 2023 07:00 )  1362 ng/L / x     / x     / x     / x     / x      CARDIAC MARKERS ( 04 Dec 2023 11:47 )  1614 ng/L / x     / x     / 368 U/L / x     / 21.3 ng/mL  CARDIAC MARKERS ( 04 Dec 2023 05:30 )  1695 ng/L / x     / x     / 415 U/L / x     / 25.7 ng/mL  CARDIAC MARKERS ( 03 Dec 2023 23:30 )  1720 ng/L / x     / x     / 464 U/L / x     / 29.4 ng/mL  CARDIAC MARKERS ( 03 Dec 2023 20:15 )  1054 ng/L / x     / x     / 415 U/L / x     / 27.8 ng/mL            -------------------------------------------------------------------------------------------  Meds:  acetaminophen     Tablet .. 650 milliGRAM(s) Oral every 6 hours PRN  aluminum hydroxide/magnesium hydroxide/simethicone Suspension 30 milliLiter(s) Oral every 4 hours PRN  atorvastatin 80 milliGRAM(s) Oral at bedtime  clopidogrel Tablet 75 milliGRAM(s) Oral daily  dextrose 5%. 1000 milliLiter(s) IV Continuous <Continuous>  dextrose 5%. 1000 milliLiter(s) IV Continuous <Continuous>  dextrose 50% Injectable 12.5 Gram(s) IV Push once  dextrose 50% Injectable 25 Gram(s) IV Push once  dextrose 50% Injectable 25 Gram(s) IV Push once  dextrose Oral Gel 15 Gram(s) Oral once PRN  furosemide    Tablet 40 milliGRAM(s) Oral two times a day  glucagon  Injectable 1 milliGRAM(s) IntraMuscular once  insulin lispro (ADMELOG) corrective regimen sliding scale   SubCutaneous at bedtime  insulin lispro (ADMELOG) corrective regimen sliding scale   SubCutaneous three times a day before meals  melatonin 3 milliGRAM(s) Oral at bedtime PRN  melatonin 3 milliGRAM(s) Oral once PRN  metoprolol succinate ER 25 milliGRAM(s) Oral daily  ondansetron Injectable 4 milliGRAM(s) IV Push every 8 hours PRN  polyethylene glycol 3350 17 Gram(s) Oral daily  sacubitril 24 mG/valsartan 26 mG 1 Tablet(s) Oral two times a day  spironolactone 12.5 milliGRAM(s) Oral daily    -------------------------------------------------------------------------------------------  Cardiovascular Diagnostic Testing:      -------------------------------------------------------------------------------------------  Assessment and Plan:   Ms. Howell is a 77 yo F w/ HTN, HLD, and T2DM who presented w/ SOB that began acutely 1w prior to presentation found to have anterior Q waves, elevated trop and new signs of volume overload c/f late presentation anterior wall MI, now s/p LHC and viability showing large area of infarct anteriorly and also severe pLAD disease w/o intervention given viability. TTE showed HFrEF 20-25% w/ anteroseptal WMA and large LV thrombus. Started on AC, and GDMT.    #NSTEMI  #new HFrEF  #CAD   #LV thrombus   #HTN  Presentation c/w late presentation anterior wall MI, c/b newly rEF 20-25% w/ LV thrombus. Viability study showing large area of infarct in the apical and anteroseptal walls which correlates w/ pLAD disease. She also was noted to have medium size inferior and inferoseptal wall that is mixed infarct w/ partial viability. LHC w/ severe pLAD disease w/o intervention given viability findings. Started on GDMT. Pt w/ 1x vaso-vagal episode on 12/3 while using the bathroom, confirmed on tele to have bradycardic episode w/ prolonging [place] c/w vagal tone, but subequently resolved and has since remained stable and asx.    Recommendations:   - please start empagliflozin 10mg qd, check w/ pharmacy for outpt insurance approval to continue at home  - continue furosemide 40mg po BID  - continue warfarin for goal INR 2-3  - continue clopidogrel 75mg qd  - continue sacubitril-valsartan 24-26mg BID, hold for SBP <90  - continue metop succ 25mg qd, hold for HR <50  - continue spironolactone 12.5mg qd, hold for SBP <90  - continue atorvastatin 40mg  - stable for cardiology perspective for DC once INR is therapeutic, INR 2-3  - please ensure pt has cardiology f/up within 2w of DC    *** Recommendations are preliminary until cosigned by the attending.    Abdelrahman Owens MD  Cardiology Fellow    For all New Consults and Questions:  [email or web address]   Login: [email or web address]

## 2023-12-07 ENCOUNTER — TRANSCRIPTION ENCOUNTER (OUTPATIENT)
Age: 78
End: 2023-12-07

## 2023-12-07 RX ORDER — METFORMIN HYDROCHLORIDE 500 MG/1
500 TABLET, COATED ORAL
Qty: 360 | Refills: 2 | Status: ACTIVE | COMMUNITY
Start: 2019-09-26

## 2023-12-07 NOTE — CHART NOTE - NSCHARTNOTEFT_GEN_A_CORE
[facility] Patient follow up: [facility] Patient follow up:  Pt is scheduled for Cardiology appointment with Dr Mahmood 12/13 @230 pm  Pt is scheduled for PCP appointment with Dr. Coe 12/13 1140 am  Pt Son Raciel Howell stated that Dr Coe Rx labs to check INR for 12/8

## 2023-12-08 ENCOUNTER — NON-APPOINTMENT (OUTPATIENT)
Age: 78
End: 2023-12-08

## 2023-12-08 ENCOUNTER — TRANSCRIPTION ENCOUNTER (OUTPATIENT)
Age: 78
End: 2023-12-08

## 2023-12-08 ENCOUNTER — LABORATORY RESULT (OUTPATIENT)
Age: 78
End: 2023-12-08

## 2023-12-10 ENCOUNTER — NON-APPOINTMENT (OUTPATIENT)
Age: 78
End: 2023-12-10

## 2023-12-13 ENCOUNTER — APPOINTMENT (OUTPATIENT)
Dept: INTERNAL MEDICINE | Facility: CLINIC | Age: 78
End: 2023-12-13
Payer: MEDICARE

## 2023-12-13 ENCOUNTER — NON-APPOINTMENT (OUTPATIENT)
Age: 78
End: 2023-12-13

## 2023-12-13 ENCOUNTER — TRANSCRIPTION ENCOUNTER (OUTPATIENT)
Age: 78
End: 2023-12-13

## 2023-12-13 ENCOUNTER — APPOINTMENT (OUTPATIENT)
Dept: CARDIOLOGY | Facility: CLINIC | Age: 78
End: 2023-12-13
Payer: MEDICARE

## 2023-12-13 VITALS
SYSTOLIC BLOOD PRESSURE: 100 MMHG | WEIGHT: 150 LBS | DIASTOLIC BLOOD PRESSURE: 56 MMHG | BODY MASS INDEX: 26.25 KG/M2 | HEART RATE: 82 BPM | OXYGEN SATURATION: 99 % | HEIGHT: 63.5 IN

## 2023-12-13 VITALS
SYSTOLIC BLOOD PRESSURE: 107 MMHG | BODY MASS INDEX: 26.25 KG/M2 | OXYGEN SATURATION: 95 % | TEMPERATURE: 98 F | DIASTOLIC BLOOD PRESSURE: 65 MMHG | WEIGHT: 150 LBS | HEART RATE: 80 BPM | HEIGHT: 63.5 IN

## 2023-12-13 DIAGNOSIS — I25.2 OLD MYOCARDIAL INFARCTION: ICD-10-CM

## 2023-12-13 LAB
ALBUMIN SERPL ELPH-MCNC: 4 G/DL
ALP BLD-CCNC: 80 U/L
ALT SERPL-CCNC: 13 U/L
ANION GAP SERPL CALC-SCNC: 18 MMOL/L
AST SERPL-CCNC: 14 U/L
BASOPHILS # BLD AUTO: 0.05 K/UL
BASOPHILS NFR BLD AUTO: 0.7 %
BILIRUB SERPL-MCNC: 0.7 MG/DL
BUN SERPL-MCNC: 39 MG/DL
CALCIUM SERPL-MCNC: 9.2 MG/DL
CHLORIDE SERPL-SCNC: 97 MMOL/L
CO2 SERPL-SCNC: 22 MMOL/L
CREAT SERPL-MCNC: 0.98 MG/DL
EGFR: 59 ML/MIN/1.73M2
EOSINOPHIL # BLD AUTO: 0.15 K/UL
EOSINOPHIL NFR BLD AUTO: 2 %
GLUCOSE SERPL-MCNC: 148 MG/DL
HCT VFR BLD CALC: 34.2 %
HGB BLD-MCNC: 11.1 G/DL
IMM GRANULOCYTES NFR BLD AUTO: 0.3 %
INR PPP: 7.18 RATIO
LYMPHOCYTES # BLD AUTO: 0.94 K/UL
LYMPHOCYTES NFR BLD AUTO: 12.7 %
MAN DIFF?: NORMAL
MCHC RBC-ENTMCNC: 30.5 PG
MCHC RBC-ENTMCNC: 32.5 GM/DL
MCV RBC AUTO: 94 FL
MONOCYTES # BLD AUTO: 0.52 K/UL
MONOCYTES NFR BLD AUTO: 7 %
NEUTROPHILS # BLD AUTO: 5.75 K/UL
NEUTROPHILS NFR BLD AUTO: 77.3 %
PLATELET # BLD AUTO: 381 K/UL
POTASSIUM SERPL-SCNC: 3.7 MMOL/L
PROT SERPL-MCNC: 6.9 G/DL
PT BLD: 77.3 SEC
RBC # BLD: 3.64 M/UL
RBC # FLD: 15.1 %
SODIUM SERPL-SCNC: 137 MMOL/L
WBC # FLD AUTO: 7.43 K/UL

## 2023-12-13 PROCEDURE — 99214 OFFICE O/P EST MOD 30 MIN: CPT

## 2023-12-13 PROCEDURE — 99496 TRANSJ CARE MGMT HIGH F2F 7D: CPT

## 2023-12-13 PROCEDURE — 93000 ELECTROCARDIOGRAM COMPLETE: CPT

## 2023-12-13 PROCEDURE — 93793 ANTICOAG MGMT PT WARFARIN: CPT

## 2023-12-13 RX ORDER — METOPROLOL SUCCINATE 25 MG/1
25 TABLET, EXTENDED RELEASE ORAL DAILY
Qty: 90 | Refills: 1 | Status: ACTIVE | COMMUNITY
Start: 2023-12-13 | End: 1900-01-01

## 2023-12-13 RX ORDER — BENZONATATE 100 MG/1
100 CAPSULE ORAL 3 TIMES DAILY
Qty: 42 | Refills: 0 | Status: DISCONTINUED | COMMUNITY
Start: 2023-11-27 | End: 2023-12-13

## 2023-12-13 RX ORDER — ATORVASTATIN CALCIUM 80 MG/1
80 TABLET, FILM COATED ORAL
Qty: 90 | Refills: 1 | Status: ACTIVE | COMMUNITY
Start: 2023-12-13 | End: 1900-01-01

## 2023-12-13 NOTE — HISTORY OF PRESENT ILLNESS
[Post-hospitalization from ___ Hospital] : Post-hospitalization from [unfilled] Hospital [Admitted on: ___] : The patient was admitted on [unfilled] [Discharged on ___] : discharged on [unfilled] [Discharge Summary] : discharge summary [Pertinent Labs] : pertinent labs [Discharge Med List] : discharge medication list [Med Reconciliation] : medication reconciliation has been completed [Patient Contacted By: ____] : and contacted by [unfilled] [FreeTextEntry2] : accompanied with Son  Reason for Admission Chest pain, SOB Hospital Course  78-year-old female with past medical history significant for hypertension, hyperlipidemia, diabetes on metformin presented with SOB, found to have elevated trops and EKG changes, consistent with NSTEMI.  NSTEMI (non-ST elevation myocardial infarction). Patient with SOB, found to have elevated trops and EKG changes Recommendations: - please start empagliflozin 10mg qd, check w/ pharmacy for outpt insurance approval to continue at home ( pt declined secondary to cost) - continue furosemide 40mg po BID - continue warfarin for goal INR 2-3 (DC'd on 5 mg every evening) needs Coumadin INR check in 2-3 days , continue for LV thrombus - continue clopidogrel 75mg qd - continue sacubitril-valsartan 24-26mg BID, hold for SBP <90 (DC'd Lisinopril) - continue metop succ 25mg qd, hold for HR <50 - continue spironolactone 12.5mg qd, hold for SBP <90 - continue atorvastatin 40mg - stable for cardiology perspective for DC once INR is therapeutic, INR 2-3 , 2.3 on DC - please ensure pt has cardiology f/up within 2w of DC  Patient seen and evaluated. Reviewed discharge medications with patient and attending. All new medications requiring new prescriptions were sent to the pharmacy of patient's choice. Reviewed need for prescription for previous home medications and new prescriptions sent if requested. Medically cleared/stable for discharge as per Dr. Thompson with appropriate follow up. Patient understands and agrees with plan of care.  has appt with cardio 2:30 today 12/13/23   INR- 7 - today poc - did Blood draw today - will f/u on 12/15/23 was on 7.5 daily

## 2023-12-13 NOTE — ASSESSMENT
[FreeTextEntry1] : #NSTEMI (non-ST elevation myocardial infarction).11/27/23  ECHO 11/28/23 -TTE: 11/28/23, LV cavity size and wall thickness is normal. LV systolic function is severely decreased with EF visually estimated 20 to 25%. There is mild (grade 1) left ventricular diastolic dysfunction. A large (~ 2.7 cm X 1.9 cm) left ventricular apical thrombus is visualized. There is hypokinesis of the apex, mid to distal septum, and mid to distal anterior wall. Normal RV cavity size and systolic function. Structurally normal mitral valve with normal leaflet excursion. There is calcification of the mitral valve annulus. There is mild to moderate mitral regurgitation. -Continue furosemide 40mg po BID - continue clopidogrel 75mg qd - continue sacubitril-valsartan 24-26mg BID, hold for SBP <90 (DC'd Lisinopril) - continue metop succ 25mg qd, hold for HR <50 - continue spironolactone 12.5mg qd, hold for SBP <90 - continue atorvastatin 40mg - stable for cardiology perspective for DC once INR is therapeutic, INR 2-3 , 2.3 on DC - please ensure pt has cardiology f/up within 2w of DC  # LV thrombus 11/27/23  - continue warfarin for goal INR 2-3 (DC'd on 5 mg every evening) needs Coumadin INR check in 2-3 days , continue for LV thrombus --INR 1.49 12/8/23 --> 7 today pending venous draw results   # Diabetes-AIC 6,4 11/22/23 improved form 6.6 10/23 - saw endo 7/20/23 -Ophtho 2/2023 -podiatrist 9/2023 -discussed comp with diet and medications -f/u with endo consult reviewed - cont metformin 500 mg 2 tab BID and levemir 15U am 25 PM and jardiance 10 , added ozempic 0.25 5/2023 - now started 0.5 q weekly -educated pt risk of uncontrolled DM , including microvascular and macrovascular complications , retinopathy leading to blindness , nephropathy leading to ESRD requiring Dialysis , neuropathy leading to amputations , poor wound healing , CAD- leading to MI and death. -pt verbalized and understands , compliance with medication , diet and exercise enforced  -educated to Monitor Accu-Cheks daily fasting and 2 hrs post prandial , ophthalmology consult requested ,Pt to Monitor for signs of hypoglycemia. urine r/o proteins -d/w pt to eat 1800 kcal ADA diet, avoid rice, pasta, sugar, sweet, soda, juices, exercise daily.  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 Follow up annually.  Hyperlipidemia- ASCVD risk 40 % change to Crestor 20 4/2022 LFT and lipids nl 11/2023 - LDL 36 -eat Low-fat diet, avoid red meat, cheese, butter, peanuts and exercise daily for 40 minutes.  Hypertension-Controlled, continue lisinopril 5 mg qd low sodium-DASH diet.  Health maintenance PAP-3/2019- advised dexa -11/2023 nl Mammogram -11/2023 bi rad 2  Colonoscopy 10/2016 due 3 yr repeated 9/2/21 - tubular adenoma - due 3 yrs 2024 Flu vaccine -10/4/23 Tetanus vaccine -given 2014 Pneumovax - given 2014, Prevnar 13 given 2016 - completed skin check - referral to derm given Hep c 2016 neg shingrex advised. Moderna Covid shot first dose 1/13/21, second dose 2/13. Booster 11/2021, 10/2022 dermatologist - 12/2020 skin check - due pt to schedule appt.

## 2023-12-15 ENCOUNTER — TRANSCRIPTION ENCOUNTER (OUTPATIENT)
Age: 78
End: 2023-12-15

## 2023-12-15 ENCOUNTER — NON-APPOINTMENT (OUTPATIENT)
Age: 78
End: 2023-12-15

## 2023-12-16 ENCOUNTER — NON-APPOINTMENT (OUTPATIENT)
Age: 78
End: 2023-12-16

## 2023-12-18 ENCOUNTER — APPOINTMENT (OUTPATIENT)
Age: 78
End: 2023-12-18
Payer: MEDICARE

## 2023-12-18 VITALS
DIASTOLIC BLOOD PRESSURE: 58 MMHG | SYSTOLIC BLOOD PRESSURE: 100 MMHG | OXYGEN SATURATION: 98 % | HEART RATE: 76 BPM | RESPIRATION RATE: 16 BRPM | TEMPERATURE: 97.3 F

## 2023-12-18 LAB
INR PPP: 5.1 RATIO
PT BLD: 55 SEC

## 2023-12-18 PROCEDURE — 99349 HOME/RES VST EST MOD MDM 40: CPT

## 2023-12-18 NOTE — PHYSICAL EXAM
[No Acute Distress] : no acute distress [Well-Appearing] : well-appearing [Normal Sclera/Conjunctiva] : normal sclera/conjunctiva [PERRL] : pupils equal round and reactive to light [EOMI] : extraocular movements intact [Normal Outer Ear/Nose] : the outer ears and nose were normal in appearance [Normal Oropharynx] : the oropharynx was normal [No JVD] : no jugular venous distention [No Lymphadenopathy] : no lymphadenopathy [No Respiratory Distress] : no respiratory distress  [No Accessory Muscle Use] : no accessory muscle use [Clear to Auscultation] : lungs were clear to auscultation bilaterally [Normal Rate] : normal rate  [Regular Rhythm] : with a regular rhythm [Pedal Pulses Present] : the pedal pulses are present [No Edema] : there was no peripheral edema [Soft] : abdomen soft [Normal Bowel Sounds] : normal bowel sounds [No Joint Swelling] : no joint swelling [Grossly Normal Strength/Tone] : grossly normal strength/tone [Coordination Grossly Intact] : coordination grossly intact [No Focal Deficits] : no focal deficits [Normal Gait] : normal gait [Normal Affect] : the affect was normal [Alert and Oriented x3] : oriented to person, place, and time [Normal Insight/Judgement] : insight and judgment were intact

## 2023-12-18 NOTE — HISTORY OF PRESENT ILLNESS
[FreeTextEntry1] : 78-year-old female being seen in cardiac evaluation after a recent anterior apical infarct.  She has a history of diabetes, hypertension, and hypercholesterolemia, but no history of coronary disease.  On 11/24 she was shopping and developed profound weakness which persisted over the next few days.  On 1127 she was seen in her internist office and had EKG changes of an anterior wall infarct.  She underwent urgent angiography which showed LAD disease.  However, her anterior wall was not viable so the vessel was not stented.  She has developed CHF as well as an intramural clot at the apex.  She has been home for about a week.  She is comfortable with no shortness of breath, chest pressure, or palpitations.  She has had generally good control of her risk factors.  Her most recent blood work shows an A1c of 6.4 and an LDL cholesterol of 35.  Her blood pressure has been well-controlled.

## 2023-12-18 NOTE — DISCUSSION/SUMMARY
[EKG obtained to assist in diagnosis and management of assessed problem(s)] : EKG obtained to assist in diagnosis and management of assessed problem(s) [FreeTextEntry1] : In summary,  is a 78-year-old female with risk factors for CAD who had an anterior wall infarct about 2 weeks ago.  She did not seek immediate medical advice and so had irreversible damage to her anterior wall at the time of her angiography.  She also developed an intramural hematoma and CHF.  She has been home for about a week and is currently asymptomatic.  Her exam shows regular rhythm, a blood pressure of about 100/60, clear lungs, and normal cardiac exam, and no peripheral edema.  Her EKG shows Q waves and ST segment elevation across the precordial leads with left axis deviation.  It is unchanged from her admission EKG on 1127.  She seems stable and for present we will continue on her current regimen of Lasix, Entresto, metoprolol, spironolactone, Lipitor, metformin, Jardiance, and Coumadin.  I instructed her to try to resume normal activities.  She will follow-up here in 2 or 3 weeks.

## 2023-12-19 ENCOUNTER — NON-APPOINTMENT (OUTPATIENT)
Age: 78
End: 2023-12-19

## 2023-12-19 ENCOUNTER — APPOINTMENT (OUTPATIENT)
Dept: INTERNAL MEDICINE | Facility: CLINIC | Age: 78
End: 2023-12-19
Payer: MEDICARE

## 2023-12-19 LAB
INR PPP: 1.9
PT BLD: 21.1

## 2023-12-19 PROCEDURE — 93793 ANTICOAG MGMT PT WARFARIN: CPT

## 2023-12-21 ENCOUNTER — NON-APPOINTMENT (OUTPATIENT)
Age: 78
End: 2023-12-21

## 2023-12-21 ENCOUNTER — APPOINTMENT (OUTPATIENT)
Dept: INTERNAL MEDICINE | Facility: CLINIC | Age: 78
End: 2023-12-21
Payer: MEDICARE

## 2023-12-21 PROCEDURE — 93793 ANTICOAG MGMT PT WARFARIN: CPT

## 2023-12-21 NOTE — REVIEW OF SYSTEMS
[Fatigue] : fatigue [Cough] : cough [Negative] : Neurological [Fever] : no fever [Chills] : no chills [Shortness Of Breath] : no shortness of breath [Wheezing] : no wheezing [FreeTextEntry4] : JASMYNE

## 2023-12-21 NOTE — PLAN
[FreeTextEntry1] : Educated patient on fall and safety precautions, heart healthy diet, daily weights, medication compliance, good skin care and monitor/notify MD/NP for signs/symptoms of bleeding, fluid overload and electrolyte abnormalities, such as, shortness of breath, cough, swelling, chest discomfort, changes in heart rate, dizziness, fainting, or changes in mental status. Reinforced provider follow up with PCP, cardiology, coumadin clinic and endo. Continue home care services to strengthen muscles. 24/7 TCM contact provided and encouraged to call with any questions or concerns.

## 2023-12-21 NOTE — HISTORY OF PRESENT ILLNESS
[FreeTextEntry1] : F/u Transitional care management STAR NSTEMI [de-identified] : Ms. Howell is a 78 year old female with past medical history significant for hypertension, hyperlipidemia, diabetes on metformin presented to ED with SOB, found to have elevated trops and EKG changes, consistent with NSTEMI. She was admitted from 11/27-12/6, found to have LV thrombus and started on warfarin. She was discharged with home care services and outpatient provider follow up.  Upon arrival, Mrs. Howell is observed ambulating, appears pleasant and in nad. Since home, feeling okay, and taking it easy. Initially she was staying with son and now back home.  She followed up with PCP Dr. Coe, cardiologist Dr. Mahmood and coumadin clinic for INR monitoring on 12/13. Son states last INR 5.1 and was told to hold coumadin until repeat draws scheduled for today. Patient aware to monitor/report any s/s of abnormal bleeding. Denies any other changes to medication regime and taking medications as prescribed. Monitoring daily weights - stable 152.8lbs today and ranging between 152-154lbs. Sleeps with 1 pillow, unchanged. . Appetite improving, BM managed with prune juice.   Has home care services, nurse was in this morning and has supportive family, son Cole. Prior to hospitalization, independent with all ADLs and goal to return to same.

## 2023-12-22 LAB
INR PPP: 1.2
PT BLD: 14.1

## 2023-12-26 ENCOUNTER — APPOINTMENT (OUTPATIENT)
Dept: INTERNAL MEDICINE | Facility: CLINIC | Age: 78
End: 2023-12-26
Payer: MEDICARE

## 2023-12-26 ENCOUNTER — NON-APPOINTMENT (OUTPATIENT)
Age: 78
End: 2023-12-26

## 2023-12-26 LAB
INR PPP: 1.2 RATIO
POCT-PROTHROMBIN TIME: 14.8 SECS

## 2023-12-26 PROCEDURE — 93793 ANTICOAG MGMT PT WARFARIN: CPT

## 2023-12-27 ENCOUNTER — APPOINTMENT (OUTPATIENT)
Dept: CARDIOLOGY | Facility: CLINIC | Age: 78
End: 2023-12-27
Payer: MEDICARE

## 2023-12-27 VITALS
DIASTOLIC BLOOD PRESSURE: 63 MMHG | OXYGEN SATURATION: 100 % | HEART RATE: 77 BPM | WEIGHT: 151 LBS | BODY MASS INDEX: 26.33 KG/M2 | SYSTOLIC BLOOD PRESSURE: 98 MMHG

## 2023-12-27 DIAGNOSIS — I23.6 THROMBOSIS OF ATRIUM, AURICULAR APPENDAGE, AND VENTRICLE AS CURRENT COMPLICATIONS FOLLOWING ACUTE MYOCARDIAL INFARCTION: ICD-10-CM

## 2023-12-27 PROCEDURE — 99214 OFFICE O/P EST MOD 30 MIN: CPT

## 2023-12-27 NOTE — HISTORY OF PRESENT ILLNESS
[FreeTextEntry1] : 78-year-old female who sustained a large anterior wall infarct with HFrEF and an apical clot about a month ago.  She was seen in follow-up about 2 weeks ago and reports since that time she has been feeling generally well.  She does not have any chest pressure, shortness of breath, or lightheadedness.  She is able to carry on usual activities without any difficulty.  Her INR has been checked multiple times per week and has been erratic ranging from 7-1.  She had her electrolytes and renal function checked last week and they are unremarkable.

## 2023-12-27 NOTE — DISCUSSION/SUMMARY
[FreeTextEntry1] :  is now about a month post large anterior wall infarct.  She is comfortable at present.  Her exam shows a low blood pressure of about 100/70 (she is has no orthostatic symptoms).  Regular rhythm, clear lungs, no change in her weight, and a normal cardiac exam.  She has been stable over the past few weeks.  She will continue on Entresto, Lasix, Jardiance, spironolactone, metoprolol, Lipitor, Plavix, and insulin.  She will return in follow-up for an echocardiogram and repeat office visit in about a month.  Hopefully will be able to discontinue her Coumadin at that time.

## 2023-12-28 ENCOUNTER — RESULT CHARGE (OUTPATIENT)
Age: 78
End: 2023-12-28

## 2023-12-29 ENCOUNTER — APPOINTMENT (OUTPATIENT)
Dept: INTERNAL MEDICINE | Facility: CLINIC | Age: 78
End: 2023-12-29
Payer: MEDICARE

## 2023-12-29 LAB
INR PPP: 1.67 RATIO
PT BLD: 18.8 SEC

## 2023-12-29 PROCEDURE — 93793 ANTICOAG MGMT PT WARFARIN: CPT

## 2024-01-02 ENCOUNTER — APPOINTMENT (OUTPATIENT)
Dept: INTERNAL MEDICINE | Facility: CLINIC | Age: 79
End: 2024-01-02
Payer: MEDICARE

## 2024-01-02 LAB
INR PPP: 3.2 RATIO
POCT-PROTHROMBIN TIME: 38.3 SECS

## 2024-01-02 PROCEDURE — 93793 ANTICOAG MGMT PT WARFARIN: CPT

## 2024-01-04 ENCOUNTER — RESULT CHARGE (OUTPATIENT)
Age: 79
End: 2024-01-04

## 2024-01-04 ENCOUNTER — TRANSCRIPTION ENCOUNTER (OUTPATIENT)
Age: 79
End: 2024-01-04

## 2024-01-04 RX ORDER — INSULIN DETEMIR 100 [IU]/ML
100 INJECTION, SOLUTION SUBCUTANEOUS
Qty: 3 | Refills: 0 | Status: ACTIVE | COMMUNITY
Start: 2019-03-21 | End: 1900-01-01

## 2024-01-05 ENCOUNTER — APPOINTMENT (OUTPATIENT)
Dept: INTERNAL MEDICINE | Facility: CLINIC | Age: 79
End: 2024-01-05
Payer: MEDICARE

## 2024-01-05 LAB
INR PPP: 2.2 RATIO
POCT-PROTHROMBIN TIME: 26.5 SECS

## 2024-01-05 PROCEDURE — 93793 ANTICOAG MGMT PT WARFARIN: CPT

## 2024-01-09 ENCOUNTER — NON-APPOINTMENT (OUTPATIENT)
Age: 79
End: 2024-01-09

## 2024-01-09 ENCOUNTER — APPOINTMENT (OUTPATIENT)
Dept: INTERNAL MEDICINE | Facility: CLINIC | Age: 79
End: 2024-01-09

## 2024-01-10 ENCOUNTER — NON-APPOINTMENT (OUTPATIENT)
Age: 79
End: 2024-01-10

## 2024-01-10 ENCOUNTER — APPOINTMENT (OUTPATIENT)
Dept: INTERNAL MEDICINE | Facility: CLINIC | Age: 79
End: 2024-01-10
Payer: MEDICARE

## 2024-01-10 PROCEDURE — 93793 ANTICOAG MGMT PT WARFARIN: CPT

## 2024-01-11 ENCOUNTER — RESULT CHARGE (OUTPATIENT)
Age: 79
End: 2024-01-11

## 2024-01-12 ENCOUNTER — NON-APPOINTMENT (OUTPATIENT)
Age: 79
End: 2024-01-12

## 2024-01-12 ENCOUNTER — APPOINTMENT (OUTPATIENT)
Dept: CARDIOLOGY | Facility: CLINIC | Age: 79
End: 2024-01-12
Payer: MEDICARE

## 2024-01-12 VITALS
OXYGEN SATURATION: 99 % | WEIGHT: 151 LBS | HEART RATE: 80 BPM | BODY MASS INDEX: 26.33 KG/M2 | SYSTOLIC BLOOD PRESSURE: 101 MMHG | DIASTOLIC BLOOD PRESSURE: 66 MMHG

## 2024-01-12 PROCEDURE — 93000 ELECTROCARDIOGRAM COMPLETE: CPT

## 2024-01-12 PROCEDURE — 99214 OFFICE O/P EST MOD 30 MIN: CPT

## 2024-01-12 PROCEDURE — 93306 TTE W/DOPPLER COMPLETE: CPT

## 2024-01-12 RX ORDER — WARFARIN 5 MG/1
5 TABLET ORAL
Qty: 60 | Refills: 2 | Status: DISCONTINUED | COMMUNITY
Start: 2023-12-13 | End: 2024-01-12

## 2024-01-12 NOTE — HISTORY OF PRESENT ILLNESS
[FreeTextEntry1] : 78-year-old female who sustained a large anterior wall infarct with left ventricular dysfunction, CHF, and an apical clot about a month ago.  She has been on a standard CHF regimen since discharge and is feeling generally well.  She returns today for echocardiography and reevaluation to see if she needs to continue Coumadin and to reassess her status.  She reports continuing to be only mildly symptomatic.  She states she can do most activities without any problem.

## 2024-01-12 NOTE — DISCUSSION/SUMMARY
[FreeTextEntry1] : Ms Howell continues to feel surprisingly well.  Her exam is unchanged.  Heart rate regular, blood pressure about 100/70, chest clear, cardiac exam unremarkable, and she has no peripheral edema.  An EKG is also unchanged with loss of R wave voltage and biphasic T's in the mid precordial leads.  An echocardiogram was performed as part of her evaluation.  She has severe segmental left ventricular dysfunction with a large area of akinesis involving the anterior wall, septum, apex, and distal lateral wall.  However the clot is no longer present.  I told her she could discontinue her Coumadin.  She will continue on Entresto, metoprolol, spironolactone, Jardiance, Lipitor, and Lasix.  She will follow-up in 2 months. [EKG obtained to assist in diagnosis and management of assessed problem(s)] : EKG obtained to assist in diagnosis and management of assessed problem(s)

## 2024-01-15 ENCOUNTER — RX RENEWAL (OUTPATIENT)
Age: 79
End: 2024-01-15

## 2024-01-16 LAB — INR PPP: 1.81 RATIO

## 2024-01-18 ENCOUNTER — RESULT CHARGE (OUTPATIENT)
Age: 79
End: 2024-01-18

## 2024-01-23 NOTE — HISTORY OF PRESENT ILLNESS
Last Visit: 7/11/2023   Next Appointment: 6/3/2024  Previous Refill Encounter: 11/2/2023 #15 ml  with 11 refills    Requested Prescriptions     Pending Prescriptions Disp Refills    insulin aspart (NOVOLOG) 100 UNIT/ML injection pen 15 mL 11     Sig: Inject 1 unit subcutaneously for every 6 grams of carbohydrates plus correction: 150-199: 1 unit, 200-249: 2 units, >250: 3 units          [de-identified] : Came in for annual check up \par \par DM- started seeing Endo 5/2019 - was supposed to be on Levemir 16 units qhs - taking metformin 2 tab d q daily  - stopped  Ozempic 0.25 q weekly as not covered \par -AIC 8.4 9/22/2020 \par - pt agrees being compliant with all her medications \par --saw eye doctor 5/2020 + retinopathy \par \par  Hyperlipidemia -back on simvastatin 40 daily - lipids 9/2019 better \par

## 2024-01-25 ENCOUNTER — RESULT CHARGE (OUTPATIENT)
Age: 79
End: 2024-01-25

## 2024-01-26 ENCOUNTER — APPOINTMENT (OUTPATIENT)
Dept: INTERNAL MEDICINE | Facility: CLINIC | Age: 79
End: 2024-01-26

## 2024-01-29 ENCOUNTER — RX RENEWAL (OUTPATIENT)
Age: 79
End: 2024-01-29

## 2024-01-31 ENCOUNTER — NON-APPOINTMENT (OUTPATIENT)
Age: 79
End: 2024-01-31

## 2024-02-01 ENCOUNTER — RESULT CHARGE (OUTPATIENT)
Age: 79
End: 2024-02-01

## 2024-02-02 RX ORDER — CLOPIDOGREL BISULFATE 75 MG/1
75 TABLET, FILM COATED ORAL
Qty: 90 | Refills: 0 | Status: ACTIVE | COMMUNITY
Start: 2023-12-13 | End: 1900-01-01

## 2024-02-02 RX ORDER — ROSUVASTATIN CALCIUM 20 MG/1
20 TABLET, FILM COATED ORAL
Qty: 90 | Refills: 0 | Status: DISCONTINUED | COMMUNITY
Start: 2022-04-15 | End: 2024-02-02

## 2024-02-02 RX ORDER — SACUBITRIL AND VALSARTAN 24; 26 MG/1; MG/1
24-26 TABLET, FILM COATED ORAL TWICE DAILY
Qty: 180 | Refills: 0 | Status: ACTIVE | COMMUNITY
Start: 2023-12-13 | End: 1900-01-01

## 2024-02-07 ENCOUNTER — APPOINTMENT (OUTPATIENT)
Dept: INTERNAL MEDICINE | Facility: CLINIC | Age: 79
End: 2024-02-07
Payer: MEDICARE

## 2024-02-07 VITALS
OXYGEN SATURATION: 98 % | WEIGHT: 158 LBS | TEMPERATURE: 97.5 F | HEART RATE: 78 BPM | DIASTOLIC BLOOD PRESSURE: 61 MMHG | SYSTOLIC BLOOD PRESSURE: 93 MMHG | BODY MASS INDEX: 28 KG/M2 | HEIGHT: 63 IN

## 2024-02-07 DIAGNOSIS — Z00.00 ENCOUNTER FOR GENERAL ADULT MEDICAL EXAMINATION W/OUT ABNORMAL FINDINGS: ICD-10-CM

## 2024-02-07 PROCEDURE — G0439: CPT

## 2024-02-07 RX ORDER — WARFARIN 1 MG/1
1 TABLET ORAL
Qty: 90 | Refills: 3 | Status: DISCONTINUED | COMMUNITY
Start: 2023-12-19 | End: 2024-02-07

## 2024-02-07 RX ORDER — TRIAMCINOLONE ACETONIDE 1 MG/G
0.1 CREAM TOPICAL TWICE DAILY
Qty: 1 | Refills: 1 | Status: ACTIVE | COMMUNITY
Start: 2024-02-07 | End: 1900-01-01

## 2024-02-07 NOTE — HEALTH RISK ASSESSMENT
[No] : In the past 12 months have you used drugs other than those required for medical reasons? No [No falls in past year] : Patient reported no falls in the past year [0] : 2) Feeling down, depressed, or hopeless: Not at all (0) [PHQ-2 Negative - No further assessment needed] : PHQ-2 Negative - No further assessment needed [Alone] : lives alone [Fully functional (bathing, dressing, toileting, transferring, walking, feeding)] : Fully functional (bathing, dressing, toileting, transferring, walking, feeding) [Fully functional (using the telephone, shopping, preparing meals, housekeeping, doing laundry, using] : Fully functional and needs no help or supervision to perform IADLs (using the telephone, shopping, preparing meals, housekeeping, doing laundry, using transportation, managing medications and managing finances) [With Patient/Caregiver] : , with patient/caregiver [Never] : Never [[id number]] : 0 [Reports changes in hearing] : Reports no changes in hearing [Reports changes in vision] : Reports no changes in vision [Reports changes in dental health] : Reports no changes in dental health [de-identified] : hard of hearing, wears  [AdvancecareDate] : 02/24

## 2024-02-07 NOTE — HISTORY OF PRESENT ILLNESS
[de-identified] : 78 y.o. female, PMHx hypertension, hyperlipidemia, DM2, NSTEMI 12/203 with LV thrombus. Presents for CPE/wellness visit.    Large anterior wall infarct with left ventricular dysfunction, CHF, and an apical clot about 2 months ago.   Cardiology f/u last month, note reviewed - clot resolved, discontinued warfarin, remains on Entresto, metoprolol, spironolactone, Jardiance, Lipitor, and Lasix. She will follow-up with cariology in 1 month.  C/o cough today.  Reports on and off for years.    Also with lots of itching on back of neck and shoulders with slight rash about 1-2 months.  Not sure if started during or after hospitalization.

## 2024-02-07 NOTE — PHYSICAL EXAM
[No Acute Distress] : no acute distress [Well-Appearing] : well-appearing [Normal Voice/Communication] : normal voice/communication [No Carotid Bruits] : no carotid bruits [Pedal Pulses Present] : the pedal pulses are present [No Edema] : there was no peripheral edema [Normal Supraclavicular Nodes] : no supraclavicular lymphadenopathy [Grossly Normal Strength/Tone] : grossly normal strength/tone [Coordination Grossly Intact] : coordination grossly intact [No Focal Deficits] : no focal deficits [Normal Gait] : normal gait [Alert and Oriented x3] : oriented to person, place, and time [Normal] : affect was normal and insight and judgment were intact [de-identified] : hard of hearing

## 2024-02-07 NOTE — ASSESSMENT
[FreeTextEntry1] : --- HM: Breast cancer screening UTD - mammo + US 11/2023 birads 2 CRS - colonoscopy 9/2021 - thinks repeat due, defers for now  Skin cancer screening - advised yearly with dermatology, 2020, referral provided Bone density 11/2023, normal  Cervical cancer screening - pap 2019, due for repeat   Dental - yearly cleanings tdap due - 2014 Pneumococcal vaccination completed  Shingrix vaccine - reports completed about 5 years ago Flu shot done in the fall  check labs  cough: Trial PRN symbicort  hypertension: well controlled  hyperlipidemia: continues statin  DM2: Continues Levemir 13 units in the morning, 23 units in the evening per endo recs Metformin 500 every 12 hours, jardiance daily  Morning  today, range  Needs to see ophthalmology, referral provided  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 Follow up annually.  NSTEMI 12/203 with LV thrombus: clot resolved - discontinued warfarin remains on Entresto, metoprolol, spironolactone, Jardiance, Lipitor, plavix, and Lasix She will follow-up with cariology in 1 month Find she is a little slower with activities, no shortness of breath, no dizziness, no palpitations  Rash: ?contact dermatitis daily lotion after shower to avoid dryness, twice daily triamcinolone x 1 week

## 2024-02-08 ENCOUNTER — RESULT CHARGE (OUTPATIENT)
Age: 79
End: 2024-02-08

## 2024-02-09 LAB
ALBUMIN SERPL ELPH-MCNC: 4 G/DL
ALP BLD-CCNC: 107 U/L
ALT SERPL-CCNC: 13 U/L
ANION GAP SERPL CALC-SCNC: 12 MMOL/L
AST SERPL-CCNC: 14 U/L
BASOPHILS # BLD AUTO: 0.05 K/UL
BASOPHILS NFR BLD AUTO: 0.7 %
BILIRUB SERPL-MCNC: 0.5 MG/DL
BUN SERPL-MCNC: 16 MG/DL
CALCIUM SERPL-MCNC: 9.4 MG/DL
CHLORIDE SERPL-SCNC: 102 MMOL/L
CO2 SERPL-SCNC: 23 MMOL/L
CREAT SERPL-MCNC: 0.77 MG/DL
EGFR: 78 ML/MIN/1.73M2
EOSINOPHIL # BLD AUTO: 0.41 K/UL
EOSINOPHIL NFR BLD AUTO: 5.9 %
ESTIMATED AVERAGE GLUCOSE: 148 MG/DL
GLUCOSE SERPL-MCNC: 123 MG/DL
HBA1C MFR BLD HPLC: 6.8 %
HCT VFR BLD CALC: 37 %
HGB BLD-MCNC: 11.2 G/DL
IMM GRANULOCYTES NFR BLD AUTO: 0.3 %
LYMPHOCYTES # BLD AUTO: 1.02 K/UL
LYMPHOCYTES NFR BLD AUTO: 14.6 %
MAN DIFF?: NORMAL
MCHC RBC-ENTMCNC: 26.7 PG
MCHC RBC-ENTMCNC: 30.3 GM/DL
MCV RBC AUTO: 88.1 FL
MONOCYTES # BLD AUTO: 0.53 K/UL
MONOCYTES NFR BLD AUTO: 7.6 %
NEUTROPHILS # BLD AUTO: 4.95 K/UL
NEUTROPHILS NFR BLD AUTO: 70.9 %
PLATELET # BLD AUTO: 417 K/UL
POTASSIUM SERPL-SCNC: 4.7 MMOL/L
PROT SERPL-MCNC: 6.9 G/DL
RBC # BLD: 4.2 M/UL
RBC # FLD: 17.7 %
SODIUM SERPL-SCNC: 137 MMOL/L
WBC # FLD AUTO: 6.98 K/UL

## 2024-02-15 ENCOUNTER — RESULT CHARGE (OUTPATIENT)
Age: 79
End: 2024-02-15

## 2024-02-22 ENCOUNTER — RESULT CHARGE (OUTPATIENT)
Age: 79
End: 2024-02-22

## 2024-02-26 ENCOUNTER — RX RENEWAL (OUTPATIENT)
Age: 79
End: 2024-02-26

## 2024-02-29 ENCOUNTER — RESULT CHARGE (OUTPATIENT)
Age: 79
End: 2024-02-29

## 2024-03-07 ENCOUNTER — RESULT CHARGE (OUTPATIENT)
Age: 79
End: 2024-03-07

## 2024-03-08 ENCOUNTER — APPOINTMENT (OUTPATIENT)
Dept: INTERNAL MEDICINE | Facility: CLINIC | Age: 79
End: 2024-03-08
Payer: MEDICARE

## 2024-03-08 VITALS
TEMPERATURE: 97.8 F | OXYGEN SATURATION: 99 % | HEART RATE: 77 BPM | SYSTOLIC BLOOD PRESSURE: 112 MMHG | WEIGHT: 160 LBS | DIASTOLIC BLOOD PRESSURE: 67 MMHG | BODY MASS INDEX: 28.35 KG/M2 | HEIGHT: 63 IN

## 2024-03-08 PROCEDURE — 99213 OFFICE O/P EST LOW 20 MIN: CPT

## 2024-03-08 NOTE — PHYSICAL EXAM
[No Acute Distress] : no acute distress [Well-Appearing] : well-appearing [Normal Voice/Communication] : normal voice/communication [Normal Sclera/Conjunctiva] : normal sclera/conjunctiva [Normal Oropharynx] : the oropharynx was normal [Normal TMs] : both tympanic membranes were normal [Normal] : normal rate, regular rhythm, normal S1 and S2 and no murmur heard [No Edema] : there was no peripheral edema [Normal Affect] : the affect was normal [Alert and Oriented x3] : oriented to person, place, and time [Normal Insight/Judgement] : insight and judgment were intact

## 2024-03-08 NOTE — HISTORY OF PRESENT ILLNESS
[FreeTextEntry8] : 78 y.o. female, PMHx hypertension, hyperlipidemia, DM2, NSTEMI 12/2023 with LV thrombus and CHF. Presents with c/o hoarse voice x 1 week.  Feeling otherwise well.  No fever/chills/sweats.  No fatigue, no cough, no congestion.  No dyspnea, no edema, no chest pain, no dizziness.  Cough much improved with Symbicort.

## 2024-03-08 NOTE — ASSESSMENT
[FreeTextEntry1] : --- Laryngitis: Mild with no other symptoms. Unremarkable exam other than noting mild voice hoarseness. Recommend tea, honey. lozenges, warm salt-water gargle.   F/u if and new/worsening symptoms.

## 2024-03-14 ENCOUNTER — RESULT CHARGE (OUTPATIENT)
Age: 79
End: 2024-03-14

## 2024-03-20 ENCOUNTER — APPOINTMENT (OUTPATIENT)
Dept: CARDIOLOGY | Facility: CLINIC | Age: 79
End: 2024-03-20
Payer: MEDICARE

## 2024-03-20 VITALS
HEART RATE: 74 BPM | SYSTOLIC BLOOD PRESSURE: 110 MMHG | WEIGHT: 158 LBS | DIASTOLIC BLOOD PRESSURE: 60 MMHG | BODY MASS INDEX: 28 KG/M2 | OXYGEN SATURATION: 100 % | HEIGHT: 63 IN

## 2024-03-20 PROCEDURE — 99214 OFFICE O/P EST MOD 30 MIN: CPT

## 2024-03-20 NOTE — PHYSICAL EXAM
[Well Developed] : well developed [Well Nourished] : well nourished [No Acute Distress] : no acute distress [Normal Conjunctiva] : normal conjunctiva [Normal Venous Pressure] : normal venous pressure [No Carotid Bruit] : no carotid bruit [Normal S1, S2] : normal S1, S2 [No Murmur] : no murmur [No Rub] : no rub [No Gallop] : no gallop [Clear Lung Fields] : clear lung fields [Good Air Entry] : good air entry [No Respiratory Distress] : no respiratory distress  [Soft] : abdomen soft [Non Tender] : non-tender [No Masses/organomegaly] : no masses/organomegaly [Normal Bowel Sounds] : normal bowel sounds [Normal Gait] : normal gait [No Edema] : no edema [No Clubbing] : no clubbing [No Cyanosis] : no cyanosis [No Varicosities] : no varicosities [No Skin Lesions] : no skin lesions [No Rash] : no rash [Moves all extremities] : moves all extremities [No Focal Deficits] : no focal deficits [Normal Speech] : normal speech [Normal memory] : normal memory [Alert and Oriented] : alert and oriented

## 2024-03-20 NOTE — HISTORY OF PRESENT ILLNESS
[FreeTextEntry1] : 78-year-old female with a history of coronary artery disease with an anterior wall infarct with severe segmental left ventricular dysfunction, HFrEF, and a prior mural thrombus.  She was last seen 1/24.  At that time echo showed resolution of the thrombus and her Coumadin was discontinued.  She has always had mild ankle edema, but thinks it has increased during the past week.  It is always been more pronounced on the left and is quite noticeable.  She is not more short of breath than in the past.  She states she is only taking half of her Lasix 40 mg/day.  She also has a history of hypertension, hypercholesterolemia, and diabetes.  Recent blood work shows her A1c to be 6.8 and her LDL cholesterol to be 36.  Her renal function and electrolytes were also normal measured last month.

## 2024-03-20 NOTE — DISCUSSION/SUMMARY
[FreeTextEntry1] :  is noticing increased ankle edema and her exam shows about 4+ on the left and 2+ on the right at present.  Her exam is otherwise unremarkable.  She has regular rhythm, normal blood pressure, no JVD, and a clear chest.  She needs increased diuretics and I told her to back to a whole furosemide tablet a day.  She will follow-up here again in a month.

## 2024-03-21 ENCOUNTER — RESULT CHARGE (OUTPATIENT)
Age: 79
End: 2024-03-21

## 2024-03-28 ENCOUNTER — RESULT CHARGE (OUTPATIENT)
Age: 79
End: 2024-03-28

## 2024-04-04 ENCOUNTER — RESULT CHARGE (OUTPATIENT)
Age: 79
End: 2024-04-04

## 2024-04-11 ENCOUNTER — RESULT CHARGE (OUTPATIENT)
Age: 79
End: 2024-04-11

## 2024-04-17 ENCOUNTER — LABORATORY RESULT (OUTPATIENT)
Age: 79
End: 2024-04-17

## 2024-04-17 ENCOUNTER — APPOINTMENT (OUTPATIENT)
Dept: ENDOCRINOLOGY | Facility: CLINIC | Age: 79
End: 2024-04-17
Payer: MEDICARE

## 2024-04-17 VITALS
WEIGHT: 142.31 LBS | OXYGEN SATURATION: 99 % | HEIGHT: 63 IN | DIASTOLIC BLOOD PRESSURE: 50 MMHG | BODY MASS INDEX: 25.21 KG/M2 | SYSTOLIC BLOOD PRESSURE: 90 MMHG | HEART RATE: 73 BPM | TEMPERATURE: 97.8 F

## 2024-04-17 LAB
GLUCOSE BLDC GLUCOMTR-MCNC: 161
HBA1C MFR BLD HPLC: 7.1

## 2024-04-17 PROCEDURE — 36415 COLL VENOUS BLD VENIPUNCTURE: CPT

## 2024-04-17 PROCEDURE — 82962 GLUCOSE BLOOD TEST: CPT

## 2024-04-17 PROCEDURE — 99214 OFFICE O/P EST MOD 30 MIN: CPT

## 2024-04-17 PROCEDURE — 83036 HEMOGLOBIN GLYCOSYLATED A1C: CPT | Mod: QW

## 2024-04-18 ENCOUNTER — RESULT CHARGE (OUTPATIENT)
Age: 79
End: 2024-04-18

## 2024-04-18 ENCOUNTER — RX RENEWAL (OUTPATIENT)
Age: 79
End: 2024-04-18

## 2024-04-18 LAB
ALBUMIN SERPL ELPH-MCNC: 4.8 G/DL
ALP BLD-CCNC: 77 U/L
ALT SERPL-CCNC: 12 U/L
ANION GAP SERPL CALC-SCNC: 18 MMOL/L
AST SERPL-CCNC: 14 U/L
BASOPHILS # BLD AUTO: 0.07 K/UL
BASOPHILS NFR BLD AUTO: 1.1 %
BILIRUB SERPL-MCNC: 0.7 MG/DL
BUN SERPL-MCNC: 29 MG/DL
CALCIUM SERPL-MCNC: 10.4 MG/DL
CHLORIDE SERPL-SCNC: 102 MMOL/L
CHOLEST SERPL-MCNC: 163 MG/DL
CO2 SERPL-SCNC: 21 MMOL/L
CREAT SERPL-MCNC: 0.92 MG/DL
CREAT SPEC-SCNC: 27 MG/DL
EGFR: 63 ML/MIN/1.73M2
EOSINOPHIL # BLD AUTO: 0.24 K/UL
EOSINOPHIL NFR BLD AUTO: 3.9 %
GLUCOSE SERPL-MCNC: 134 MG/DL
HCT VFR BLD CALC: 39.5 %
HDLC SERPL-MCNC: 59 MG/DL
HGB BLD-MCNC: 12 G/DL
IMM GRANULOCYTES NFR BLD AUTO: 0.3 %
LDLC SERPL DIRECT ASSAY-MCNC: 82 MG/DL
LYMPHOCYTES # BLD AUTO: 1.82 K/UL
LYMPHOCYTES NFR BLD AUTO: 29.8 %
MAN DIFF?: NORMAL
MCHC RBC-ENTMCNC: 26.1 PG
MCHC RBC-ENTMCNC: 30.4 GM/DL
MCV RBC AUTO: 85.9 FL
MICROALBUMIN 24H UR DL<=1MG/L-MCNC: <1.2 MG/DL
MICROALBUMIN/CREAT 24H UR-RTO: NORMAL MG/G
MONOCYTES # BLD AUTO: 0.58 K/UL
MONOCYTES NFR BLD AUTO: 9.5 %
NEUTROPHILS # BLD AUTO: 3.38 K/UL
NEUTROPHILS NFR BLD AUTO: 55.4 %
PLATELET # BLD AUTO: 297 K/UL
POTASSIUM SERPL-SCNC: 4.2 MMOL/L
PROT SERPL-MCNC: 7.8 G/DL
RBC # BLD: 4.6 M/UL
RBC # FLD: 20.4 %
SODIUM SERPL-SCNC: 141 MMOL/L
TRIGL SERPL-MCNC: 94 MG/DL
WBC # FLD AUTO: 6.11 K/UL

## 2024-04-18 RX ORDER — EMPAGLIFLOZIN 10 MG/1
10 TABLET, FILM COATED ORAL
Qty: 90 | Refills: 0 | Status: ACTIVE | COMMUNITY
Start: 2021-11-30 | End: 1900-01-01

## 2024-04-21 NOTE — HISTORY OF PRESENT ILLNESS
[FreeTextEntry1] : Ms. SAWYER is a 78-year-old female who returns today in follow up with regard to a history of type 2 diabetes mellitus. The diabetes mellitus has been present for about 20 years There is no known history of nephropathy. She too denies any history of neuropathy. There is a hx of retinopathy.   The patient suffered an MI at the end of November 2023 the developed left ventricular thrombosis a few months later.  Patient reports left leg swelling for the past few months.  She is being closely followed by cardiology.   Current dm medications include Metformin 500 mg [2 tabs BID], and Levemir pen [13 units in the AM and 23 units HS].   Patient denies any chest pain, sob, neurologic or ophthalmologic complaints. She too denies any new podiatric concerns. She is up to date with her ophthalmologic visit.  HGM of late has shown values to be running in the 160s in the morning.  She does deny any significant hypoglycemic signs and symptoms of late.  She notes that she has been eating out for dinner more of late.   POCT A1C returned today at 7.1%, previously 6.8% in February 2024, and prior 6.4% in November 2023.  POCT glucose today at 161 mg/dL.  Patient notes that she has lost weight. She does note that she is watching her diet, but not enough to cause weight change. She does live alone. She cooks at times, and gets takeout at other times.  Her current weight is 142 pounds, previously 158 pounds a few weeks ago in March.  ____________________________________________  Additional medical history includes that of Hyperlipidemia and hypertension.  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  Additional medication: Entresto 80 mg, Plavix,

## 2024-04-21 NOTE — ADDENDUM
[FreeTextEntry1] : POCT glucose testing and Hemoglobin A1c was carried out today given diabetes diagnosis. Blood will be drawn in office today.  This note was written by Daniela Richardson on 04/17/2024 acting as medical scribe for Dr. Tyrell Rai. I, Dr. Tyrell Rai, have read and attest that all the information, medical decision making and discharge instructions within are true and accurate.

## 2024-04-24 ENCOUNTER — NON-APPOINTMENT (OUTPATIENT)
Age: 79
End: 2024-04-24

## 2024-04-24 ENCOUNTER — APPOINTMENT (OUTPATIENT)
Dept: CARDIOLOGY | Facility: CLINIC | Age: 79
End: 2024-04-24
Payer: MEDICARE

## 2024-04-24 VITALS
BODY MASS INDEX: 24.98 KG/M2 | SYSTOLIC BLOOD PRESSURE: 91 MMHG | HEART RATE: 71 BPM | OXYGEN SATURATION: 98 % | WEIGHT: 141 LBS | DIASTOLIC BLOOD PRESSURE: 55 MMHG

## 2024-04-24 PROCEDURE — G2211 COMPLEX E/M VISIT ADD ON: CPT

## 2024-04-24 PROCEDURE — 99214 OFFICE O/P EST MOD 30 MIN: CPT

## 2024-04-24 PROCEDURE — 93000 ELECTROCARDIOGRAM COMPLETE: CPT

## 2024-04-24 NOTE — DISCUSSION/SUMMARY
[FreeTextEntry1] : Ms Howell is a little improved since she increased her Lasix to 40 mg/day.  Her exam shows a weight loss of 2 pounds, BP of about 100/60, clear lungs, 1+ edema bilaterally.  Her cardiac exam is within normal limits.  An EKG shows poor R wave progression and some T wave flattening and is unchanged.  She is stable and doing better.  She will continue on her current regimen of Entresto, Jardiance, metoprolol, spironolactone, Lasix, metformin.  She will follow-up in 3 months. [EKG obtained to assist in diagnosis and management of assessed problem(s)] : EKG obtained to assist in diagnosis and management of assessed problem(s)

## 2024-04-24 NOTE — HISTORY OF PRESENT ILLNESS
[FreeTextEntry1] : 78-year-old female with a history of coronary disease with a inferior wall infarct, left ventricular dysfunction, HFrEF, a prior interventricular clot which is now resolved, hypertension, hypercholesterolemia, diabetes.  She was last seen about a month ago.  At point she was taking only 20 mg of Lasix per day and I instructed her to increase to 40.  She reports since that time "I pee like crazy" she is walking better and was able to walk around the block and into the office without stopping today.  The edema in her legs improved and she is lost a few pounds.  She saw her endocrinologist recently.  Her A1c was 6.8 and her LDL cholesterol was 36.  Her renal function continues to be normal.

## 2024-04-25 ENCOUNTER — RESULT CHARGE (OUTPATIENT)
Age: 79
End: 2024-04-25

## 2024-05-02 ENCOUNTER — RESULT CHARGE (OUTPATIENT)
Age: 79
End: 2024-05-02

## 2024-05-09 ENCOUNTER — RESULT CHARGE (OUTPATIENT)
Age: 79
End: 2024-05-09

## 2024-05-16 ENCOUNTER — RESULT CHARGE (OUTPATIENT)
Age: 79
End: 2024-05-16

## 2024-05-16 RX ORDER — SPIRONOLACTONE 25 MG/1
25 TABLET ORAL DAILY
Qty: 90 | Refills: 3 | Status: ACTIVE | COMMUNITY
Start: 2023-12-13 | End: 1900-01-01

## 2024-05-20 ENCOUNTER — INPATIENT (INPATIENT)
Facility: HOSPITAL | Age: 79
LOS: 5 days | Discharge: HOME | End: 2024-05-26
Attending: HOSPITALIST | Admitting: HOSPITALIST
Payer: MEDICARE

## 2024-05-20 VITALS
SYSTOLIC BLOOD PRESSURE: 105 MMHG | DIASTOLIC BLOOD PRESSURE: 67 MMHG | TEMPERATURE: 99 F | OXYGEN SATURATION: 99 % | RESPIRATION RATE: 18 BRPM | HEART RATE: 72 BPM

## 2024-05-20 DIAGNOSIS — Z98.41 CATARACT EXTRACTION STATUS, RIGHT EYE: Chronic | ICD-10-CM

## 2024-05-20 DIAGNOSIS — Z98.89 OTHER SPECIFIED POSTPROCEDURAL STATES: Chronic | ICD-10-CM

## 2024-05-20 LAB
ALBUMIN SERPL ELPH-MCNC: 3.8 G/DL — SIGNIFICANT CHANGE UP (ref 3.3–5)
ALP SERPL-CCNC: 73 U/L — SIGNIFICANT CHANGE UP (ref 40–120)
ALT FLD-CCNC: 9 U/L — SIGNIFICANT CHANGE UP (ref 4–33)
ANION GAP SERPL CALC-SCNC: 14 MMOL/L — SIGNIFICANT CHANGE UP (ref 7–14)
APTT BLD: 26.9 SEC — SIGNIFICANT CHANGE UP (ref 24.5–35.6)
AST SERPL-CCNC: 11 U/L — SIGNIFICANT CHANGE UP (ref 4–32)
BASOPHILS # BLD AUTO: 0.04 K/UL — SIGNIFICANT CHANGE UP (ref 0–0.2)
BASOPHILS NFR BLD AUTO: 0.4 % — SIGNIFICANT CHANGE UP (ref 0–2)
BILIRUB SERPL-MCNC: 0.6 MG/DL — SIGNIFICANT CHANGE UP (ref 0.2–1.2)
BUN SERPL-MCNC: 45 MG/DL — HIGH (ref 7–23)
CALCIUM SERPL-MCNC: 9.8 MG/DL — SIGNIFICANT CHANGE UP (ref 8.4–10.5)
CHLORIDE SERPL-SCNC: 97 MMOL/L — LOW (ref 98–107)
CO2 SERPL-SCNC: 22 MMOL/L — SIGNIFICANT CHANGE UP (ref 22–31)
CREAT SERPL-MCNC: 0.98 MG/DL — SIGNIFICANT CHANGE UP (ref 0.5–1.3)
CRP SERPL-MCNC: 58.1 MG/L — HIGH
EGFR: 59 ML/MIN/1.73M2 — LOW
EOSINOPHIL # BLD AUTO: 0.16 K/UL — SIGNIFICANT CHANGE UP (ref 0–0.5)
EOSINOPHIL NFR BLD AUTO: 1.7 % — SIGNIFICANT CHANGE UP (ref 0–6)
ERYTHROCYTE [SEDIMENTATION RATE] IN BLOOD: 62 MM/HR — HIGH (ref 4–25)
GLUCOSE SERPL-MCNC: 142 MG/DL — HIGH (ref 70–99)
HCT VFR BLD CALC: 32.9 % — LOW (ref 34.5–45)
HGB BLD-MCNC: 10.7 G/DL — LOW (ref 11.5–15.5)
IANC: 6.51 K/UL — SIGNIFICANT CHANGE UP (ref 1.8–7.4)
IMM GRANULOCYTES NFR BLD AUTO: 0.3 % — SIGNIFICANT CHANGE UP (ref 0–0.9)
INR BLD: 1.05 RATIO — SIGNIFICANT CHANGE UP (ref 0.85–1.18)
LYMPHOCYTES # BLD AUTO: 1.64 K/UL — SIGNIFICANT CHANGE UP (ref 1–3.3)
LYMPHOCYTES # BLD AUTO: 17.7 % — SIGNIFICANT CHANGE UP (ref 13–44)
MCHC RBC-ENTMCNC: 27 PG — SIGNIFICANT CHANGE UP (ref 27–34)
MCHC RBC-ENTMCNC: 32.5 GM/DL — SIGNIFICANT CHANGE UP (ref 32–36)
MCV RBC AUTO: 83.1 FL — SIGNIFICANT CHANGE UP (ref 80–100)
MONOCYTES # BLD AUTO: 0.91 K/UL — HIGH (ref 0–0.9)
MONOCYTES NFR BLD AUTO: 9.8 % — SIGNIFICANT CHANGE UP (ref 2–14)
NEUTROPHILS # BLD AUTO: 6.51 K/UL — SIGNIFICANT CHANGE UP (ref 1.8–7.4)
NEUTROPHILS NFR BLD AUTO: 70.1 % — SIGNIFICANT CHANGE UP (ref 43–77)
NRBC # BLD: 0 /100 WBCS — SIGNIFICANT CHANGE UP (ref 0–0)
NRBC # FLD: 0 K/UL — SIGNIFICANT CHANGE UP (ref 0–0)
PLATELET # BLD AUTO: 353 K/UL — SIGNIFICANT CHANGE UP (ref 150–400)
POTASSIUM SERPL-MCNC: 4.3 MMOL/L — SIGNIFICANT CHANGE UP (ref 3.5–5.3)
POTASSIUM SERPL-SCNC: 4.3 MMOL/L — SIGNIFICANT CHANGE UP (ref 3.5–5.3)
PROT SERPL-MCNC: 7.6 G/DL — SIGNIFICANT CHANGE UP (ref 6–8.3)
PROTHROM AB SERPL-ACNC: 11.9 SEC — SIGNIFICANT CHANGE UP (ref 9.5–13)
RBC # BLD: 3.96 M/UL — SIGNIFICANT CHANGE UP (ref 3.8–5.2)
RBC # FLD: 19.2 % — HIGH (ref 10.3–14.5)
SODIUM SERPL-SCNC: 133 MMOL/L — LOW (ref 135–145)
WBC # BLD: 9.29 K/UL — SIGNIFICANT CHANGE UP (ref 3.8–10.5)
WBC # FLD AUTO: 9.29 K/UL — SIGNIFICANT CHANGE UP (ref 3.8–10.5)

## 2024-05-20 PROCEDURE — 99285 EMERGENCY DEPT VISIT HI MDM: CPT

## 2024-05-20 RX ORDER — VANCOMYCIN HCL 1 G
1000 VIAL (EA) INTRAVENOUS ONCE
Refills: 0 | Status: COMPLETED | OUTPATIENT
Start: 2024-05-20 | End: 2024-05-21

## 2024-05-20 RX ORDER — PIPERACILLIN AND TAZOBACTAM 4; .5 G/20ML; G/20ML
3.38 INJECTION, POWDER, LYOPHILIZED, FOR SOLUTION INTRAVENOUS ONCE
Refills: 0 | Status: COMPLETED | OUTPATIENT
Start: 2024-05-20 | End: 2024-05-20

## 2024-05-20 NOTE — ED ADULT TRIAGE NOTE - CHIEF COMPLAINT QUOTE
C/o lower extremity edema x 1 week. LLE noted with 2+ pitting edema. endorsing numbness/ tingling to toes. +ROM and warmth noted. Hx MI, CHF, DM II

## 2024-05-20 NOTE — ED ADULT NURSE NOTE - NSFALLUNIVINTERV_ED_ALL_ED
RT Note:    Patient wore BiPAP overnight, settings 14/7 30%. Duoneb and pulmicort given inline with BiPAP. RT will continue to monitor.    Cristal Rouse  February 22, 2019.5:58 AM         Bed/Stretcher in lowest position, wheels locked, appropriate side rails in place/Call bell, personal items and telephone in reach/Instruct patient to call for assistance before getting out of bed/chair/stretcher/Non-slip footwear applied when patient is off stretcher/[place] to call system/Physically safe environment - no spills, clutter or unnecessary equipment/Purposeful proactive rounding/Room/bathroom lighting operational, light cord in reach

## 2024-05-20 NOTE — ED ADULT NURSE NOTE - OBJECTIVE STATEMENT
Avinash RN: Pt received in spot 23a. Pt A&Ox4, Hx of MI, CHF, DM, hard or hearing, presenting to ED for LLE swelling X 1 week. States was at her podiatrist about a week ago and "got a shot in her big toe." +2 edema to LLE, warm to tough. Denies chest pain, sob, abd pain, n/v/d, fevers/chills. Respirations even and unlabored, no accessory muscle use. 20G placed to L arm, labs sent. Stretcher in lowest position, side rail up, call bell within reach. Report given to primary RN

## 2024-05-21 ENCOUNTER — RESULT REVIEW (OUTPATIENT)
Age: 79
End: 2024-05-21

## 2024-05-21 DIAGNOSIS — I10 ESSENTIAL (PRIMARY) HYPERTENSION: ICD-10-CM

## 2024-05-21 DIAGNOSIS — I25.10 ATHEROSCLEROTIC HEART DISEASE OF NATIVE CORONARY ARTERY WITHOUT ANGINA PECTORIS: ICD-10-CM

## 2024-05-21 DIAGNOSIS — E11.9 TYPE 2 DIABETES MELLITUS WITHOUT COMPLICATIONS: ICD-10-CM

## 2024-05-21 DIAGNOSIS — I50.20 UNSPECIFIED SYSTOLIC (CONGESTIVE) HEART FAILURE: ICD-10-CM

## 2024-05-21 DIAGNOSIS — L08.9 LOCAL INFECTION OF THE SKIN AND SUBCUTANEOUS TISSUE, UNSPECIFIED: ICD-10-CM

## 2024-05-21 DIAGNOSIS — Z29.9 ENCOUNTER FOR PROPHYLACTIC MEASURES, UNSPECIFIED: ICD-10-CM

## 2024-05-21 DIAGNOSIS — E11.621 TYPE 2 DIABETES MELLITUS WITH FOOT ULCER: ICD-10-CM

## 2024-05-21 LAB
ADD ON TEST-SPECIMEN IN LAB: SIGNIFICANT CHANGE UP
BASE EXCESS BLDV CALC-SCNC: -1.8 MMOL/L — SIGNIFICANT CHANGE UP (ref -2–3)
BLOOD GAS VENOUS COMPREHENSIVE RESULT: SIGNIFICANT CHANGE UP
CHLORIDE BLDV-SCNC: 103 MMOL/L — SIGNIFICANT CHANGE UP (ref 96–108)
CO2 BLDV-SCNC: 24.3 MMOL/L — SIGNIFICANT CHANGE UP (ref 22–26)
GAS PNL BLDV: 132 MMOL/L — LOW (ref 136–145)
GLUCOSE BLDC GLUCOMTR-MCNC: 171 MG/DL — HIGH (ref 70–99)
GLUCOSE BLDV-MCNC: 146 MG/DL — HIGH (ref 70–99)
HCO3 BLDV-SCNC: 23 MMOL/L — SIGNIFICANT CHANGE UP (ref 22–29)
HCT VFR BLDA CALC: 29 % — LOW (ref 34.5–46.5)
HGB BLD CALC-MCNC: 9.8 G/DL — LOW (ref 11.7–16.1)
LACTATE BLDV-MCNC: 1.1 MMOL/L — SIGNIFICANT CHANGE UP (ref 0.5–2)
MRSA PCR RESULT.: SIGNIFICANT CHANGE UP
PCO2 BLDV: 39 MMHG — SIGNIFICANT CHANGE UP (ref 39–52)
PH BLDV: 7.38 — SIGNIFICANT CHANGE UP (ref 7.32–7.43)
PO2 BLDV: 37 MMHG — SIGNIFICANT CHANGE UP (ref 25–45)
POTASSIUM BLDV-SCNC: 3.7 MMOL/L — SIGNIFICANT CHANGE UP (ref 3.5–5.1)
S AUREUS DNA NOSE QL NAA+PROBE: SIGNIFICANT CHANGE UP
SAO2 % BLDV: 55.7 % — LOW (ref 67–88)
TROPONIN T, HIGH SENSITIVITY RESULT: 40 NG/L — SIGNIFICANT CHANGE UP

## 2024-05-21 PROCEDURE — 93971 EXTREMITY STUDY: CPT | Mod: 26,LT

## 2024-05-21 PROCEDURE — 99223 1ST HOSP IP/OBS HIGH 75: CPT | Mod: GC,AI

## 2024-05-21 PROCEDURE — 73620 X-RAY EXAM OF FOOT: CPT | Mod: 26,LT

## 2024-05-21 PROCEDURE — 93922 UPR/L XTREMITY ART 2 LEVELS: CPT | Mod: 26,59

## 2024-05-21 PROCEDURE — 73720 MRI LWR EXTREMITY W/O&W/DYE: CPT | Mod: 26,LT

## 2024-05-21 PROCEDURE — 93926 LOWER EXTREMITY STUDY: CPT | Mod: 26,LT

## 2024-05-21 PROCEDURE — 93923 UPR/LXTR ART STDY 3+ LVLS: CPT | Mod: 26

## 2024-05-21 RX ORDER — ONDANSETRON 8 MG/1
4 TABLET, FILM COATED ORAL EVERY 8 HOURS
Refills: 0 | Status: DISCONTINUED | OUTPATIENT
Start: 2024-05-21 | End: 2024-05-21

## 2024-05-21 RX ORDER — SODIUM CHLORIDE 9 MG/ML
1000 INJECTION, SOLUTION INTRAVENOUS
Refills: 0 | Status: DISCONTINUED | OUTPATIENT
Start: 2024-05-21 | End: 2024-05-26

## 2024-05-21 RX ORDER — INSULIN LISPRO 100/ML
VIAL (ML) SUBCUTANEOUS
Refills: 0 | Status: DISCONTINUED | OUTPATIENT
Start: 2024-05-21 | End: 2024-05-23

## 2024-05-21 RX ORDER — DEXTROSE 50 % IN WATER 50 %
25 SYRINGE (ML) INTRAVENOUS ONCE
Refills: 0 | Status: DISCONTINUED | OUTPATIENT
Start: 2024-05-21 | End: 2024-05-26

## 2024-05-21 RX ORDER — CEFEPIME 1 G/1
2000 INJECTION, POWDER, FOR SOLUTION INTRAMUSCULAR; INTRAVENOUS EVERY 12 HOURS
Refills: 0 | Status: DISCONTINUED | OUTPATIENT
Start: 2024-05-21 | End: 2024-05-22

## 2024-05-21 RX ORDER — INSULIN GLARGINE 100 [IU]/ML
18 INJECTION, SOLUTION SUBCUTANEOUS AT BEDTIME
Refills: 0 | Status: DISCONTINUED | OUTPATIENT
Start: 2024-05-21 | End: 2024-05-26

## 2024-05-21 RX ORDER — DEXTROSE 50 % IN WATER 50 %
15 SYRINGE (ML) INTRAVENOUS ONCE
Refills: 0 | Status: DISCONTINUED | OUTPATIENT
Start: 2024-05-21 | End: 2024-05-26

## 2024-05-21 RX ORDER — EMPAGLIFLOZIN 10 MG/1
1 TABLET, FILM COATED ORAL
Refills: 0 | DISCHARGE

## 2024-05-21 RX ORDER — VANCOMYCIN HCL 1 G
750 VIAL (EA) INTRAVENOUS EVERY 12 HOURS
Refills: 0 | Status: DISCONTINUED | OUTPATIENT
Start: 2024-05-21 | End: 2024-05-21

## 2024-05-21 RX ORDER — CEFEPIME 1 G/1
2000 INJECTION, POWDER, FOR SOLUTION INTRAMUSCULAR; INTRAVENOUS ONCE
Refills: 0 | Status: COMPLETED | OUTPATIENT
Start: 2024-05-21 | End: 2024-05-21

## 2024-05-21 RX ORDER — ACETAMINOPHEN 500 MG
650 TABLET ORAL EVERY 6 HOURS
Refills: 0 | Status: DISCONTINUED | OUTPATIENT
Start: 2024-05-21 | End: 2024-05-26

## 2024-05-21 RX ORDER — GLUCAGON INJECTION, SOLUTION 0.5 MG/.1ML
1 INJECTION, SOLUTION SUBCUTANEOUS ONCE
Refills: 0 | Status: DISCONTINUED | OUTPATIENT
Start: 2024-05-21 | End: 2024-05-26

## 2024-05-21 RX ORDER — SPIRONOLACTONE 25 MG/1
25 TABLET, FILM COATED ORAL DAILY
Refills: 0 | Status: DISCONTINUED | OUTPATIENT
Start: 2024-05-21 | End: 2024-05-26

## 2024-05-21 RX ORDER — SACUBITRIL AND VALSARTAN 24; 26 MG/1; MG/1
1 TABLET, FILM COATED ORAL
Refills: 0 | Status: DISCONTINUED | OUTPATIENT
Start: 2024-05-21 | End: 2024-05-26

## 2024-05-21 RX ORDER — METFORMIN HYDROCHLORIDE 850 MG/1
2 TABLET ORAL
Refills: 0 | DISCHARGE

## 2024-05-21 RX ORDER — VANCOMYCIN HCL 1 G
1000 VIAL (EA) INTRAVENOUS EVERY 12 HOURS
Refills: 0 | Status: DISCONTINUED | OUTPATIENT
Start: 2024-05-21 | End: 2024-05-21

## 2024-05-21 RX ORDER — VANCOMYCIN HCL 1 G
1000 VIAL (EA) INTRAVENOUS EVERY 24 HOURS
Refills: 0 | Status: DISCONTINUED | OUTPATIENT
Start: 2024-05-22 | End: 2024-05-22

## 2024-05-21 RX ORDER — ATORVASTATIN CALCIUM 80 MG/1
80 TABLET, FILM COATED ORAL AT BEDTIME
Refills: 0 | Status: DISCONTINUED | OUTPATIENT
Start: 2024-05-21 | End: 2024-05-26

## 2024-05-21 RX ORDER — CEFEPIME 1 G/1
INJECTION, POWDER, FOR SOLUTION INTRAMUSCULAR; INTRAVENOUS
Refills: 0 | Status: DISCONTINUED | OUTPATIENT
Start: 2024-05-21 | End: 2024-05-21

## 2024-05-21 RX ORDER — METOPROLOL TARTRATE 50 MG
25 TABLET ORAL DAILY
Refills: 0 | Status: DISCONTINUED | OUTPATIENT
Start: 2024-05-21 | End: 2024-05-26

## 2024-05-21 RX ORDER — DEXTROSE 10 % IN WATER 10 %
125 INTRAVENOUS SOLUTION INTRAVENOUS ONCE
Refills: 0 | Status: DISCONTINUED | OUTPATIENT
Start: 2024-05-21 | End: 2024-05-26

## 2024-05-21 RX ORDER — INSULIN LISPRO 100/ML
VIAL (ML) SUBCUTANEOUS AT BEDTIME
Refills: 0 | Status: DISCONTINUED | OUTPATIENT
Start: 2024-05-21 | End: 2024-05-23

## 2024-05-21 RX ORDER — DEXTROSE 50 % IN WATER 50 %
12.5 SYRINGE (ML) INTRAVENOUS ONCE
Refills: 0 | Status: DISCONTINUED | OUTPATIENT
Start: 2024-05-21 | End: 2024-05-26

## 2024-05-21 RX ORDER — CEFEPIME 1 G/1
2000 INJECTION, POWDER, FOR SOLUTION INTRAMUSCULAR; INTRAVENOUS EVERY 8 HOURS
Refills: 0 | Status: DISCONTINUED | OUTPATIENT
Start: 2024-05-21 | End: 2024-05-21

## 2024-05-21 RX ORDER — METOPROLOL TARTRATE 50 MG
25 TABLET ORAL DAILY
Refills: 0 | Status: DISCONTINUED | OUTPATIENT
Start: 2024-05-21 | End: 2024-05-21

## 2024-05-21 RX ORDER — INSULIN DETEMIR 100/ML (3)
23 INSULIN PEN (ML) SUBCUTANEOUS
Refills: 0 | DISCHARGE

## 2024-05-21 RX ORDER — CLOPIDOGREL BISULFATE 75 MG/1
75 TABLET, FILM COATED ORAL DAILY
Refills: 0 | Status: DISCONTINUED | OUTPATIENT
Start: 2024-05-21 | End: 2024-05-26

## 2024-05-21 RX ORDER — INSULIN DETEMIR 100/ML (3)
8 INSULIN PEN (ML) SUBCUTANEOUS ONCE
Refills: 0 | Status: DISCONTINUED | OUTPATIENT
Start: 2024-05-21 | End: 2024-05-21

## 2024-05-21 RX ORDER — INSULIN DETEMIR 100/ML (3)
13 INSULIN PEN (ML) SUBCUTANEOUS
Refills: 0 | DISCHARGE

## 2024-05-21 RX ORDER — LANOLIN ALCOHOL/MO/W.PET/CERES
3 CREAM (GRAM) TOPICAL AT BEDTIME
Refills: 0 | Status: DISCONTINUED | OUTPATIENT
Start: 2024-05-21 | End: 2024-05-21

## 2024-05-21 RX ADMIN — Medication 250 MILLIGRAM(S): at 02:31

## 2024-05-21 RX ADMIN — CLOPIDOGREL BISULFATE 75 MILLIGRAM(S): 75 TABLET, FILM COATED ORAL at 12:56

## 2024-05-21 RX ADMIN — PIPERACILLIN AND TAZOBACTAM 200 GRAM(S): 4; .5 INJECTION, POWDER, LYOPHILIZED, FOR SOLUTION INTRAVENOUS at 00:18

## 2024-05-21 RX ADMIN — CEFEPIME 100 MILLIGRAM(S): 1 INJECTION, POWDER, FOR SOLUTION INTRAMUSCULAR; INTRAVENOUS at 18:02

## 2024-05-21 RX ADMIN — CEFEPIME 100 MILLIGRAM(S): 1 INJECTION, POWDER, FOR SOLUTION INTRAMUSCULAR; INTRAVENOUS at 04:53

## 2024-05-21 RX ADMIN — Medication 25 MILLIGRAM(S): at 12:55

## 2024-05-21 RX ADMIN — ATORVASTATIN CALCIUM 80 MILLIGRAM(S): 80 TABLET, FILM COATED ORAL at 22:01

## 2024-05-21 RX ADMIN — INSULIN GLARGINE 18 UNIT(S): 100 INJECTION, SOLUTION SUBCUTANEOUS at 23:41

## 2024-05-21 RX ADMIN — Medication 2: at 18:01

## 2024-05-21 RX ADMIN — SACUBITRIL AND VALSARTAN 1 TABLET(S): 24; 26 TABLET, FILM COATED ORAL at 18:02

## 2024-05-21 NOTE — ED PROVIDER NOTE - NSICDXPASTMEDICALHX_GEN_ALL_CORE_FT
PAST MEDICAL HISTORY:  DMII (diabetes mellitus, type 2)     [place] (hard of hearing)     HTN (hypertension)     Hyperlipidemia     Vitamin D deficiency

## 2024-05-21 NOTE — H&P ADULT - NSICDXPASTMEDICALHX_GEN_ALL_CORE_FT
PAST MEDICAL HISTORY:  CAD (coronary artery disease)     DMII (diabetes mellitus, type 2)     HFrEF (heart failure with reduced ejection fraction)     [place] (hard of hearing)     HTN (hypertension)     Hyperlipidemia     NSTEMI (non-ST elevation myocardial infarction)     Vitamin D deficiency

## 2024-05-21 NOTE — H&P ADULT - ASSESSMENT
78 F w/ pmhx of HTN, HLD, T2DM (on insulin) CAD, NSTEMI on 11/2023, HFrEF, p/w LLE swelling and left toe wound, MR findings c/w osteomyelitis, pending resection with Podiatry on 5/22, on broad spectrum antibiotics.

## 2024-05-21 NOTE — H&P ADULT - NSHPLABSRESULTS_GEN_ALL_CORE
LABS:                         10.7   9.29  )-----------( 353      ( 20 May 2024 22:55 )             32.9     05-20    133<L>  |  97<L>  |  45<H>  ----------------------------<  142<H>  4.3   |  22  |  0.98    Ca    9.8      20 May 2024 22:55    TPro  7.6  /  Alb  3.8  /  TBili  0.6  /  DBili  x   /  AST  11  /  ALT  9   /  AlkPhos  73  05-20    PT/INR - ( 20 May 2024 22:55 )   PT: 11.9 sec;   INR: 1.05 ratio         PTT - ( 20 May 2024 22:55 )  PTT:26.9 sec  Urinalysis Basic - ( 20 May 2024 22:55 )    Color: x / Appearance: x / SG: x / pH: x  Gluc: 142 mg/dL / Ketone: x  / Bili: x / Urobili: x   Blood: x / Protein: x / Nitrite: x   Leuk Esterase: x / RBC: x / WBC x   Sq Epi: x / Non Sq Epi: x / Bacteria: x            RADIOLOGY, EKG & ADDITIONAL TESTS: Reviewed.

## 2024-05-21 NOTE — H&P ADULT - NSHPREVIEWOFSYSTEMS_GEN_ALL_CORE
General: no weakness, no fatigue, no change in weight  HEENT: No blurry vision, no congestion, no rhinorrhea, no ear pain, no throat pain, no odynophagia,   Respiratory: No cough, no shortness of breath  Cardiac: No chest pain, no palpitations  GI: No abdominal pain, no nausea, no vomiting, no constipation, no diarrhea  : No dysuria, no hematuria  MSK: No swelling in extremities, no arthralgias, no back pain  Neuro: No headache, no dizziness  Skin: No rashes General: no weakness, no fatigue, no change in weight  HEENT: No blurry vision, no congestion, no rhinorrhea, no ear pain, no throat pain, no odynophagia,   Respiratory: No cough, no shortness of breath  Cardiac: No chest pain, no palpitations  GI: No abdominal pain, no nausea, no vomiting, no constipation, no diarrhea  : No dysuria, no hematuria  MSK: No swelling in extremities, no arthralgias, no back pain. +Foot wound  Neuro: No headache, no dizziness  Skin: No rashes

## 2024-05-21 NOTE — H&P ADULT - PROBLEM SELECTOR PLAN 4
Hx of NSTEMI in 11/2023, not stented due to likely irreversible defects, c/b HFrEF    - continue with Plavix  - Cards consulted for further optimization, appreciate recs prior to OR.

## 2024-05-21 NOTE — H&P ADULT - NSHPSOCIALHISTORY_GEN_ALL_CORE
Denies smoking, alcohol, or recreational drug use. Lives alone, independent with all ADLs. Denies smoking, alcohol, or recreational drug use. Lives alone, independent with all ADLs. Son will be taking patient home if willing.

## 2024-05-21 NOTE — H&P ADULT - NSHPPHYSICALEXAM_GEN_ALL_CORE
.  VITAL SIGNS:  T(C): 36.8 (05-21-24 @ 05:43), Max: 37 (05-20-24 @ 21:06)  T(F): 98.3 (05-21-24 @ 05:43), Max: 98.6 (05-20-24 @ 21:06)  HR: 71 (05-21-24 @ 05:43) (63 - 72)  BP: 92/52 (05-21-24 @ 05:43) (91/50 - 105/67)  BP(mean): 62 (05-21-24 @ 02:36) (62 - 62)  RR: 16 (05-21-24 @ 05:43) (16 - 18)  SpO2: 100% (05-21-24 @ 05:43) (98% - 100%)  Wt(kg): --    PHYSICAL EXAM:  GENERAL: NAD, well-groomed, well-developed  HEAD:  Atraumatic, Normocephalic  EYES: EOMI, PERRLA, conjunctiva and sclera clear  ENMT: No tonsillar erythema, exudates, or enlargement; Moist mucous membranes, Good dentition, No lesions  NECK: Supple, No JVD, Normal thyroid  CHEST/LUNG: Clear to percussion bilaterally; No rales, rhonchi, wheezing, or rubs  HEART: Regular rate and rhythm; No murmurs, rubs, or gallops  ABDOMEN: Soft, Nontender, Nondistended; Bowel sounds present  VASCULAR:  2+ Peripheral Pulses, No clubbing, cyanosis, or edema  LYMPH: No lymphadenopathy noted  SKIN:   NERVOUS SYSTEM:  Alert & Oriented X3, Good concentration; .  VITAL SIGNS:  T(C): 36.8 (05-21-24 @ 05:43), Max: 37 (05-20-24 @ 21:06)  T(F): 98.3 (05-21-24 @ 05:43), Max: 98.6 (05-20-24 @ 21:06)  HR: 71 (05-21-24 @ 05:43) (63 - 72)  BP: 92/52 (05-21-24 @ 05:43) (91/50 - 105/67)  BP(mean): 62 (05-21-24 @ 02:36) (62 - 62)  RR: 16 (05-21-24 @ 05:43) (16 - 18)  SpO2: 100% (05-21-24 @ 05:43) (98% - 100%)  Wt(kg): --    PHYSICAL EXAM:  GENERAL: NAD, well-groomed, well-developed  HEAD:  Atraumatic, Normocephalic  EYES: EOMI, PERRLA, conjunctiva and sclera clear  ENMT: No tonsillar erythema, exudates, or enlargement; Moist mucous membranes  NECK: Supple, No JVD, Normal thyroid  CHEST/LUNG: Clear to percussion bilaterally; No rales, rhonchi, wheezing, or rubs  HEART: Regular rate and rhythm; No murmurs, rubs, or gallops  ABDOMEN: Soft, Nontender, Nondistended; Bowel sounds present  VASCULAR:  no palpable left PT/DP but LLE is warm. No clubbing or edema  LYMPH: No lymphadenopathy noted  SKIN: + ulcerated 1st left toe hallux wound, necrotic base with some drainage and probes to bone. 1st left toe generally erythematous.   NERVOUS SYSTEM:  Alert & Oriented X3, Good concentration. No sensation to toe wound.

## 2024-05-21 NOTE — ED PROVIDER NOTE - PROGRESS NOTE DETAILS
Nilda Valenzuela MD PGY3  Podiatry at bedside cleaned wound. Abx ordered. Patient TBA. DVT US pending Nilda Valenzuela MD PGY3: hospitalist aware of patient, recommending to order troponin, VBG, chest xray given patient history with CAD, however at this time vital stable, no CP, no SOB, leg swelling unilateral. Will be followed up inpatient. Nilda Valenzuela MD PGY3: Radiology states there is a blood clot in superficial veins and great saphenous but not in any of the deep veins.

## 2024-05-21 NOTE — ED PROVIDER NOTE - OBJECTIVE STATEMENT
Patient is a 78-year-old history of HTN, HLD, DM on metformin, CAD, CHF, pw left leg swelling. Patient states her leg swelling improved at home after lasix but then the past few days her left leg started swelling. Also with swelling and pain in her left big toe. Denies fevers, CP, SOB, cough, abdominal pain, nv.

## 2024-05-21 NOTE — H&P ADULT - HISTORY OF PRESENT ILLNESS
78 F w/ pmhx of HTN, HLD, T2DM (on insulin) CAD, NSTEMI on 11/2023, HFrEF, p/w LLE swelling and left toe wound. Patient denies any known trauma to the left toe but endorses left toe swelling that began 3-4 weeks prior without any pain. Patient was evaluated by her primary podiatrist and per patient she had some injection preformed. Patient was later evaluated by a primary medical provider and was advised to come to the hospital given the wound. Patient denies any fever, chill, pain to the foot or wound, N/V, or chest pain.  78 F w/ pmhx of HTN, HLD, T2DM (on insulin) CAD, NSTEMI on 11/2023, HFrEF, p/w LLE swelling and left toe wound. Patient denies any known trauma to the left toe but endorses left toe swelling that began 3-4 weeks prior without any pain. Patient was evaluated by her primary podiatrist and per patient she had some injection preformed. Patient was later evaluated by a primary medical provider and was advised to come to the hospital given the wound. Patient denies any fever, chill, pain to the foot or wound, N/V, or chest pain. Endorses left leg swelling without redness. Denies any other previous wounds. Denies any chest pain, N/V, abdominal pain.      ED course: Given Vanc and Zosyn

## 2024-05-21 NOTE — CONSULT NOTE ADULT - ATTENDING COMMENTS
Seen at bedside with medicine team.  Rec Hillary's and MRI.  Discussed with patient long term antibitoics vs partial toe amputation if positive for OM.  Patient to consider her options. Will follow
CAlled ~08:00   Pt w PAD/L toe ulcer  Needs toe amp by podiatry  Angio +/- intervention requested.

## 2024-05-21 NOTE — H&P ADULT - PROBLEM SELECTOR PLAN 1
Plan:  - Continue Vancomycin 1g q24h (5/20 - )  and Cefepime 2000mg q12h (5/20 - )  - Obtain MR Left Foot for further evaluation for Osteomyelitis  - Obtaining XIOMY/PVRs. Consider Vascular c/s pending results  - F/u BCx (5/21)   - Podiatry following, appreciate recs. Local wound care per Podiatry. P/w left 1st toe ulcerated and necrotic wound with +bone probing. MR c/w osteomyelitis of the 1st toe, likely 2/2 direct extension of wound i/s/o T2DM.  - XIOMY/PVR overall normal    Plan:  - Continue Vancomycin 1g q24h (5/20 - )  and Cefepime 2000mg q12h (5/20 - )  - F/u BCx (5/21)   - Podiatry following, appreciate recs. Local wound care per Podiatry.  > Vascular c/s per Podiatry. Pod tentatively planning for OR for resection on 5/22 at 3PM.   - Obtaining Cardiac optimization given HFrEF, appreciate recs

## 2024-05-21 NOTE — H&P ADULT - PROBLEM SELECTOR PLAN 2
- Hx of T2DM, per chart review on Levemir 13u qAM and 23u qHS. Also on Jardiance 10mg, Metformin 1000mg BID.   - Last A1c 11/28/2023 6.4%    Plan: - Hx of T2DM, per chart review on Levemir 13u qAM and 23u qHS. Also on Jardiance 10mg, Metformin 1000mg BID.   - Last A1c 11/28/2023 6.4%    Plan:  - FS thus far well controlled on JO-ANN. Will continue JO-ANN with Lantus 18u qHS.   - f/u A1c  - DASH/CC diet

## 2024-05-21 NOTE — PROGRESS NOTE ADULT - ATTENDING COMMENTS
Seen at bedside.  Discussed condition with patient. Weighing options.  Will follow.  Tentatively holding OR time if agreeable and cleared by vasc.

## 2024-05-21 NOTE — H&P ADULT - ATTENDING COMMENTS
78 yr old woman PMH HTN, HLD, T2DM (on insulin and orals), NSTEMI  11/2023-medically managed, HFrEF (EF 2025% 11/2023), LV thrombus previously on warfarin p/w LLE swelling and necrotic left toe wound, Found to have MRI findings c/f osteomyelitis.    F/u Bcx, C/w Vanc/Cefepime  LLE US negative for DVT  Surgical planning by podiatry pending vascular and cardiology clearance  Vascular planning angiogram  Switch BID levemir to basal/bolus for now, f/u A1C  PT eval    D/w patient and son, Cole at bedside

## 2024-05-21 NOTE — H&P ADULT - PROBLEM SELECTOR PLAN 3
Hx of HFrEF (last EF 20-25% in 11/2023), likely ischemic cardiomyopathy i/s/o CAD and T2DM.   - on GDMT with Entresto 24-26mg BID, Metorpolol succinate 25mg QD, Aldactone 25mg QD, and Jardiance 10mg QD.   - Takes Lasix 20mg QD to 40mg BID based on symptoms. Currently appearing euvolemic  - baseline BPs in 90s - 100s SBP    Plan:  - continue home GDMT with Entresto, Metoprolol, Aldactone as above with hold parameters  - holding diruesis as patient appears euvolemic.   - strict I&Os, daily standing weights

## 2024-05-21 NOTE — PROGRESS NOTE ADULT - SUBJECTIVE AND OBJECTIVE BOX
YUSUF SAWYER, MRN [id number], 78yfemale      Patient is a 78y old  Female who presents with a chief complaint of LLE Wound and swelling (21 May 2024 10:13)       INTERVAL HPI/OVERNIGHT EVENTS:  Patient seen and evaluated at bedside.  Pt is resting comfortable in NAD. Denies N/V/F/C.    Allergies    No Known Allergies    Intolerances        Vital Signs Last 24 Hrs  T(C): 36.8 (21 May 2024 05:43), Max: 37 (20 May 2024 21:06)  T(F): 98.3 (21 May 2024 05:43), Max: 98.6 (20 May 2024 21:06)  HR: 71 (21 May 2024 05:43) (63 - 72)  BP: 92/52 (21 May 2024 05:43) (91/50 - 105/67)  BP(mean): 62 (21 May 2024 02:36) (62 - 62)  RR: 16 (21 May 2024 05:43) (16 - 18)  SpO2: 100% (21 May 2024 05:43) (98% - 100%)    Parameters below as of 21 May 2024 05:43  Patient On (Oxygen Delivery Method): room air        LABS:                        10.7   9.29  )-----------( 353      ( 20 May 2024 22:55 )             32.9     05-20    133<L>  |  97<L>  |  45<H>  ----------------------------<  142<H>  4.3   |  22  |  0.98    Ca    9.8      20 May 2024 22:55    TPro  7.6  /  Alb  3.8  /  TBili  0.6  /  DBili  x   /  AST  11  /  ALT  9   /  AlkPhos  73  05-20    PT/INR - ( 20 May 2024 22:55 )   PT: 11.9 sec;   INR: 1.05 ratio         PTT - ( 20 May 2024 22:55 )  PTT:26.9 sec  Urinalysis Basic - ( 20 May 2024 22:55 )    Color: x / Appearance: x / SG: x / pH: x  Gluc: 142 mg/dL / Ketone: x  / Bili: x / Urobili: x   Blood: x / Protein: x / Nitrite: x   Leuk Esterase: x / RBC: x / WBC x   Sq Epi: x / Non Sq Epi: x / Bacteria: x      CAPILLARY BLOOD GLUCOSE      POCT Blood Glucose.: 127 mg/dL (21 May 2024 12:44)  POCT Blood Glucose.: 171 mg/dL (21 May 2024 01:43)  POCT Blood Glucose.: 151 mg/dL (20 May 2024 21:11)      Lower Extremity Physical Exam:      Vascular: DP/PT 0/4, B/L, CFT <3 seconds B/L, Temperature gradient warm to cool, B/L.   Neuro: Epicritic sensation diminished to the level of digits, B/L.  Musculoskeletal/Ortho: unremarkable   Skin: L foot hallux distal necrotic wound to periosteum, slight malodor, no purulence, no tracking/ tunnelling/ erythema. R foot no open wound       RADIOLOGY & ADDITIONAL TESTS:

## 2024-05-21 NOTE — ED PROVIDER NOTE - ATTENDING CONTRIBUTION TO CARE
Patient is a 79 yo female wit hx of HTN HLD DM CAD CHF pw L leg swelling with dry gangrene of first digit of L toe with malodorous discharge.    on exam patient awake alert in no acute distress, RRR, abdomen soft, normal insipratory effort, L first digit with necrotic changes and associated malodorous discharge, otherwise L foot warm to touch with cap refill < 2 seconds     concern for infected diabetic foot ulcer; will do infectious workup broad spectrum antibiotics podiatry consult and admit for further workup and debridement.

## 2024-05-21 NOTE — PROGRESS NOTE ADULT - ASSESSMENT
78F presents with left hallux wound to periosteum   - Patient seen and evaluated  - Afebrile, no leukocytosis,  - L foot hallux distal necrotic wound to periosteum, slight malodor, no purulence, no tracking/ tunnelling/ erythema. R foot no open wound  - Cont IV abx, ID recs pending  - Left foot wound culture pending   - recommended Vasc recs  - Pod plan: tentatively L foot Partial Hallux Amputation 5/22 3pm pending pt decision /. Vasc recs  - Please document medical clearance for possible podiatric procedure   - Seen with attending

## 2024-05-21 NOTE — ED PROVIDER NOTE - PHYSICAL EXAMINATION
GENERAL: no acute distress, non-toxic appearing  HEAD: normocephalic, atraumatic  HEENT: PERRLA, EOMI, normal conjunctiva, oral mucosa moist  CARDIAC: regular rate and rhythm, no appreciable murmurs  PULM: clear to ascultation bilaterally  GI: abdomen nondistended, soft, nontender  NEURO: alert and oriented x 3, normal speech, no gross neurologic deficit  MSK: +2+ edema to LLE, no edema RLE, +left 1st toe discoloration and foot ulcer   SKIN: no visible rashes, dry, well-perfused  PSYCH: appropriate mood and affect

## 2024-05-21 NOTE — ED PROVIDER NOTE - CLINICAL SUMMARY MEDICAL DECISION MAKING FREE TEXT BOX
Patient is a 78-year-old history of HTN, HLD, DM on metformin, CAD, CHF, pw left leg swelling. Left first toe swollen, discolored, +ulcer to plantar surface, concern for diabetic ulcer infection, will consult podiatry. Will get labs, blood cultures, give abx. Will get XRay of foot and DVT US. Patient likely TBA for IV abx. No cellulitis to leg.

## 2024-05-21 NOTE — ED PROVIDER NOTE - IN ACCORDANCE WITH [place] STATE LAW, WE OFFER EVERY PATIENT A HEPATITIS C TEST. WOULD YOU LIKE TO BE TESTED TODAY?
- Worsening anemia and thrombocytopenia since discharge ~1 week ago likely in part due to lymphoma/leukemia in part due to hemolytic anemia  - s/p 6 units of PRBC  6.8-1u-7.4-7.6-6.7-1u-8.3-7.7-6.9-1u-7.1-7.4-1u-8.9-8.0-7.6-1u-8.2-7.6-1u-7.3  - No acute s/s of bleeding on admission, continue to monitor, high bilirubin and though a greater direct bili component, suspect this is in large part due to AIHA and the indirect bili is getting conjugated  - suspect due to AIHA. Was on prednisone 10mg daily -- increase to prednisone 40mg po during hospitalization, tapering started @2/29 - now 20 daily Opt out - Worsening anemia and thrombocytopenia since discharge ~1 week ago likely in part due to lymphoma/leukemia in part due to hemolytic anemia  - s/p 6 units of PRBC  6.8-1u-7.4-7.6-6.7-1u-8.3-7.7-6.9-1u-7.1-7.4-1u-8.9-8.0-7.6-1u-8.2-7.6-1u-7.3-7.0  - No acute s/s of bleeding on admission, continue to monitor, high bilirubin and though a greater direct bili component, suspect this is in large part due to AIHA and the indirect bili is getting conjugated  - suspect due to AIHA. Was on prednisone 10mg daily -- increase to prednisone 40mg po during hospitalization, tapering started @2/29 - now 20 daily  - will transfuse 1 unit of PRBC and 2 units of FFP today, follow CBC, PT/PTT - Worsening anemia and thrombocytopenia since discharge ~1 week ago likely in part due to lymphoma/leukemia in part due to hemolytic anemia  - s/p 6 units of PRBC  6.8-1u-7.4-7.6-6.7-1u-8.3-7.7-6.9-1u-7.1-7.4-1u-8.9-8.0-7.6-1u-8.2-7.6-1u-7.3-7.0  - No acute s/s of bleeding on admission, continue to monitor, high bilirubin and though a greater direct bili component, suspect this is in large part due to AIHA and the indirect bili is getting conjugated  - suspect due to AIHA. Was on prednisone 10mg daily -- increase to prednisone 40mg po during hospitalization, tapering started @2/29 - now 20 daily  - will transfuse 1 unit of PRBC and 2 units of FFP today, follow CBC, PT/PTT  - F/U CBC @6pm

## 2024-05-21 NOTE — H&P ADULT - PROBLEM SELECTOR PLAN 6
DVT: Lovenox  Diet: DASH/CC  Pharmacy:  Dispo: pending PT evaluation after surgery, Son willing to take patient home.

## 2024-05-21 NOTE — ED ADULT NURSE REASSESSMENT NOTE - NS ED NURSE REASSESS COMMENT FT1
Pt at baseline mental status, breathing even and unlabored in bed. Pt denies chest pain, SOB, dizziness, headache, blurry vision, chills. Bed in lowest position, call bell within reach. Pt being brought to [facility] at this time.

## 2024-05-22 ENCOUNTER — TRANSCRIPTION ENCOUNTER (OUTPATIENT)
Age: 79
End: 2024-05-22

## 2024-05-22 ENCOUNTER — RESULT REVIEW (OUTPATIENT)
Age: 79
End: 2024-05-22

## 2024-05-22 LAB
A1C WITH ESTIMATED AVERAGE GLUCOSE RESULT: 6.9 % — HIGH (ref 4–5.6)
ANION GAP SERPL CALC-SCNC: 13 MMOL/L — SIGNIFICANT CHANGE UP (ref 7–14)
APTT BLD: 25.5 SEC — SIGNIFICANT CHANGE UP (ref 24.5–35.6)
BLD GP AB SCN SERPL QL: NEGATIVE — SIGNIFICANT CHANGE UP
BUN SERPL-MCNC: 33 MG/DL — HIGH (ref 7–23)
CALCIUM SERPL-MCNC: 9.2 MG/DL — SIGNIFICANT CHANGE UP (ref 8.4–10.5)
CHLORIDE SERPL-SCNC: 103 MMOL/L — SIGNIFICANT CHANGE UP (ref 98–107)
CO2 SERPL-SCNC: 18 MMOL/L — LOW (ref 22–31)
CREAT SERPL-MCNC: 0.81 MG/DL — SIGNIFICANT CHANGE UP (ref 0.5–1.3)
EGFR: 74 ML/MIN/1.73M2 — SIGNIFICANT CHANGE UP
ESTIMATED AVERAGE GLUCOSE: 151 — SIGNIFICANT CHANGE UP
GLUCOSE SERPL-MCNC: 186 MG/DL — HIGH (ref 70–99)
HCT VFR BLD CALC: 29.7 % — LOW (ref 34.5–45)
HGB BLD-MCNC: 9.9 G/DL — LOW (ref 11.5–15.5)
INR BLD: 1.07 RATIO — SIGNIFICANT CHANGE UP (ref 0.85–1.18)
MAGNESIUM SERPL-MCNC: 2.1 MG/DL — SIGNIFICANT CHANGE UP (ref 1.6–2.6)
MCHC RBC-ENTMCNC: 27.8 PG — SIGNIFICANT CHANGE UP (ref 27–34)
MCHC RBC-ENTMCNC: 33.3 GM/DL — SIGNIFICANT CHANGE UP (ref 32–36)
MCV RBC AUTO: 83.4 FL — SIGNIFICANT CHANGE UP (ref 80–100)
NRBC # BLD: 0 /100 WBCS — SIGNIFICANT CHANGE UP (ref 0–0)
NRBC # FLD: 0 K/UL — SIGNIFICANT CHANGE UP (ref 0–0)
PHOSPHATE SERPL-MCNC: 3 MG/DL — SIGNIFICANT CHANGE UP (ref 2.5–4.5)
PLATELET # BLD AUTO: 319 K/UL — SIGNIFICANT CHANGE UP (ref 150–400)
POTASSIUM SERPL-MCNC: 4.4 MMOL/L — SIGNIFICANT CHANGE UP (ref 3.5–5.3)
POTASSIUM SERPL-SCNC: 4.4 MMOL/L — SIGNIFICANT CHANGE UP (ref 3.5–5.3)
PROTHROM AB SERPL-ACNC: 12.1 SEC — SIGNIFICANT CHANGE UP (ref 9.5–13)
RBC # BLD: 3.56 M/UL — LOW (ref 3.8–5.2)
RBC # FLD: 18.9 % — HIGH (ref 10.3–14.5)
RH IG SCN BLD-IMP: NEGATIVE — SIGNIFICANT CHANGE UP
SODIUM SERPL-SCNC: 134 MMOL/L — LOW (ref 135–145)
WBC # BLD: 7.2 K/UL — SIGNIFICANT CHANGE UP (ref 3.8–10.5)
WBC # FLD AUTO: 7.2 K/UL — SIGNIFICANT CHANGE UP (ref 3.8–10.5)

## 2024-05-22 PROCEDURE — 99222 1ST HOSP IP/OBS MODERATE 55: CPT | Mod: GC

## 2024-05-22 PROCEDURE — 99232 SBSQ HOSP IP/OBS MODERATE 35: CPT | Mod: GC

## 2024-05-22 PROCEDURE — 93308 TTE F-UP OR LMTD: CPT | Mod: 26

## 2024-05-22 PROCEDURE — 71046 X-RAY EXAM CHEST 2 VIEWS: CPT | Mod: 26

## 2024-05-22 RX ORDER — AMPICILLIN SODIUM AND SULBACTAM SODIUM 250; 125 MG/ML; MG/ML
3 INJECTION, POWDER, FOR SUSPENSION INTRAMUSCULAR; INTRAVENOUS EVERY 6 HOURS
Refills: 0 | Status: DISCONTINUED | OUTPATIENT
Start: 2024-05-22 | End: 2024-05-24

## 2024-05-22 RX ORDER — AMPICILLIN SODIUM AND SULBACTAM SODIUM 250; 125 MG/ML; MG/ML
3 INJECTION, POWDER, FOR SUSPENSION INTRAMUSCULAR; INTRAVENOUS ONCE
Refills: 0 | Status: COMPLETED | OUTPATIENT
Start: 2024-05-22 | End: 2024-05-22

## 2024-05-22 RX ORDER — AMPICILLIN SODIUM AND SULBACTAM SODIUM 250; 125 MG/ML; MG/ML
INJECTION, POWDER, FOR SUSPENSION INTRAMUSCULAR; INTRAVENOUS
Refills: 0 | Status: DISCONTINUED | OUTPATIENT
Start: 2024-05-22 | End: 2024-05-24

## 2024-05-22 RX ADMIN — AMPICILLIN SODIUM AND SULBACTAM SODIUM 200 GRAM(S): 250; 125 INJECTION, POWDER, FOR SUSPENSION INTRAMUSCULAR; INTRAVENOUS at 23:48

## 2024-05-22 RX ADMIN — AMPICILLIN SODIUM AND SULBACTAM SODIUM 200 GRAM(S): 250; 125 INJECTION, POWDER, FOR SUSPENSION INTRAMUSCULAR; INTRAVENOUS at 17:42

## 2024-05-22 RX ADMIN — Medication 250 MILLIGRAM(S): at 02:29

## 2024-05-22 RX ADMIN — ATORVASTATIN CALCIUM 80 MILLIGRAM(S): 80 TABLET, FILM COATED ORAL at 22:01

## 2024-05-22 RX ADMIN — SACUBITRIL AND VALSARTAN 1 TABLET(S): 24; 26 TABLET, FILM COATED ORAL at 17:43

## 2024-05-22 RX ADMIN — Medication 1: at 17:42

## 2024-05-22 RX ADMIN — SPIRONOLACTONE 25 MILLIGRAM(S): 25 TABLET, FILM COATED ORAL at 06:23

## 2024-05-22 RX ADMIN — Medication 25 MILLIGRAM(S): at 06:22

## 2024-05-22 RX ADMIN — CLOPIDOGREL BISULFATE 75 MILLIGRAM(S): 75 TABLET, FILM COATED ORAL at 14:02

## 2024-05-22 RX ADMIN — Medication 1: at 06:20

## 2024-05-22 RX ADMIN — INSULIN GLARGINE 18 UNIT(S): 100 INJECTION, SOLUTION SUBCUTANEOUS at 22:00

## 2024-05-22 RX ADMIN — SACUBITRIL AND VALSARTAN 1 TABLET(S): 24; 26 TABLET, FILM COATED ORAL at 06:23

## 2024-05-22 RX ADMIN — CEFEPIME 100 MILLIGRAM(S): 1 INJECTION, POWDER, FOR SOLUTION INTRAMUSCULAR; INTRAVENOUS at 06:22

## 2024-05-22 RX ADMIN — AMPICILLIN SODIUM AND SULBACTAM SODIUM 200 GRAM(S): 250; 125 INJECTION, POWDER, FOR SUSPENSION INTRAMUSCULAR; INTRAVENOUS at 13:57

## 2024-05-22 NOTE — PROGRESS NOTE ADULT - SUBJECTIVE AND OBJECTIVE BOX
Internal Medicine Daily Progress Note  Marshall David MD  Internal Medicine PGY-1  Available on Teams    |:::::::::::::::::::::::::::| SUBJECTIVE |:::::::::::::::::::::::::::|  PROGRESS NOTE:   Patient is a 78y old  Female who presents with a chief complaint of LLE Wound and swelling (21 May 2024 14:35)      SUBJECTIVE / OVERNIGHT EVENTS: No acute overnight events.      |:::::::::::::::::::::::::::| OBJECTIVE |:::::::::::::::::::::::::::|    MEDICATIONS  (STANDING):  atorvastatin 80 milliGRAM(s) Oral at bedtime  cefepime   IVPB 2000 milliGRAM(s) IV Intermittent every 12 hours  clopidogrel Tablet 75 milliGRAM(s) Oral daily  dextrose 10% Bolus 125 milliLiter(s) IV Bolus once  dextrose 5%. 1000 milliLiter(s) (100 mL/Hr) IV Continuous <Continuous>  dextrose 5%. 1000 milliLiter(s) (50 mL/Hr) IV Continuous <Continuous>  dextrose 50% Injectable 25 Gram(s) IV Push once  dextrose 50% Injectable 12.5 Gram(s) IV Push once  glucagon  Injectable 1 milliGRAM(s) IntraMuscular once  insulin glargine Injectable (LANTUS) 18 Unit(s) SubCutaneous at bedtime  insulin lispro (ADMELOG) corrective regimen sliding scale   SubCutaneous at bedtime  insulin lispro (ADMELOG) corrective regimen sliding scale   SubCutaneous three times a day before meals  metoprolol succinate ER 25 milliGRAM(s) Oral daily  sacubitril 24 mG/valsartan 26 mG 1 Tablet(s) Oral two times a day  spironolactone 25 milliGRAM(s) Oral daily  vancomycin  IVPB 1000 milliGRAM(s) IV Intermittent every 24 hours    MEDICATIONS  (PRN):  acetaminophen     Tablet .. 650 milliGRAM(s) Oral every 6 hours PRN Temp greater or equal to 38C (100.4F), Mild Pain (1 - 3)  dextrose Oral Gel 15 Gram(s) Oral once PRN Blood Glucose LESS THAN 70 milliGRAM(s)/deciliter      CAPILLARY BLOOD GLUCOSE      POCT Blood Glucose.: 167 mg/dL (22 May 2024 05:29)  POCT Blood Glucose.: 179 mg/dL (21 May 2024 23:31)  POCT Blood Glucose.: 223 mg/dL (21 May 2024 17:47)  POCT Blood Glucose.: 127 mg/dL (21 May 2024 12:44)    I&O's Summary    21 May 2024 07:01  -  22 May 2024 06:56  --------------------------------------------------------  IN: 100 mL / OUT: 200 mL / NET: -100 mL        PHYSICAL EXAM:  Vital Signs Last 24 Hrs  T(C): 36.9 (22 May 2024 06:12), Max: 37 (21 May 2024 12:50)  T(F): 98.5 (22 May 2024 06:12), Max: 98.6 (21 May 2024 12:50)  HR: 62 (22 May 2024 06:12) (62 - 75)  BP: 95/47 (22 May 2024 06:12) (95/47 - 106/55)  BP(mean): --  RR: 17 (22 May 2024 06:12) (16 - 18)  SpO2: 98% (22 May 2024 06:12) (98% - 99%)    Parameters below as of 22 May 2024 06:12  Patient On (Oxygen Delivery Method): room air        CONSTITUTIONAL: NAD,  HEENT: EOMI, moist mucous membranes.  NECK: Supple. No JVD.  RESPIRATORY: Normal respiratory effort, Lungs CTAB  CARDIOVASCULAR: Regular rate and rhythm with normal S1 and S2. No murmurs.  ABDOMEN: Soft, non-tender, non-distended. Normoactive bowel sounds, no rebound/guarding; No hepatosplenomegaly  EXTREMITIES: 2+ peripheral pulses. No clubbing, cyanosis, or edema.  MUSCULOSKELETAL: No joint swelling or tenderness to palpation  SKIN: No rashes or lesions  NEUROLOGIC: A&Ox3. No focal deficits.  PSYCH: Affect appropriate    LABS:                        9.9    7.20  )-----------( 319      ( 22 May 2024 03:40 )             29.7     05-22    134<L>  |  103  |  33<H>  ----------------------------<  186<H>  4.4   |  18<L>  |  0.81    Ca    9.2      22 May 2024 03:40  Phos  3.0     05-22  Mg     2.10     05-22    TPro  7.6  /  Alb  3.8  /  TBili  0.6  /  DBili  x   /  AST  11  /  ALT  9   /  AlkPhos  73  05-20    PT/INR - ( 22 May 2024 03:40 )   PT: 12.1 sec;   INR: 1.07 ratio         PTT - ( 22 May 2024 03:40 )  PTT:25.5 sec      Urinalysis Basic - ( 22 May 2024 03:40 )    Color: x / Appearance: x / SG: x / pH: x  Gluc: 186 mg/dL / Ketone: x  / Bili: x / Urobili: x   Blood: x / Protein: x / Nitrite: x   Leuk Esterase: x / RBC: x / WBC x   Sq Epi: x / Non Sq Epi: x / Bacteria: x          RADIOLOGY & ADDITIONAL TESTS:  Results Reviewed:   Imaging Personally Reviewed:  Electrocardiogram Personally Reviewed:    COORDINATION OF CARE:  Care Discussed with Consultants/Other Providers [Y/N]:  Prior or Outpatient Records Reviewed [Y/N]: Internal Medicine Daily Progress Note  Marshall David MD  Internal Medicine PGY-1  Available on Teams    |:::::::::::::::::::::::::::| SUBJECTIVE |:::::::::::::::::::::::::::|  PROGRESS NOTE:   Patient is a 78y old  Female who presents with a chief complaint of LLE Wound and swelling (21 May 2024 14:35)      SUBJECTIVE / OVERNIGHT EVENTS: No acute overnight events. Evaluated at bedside, denies any SOB, chest pain, N/V, no foot pain, fever or chills.       |:::::::::::::::::::::::::::| OBJECTIVE |:::::::::::::::::::::::::::|    MEDICATIONS  (STANDING):  atorvastatin 80 milliGRAM(s) Oral at bedtime  cefepime   IVPB 2000 milliGRAM(s) IV Intermittent every 12 hours  clopidogrel Tablet 75 milliGRAM(s) Oral daily  dextrose 10% Bolus 125 milliLiter(s) IV Bolus once  dextrose 5%. 1000 milliLiter(s) (100 mL/Hr) IV Continuous <Continuous>  dextrose 5%. 1000 milliLiter(s) (50 mL/Hr) IV Continuous <Continuous>  dextrose 50% Injectable 25 Gram(s) IV Push once  dextrose 50% Injectable 12.5 Gram(s) IV Push once  glucagon  Injectable 1 milliGRAM(s) IntraMuscular once  insulin glargine Injectable (LANTUS) 18 Unit(s) SubCutaneous at bedtime  insulin lispro (ADMELOG) corrective regimen sliding scale   SubCutaneous at bedtime  insulin lispro (ADMELOG) corrective regimen sliding scale   SubCutaneous three times a day before meals  metoprolol succinate ER 25 milliGRAM(s) Oral daily  sacubitril 24 mG/valsartan 26 mG 1 Tablet(s) Oral two times a day  spironolactone 25 milliGRAM(s) Oral daily  vancomycin  IVPB 1000 milliGRAM(s) IV Intermittent every 24 hours    MEDICATIONS  (PRN):  acetaminophen     Tablet .. 650 milliGRAM(s) Oral every 6 hours PRN Temp greater or equal to 38C (100.4F), Mild Pain (1 - 3)  dextrose Oral Gel 15 Gram(s) Oral once PRN Blood Glucose LESS THAN 70 milliGRAM(s)/deciliter      CAPILLARY BLOOD GLUCOSE      POCT Blood Glucose.: 167 mg/dL (22 May 2024 05:29)  POCT Blood Glucose.: 179 mg/dL (21 May 2024 23:31)  POCT Blood Glucose.: 223 mg/dL (21 May 2024 17:47)  POCT Blood Glucose.: 127 mg/dL (21 May 2024 12:44)    I&O's Summary    21 May 2024 07:01  -  22 May 2024 06:56  --------------------------------------------------------  IN: 100 mL / OUT: 200 mL / NET: -100 mL        PHYSICAL EXAM:  Vital Signs Last 24 Hrs  T(C): 36.9 (22 May 2024 06:12), Max: 37 (21 May 2024 12:50)  T(F): 98.5 (22 May 2024 06:12), Max: 98.6 (21 May 2024 12:50)  HR: 62 (22 May 2024 06:12) (62 - 75)  BP: 95/47 (22 May 2024 06:12) (95/47 - 106/55)  BP(mean): --  RR: 17 (22 May 2024 06:12) (16 - 18)  SpO2: 98% (22 May 2024 06:12) (98% - 99%)    Parameters below as of 22 May 2024 06:12  Patient On (Oxygen Delivery Method): room air        CONSTITUTIONAL: NAD,  HEENT: EOMI, moist mucous membranes.  NECK: Supple. No JVD.  RESPIRATORY: Normal respiratory effort, Lungs CTAB  CARDIOVASCULAR: Regular rate and rhythm with normal S1 and S2. No murmurs.  ABDOMEN: Soft, non-tender, non-distended. Normoactive bowel sounds, no rebound/guarding; No hepatosplenomegaly  EXTREMITIES: poorly palpated LLE pulses but warm. No clubbing, cyanosis, or edema.  MUSCULOSKELETAL: No joint swelling or tenderness to palpation  SKIN: No rashes or lesions. Known left toe wound with peripheral necrosis and edema.   NEUROLOGIC: A&Ox3. No focal deficits.  PSYCH: Affect appropriate    LABS:                        9.9    7.20  )-----------( 319      ( 22 May 2024 03:40 )             29.7     05-22    134<L>  |  103  |  33<H>  ----------------------------<  186<H>  4.4   |  18<L>  |  0.81    Ca    9.2      22 May 2024 03:40  Phos  3.0     05-22  Mg     2.10     05-22    TPro  7.6  /  Alb  3.8  /  TBili  0.6  /  DBili  x   /  AST  11  /  ALT  9   /  AlkPhos  73  05-20    PT/INR - ( 22 May 2024 03:40 )   PT: 12.1 sec;   INR: 1.07 ratio         PTT - ( 22 May 2024 03:40 )  PTT:25.5 sec      Urinalysis Basic - ( 22 May 2024 03:40 )    Color: x / Appearance: x / SG: x / pH: x  Gluc: 186 mg/dL / Ketone: x  / Bili: x / Urobili: x   Blood: x / Protein: x / Nitrite: x   Leuk Esterase: x / RBC: x / WBC x   Sq Epi: x / Non Sq Epi: x / Bacteria: x          RADIOLOGY & ADDITIONAL TESTS:  Results Reviewed:   Imaging Personally Reviewed:  Electrocardiogram Personally Reviewed:    COORDINATION OF CARE:  Care Discussed with Consultants/Other Providers [Y/N]:  Prior or Outpatient Records Reviewed [Y/N]:

## 2024-05-22 NOTE — PROVIDER CONTACT NOTE (OTHER) - ACTION/TREATMENT ORDERED:
MD Rodriguez made aware. No interventions ordered. Nursing care to continue MD Rodriguez made aware. ordered to give all due medications as hold parameter less than 90 . Nursing care to continue

## 2024-05-22 NOTE — PROGRESS NOTE ADULT - SUBJECTIVE AND OBJECTIVE BOX
TEAM [ *C* ] Surgery Daily Progress Note  =====================================================    SUBJECTIVE: Patient seen and examined at bedside on AM rounds.  Denies fever, chills, N/V, chest pain, SOB    ALLERGIES:  No Known Allergies      --------------------------------------------------------------------------------------    MEDICATIONS:    Neurologic Medications  acetaminophen     Tablet .. 650 milliGRAM(s) Oral every 6 hours PRN Temp greater or equal to 38C (100.4F), Mild Pain (1 - 3)    Respiratory Medications    Cardiovascular Medications  metoprolol succinate ER 25 milliGRAM(s) Oral daily  sacubitril 24 mG/valsartan 26 mG 1 Tablet(s) Oral two times a day  spironolactone 25 milliGRAM(s) Oral daily    Gastrointestinal Medications  dextrose 10% Bolus 125 milliLiter(s) IV Bolus once  dextrose 5%. 1000 milliLiter(s) IV Continuous <Continuous>  dextrose 5%. 1000 milliLiter(s) IV Continuous <Continuous>    Genitourinary Medications    Hematologic/Oncologic Medications  clopidogrel Tablet 75 milliGRAM(s) Oral daily    Antimicrobial/Immunologic Medications  cefepime   IVPB 2000 milliGRAM(s) IV Intermittent every 12 hours  vancomycin  IVPB 1000 milliGRAM(s) IV Intermittent every 24 hours    Endocrine/Metabolic Medications  atorvastatin 80 milliGRAM(s) Oral at bedtime  dextrose 50% Injectable 12.5 Gram(s) IV Push once  dextrose 50% Injectable 25 Gram(s) IV Push once  dextrose Oral Gel 15 Gram(s) Oral once PRN Blood Glucose LESS THAN 70 milliGRAM(s)/deciliter  glucagon  Injectable 1 milliGRAM(s) IntraMuscular once  insulin glargine Injectable (LANTUS) 18 Unit(s) SubCutaneous at bedtime  insulin lispro (ADMELOG) corrective regimen sliding scale   SubCutaneous three times a day before meals  insulin lispro (ADMELOG) corrective regimen sliding scale   SubCutaneous at bedtime    Topical/Other Medications    --------------------------------------------------------------------------------------    VITAL SIGNS:  T(C): 36.9 (05-22-24 @ 06:12), Max: 37 (05-21-24 @ 12:50)  HR: 62 (05-22-24 @ 06:12) (62 - 75)  BP: 95/47 (05-22-24 @ 06:12) (95/47 - 106/55)  RR: 17 (05-22-24 @ 06:12) (16 - 18)  SpO2: 98% (05-22-24 @ 06:12) (98% - 99%)  --------------------------------------------------------------------------------------    INS AND OUTS:    05-21-24 @ 07:01  -  05-22-24 @ 07:00  --------------------------------------------------------  IN: 100 mL / OUT: 200 mL / NET: -100 mL      --------------------------------------------------------------------------------------      EXAM    Physical Examination:  General:   no acute distress.  Respiratory: breathing comfortably, no increased WOB   Extremities: Moves all four. 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 LLE pulses not palpable, RLE AT + DP pulses faintly palpable.        --------------------------------------------------------------------------------------    LABS                          9.9    7.20  )-----------( 319      ( 22 May 2024 03:40 )             29.7     05-22    134<L>  |  103  |  33<H>  ----------------------------<  186<H>  4.4   |  18<L>  |  0.81    Ca    9.2      22 May 2024 03:40  Phos  3.0     05-22  Mg     2.10     05-22    TPro  7.6  /  Alb  3.8  /  TBili  0.6  /  DBili  x   /  AST  11  /  ALT  9   /  AlkPhos  73  05-20    PT/INR - ( 22 May 2024 03:40 )   PT: 12.1 sec;   INR: 1.07 ratio         PTT - ( 22 May 2024 03:40 )  PTT:25.5 sec  Urinalysis Basic - ( 22 May 2024 03:40 )    Color: x / Appearance: x / SG: x / pH: x  Gluc: 186 mg/dL / Ketone: x  / Bili: x / Urobili: x   Blood: x / Protein: x / Nitrite: x   Leuk Esterase: x / RBC: x / WBC x   Sq Epi: x / Non Sq Epi: x / Bacteria: x      No Known Allergies    T(C): 36.9 (05-22-24 @ 06:12), Max: 37 (05-21-24 @ 12:50)  HR: 62 (05-22-24 @ 06:12) (62 - 75)  BP: 95/47 (05-22-24 @ 06:12) (95/47 - 106/55)  RR: 17 (05-22-24 @ 06:12) (16 - 18)  SpO2: 98% (05-22-24 @ 06:12) (98% - 99%)    05-21-24 @ 07:01  -  05-22-24 @ 07:00  --------------------------------------------------------  IN: 100 mL / OUT: 200 mL / NET: -100 mL

## 2024-05-22 NOTE — CONSULT NOTE ADULT - SUBJECTIVE AND OBJECTIVE BOX
Patient is a 78y old  Female who presents with a chief complaint of left foot wound     HPI:    C/o lower extremity edema x 1 week. LLE noted with 2+ pitting edema. endorsing numbness/ tingling to toes. +ROM and warmth noted.  Patient had wound for around 3 weeks. Unsure how wound started. Follows up with [place] podiatrist but unsure of name     PAST MEDICAL & SURGICAL HISTORY:  DMII (diabetes mellitus, type 2)      Hyperlipidemia      HTN (hypertension)      Vitamin D deficiency      [place] (hard of hearing)      History of cholecystectomy      S/P right cataract extraction          MEDICATIONS  (STANDING):  piperacillin/tazobactam IVPB... 3.375 Gram(s) IV Intermittent once  vancomycin  IVPB. 1000 milliGRAM(s) IV Intermittent once    MEDICATIONS  (PRN):      Allergies    No Known Allergies    Intolerances        VITALS:    Vital Signs Last 24 Hrs  T(C): 36.6 (20 May 2024 21:53), Max: 37 (20 May 2024 21:06)  T(F): 97.8 (20 May 2024 21:53), Max: 98.6 (20 May 2024 21:06)  HR: 70 (20 May 2024 21:53) (70 - 72)  BP: 99/50 (20 May 2024 21:53) (99/50 - 105/67)  BP(mean): --  RR: 18 (20 May 2024 21:53) (18 - 18)  SpO2: 98% (20 May 2024 21:53) (98% - 99%)    Parameters below as of 20 May 2024 21:53  Patient On (Oxygen Delivery Method): room air        LABS:                          10.7   9.29  )-----------( 353      ( 20 May 2024 22:55 )             32.9       05-20    133<L>  |  97<L>  |  45<H>  ----------------------------<  142<H>  4.3   |  22  |  0.98    Ca    9.8      20 May 2024 22:55    TPro  7.6  /  Alb  3.8  /  TBili  0.6  /  DBili  x   /  AST  11  /  ALT  9   /  AlkPhos  73  05-20      CAPILLARY BLOOD GLUCOSE      POCT Blood Glucose.: 151 mg/dL (20 May 2024 21:11)      PT/INR - ( 20 May 2024 22:55 )   PT: 11.9 sec;   INR: 1.05 ratio         PTT - ( 20 May 2024 22:55 )  PTT:26.9 sec    LOWER EXTREMITY PHYSICAL EXAM:    Vascular: DP/PT 0/4, B/L, CFT <3 seconds B/L, Temperature gradient warm to cool, B/L.   Neuro: Epicritic sensation diminished to the level of digits, B/L.  Musculoskeletal/Ortho: unremarkable   Skin: left foot plantar hallux blister with erythema to sulcus       RADIOLOGY & ADDITIONAL STUDIES:    
Mark Cameron MD  Interventional Cardiology / Endovascular Specialist  [place] Office : [phone] [place] [address] [place] [address]  Tel:   [place] Office : [address] [facility] [place] [address]  Tel: [phone]        HISTORY OF PRESENTING ILLNESS:  78 F w/ pmhx of HTN, HLD, T2DM (on insulin) CAD, NSTEMI on 11/2023, HFrEF, p/w LLE swelling and left toe wound. Patient denies any known trauma to the left toe but endorses left toe swelling that began 3-4 weeks prior without any pain. Patient was evaluated by her primary podiatrist and per patient she had some injection preformed. Patient was later evaluated by a primary medical provider and was advised to come to the hospital given the wound. Patient denies any fever, chill, pain to the foot or wound, N/V, or chest pain. Endorses left leg swelling without redness. Denies any other previous wounds. Denies any chest pain, N/V, abdominal pain.      ED course: Given Darrell and Zosyn     PAST MEDICAL & SURGICAL HISTORY:  DMII (diabetes mellitus, type 2)      Hyperlipidemia      HTN (hypertension)      Vitamin D deficiency      [place] (hard of hearing)      NSTEMI (non-ST elevation myocardial infarction)      CAD (coronary artery disease)      HFrEF (heart failure with reduced ejection fraction)      History of cholecystectomy      S/P right cataract extraction          SOCIAL HISTORY: Substance Use (street drugs): ( x ) never used  (  ) other:    FAMILY HISTORY:  No pertinent family history in first degree relatives        REVIEW OF SYSTEMS:  CONSTITUTIONAL: No fever, weight loss, or fatigue  EYES: No eye pain, visual disturbances, or discharge  ENMT:  No difficulty hearing, tinnitus, vertigo; No sinus or throat pain  BREASTS: No pain, masses, or nipple discharge  GASTROINTESTINAL: No abdominal or epigastric pain. No nausea, vomiting, or hematemesis; No diarrhea or constipation. No melena or hematochezia.  GENITOURINARY: No dysuria, frequency, hematuria, or incontinence  NEUROLOGICAL: No headaches, memory loss, loss of strength, numbness, or tremors  ENDOCRINE: No heat or cold intolerance; No hair loss  MUSCULOSKELETAL: No joint pain or swelling; No muscle, back, or extremity pain  PSYCHIATRIC: No depression, anxiety, mood swings, or difficulty sleeping  HEME/LYMPH: No easy bruising, or bleeding gums  All others negative    MEDICATIONS:  clopidogrel Tablet 75 milliGRAM(s) Oral daily  metoprolol succinate ER 25 milliGRAM(s) Oral daily  sacubitril 24 mG/valsartan 26 mG 1 Tablet(s) Oral two times a day  spironolactone 25 milliGRAM(s) Oral daily    cefepime   IVPB 2000 milliGRAM(s) IV Intermittent every 12 hours  vancomycin  IVPB 1000 milliGRAM(s) IV Intermittent every 24 hours      acetaminophen     Tablet .. 650 milliGRAM(s) Oral every 6 hours PRN      atorvastatin 80 milliGRAM(s) Oral at bedtime  dextrose 50% Injectable 12.5 Gram(s) IV Push once  dextrose 50% Injectable 25 Gram(s) IV Push once  dextrose Oral Gel 15 Gram(s) Oral once PRN  glucagon  Injectable 1 milliGRAM(s) IntraMuscular once  insulin glargine Injectable (LANTUS) 18 Unit(s) SubCutaneous at bedtime  insulin lispro (ADMELOG) corrective regimen sliding scale   SubCutaneous at bedtime  insulin lispro (ADMELOG) corrective regimen sliding scale   SubCutaneous three times a day before meals    dextrose 10% Bolus 125 milliLiter(s) IV Bolus once  dextrose 5%. 1000 milliLiter(s) IV Continuous <Continuous>  dextrose 5%. 1000 milliLiter(s) IV Continuous <Continuous>      FAMILY HISTORY:  No pertinent family history in first degree relatives          Allergies    No Known Allergies    Intolerances    	      PHYSICAL EXAM:  T(C): 36.8 (05-22-24 @ 11:04), Max: 37 (05-21-24 @ 12:50)  HR: 62 (05-22-24 @ 11:04) (62 - 75)  BP: 110/63 (05-22-24 @ 11:04) (95/47 - 110/63)  RR: 17 (05-22-24 @ 11:04) (16 - 18)  SpO2: 100% (05-22-24 @ 11:04) (98% - 100%)  Wt(kg): --  I&O's Summary    21 May 2024 07:01  -  22 May 2024 07:00  --------------------------------------------------------  IN: 100 mL / OUT: 200 mL / NET: -100 mL        GENERAL: NAD  EYES: conjunctiva and sclera clear  ENMT: No tonsillar erythema, exudates, or enlargement  Cardiovascular: Normal S1 S2, No JVD, No murmurs  Respiratory: Lungs clear to auscultation	  Gastrointestinal:  Soft, Non-tender, + BS	  Extremities: No edema        LABS:	 	    CARDIAC MARKERS:                                  9.9    7.20  )-----------( 319      ( 22 May 2024 03:40 )             29.7     05-22    134<L>  |  103  |  33<H>  ----------------------------<  186<H>  4.4   |  18<L>  |  0.81    Ca    9.2      22 May 2024 03:40  Phos  3.0     05-22  Mg     2.10     05-22    TPro  7.6  /  Alb  3.8  /  TBili  0.6  /  DBili  x   /  AST  11  /  ALT  9   /  AlkPhos  73  05-20    proBNP:   Lipid Profile:   HgA1c:   TSH:     Consultant(s) Notes Reviewed:  [x ] YES  [ ] NO    Care Discussed with Consultants/Other Providers [ x] YES  [ ] NO    Imaging Personally Reviewed independently:  [x] YES  [ ] NO    All labs, radiologic studies, vitals, orders and medications list reviewed. Patient is seen and examined at bedside. Case discussed with medical team.    ASSESSMENT/PLAN: 	  
GENERAL SURGERY CONSULT  ========================    HPI:  78 F w/ pmhx of HTN, HLD, T2DM (on insulin) CAD, NSTEMI on 11/2023, HFrEF, p/w LLE swelling and left toe wound. Patient denies any known trauma to the left toe but endorses left toe swelling that began 3-4 weeks prior without any pain.    Vascular Surgery is consulted to evaluate patient given c/f peripheral arterial disease. Patient is followed by an outpatient Podiatrist who has followed patient given diabetes. Previous A1c from 2023 noted. Patient denies symptoms of claudication, rest pain, fevers, chills, etc. Pods on board, likely will perform a partial ray amp. XIOMY/PVR's noted to be normal, but with reduced TBI on left side. Duplex with three vessel run off. Patient also currently on Plavix and high dose statin as well as Lantus.     ED course: Given Vanc and Zosyn  (21 May 2024 10:13)      PAST MEDICAL & SURGICAL HISTORY:  DMII (diabetes mellitus, type 2)      Hyperlipidemia      HTN (hypertension)      Vitamin D deficiency      [place] (hard of hearing)      NSTEMI (non-ST elevation myocardial infarction)      CAD (coronary artery disease)      HFrEF (heart failure with reduced ejection fraction)      History of cholecystectomy      S/P right cataract extraction          MEDICATIONS  (STANDING):  atorvastatin 80 milliGRAM(s) Oral at bedtime  cefepime   IVPB 2000 milliGRAM(s) IV Intermittent every 12 hours  clopidogrel Tablet 75 milliGRAM(s) Oral daily  dextrose 10% Bolus 125 milliLiter(s) IV Bolus once  dextrose 5%. 1000 milliLiter(s) (100 mL/Hr) IV Continuous <Continuous>  dextrose 5%. 1000 milliLiter(s) (50 mL/Hr) IV Continuous <Continuous>  dextrose 50% Injectable 25 Gram(s) IV Push once  dextrose 50% Injectable 12.5 Gram(s) IV Push once  glucagon  Injectable 1 milliGRAM(s) IntraMuscular once  insulin glargine Injectable (LANTUS) 18 Unit(s) SubCutaneous at bedtime  insulin lispro (ADMELOG) corrective regimen sliding scale   SubCutaneous three times a day before meals  insulin lispro (ADMELOG) corrective regimen sliding scale   SubCutaneous at bedtime  metoprolol succinate ER 25 milliGRAM(s) Oral daily  sacubitril 24 mG/valsartan 26 mG 1 Tablet(s) Oral two times a day  spironolactone 25 milliGRAM(s) Oral daily      ALLERGIES:    ___________________________________________  REVIEW OF SYSTEMS:  All ROS negative except as per HPI.    ___________________________________________  VITALS:  Vital Signs Last 24 Hrs  T(C): 37 (21 May 2024 12:50), Max: 37 (20 May 2024 21:06)  T(F): 98.6 (21 May 2024 12:50), Max: 98.6 (20 May 2024 21:06)  HR: 74 (21 May 2024 18:09) (63 - 74)  BP: 100/55 (21 May 2024 18:09) (91/50 - 106/55)  BP(mean): 62 (21 May 2024 02:36) (62 - 62)  RR: 18 (21 May 2024 12:50) (16 - 18)  SpO2: 99% (21 May 2024 12:50) (98% - 100%)    Parameters below as of 21 May 2024 12:50  Patient On (Oxygen Delivery Method): room air        CAPILLARY BLOOD GLUCOSE      POCT Blood Glucose.: 223 mg/dL (21 May 2024 17:47)      I&O's Detail      PHYSICAL EXAM:  General: AAOx3, no acute distress.  HEENT: Normocephalic, atraumatic  Respiratory: breathing comfortably, no increased WOB   Abdomen: soft, nontender, nondistended, no rebound, no guarding  Extremities: Moves all four. 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 Dopplerable DP/PT pulses. No evidence of pus or venous stasis disease. No swelling noted  Neuro: CN intact  Skin: No rashes  Vascular: Good cap refill    ____________________________________________  LABS:  CBC Full  -  ( 20 May 2024 22:55 )  WBC Count : 9.29 K/uL  RBC Count : 3.96 M/uL  Hemoglobin : 10.7 g/dL  Hematocrit : 32.9 %  Platelet Count - Automated : 353 K/uL  Mean Cell Volume : 83.1 fL  Mean Cell Hemoglobin : 27.0 pg  Mean Cell Hemoglobin Concentration : 32.5 gm/dL  Auto Neutrophil # : 6.51 K/uL  Auto Lymphocyte # : 1.64 K/uL  Auto Monocyte # : 0.91 K/uL  Auto Eosinophil # : 0.16 K/uL  Auto Basophil # : 0.04 K/uL  Auto Neutrophil % : 70.1 %  Auto Lymphocyte % : 17.7 %  Auto Monocyte % : 9.8 %  Auto Eosinophil % : 1.7 %  Auto Basophil % : 0.4 %    05-20    133<L>  |  97<L>  |  45<H>  ----------------------------<  142<H>  4.3   |  22  |  0.98    Ca    9.8      20 May 2024 22:55    TPro  7.6  /  Alb  3.8  /  TBili  0.6  /  DBili  x   /  AST  11  /  ALT  9   /  AlkPhos  73  05-20    LIVER FUNCTIONS - ( 20 May 2024 22:55 )  Alb: 3.8 g/dL / Pro: 7.6 g/dL / ALK PHOS: 73 U/L / ALT: 9 U/L / AST: 11 U/L / GGT: x           PT/INR - ( 20 May 2024 22:55 )   PT: 11.9 sec;   INR: 1.05 ratio         PTT - ( 20 May 2024 22:55 )  PTT:26.9 sec  Urinalysis Basic - ( 20 May 2024 22:55 )    Color: x / Appearance: x / SG: x / pH: x  Gluc: 142 mg/dL / Ketone: x  / Bili: x / Urobili: x   Blood: x / Protein: x / Nitrite: x   Leuk Esterase: x / RBC: x / WBC x   Sq Epi: x / Non Sq Epi: x / Bacteria: x            ____________________________________________  RADIOLOGY & ADDITIONAL STUDIES:    BILATERAL ANKLE-BRACHIAL INDEX, SEGMENTAL LIMB PRESSURES, AND PULSE VOLUME                                  RECORDING REPORT       Name:        YUSUF SAWYER Study Date:       5/21/2024  YOB: 1945        MRN:              [id number]  Patient Age: 78 years          Accession Number: [id number]  Gender:      F                 Admission ID:     [id number]  Referring Physician:    Analilia Camargo MD  Interpreting Physician: Kelvin Rand MD, PhD  Primary Sonographer:    Enoch Hernadez RVT       --------------------------------------------------------------------------------  Procedure:        XIOMY study with segmental pressures and PVR.  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       --------------------------------------------------------------------------------  CONCLUSIONS:      1. Right: Right XIOMY is normal (1.07). Right TBI is normal (0.76), with a toe pressure of 84 mmHg. No occlusive arterial disease in the right lower extremity.   2. Left: Left XIOMY is normal (1.20). Although left digit waveforms are severely blunted, the left TBI is mildly reduced (0.52) with a toe pressure of 57 mmHg. Findings suggest the presence of small vessel arterial disease in the left foot.    --------------------------------------------------------------------------------  MEASUREMENTS:    +---------+------------------+----+------------+--------+  |Right XIOMY|Rt Pressure (mmHg)|XIOMY |PVR Waveform|Comment |  +---------+------------------+----+------------+--------+  |Brachial |108               |    |            |        |  +---------+------------------+----+------------+--------+  |Thigh    |158               |1.44|Normal      |Elevated|  +---------+------------------+----+------------+--------+  |Calf     |157               |1.43|Normal      |Elevated|  +---------+------------------+----+------------+--------+  |Ankle    |118               |1.07|Normal      |        |  +---------+------------------+----+------------+--------+  |PTA      |117               |1.06|            |        |  +---------+------------------+----+------------+--------+  |DPA      |118               |1.07|            |        |  +---------+------------------+----+------------+--------+    +--------+------------------+----+------------+--------+  |Left XIOMY|Lt Pressure (mmHg)|XIOMY |PVR Waveform|Comment |  +--------+------------------+----+------------+--------+  |Brachial|110               |    |            |        |  +--------+------------------+----+------------+--------+  |Thigh   |171               |1.55|Normal      |Elevated|  +--------+------------------+----+------------+--------+  |Calf    |134               |1.22|Normal      |        |  +--------+------------------+----+------------+--------+  |Ankle   |132               |1.20|Normal      |        |  +--------+------------------+----+------------+--------+  |PTA     |132               |1.20|            |        |  +--------+------------------+----+------------+--------+  |DPA     |106               |0.96|            |        |  +--------+------------------+----+------------+--------+    +---------+------------------+----+------------+-------+  |Right TBI|Rt Pressure (mmHg)|TBI |PPG Waveform|Comment|  +---------+------------------+----+------------+-------+  |Great Toe|84                |0.76|Normal      |       |  +---------+------------------+----+------------+-------+    +---------+------------------+----+-----------------+-------+  |Left TBI |Lt Pressure (mmHg)|TBI |PPG Waveform     |Comment|  +---------+------------------+----+-----------------+-------+  |Great Toe|57                |0.52|Severely Abnormal|       |  +---------+------------------+----+-----------------+-------+    +-------+-----------+-----------+  |XIOMY/TBI|Today's XIOMY|Today's TBI|  +-------+-----------+-----------+  |Right  |1.07       |0.76       |  +-------+-----------+-----------+  |Left   |1.20       |0.52       |  +-------+-----------+-----------+       --------------------------------------------------------------------------------  FINDINGS:     Right Lower Extremity Arteries:  Right XIOMY is normal (1.07). Right TBI is normal (0.76), with a toe pressure of 84 mmHg. No occlusive arterial disease in the right lower extremity.    Left Lower Extremity Arteries:  Left XIOMY is normal (1.20). Although left digit waveforms are severely blunted, the left TBI is mildly reduced (0.52) with a toe pressure of 57 mmHg. Findings suggest the presence of small vessel arterial disease in the left foot.         Electronically signed on 5/21/2024 at 12:46:13 PM by Kelvin Rand MD, PhD, [facility], [place]

## 2024-05-22 NOTE — PROGRESS NOTE ADULT - PROBLEM SELECTOR PLAN 3
Hx of HFrEF (last EF 20-25% in 11/2023), likely ischemic cardiomyopathy i/s/o CAD and T2DM.   - on GDMT with Entresto 24-26mg BID, Metorpolol succinate 25mg QD, Aldactone 25mg QD, and Jardiance 10mg QD.   - Takes Lasix 20mg QD to 40mg BID based on symptoms. Currently appearing euvolemic  - baseline BPs in 90s - 100s SBP    Plan:  - continue home GDMT with Entresto, Metoprolol, Aldactone as above with hold parameters  - holding diruesis as patient appears euvolemic.   - strict I&Os, daily standing weights Hx of HFrEF (last EF 20-25% in 11/2023), likely ischemic cardiomyopathy i/s/o CAD and T2DM.   - on GDMT with Entresto 24-26mg BID, Metorpolol succinate 25mg QD, Aldactone 25mg QD, and Jardiance 10mg QD.   - Takes Lasix 20mg QD to 40mg BID based on symptoms. Currently appearing euvolemic  - baseline BPs in 90s - 100s SBP  - Previous outpatient TTE was negative for LV thrombus on 1/12/2024, patient was stopped on Coumadin at that time.     Plan:  - continue home GDMT with Entresto, Metoprolol, Aldactone as above with hold parameters  - holding diruesis as patient appears euvolemic.   - Obtain Repeat TTE.   - strict I&Os, daily standing weights

## 2024-05-22 NOTE — CHART NOTE - NSCHARTNOTEFT_GEN_A_CORE
YUSUF SAWYER  [id number]    Condition: Stable  Dx: Left foot Osteomyelitis  Planned procedure: Left toe partial resection and Angiogram    Relevant active conditions:   - Cardiac: No active Cardiac issues. Past history of HFrEF stable on GDMT. Patient is not in ADHF, appearing euvolemic. Past Hx of NSTEMI without any new ischemic changes seen on admission EKG.    - Pulm: None   - Renal, GI: None   - Metabolic: T2MD - stable glucose on insulin regimen.    - Infection/Hematological/Thromboembolic: Left 1st toe osteomyelitis without evidence of systemic infection. On Vanc and Cefepime.     VS: T(C): 36.9 (05-22-24 @ 06:12), Max: 37 (05-21-24 @ 12:50)  HR: 62 (05-22-24 @ 06:12) (62 - 75)  BP: 95/47 (05-22-24 @ 06:12) (95/47 - 106/55)  RR: 17 (05-22-24 @ 06:12) (16 - 18)  SpO2: 98% (05-22-24 @ 06:12) (98% - 99%)    PHYSICAL EXAM:  GENERAL: NAD on RA  EYES: PERRLA, conjunctiva and sclera clear  ENMT: No tonsillar erythema, exudates, or enlargement; Moist mucous membranes,   NECK: Supple, No JVD, Normal thyroid  CHEST/LUNG: Clear to percussion bilaterally; No rales, rhonchi, wheezing, or rubs  HEART: Regular rate and rhythm; No murmurs, rubs, or gallops  ABDOMEN: Soft, Nontender, Nondistended; Bowel sounds present  VASCULAR:  Poorly palpable left PT and DP but LLE is warm. No clubbing, cyanosis, or edema  LYMPH: No lymphadenopathy noted  SKIN: No new rashes or lesions. Known left 1st toe necrotic ulcerated wound.   NERVOUS SYSTEM:  Alert & Oriented X3, Good concentration    Patient seen and examined today Plan discussed/Questions answered  (with patient/ancillary staff/colleagues).  Chart notes reviewed.  Notable interval events reviewed.    RCRI: Class IV risk - 15% 30 day risk of MI or Cardiac Arrest  Yin: 0.9% risk for MI in 30 days.     Management of comorbid conditions/medications as follows:    - HFrEF: overall stable, euvolemic. Continue GDMT as BP allows.   - Hx of NSTEMI: EKG obtained on admission in NSR, HR 71, QTc 454. Nonspecific TVI in I, AVL, and V5, likely related to. No active chest pain or evidence of ACS.   - T2DM: continue insulin, stable BGs.   - Left toe wound / Osteomyelitis: continue Vanc and Cefepime pending further cultures. BCx NGTD x24 hrs.     Patient is medically cleared for the surgery as above. No contraindication.    Marshall David MD  Internal Medicine PGY-1  Available on Teams YUSUF SAWYER  [id number]    Condition: Stable  Dx: Left foot Osteomyelitis  Planned procedure: LE Angiogram with Vascular Surgery    Relevant active conditions:   - Cardiac: No active Cardiac issues. Past history of HFrEF stable on GDMT. Patient is not in ADHF, appearing euvolemic. Past Hx of NSTEMI without any new ischemic changes seen on admission EKG.    - Pulm: None   - Renal, GI: None   - Metabolic: T2MD - stable glucose on insulin regimen.    - Infection/Hematological/Thromboembolic: Left 1st toe osteomyelitis without evidence of systemic infection. On Vanc and Cefepime.     VS: T(C): 36.9 (05-22-24 @ 06:12), Max: 37 (05-21-24 @ 12:50)  HR: 62 (05-22-24 @ 06:12) (62 - 75)  BP: 95/47 (05-22-24 @ 06:12) (95/47 - 106/55)  RR: 17 (05-22-24 @ 06:12) (16 - 18)  SpO2: 98% (05-22-24 @ 06:12) (98% - 99%)    PHYSICAL EXAM:  GENERAL: NAD on RA  EYES: PERRLA, conjunctiva and sclera clear  ENMT: No tonsillar erythema, exudates, or enlargement; Moist mucous membranes,   NECK: Supple, No JVD, Normal thyroid  CHEST/LUNG: Clear to percussion bilaterally; No rales, rhonchi, wheezing, or rubs  HEART: Regular rate and rhythm; No murmurs, rubs, or gallops  ABDOMEN: Soft, Nontender, Nondistended; Bowel sounds present  VASCULAR:  Poorly palpable left PT and DP but LLE is warm. No clubbing, cyanosis, or edema  LYMPH: No lymphadenopathy noted  SKIN: No new rashes or lesions. Known left 1st toe necrotic ulcerated wound.   NERVOUS SYSTEM:  Alert & Oriented X3, Good concentration    Patient seen and examined today Plan discussed/Questions answered  (with patient/ancillary staff/colleagues).  Chart notes reviewed.  Notable interval events reviewed.    RCRI: Class IV risk - 15% 30 day risk of MI or Cardiac Arrest  Yin: 0.9% risk for MI in 30 days.     Management of comorbid conditions/medications as follows:    - HFrEF: overall stable, euvolemic. Continue GDMT as BP allows.   - Hx of NSTEMI: EKG obtained on admission in NSR, HR 71, QTc 454. Nonspecific TVI in I, AVL, and V5, likely related to. No active chest pain or evidence of ACS.   - T2DM: continue insulin, stable BGs.   - Left toe wound / Osteomyelitis: continue Vanc and Cefepime pending further cultures. BCx NGTD x24 hrs.     Patient is medically cleared for the surgery as above. No contraindication.    Marshall David MD  Internal Medicine PGY-1  Available on Teams

## 2024-05-22 NOTE — PROGRESS NOTE ADULT - PROBLEM SELECTOR PLAN 1
P/w left 1st toe ulcerated and necrotic wound with +bone probing. MR c/w osteomyelitis of the 1st toe, likely 2/2 direct extension of wound i/s/o T2DM.  - XIOMY/PVR overall normal    Plan:  - Continue Vancomycin 1g q24h (5/20 - )  and Cefepime 2000mg q12h (5/20 - )  - F/u BCx (5/21)   - Podiatry following, appreciate recs. Local wound care per Podiatry.  > Vascular c/s per Podiatry. Pod tentatively planning for OR for resection on 5/22 at 3PM.   - Obtaining Cardiac optimization given HFrEF, appreciate recs P/w left 1st toe ulcerated and necrotic wound with +bone probing. MR c/w osteomyelitis of the 1st toe, likely 2/2 direct extension of wound i/s/o T2DM.  - XIOMY/PVR overall normal    Plan:  - Continue Vancomycin 1g q24h (5/20 - )  and Cefepime 2000mg q12h (5/20 - )  - F/u BCx (5/21)   - Podiatry following, appreciate recs. Local wound care per Podiatry.  > Vascular c/s per Podiatry. Pod tentatively planning for OR for resection on 5/23  > No inpatient Vascular procedure needed.   - Obtaining Cardiac optimization given HFrEF, appreciate recs

## 2024-05-22 NOTE — CHART NOTE - NSCHARTNOTEFT_GEN_A_CORE
pod sx intervention rescheduled to Friday at 2:30 pm 2/2 vascular planning angio tonight pod sx intervention rescheduled to Thursday at 4:00 pm pod sx intervention rescheduled to Thursday at 4:00 pm 2/2 vascular tent plan for angio tonight

## 2024-05-22 NOTE — PROGRESS NOTE ADULT - SUBJECTIVE AND OBJECTIVE BOX
Patient is a 78y old  Female who presents with a chief complaint of LLE Wound and swelling (22 May 2024 14:24)       INTERVAL HPI/OVERNIGHT EVENTS:  Patient seen and evaluated at bedside.  Pt is resting comfortable in NAD. Denies N/V/F/C.      Allergies    No Known Allergies    Intolerances        Vital Signs Last 24 Hrs  T(C): 36.8 (22 May 2024 11:04), Max: 37 (21 May 2024 21:35)  T(F): 98.2 (22 May 2024 11:04), Max: 98.6 (21 May 2024 21:35)  HR: 62 (22 May 2024 11:04) (62 - 75)  BP: 110/63 (22 May 2024 11:04) (95/47 - 110/63)  BP(mean): --  RR: 17 (22 May 2024 11:04) (16 - 17)  SpO2: 100% (22 May 2024 11:04) (98% - 100%)    Parameters below as of 22 May 2024 06:12  Patient On (Oxygen Delivery Method): room air        LABS:                        9.9    7.20  )-----------( 319      ( 22 May 2024 03:40 )             29.7     05-22    134<L>  |  103  |  33<H>  ----------------------------<  186<H>  4.4   |  18<L>  |  0.81    Ca    9.2      22 May 2024 03:40  Phos  3.0     05-22  Mg     2.10     05-22    TPro  7.6  /  Alb  3.8  /  TBili  0.6  /  DBili  x   /  AST  11  /  ALT  9   /  AlkPhos  73  05-20    PT/INR - ( 22 May 2024 03:40 )   PT: 12.1 sec;   INR: 1.07 ratio         PTT - ( 22 May 2024 03:40 )  PTT:25.5 sec  Urinalysis Basic - ( 22 May 2024 03:40 )    Color: x / Appearance: x / SG: x / pH: x  Gluc: 186 mg/dL / Ketone: x  / Bili: x / Urobili: x   Blood: x / Protein: x / Nitrite: x   Leuk Esterase: x / RBC: x / WBC x   Sq Epi: x / Non Sq Epi: x / Bacteria: x      CAPILLARY BLOOD GLUCOSE      POCT Blood Glucose.: 135 mg/dL (22 May 2024 12:42)  POCT Blood Glucose.: 135 mg/dL (22 May 2024 11:49)  POCT Blood Glucose.: 139 mg/dL (22 May 2024 11:04)  POCT Blood Glucose.: 167 mg/dL (22 May 2024 05:29)  POCT Blood Glucose.: 179 mg/dL (21 May 2024 23:31)  POCT Blood Glucose.: 223 mg/dL (21 May 2024 17:47)      Lower Extremity Physical Exam:  Vascular: DP/PT 0/4, B/L, CFT <3 seconds B/L, Temperature gradient warm to cool, B/L.   Neuro: Epicritic sensation diminished to the level of digits, B/L.  Musculoskeletal/Ortho: unremarkable   Skin: L foot hallux distal necrotic wound to periosteum, slight malodor, no purulence, no tracking/ tunnelling/ erythema. R foot no open wound     RADIOLOGY & ADDITIONAL TESTS:

## 2024-05-22 NOTE — PROGRESS NOTE ADULT - PROBLEM SELECTOR PLAN 2
- Hx of T2DM, per chart review on Levemir 13u qAM and 23u qHS. Also on Jardiance 10mg, Metformin 1000mg BID.   - Last A1c 11/28/2023 6.4%    Plan:  - FS thus far well controlled on JO-ANN. Will continue JO-ANN with Lantus 18u qHS.   - f/u A1c  - DASH/CC diet

## 2024-05-22 NOTE — PROGRESS NOTE ADULT - SUBJECTIVE AND OBJECTIVE BOX
Pt reeval'ed in SDA  Confortable  L 1st toe dist dry isch ulcer and resid erythema to met head.   B/L +fem, pop, PT pulses  Art perf appears to be adequate  Doubt utility of angio at this time.  Angio cancelled for today.  DW'ed pt.  DW'ed podiatry attd: WIll fu plans for abx, local I&D/toe amp and observe/reeval.

## 2024-05-22 NOTE — PROGRESS NOTE ADULT - ASSESSMENT
78F presents with left hallux wound to periosteum   - Patient seen and evaluated  - Afebrile, no leukocytosis  - L foot hallux distal necrotic wound to periosteum, slight malodor, no purulence, no tracking/ tunnelling/ erythema. R foot no open wound  - Cont IV abx, ID recs pending  - Left foot wound culture pending   - Vasc recs appreciated, ok to proceed with pod surgery tomorrow   - Pod plan: L foot Partial Hallux Amputation Tomorrow 5/23 4pm   - Please document medical clearance for possible podiatric procedure   - NPO at midnight  - CBC BMP Coags Type and screen in AM   - Discussed with attending

## 2024-05-22 NOTE — CHART NOTE - NSCHARTNOTEFT_GEN_A_CORE
MEDICAL OPTIMIZATION    YUSUF SAWYER  [id number]    Patient Evaluated on 5/22    Condition: Stable  Dx: Left foot Osteomyelitis  Planned procedure: L foot Partial Hallux Amputation 5/23 4pm with Podiatry    Relevant active conditions:   - Cardiac: No active Cardiac issues. Past history of HFrEF stable on GDMT. TTE with enhancing agent obtained on 5/22 for known HFrEF does NOT demonstrate an LV thrombus with decreased LVEF of 20 to 25%, stable from previous exam in November 2023. Previous TTE noted mild to moderate MR without any other notable valvular disease. Patient is not in ADHF, appearing euvolemic. Past Hx of NSTEMI without any new ischemic changes seen on admission EKG.    - Pulm: None   - Renal, GI: None   - Metabolic: T2MD - stable glucose on insulin regimen.    - Infection/Hematological/Thromboembolic: Left 1st toe osteomyelitis without evidence of systemic infection. On Vanc and Cefepime.     VS: T(C): 36.9 (05-22-24 @ 06:12), Max: 37 (05-21-24 @ 12:50)  HR: 62 (05-22-24 @ 06:12) (62 - 75)  BP: 95/47 (05-22-24 @ 06:12) (95/47 - 106/55)  RR: 17 (05-22-24 @ 06:12) (16 - 18)  SpO2: 98% (05-22-24 @ 06:12) (98% - 99%)    PHYSICAL EXAM:  GENERAL: NAD on RA  EYES: PERRLA, conjunctiva and sclera clear  ENMT: No tonsillar erythema, exudates, or enlargement; Moist mucous membranes,   NECK: Supple, No JVD, Normal thyroid  CHEST/LUNG: Clear to percussion bilaterally; No rales, rhonchi, wheezing, or rubs  HEART: Regular rate and rhythm; No murmurs, rubs, or gallops  ABDOMEN: Soft, Nontender, Nondistended; Bowel sounds present  VASCULAR:  Poorly palpable left PT and DP but LLE is warm. No clubbing, cyanosis, or edema  LYMPH: No lymphadenopathy noted  SKIN: No new rashes or lesions. Known left 1st toe necrotic ulcerated wound.   NERVOUS SYSTEM:  Alert & Oriented X3, Good concentration    Patient seen and examined today Plan discussed/Questions answered  (with patient/ancillary staff/colleagues).  Chart notes reviewed.  Notable interval events reviewed.    RCRI: Class IV risk - 15% 30 day risk of MI or Cardiac Arrest  Yin: 0.9% risk for MI in 30 days.     Management of comorbid conditions/medications as follows:    - HFrEF: overall stable, euvolemic. Continue GDMT as BP allows. TTE demonstrating decreased LVEF of 20-25%, would be cautious with negative inotropic medications intraoperatively given decreased EF. Patient is not in acute decompensated HF and has no significant valvular diseases that would be contraindications to surgery.   - Hx of NSTEMI: EKG obtained on admission in NSR, HR 71, QTc 454. Nonspecific TVI in I, AVL, and V5, likely related to. No active chest pain or evidence of ACS.   - T2DM: continue insulin, stable BGs.   - Left toe wound / Osteomyelitis: continue Vanc and Cefepime pending further cultures. BCx NGTD x24 hrs.     Patient is medically cleared for the surgery as above at this time based on the current exam on 5/22. No contraindications.    Marshall David MD  Internal Medicine PGY-1  Available on Teams.

## 2024-05-22 NOTE — PROGRESS NOTE ADULT - SUBJECTIVE AND OBJECTIVE BOX
Podiatry Pager #: [phone]    Patient is a 78y old  Female who presents with a chief complaint of LLE Wound and swelling (22 May 2024 06:56)      INTERVAL HPI/OVERNIGHT EVENTS:   Pt is scheduled for left foot first digit amputation with Dr. Grande at 3pm . Patient is aware of procedure and is NPO since midnight.    MEDICATIONS  (STANDING):  atorvastatin 80 milliGRAM(s) Oral at bedtime  cefepime   IVPB 2000 milliGRAM(s) IV Intermittent every 12 hours  clopidogrel Tablet 75 milliGRAM(s) Oral daily  dextrose 10% Bolus 125 milliLiter(s) IV Bolus once  dextrose 5%. 1000 milliLiter(s) (100 mL/Hr) IV Continuous <Continuous>  dextrose 5%. 1000 milliLiter(s) (50 mL/Hr) IV Continuous <Continuous>  dextrose 50% Injectable 12.5 Gram(s) IV Push once  dextrose 50% Injectable 25 Gram(s) IV Push once  glucagon  Injectable 1 milliGRAM(s) IntraMuscular once  insulin glargine Injectable (LANTUS) 18 Unit(s) SubCutaneous at bedtime  insulin lispro (ADMELOG) corrective regimen sliding scale   SubCutaneous three times a day before meals  insulin lispro (ADMELOG) corrective regimen sliding scale   SubCutaneous at bedtime  metoprolol succinate ER 25 milliGRAM(s) Oral daily  sacubitril 24 mG/valsartan 26 mG 1 Tablet(s) Oral two times a day  spironolactone 25 milliGRAM(s) Oral daily  vancomycin  IVPB 1000 milliGRAM(s) IV Intermittent every 24 hours    MEDICATIONS  (PRN):  acetaminophen     Tablet .. 650 milliGRAM(s) Oral every 6 hours PRN Temp greater or equal to 38C (100.4F), Mild Pain (1 - 3)  dextrose Oral Gel 15 Gram(s) Oral once PRN Blood Glucose LESS THAN 70 milliGRAM(s)/deciliter      Allergies    No Known Allergies    Intolerances        Vital Signs Last 24 Hrs  T(C): 36.9 (22 May 2024 06:12), Max: 37 (21 May 2024 12:50)  T(F): 98.5 (22 May 2024 06:12), Max: 98.6 (21 May 2024 12:50)  HR: 62 (22 May 2024 06:12) (62 - 75)  BP: 95/47 (22 May 2024 06:12) (95/47 - 106/55)  BP(mean): --  RR: 17 (22 May 2024 06:12) (16 - 18)  SpO2: 98% (22 May 2024 06:12) (98% - 99%)    Parameters below as of 22 May 2024 06:12  Patient On (Oxygen Delivery Method): room air        LABS:                        9.9    7.20  )-----------( 319      ( 22 May 2024 03:40 )             29.7     05-22    134<L>  |  103  |  33<H>  ----------------------------<  186<H>  4.4   |  18<L>  |  0.81    Ca    9.2      22 May 2024 03:40  Phos  3.0     05-22  Mg     2.10     05-22    TPro  7.6  /  Alb  3.8  /  TBili  0.6  /  DBili  x   /  AST  11  /  ALT  9   /  AlkPhos  73  05-20    PT/INR - ( 22 May 2024 03:40 )   PT: 12.1 sec;   INR: 1.07 ratio         PTT - ( 22 May 2024 03:40 )  PTT:25.5 sec  Urinalysis Basic - ( 22 May 2024 03:40 )    Color: x / Appearance: x / SG: x / pH: x  Gluc: 186 mg/dL / Ketone: x  / Bili: x / Urobili: x   Blood: x / Protein: x / Nitrite: x   Leuk Esterase: x / RBC: x / WBC x   Sq Epi: x / Non Sq Epi: x / Bacteria: x      CAPILLARY BLOOD GLUCOSE      POCT Blood Glucose.: 167 mg/dL (22 May 2024 05:29)  POCT Blood Glucose.: 179 mg/dL (21 May 2024 23:31)  POCT Blood Glucose.: 223 mg/dL (21 May 2024 17:47)  POCT Blood Glucose.: 127 mg/dL (21 May 2024 12:44)      RADIOLOGY & ADDITIONAL TESTS:    Plan:   To OR today at 3pm with Dr. Grande for left foot first digit amputation.   CXR on sunrise.  EKG on sunrise.  please document Medical/Cardiac clearance for procedure   Consent signed and in chart.  Procedure was explained to patient in detail. All alternatives, risks and complications were discussed. All questions answered. Podiatry Pager #: [phone]    Patient is a 78y old  Female who presents with a chief complaint of LLE Wound and swelling (22 May 2024 06:56)      INTERVAL HPI/OVERNIGHT EVENTS:   Pt is scheduled for left foot first digit amputation with Dr. Grande at 3pm . Patient is aware of procedure and is NPO since midnight.    MEDICATIONS  (STANDING):  atorvastatin 80 milliGRAM(s) Oral at bedtime  cefepime   IVPB 2000 milliGRAM(s) IV Intermittent every 12 hours  clopidogrel Tablet 75 milliGRAM(s) Oral daily  dextrose 10% Bolus 125 milliLiter(s) IV Bolus once  dextrose 5%. 1000 milliLiter(s) (100 mL/Hr) IV Continuous <Continuous>  dextrose 5%. 1000 milliLiter(s) (50 mL/Hr) IV Continuous <Continuous>  dextrose 50% Injectable 12.5 Gram(s) IV Push once  dextrose 50% Injectable 25 Gram(s) IV Push once  glucagon  Injectable 1 milliGRAM(s) IntraMuscular once  insulin glargine Injectable (LANTUS) 18 Unit(s) SubCutaneous at bedtime  insulin lispro (ADMELOG) corrective regimen sliding scale   SubCutaneous three times a day before meals  insulin lispro (ADMELOG) corrective regimen sliding scale   SubCutaneous at bedtime  metoprolol succinate ER 25 milliGRAM(s) Oral daily  sacubitril 24 mG/valsartan 26 mG 1 Tablet(s) Oral two times a day  spironolactone 25 milliGRAM(s) Oral daily  vancomycin  IVPB 1000 milliGRAM(s) IV Intermittent every 24 hours    MEDICATIONS  (PRN):  acetaminophen     Tablet .. 650 milliGRAM(s) Oral every 6 hours PRN Temp greater or equal to 38C (100.4F), Mild Pain (1 - 3)  dextrose Oral Gel 15 Gram(s) Oral once PRN Blood Glucose LESS THAN 70 milliGRAM(s)/deciliter      Allergies    No Known Allergies    Intolerances        Vital Signs Last 24 Hrs  T(C): 36.9 (22 May 2024 06:12), Max: 37 (21 May 2024 12:50)  T(F): 98.5 (22 May 2024 06:12), Max: 98.6 (21 May 2024 12:50)  HR: 62 (22 May 2024 06:12) (62 - 75)  BP: 95/47 (22 May 2024 06:12) (95/47 - 106/55)  BP(mean): --  RR: 17 (22 May 2024 06:12) (16 - 18)  SpO2: 98% (22 May 2024 06:12) (98% - 99%)    Parameters below as of 22 May 2024 06:12  Patient On (Oxygen Delivery Method): room air        LABS:                        9.9    7.20  )-----------( 319      ( 22 May 2024 03:40 )             29.7     05-22    134<L>  |  103  |  33<H>  ----------------------------<  186<H>  4.4   |  18<L>  |  0.81    Ca    9.2      22 May 2024 03:40  Phos  3.0     05-22  Mg     2.10     05-22    TPro  7.6  /  Alb  3.8  /  TBili  0.6  /  DBili  x   /  AST  11  /  ALT  9   /  AlkPhos  73  05-20    PT/INR - ( 22 May 2024 03:40 )   PT: 12.1 sec;   INR: 1.07 ratio         PTT - ( 22 May 2024 03:40 )  PTT:25.5 sec  Urinalysis Basic - ( 22 May 2024 03:40 )    Color: x / Appearance: x / SG: x / pH: x  Gluc: 186 mg/dL / Ketone: x  / Bili: x / Urobili: x   Blood: x / Protein: x / Nitrite: x   Leuk Esterase: x / RBC: x / WBC x   Sq Epi: x / Non Sq Epi: x / Bacteria: x      CAPILLARY BLOOD GLUCOSE      POCT Blood Glucose.: 167 mg/dL (22 May 2024 05:29)  POCT Blood Glucose.: 179 mg/dL (21 May 2024 23:31)  POCT Blood Glucose.: 223 mg/dL (21 May 2024 17:47)  POCT Blood Glucose.: 127 mg/dL (21 May 2024 12:44)      RADIOLOGY & ADDITIONAL TESTS:    Plan:    OR canceled for today at 3pm with Dr. Grande for left foot first digit amputation.   Needs further vasc workup  CXR on sunrise.  EKG on sunrise.  please document Medical/Cardiac clearance for procedure   Consent signed and in chart.  Procedure was explained to patient in detail. All alternatives, risks and complications were discussed. All questions answered.

## 2024-05-22 NOTE — CONSULT NOTE ADULT - ASSESSMENT
78F presents with left hallux wound to periosteum   - Patient seen and evaluated  - Afebrile, no leukocytosis, ESR 62, CRP 5.81  - left foot plantar hallux blister with erythema to sulcus   - Deroofed left hallux blister with sterile suture removal kit, left hallux plantar fibrotic wound to periosteum, mild malodor, no drainage.   - Demarcated erythema   - Patient being admitted to Medicine   - Recommend IV Vanco/ Cefepime  - Recommend ID consult   - Left foot wound cultured   - Recommend vasc consult for nonpalpable pulses   - Pod plan local wound care w/ IV antibiotics pending imaging   - Please document medical clearance for possible podiatric procedure   - Discussed with attending 
78F w/ PMH HTN, HLD, DM, CAD, NSTEMI, HFrEF presents with likely ischemic arterial disease of 1st MTP.    -No acute surgical intervention  -Recommend repeat A1C given small vessel disease noted on XIOMY  -MRI results noted  -Medical and cardiac clearance for possible angiogram     D/w Dr. Vincent Watts MD  PGY-3  C Team
EKG SR non specific changes    Echo < from: TTE W or WO Ultrasound Enhancing Agent (11.28.23 @ 07:02) >   1. The left ventricular cavity is normal. Left ventricular wall thickness is normal. Left ventricular systolic function is severely decreased with an ejection fraction visually estimated at 20 to 25%. There are regional wall motion abnormalities present. There is mild (grade 1) left ventricular diastolic dysfunction. There is a left ventricular thrombus. A large (~ 2.7 cm X 1.9 cm) left ventricular apical thrombus is visualized. Hypokinesis of the apex, mid to distal septum, and mid to distal anterior wall.   2. Normal right ventricular cavity size and systolic function.   3. Structurally normal mitral valve with normal leaflet excursion. There is calcification of the mitral valve annulus. There is mild to moderate mitral regurgitation.   4. No prior echocardiogram is available for comparison.    < end of copied text >    Assessment and Plan     1) Pre op eval : Denies CP SOB , EKG non ischemic , optimized from a cardiac standpoint for LE angio , moderate risk for MACE     2) HFrEF with LV thrombus:  - 2/2 LAD MI non viable   - needs to be on a/c start a heparin drip , transition to coumadin with INR goal of 2-3 once procedure done   - c/w  metoprolol , entresto and aldactone   - repeat echo with definity this admission to assess LV clot and EF for AICD candidacy     3) PVD   t/t per vascular

## 2024-05-22 NOTE — PROGRESS NOTE ADULT - ASSESSMENT
78F w/ PMH HTN, HLD, DM, CAD, NSTEMI, HFrEF presents with likely ischemic arterial disease of 1st MTP.    -Recommend repeat A1C given small vessel disease noted on XIOMY  -MRI results noted  -Planning for LLE ANGIO today 5/22 (Added on), needs Medical and cardiac clearance documents.   - Needs consent    Vascular Surgery   [id number]

## 2024-05-22 NOTE — PROGRESS NOTE ADULT - ATTENDING COMMENTS
78 yr old woman PMH HTN, HLD, T2DM (on insulin and orals), NSTEMI  11/2023-medically managed, HFrEF (EF 20-25% 11/2023), LV thrombus (resolved on repeat TTE outpt) previously on warfarin (Dced by outpt cards on 1/2024), chronic hypotension (baseline SBP 90s) p/w LLE swelling and necrotic left toe wound, Found to have MRI findings c/f osteomyelitis.    # first distal phalanx OM  # Type 2 DM on insulin  # chronic hypotension: asymptomatic  # HFrEF (EF 20-25%): presently euvolemic    5/21 Bcx NGTD, MRSA swab negative. Change Abx to Unasyn  Vascular planning angiogram. XIOMY/PVR with no significant occlusive disease, left dorsalis pedis artery patient  Surgical planning by podiatry 5/23 at 4:00 pm  No active angina or respiratory symptoms, euvolemic, EKG unchanged from prior. Medically optimized for OR, awaiting cardiac clearance  C/w basal/bolus for now, FS goal 100-180, on levemir BID at home, A1C: 6.9  PT: no needs

## 2024-05-23 ENCOUNTER — TRANSCRIPTION ENCOUNTER (OUTPATIENT)
Age: 79
End: 2024-05-23

## 2024-05-23 ENCOUNTER — RESULT CHARGE (OUTPATIENT)
Age: 79
End: 2024-05-23

## 2024-05-23 LAB
ANION GAP SERPL CALC-SCNC: 11 MMOL/L — SIGNIFICANT CHANGE UP (ref 7–14)
APTT BLD: 25 SEC — SIGNIFICANT CHANGE UP (ref 24.5–35.6)
BLD GP AB SCN SERPL QL: NEGATIVE — SIGNIFICANT CHANGE UP
BUN SERPL-MCNC: 38 MG/DL — HIGH (ref 7–23)
CALCIUM SERPL-MCNC: 9.4 MG/DL — SIGNIFICANT CHANGE UP (ref 8.4–10.5)
CHLORIDE SERPL-SCNC: 104 MMOL/L — SIGNIFICANT CHANGE UP (ref 98–107)
CO2 SERPL-SCNC: 22 MMOL/L — SIGNIFICANT CHANGE UP (ref 22–31)
CREAT SERPL-MCNC: 1.05 MG/DL — SIGNIFICANT CHANGE UP (ref 0.5–1.3)
EGFR: 54 ML/MIN/1.73M2 — LOW
GLUCOSE SERPL-MCNC: 145 MG/DL — HIGH (ref 70–99)
GRAM STN FLD: ABNORMAL
HCT VFR BLD CALC: 30.4 % — LOW (ref 34.5–45)
HGB BLD-MCNC: 10.1 G/DL — LOW (ref 11.5–15.5)
INR BLD: 1.08 RATIO — SIGNIFICANT CHANGE UP (ref 0.85–1.18)
MAGNESIUM SERPL-MCNC: 2.2 MG/DL — SIGNIFICANT CHANGE UP (ref 1.6–2.6)
MCHC RBC-ENTMCNC: 27.7 PG — SIGNIFICANT CHANGE UP (ref 27–34)
MCHC RBC-ENTMCNC: 33.2 GM/DL — SIGNIFICANT CHANGE UP (ref 32–36)
MCV RBC AUTO: 83.3 FL — SIGNIFICANT CHANGE UP (ref 80–100)
NRBC # BLD: 0 /100 WBCS — SIGNIFICANT CHANGE UP (ref 0–0)
NRBC # FLD: 0 K/UL — SIGNIFICANT CHANGE UP (ref 0–0)
PHOSPHATE SERPL-MCNC: 3.4 MG/DL — SIGNIFICANT CHANGE UP (ref 2.5–4.5)
PLATELET # BLD AUTO: 349 K/UL — SIGNIFICANT CHANGE UP (ref 150–400)
POTASSIUM SERPL-MCNC: 4.5 MMOL/L — SIGNIFICANT CHANGE UP (ref 3.5–5.3)
POTASSIUM SERPL-SCNC: 4.5 MMOL/L — SIGNIFICANT CHANGE UP (ref 3.5–5.3)
PROTHROM AB SERPL-ACNC: 12.1 SEC — SIGNIFICANT CHANGE UP (ref 9.5–13)
RBC # BLD: 3.65 M/UL — LOW (ref 3.8–5.2)
RBC # FLD: 18.9 % — HIGH (ref 10.3–14.5)
RH IG SCN BLD-IMP: NEGATIVE — SIGNIFICANT CHANGE UP
SODIUM SERPL-SCNC: 137 MMOL/L — SIGNIFICANT CHANGE UP (ref 135–145)
SPECIMEN SOURCE: SIGNIFICANT CHANGE UP
WBC # BLD: 7.51 K/UL — SIGNIFICANT CHANGE UP (ref 3.8–10.5)
WBC # FLD AUTO: 7.51 K/UL — SIGNIFICANT CHANGE UP (ref 3.8–10.5)

## 2024-05-23 PROCEDURE — 99232 SBSQ HOSP IP/OBS MODERATE 35: CPT | Mod: GC

## 2024-05-23 PROCEDURE — 88307 TISSUE EXAM BY PATHOLOGIST: CPT | Mod: 26

## 2024-05-23 PROCEDURE — 73630 X-RAY EXAM OF FOOT: CPT | Mod: 26,LT

## 2024-05-23 PROCEDURE — 88311 DECALCIFY TISSUE: CPT | Mod: 26

## 2024-05-23 RX ORDER — INSULIN LISPRO 100/ML
VIAL (ML) SUBCUTANEOUS EVERY 6 HOURS
Refills: 0 | Status: DISCONTINUED | OUTPATIENT
Start: 2024-05-23 | End: 2024-05-23

## 2024-05-23 RX ORDER — INSULIN HUMAN 100 [IU]/ML
INJECTION, SOLUTION SUBCUTANEOUS EVERY 6 HOURS
Refills: 0 | Status: DISCONTINUED | OUTPATIENT
Start: 2024-05-23 | End: 2024-05-23

## 2024-05-23 RX ORDER — OXYCODONE AND ACETAMINOPHEN 5; 325 MG/1; MG/1
1 TABLET ORAL EVERY 4 HOURS
Refills: 0 | Status: DISCONTINUED | OUTPATIENT
Start: 2024-05-23 | End: 2024-05-24

## 2024-05-23 RX ORDER — INSULIN LISPRO 100/ML
VIAL (ML) SUBCUTANEOUS AT BEDTIME
Refills: 0 | Status: DISCONTINUED | OUTPATIENT
Start: 2024-05-23 | End: 2024-05-26

## 2024-05-23 RX ORDER — HYDROMORPHONE HYDROCHLORIDE 2 MG/ML
0.5 INJECTION INTRAMUSCULAR; INTRAVENOUS; SUBCUTANEOUS EVERY 4 HOURS
Refills: 0 | Status: DISCONTINUED | OUTPATIENT
Start: 2024-05-23 | End: 2024-05-24

## 2024-05-23 RX ORDER — INSULIN LISPRO 100/ML
VIAL (ML) SUBCUTANEOUS
Refills: 0 | Status: DISCONTINUED | OUTPATIENT
Start: 2024-05-23 | End: 2024-05-26

## 2024-05-23 RX ADMIN — AMPICILLIN SODIUM AND SULBACTAM SODIUM 200 GRAM(S): 250; 125 INJECTION, POWDER, FOR SUSPENSION INTRAMUSCULAR; INTRAVENOUS at 05:53

## 2024-05-23 RX ADMIN — ATORVASTATIN CALCIUM 80 MILLIGRAM(S): 80 TABLET, FILM COATED ORAL at 22:12

## 2024-05-23 RX ADMIN — AMPICILLIN SODIUM AND SULBACTAM SODIUM 200 GRAM(S): 250; 125 INJECTION, POWDER, FOR SUSPENSION INTRAMUSCULAR; INTRAVENOUS at 11:12

## 2024-05-23 RX ADMIN — CLOPIDOGREL BISULFATE 75 MILLIGRAM(S): 75 TABLET, FILM COATED ORAL at 11:11

## 2024-05-23 RX ADMIN — INSULIN GLARGINE 18 UNIT(S): 100 INJECTION, SOLUTION SUBCUTANEOUS at 22:12

## 2024-05-23 NOTE — PRE-OP CHECKLIST - PATIENT'S PERSONAL PROPERTY REMOVED
right on unit/  reading glasses/hearing aids right hearing  aide on unit left at home as per patient/  reading glasses/hearing aids

## 2024-05-23 NOTE — PROGRESS NOTE ADULT - ATTENDING COMMENTS
78 yr old woman PMH HTN, HLD, T2DM (on insulin and orals), NSTEMI  11/2023-medically managed, HFrEF (EF 20-25% 11/2023), LV thrombus (resolved on repeat TTE outpt) previously on warfarin (Dced by outpt cards on 1/2024), chronic hypotension (baseline SBP 90s) p/w LLE swelling and necrotic left toe wound, Found to have MRI findings c/f osteomyelitis.    # First distal phalanx OM  # Type 2 DM on insulin  # chronic hypotension: asymptomatic  # HFrEF (EF 20-25%): presently euvolemic    NPO for left foot first digit amputation with podiatry today 5/23 5/21 Bcx NGTD, MRSA swab negative. C/w Unasyn  XIOMY/PVR with no significant occlusive disease, left dorsalis pedis artery patient. No plans for angiogram from vascular  On reduced lantus dose, hold premeals, on levemir BID at home, A1C: 6.9  PT: no needs, will need reeval after OR Seen and examined on 5/23    78 yr old woman PMH HTN, HLD, T2DM (on insulin and orals), NSTEMI  11/2023-medically managed, HFrEF (EF 20-25% 11/2023), LV thrombus (resolved on repeat TTE outpt) previously on warfarin (Dced by outpt cards on 1/2024), chronic hypotension (baseline SBP 90s) p/w LLE swelling and necrotic left toe wound, Found to have MRI findings c/f osteomyelitis.    # First distal phalanx OM  # Type 2 DM on insulin  # chronic hypotension: asymptomatic  # HFrEF (EF 20-25%): presently euvolemic    NPO for left foot first digit amputation with podiatry today 5/23 5/21 Bcx NGTD, MRSA swab negative. C/w Unasyn  XIOMY/PVR with no significant occlusive disease, left dorsalis pedis artery patient. No plans for angiogram from vascular  On reduced lantus dose, hold premeals, on levemir BID at home, A1C: 6.9  PT: no needs, will need reeval after OR

## 2024-05-23 NOTE — DISCHARGE NOTE PROVIDER - PROVIDER TOKENS
PROVIDER:[TOKEN:[[id number]],FOLLOWUP:[1 week]],PROVIDER:[TOKEN:[[id number]],FOLLOWUP:[1 week]] PROVIDER:[TOKEN:[[id number]],FOLLOWUP:[1 week]],PROVIDER:[TOKEN:[[id number]],FOLLOWUP:[1 week]],PROVIDER:[TOKEN:[[id number]],FOLLOWUP:[2 weeks],ESTABLISHEDPATIENT:[T]]

## 2024-05-23 NOTE — DISCHARGE NOTE PROVIDER - NSDCFUSCHEDAPPT_GEN_ALL_CORE_FT
Acosta MahmoodFormerly [facility], [facility] Physician [facility]  CARDIOLOGY [id number] [facility]   Scheduled Appointment: 07/31/2024

## 2024-05-23 NOTE — PROGRESS NOTE ADULT - ASSESSMENT
78F w/ PMH HTN, HLD, DM, CAD, NSTEMI, HFrEF presents with likely ischemic arterial disease of 1st MTP.    -Recommend repeat A1C given small vessel disease noted on XIOMY  -MRI results noted  -Planed LLE ANGIO yesterday 5/22 cancelled (low utility of angio at this time)  -Patient scheduled for OR today 5/23 with PODS for Left foot first digit amputation. Will follow up   -Rest of care per primary team    Vascular Surgery   [id number]

## 2024-05-23 NOTE — DISCHARGE NOTE PROVIDER - NSDCMRMEDTOKEN_GEN_ALL_CORE_FT
atorvastatin 80 mg oral tablet: 1 tab(s) orally once a day (at bedtime)  clopidogrel 75 mg oral tablet: 1 tab(s) orally once a day  furosemide 40 mg oral tablet: 1 tab(s) orally 2 times a day  Jardiance 10 mg oral tablet: 1 tab(s) orally once a day  Levemir FlexPen 100 units/mL subcutaneous solution: 13 unit(s) subcutaneous once a day  Levemir FlexPen 100 units/mL subcutaneous solution: 23 unit(s) subcutaneous once a day (at bedtime)  metFORMIN 500 mg oral tablet: 2 tab(s) orally 2 times a day  metoprolol succinate 25 mg oral tablet, extended release: 1 tab(s) orally once a day  sacubitril-valsartan 24 mg-26 mg oral tablet: 1 tab(s) orally 2 times a day  spironolactone 25 mg oral tablet: 1 tab(s) orally once a day   atorvastatin 80 mg oral tablet: 1 tab(s) orally once a day (at bedtime)  Bactrim  mg-160 mg oral tablet: 1 tab(s) orally once a day take 5/28-5/29  clopidogrel 75 mg oral tablet: 1 tab(s) orally once a day  furosemide 40 mg oral tablet: 1 tab(s) orally 2 times a day  Jardiance 10 mg oral tablet: 1 tab(s) orally once a day  Levemir FlexPen 100 units/mL subcutaneous solution: 13 unit(s) subcutaneous once a day  Levemir FlexPen 100 units/mL subcutaneous solution: 23 unit(s) subcutaneous once a day (at bedtime)  metFORMIN 500 mg oral tablet: 2 tab(s) orally 2 times a day  metoprolol succinate 25 mg oral tablet, extended release: 1 tab(s) orally once a day  sacubitril-valsartan 24 mg-26 mg oral tablet: 1 tab(s) orally 2 times a day  spironolactone 25 mg oral tablet: 1 tab(s) orally once a day

## 2024-05-23 NOTE — PROGRESS NOTE ADULT - SUBJECTIVE AND OBJECTIVE BOX
Mark Cameron MD  Interventional Cardiology / Endovascular Specialist  [place] Office : [address] [place] [address] [place] [address]  Tel:   [place] Office : [address] [facility] [place] [address]  Tel: [phone]    pt is lying in bed in NAD  	  MEDICATIONS:  clopidogrel Tablet 75 milliGRAM(s) Oral daily  metoprolol succinate ER 25 milliGRAM(s) Oral daily  sacubitril 24 mG/valsartan 26 mG 1 Tablet(s) Oral two times a day  spironolactone 25 milliGRAM(s) Oral daily    ampicillin/sulbactam  IVPB      ampicillin/sulbactam  IVPB 3 Gram(s) IV Intermittent every 6 hours      acetaminophen     Tablet .. 650 milliGRAM(s) Oral every 6 hours PRN      atorvastatin 80 milliGRAM(s) Oral at bedtime  dextrose 50% Injectable 25 Gram(s) IV Push once  dextrose 50% Injectable 12.5 Gram(s) IV Push once  dextrose Oral Gel 15 Gram(s) Oral once PRN  glucagon  Injectable 1 milliGRAM(s) IntraMuscular once  insulin glargine Injectable (LANTUS) 18 Unit(s) SubCutaneous at bedtime  insulin lispro (ADMELOG) corrective regimen sliding scale   SubCutaneous every 6 hours    dextrose 10% Bolus 125 milliLiter(s) IV Bolus once  dextrose 5%. 1000 milliLiter(s) IV Continuous <Continuous>  dextrose 5%. 1000 milliLiter(s) IV Continuous <Continuous>      PAST MEDICAL/SURGICAL HISTORY  PAST MEDICAL & SURGICAL HISTORY:  DMII (diabetes mellitus, type 2)      Hyperlipidemia      HTN (hypertension)      Vitamin D deficiency      [place] (hard of hearing)      NSTEMI (non-ST elevation myocardial infarction)      CAD (coronary artery disease)      HFrEF (heart failure with reduced ejection fraction)      History of cholecystectomy      S/P right cataract extraction          SOCIAL HISTORY: Substance Use (street drugs): ( x ) never used  (  ) other:    FAMILY HISTORY:  No pertinent family history in first degree relatives      PHYSICAL EXAM:  T(C): 36.4 (05-23-24 @ 12:14), Max: 36.8 (05-22-24 @ 21:23)  HR: 64 (05-23-24 @ 12:14) (64 - 74)  BP: 105/48 (05-23-24 @ 12:14) (92/49 - 105/48)  RR: 17 (05-23-24 @ 12:14) (16 - 17)  SpO2: 98% (05-23-24 @ 12:14) (97% - 100%)  Wt(kg): --  I&O's Summary    22 May 2024 07:01  -  23 May 2024 07:00  --------------------------------------------------------  IN: 300 mL / OUT: 150 mL / NET: 150 mL        GENERAL: NAD  EYES: conjunctiva and sclera clear  ENMT: No tonsillar erythema, exudates, or enlargement  Cardiovascular: Normal S1 S2, No JVD, No murmurs  Respiratory: Lungs clear to auscultation	  Gastrointestinal:  Soft, Non-tender, + BS	  Extremities: No edema                            10.1   7.51  )-----------( 349      ( 23 May 2024 03:10 )             30.4     05-23    137  |  104  |  38<H>  ----------------------------<  145<H>  4.5   |  22  |  1.05    Ca    9.4      23 May 2024 03:10  Phos  3.4     05-23  Mg     2.20     05-23      proBNP:   Lipid Profile:   HgA1c:   TSH:     Consultant(s) Notes Reviewed:  [x ] YES  [ ] NO    Care Discussed with Consultants/Other Providers [ x] YES  [ ] NO    Imaging Personally Reviewed independently:  [x] YES  [ ] NO    All labs, radiologic studies, vitals, orders and medications list reviewed. Patient is seen and examined at bedside. Case discussed with medical team.

## 2024-05-23 NOTE — PROVIDER CONTACT NOTE (OTHER) - BACKGROUND
78 year old female admitted for local infection of skin and subcutaneous tissue.
Patient diagnosed with local infection of skin and subcutaneous tissue
Patient admitted with local infection of skin and subcutaneous tissue
Patient diagnosed with local infection of skin and subcutaneous tissue
Patient diagnosed with local infection of skin and subcutaneous tissue
pt is admitted with LT foot wound
Patient diagnosed with local infection of skin and subcutaneous tissue
pt is admitted with LT foot wound
Patient admitted with local infection of skin and subcutaneous tissue

## 2024-05-23 NOTE — PROVIDER CONTACT NOTE (CRITICAL VALUE NOTIFICATION) - SITUATION
culture of abscess left foot wound:  No polymorphonuclear cells seen per lower power field;   Moderate gram positive cocci in pairs and chains seen per oil power field;  Rare gram positive cocci in clusters seen per oil power field

## 2024-05-23 NOTE — PROGRESS NOTE ADULT - SUBJECTIVE AND OBJECTIVE BOX
Podiatry Pager #: [phone]    Patient is a 78y old  Female who presents with a chief complaint of LLE Wound and swelling (22 May 2024 14:38)      INTERVAL HPI/OVERNIGHT EVENTS:   Pt is scheduled for left foot first digit amputation with Dr. Grande at 4pm. Patient is aware of procedure and is NPO since midnight.    MEDICATIONS  (STANDING):  ampicillin/sulbactam  IVPB      ampicillin/sulbactam  IVPB 3 Gram(s) IV Intermittent every 6 hours  atorvastatin 80 milliGRAM(s) Oral at bedtime  clopidogrel Tablet 75 milliGRAM(s) Oral daily  dextrose 10% Bolus 125 milliLiter(s) IV Bolus once  dextrose 5%. 1000 milliLiter(s) (100 mL/Hr) IV Continuous <Continuous>  dextrose 5%. 1000 milliLiter(s) (50 mL/Hr) IV Continuous <Continuous>  dextrose 50% Injectable 12.5 Gram(s) IV Push once  dextrose 50% Injectable 25 Gram(s) IV Push once  glucagon  Injectable 1 milliGRAM(s) IntraMuscular once  insulin glargine Injectable (LANTUS) 18 Unit(s) SubCutaneous at bedtime  insulin lispro (ADMELOG) corrective regimen sliding scale   SubCutaneous every 6 hours  metoprolol succinate ER 25 milliGRAM(s) Oral daily  sacubitril 24 mG/valsartan 26 mG 1 Tablet(s) Oral two times a day  spironolactone 25 milliGRAM(s) Oral daily    MEDICATIONS  (PRN):  acetaminophen     Tablet .. 650 milliGRAM(s) Oral every 6 hours PRN Temp greater or equal to 38C (100.4F), Mild Pain (1 - 3)  dextrose Oral Gel 15 Gram(s) Oral once PRN Blood Glucose LESS THAN 70 milliGRAM(s)/deciliter      Allergies    No Known Allergies    Intolerances        Vital Signs Last 24 Hrs  T(C): 36.6 (23 May 2024 05:39), Max: 36.8 (22 May 2024 11:04)  T(F): 97.8 (23 May 2024 05:39), Max: 98.2 (22 May 2024 11:04)  HR: 67 (23 May 2024 05:39) (62 - 74)  BP: 92/49 (23 May 2024 05:39) (92/49 - 110/63)  BP(mean): --  RR: 16 (23 May 2024 05:39) (16 - 17)  SpO2: 97% (23 May 2024 05:39) (97% - 100%)    Parameters below as of 23 May 2024 05:39  Patient On (Oxygen Delivery Method): room air        LABS:                        10.1   7.51  )-----------( 349      ( 23 May 2024 03:10 )             30.4     05-23    137  |  104  |  38<H>  ----------------------------<  145<H>  4.5   |  22  |  1.05    Ca    9.4      23 May 2024 03:10  Phos  3.4     05-23  Mg     2.20     05-23      PT/INR - ( 23 May 2024 03:10 )   PT: 12.1 sec;   INR: 1.08 ratio         PTT - ( 23 May 2024 03:10 )  PTT:25.0 sec  Urinalysis Basic - ( 23 May 2024 03:10 )    Color: x / Appearance: x / SG: x / pH: x  Gluc: 145 mg/dL / Ketone: x  / Bili: x / Urobili: x   Blood: x / Protein: x / Nitrite: x   Leuk Esterase: x / RBC: x / WBC x   Sq Epi: x / Non Sq Epi: x / Bacteria: x      CAPILLARY BLOOD GLUCOSE      POCT Blood Glucose.: 150 mg/dL (23 May 2024 05:58)  POCT Blood Glucose.: 210 mg/dL (22 May 2024 21:59)  POCT Blood Glucose.: 198 mg/dL (22 May 2024 17:30)  POCT Blood Glucose.: 135 mg/dL (22 May 2024 12:42)  POCT Blood Glucose.: 135 mg/dL (22 May 2024 11:49)  POCT Blood Glucose.: 139 mg/dL (22 May 2024 11:04)      RADIOLOGY & ADDITIONAL TESTS:    Plan:   To OR today at 4pmwith Dr. Grande for left foot first digit amputation.   CXR on sunrise.  EKG on sunrise.  Medical/Cardiac clearance since 5/22 and documented in chart.  Consent signed and in chart.  Procedure was explained to patient in detail. All alternatives, risks and complications were discussed. All questions answered. Podiatry Pager #: [phone]    Patient is a 78y old  Female who presents with a chief complaint of LLE Wound and swelling (22 May 2024 14:38)      INTERVAL HPI/OVERNIGHT EVENTS:   Pt is scheduled for left foot first digit amputation with Dr. Grande at 4pm. Patient is aware of procedure and is NPO since midnight.    MEDICATIONS  (STANDING):  ampicillin/sulbactam  IVPB      ampicillin/sulbactam  IVPB 3 Gram(s) IV Intermittent every 6 hours  atorvastatin 80 milliGRAM(s) Oral at bedtime  clopidogrel Tablet 75 milliGRAM(s) Oral daily  dextrose 10% Bolus 125 milliLiter(s) IV Bolus once  dextrose 5%. 1000 milliLiter(s) (100 mL/Hr) IV Continuous <Continuous>  dextrose 5%. 1000 milliLiter(s) (50 mL/Hr) IV Continuous <Continuous>  dextrose 50% Injectable 12.5 Gram(s) IV Push once  dextrose 50% Injectable 25 Gram(s) IV Push once  glucagon  Injectable 1 milliGRAM(s) IntraMuscular once  insulin glargine Injectable (LANTUS) 18 Unit(s) SubCutaneous at bedtime  insulin lispro (ADMELOG) corrective regimen sliding scale   SubCutaneous every 6 hours  metoprolol succinate ER 25 milliGRAM(s) Oral daily  sacubitril 24 mG/valsartan 26 mG 1 Tablet(s) Oral two times a day  spironolactone 25 milliGRAM(s) Oral daily    MEDICATIONS  (PRN):  acetaminophen     Tablet .. 650 milliGRAM(s) Oral every 6 hours PRN Temp greater or equal to 38C (100.4F), Mild Pain (1 - 3)  dextrose Oral Gel 15 Gram(s) Oral once PRN Blood Glucose LESS THAN 70 milliGRAM(s)/deciliter      Allergies    No Known Allergies    Intolerances        Vital Signs Last 24 Hrs  T(C): 36.6 (23 May 2024 05:39), Max: 36.8 (22 May 2024 11:04)  T(F): 97.8 (23 May 2024 05:39), Max: 98.2 (22 May 2024 11:04)  HR: 67 (23 May 2024 05:39) (62 - 74)  BP: 92/49 (23 May 2024 05:39) (92/49 - 110/63)  BP(mean): --  RR: 16 (23 May 2024 05:39) (16 - 17)  SpO2: 97% (23 May 2024 05:39) (97% - 100%)    Parameters below as of 23 May 2024 05:39  Patient On (Oxygen Delivery Method): room air        LABS:                        10.1   7.51  )-----------( 349      ( 23 May 2024 03:10 )             30.4     05-23    137  |  104  |  38<H>  ----------------------------<  145<H>  4.5   |  22  |  1.05    Ca    9.4      23 May 2024 03:10  Phos  3.4     05-23  Mg     2.20     05-23      PT/INR - ( 23 May 2024 03:10 )   PT: 12.1 sec;   INR: 1.08 ratio         PTT - ( 23 May 2024 03:10 )  PTT:25.0 sec  Urinalysis Basic - ( 23 May 2024 03:10 )    Color: x / Appearance: x / SG: x / pH: x  Gluc: 145 mg/dL / Ketone: x  / Bili: x / Urobili: x   Blood: x / Protein: x / Nitrite: x   Leuk Esterase: x / RBC: x / WBC x   Sq Epi: x / Non Sq Epi: x / Bacteria: x      CAPILLARY BLOOD GLUCOSE      POCT Blood Glucose.: 150 mg/dL (23 May 2024 05:58)  POCT Blood Glucose.: 210 mg/dL (22 May 2024 21:59)  POCT Blood Glucose.: 198 mg/dL (22 May 2024 17:30)  POCT Blood Glucose.: 135 mg/dL (22 May 2024 12:42)  POCT Blood Glucose.: 135 mg/dL (22 May 2024 11:49)  POCT Blood Glucose.: 139 mg/dL (22 May 2024 11:04)      RADIOLOGY & ADDITIONAL TESTS:

## 2024-05-23 NOTE — PROGRESS NOTE ADULT - ASSESSMENT
EKG SR non specific changes    Echo < from: TTE W or WO Ultrasound Enhancing Agent (11.28.23 @ 07:02) >   1. The left ventricular cavity is normal. Left ventricular wall thickness is normal. Left ventricular systolic function is severely decreased with an ejection fraction visually estimated at 20 to 25%. There are regional wall motion abnormalities present. There is mild (grade 1) left ventricular diastolic dysfunction. There is a left ventricular thrombus. A large (~ 2.7 cm X 1.9 cm) left ventricular apical thrombus is visualized. Hypokinesis of the apex, mid to distal septum, and mid to distal anterior wall.   2. Normal right ventricular cavity size and systolic function.   3. Structurally normal mitral valve with normal leaflet excursion. There is calcification of the mitral valve annulus. There is mild to moderate mitral regurgitation.   4. No prior echocardiogram is available for comparison.    < end of copied text >    Assessment and Plan     1) Pre op eval : Denies CP SOB , EKG non ischemic , optimized from a cardiac standpoint for LE angio , moderate risk for MACE     2) HFrEF with LV thrombus:  - 2/2 LAD MI non viable   - needs to be on a/c start a heparin drip , transition to coumadin with INR goal of 2-3 once procedure done   - c/w  metoprolol , entresto and aldactone   - repeat echo with definity this admission to assess LV clot and EF for AICD candidacy     3) PVD   t/t per vascular

## 2024-05-23 NOTE — PROVIDER CONTACT NOTE (OTHER) - ASSESSMENT
patients blood pressure 99/46 heart rate 75. Patient in no distress , states she feels fine.
Patient asymptomatic
patients blood pressure 95/50 heart rate 67. Patient in no distress. patient denies dizziness, blurry vision
Patient asymptomatic
pt is alert and oriented. not in any distress
pt is alert and oriented. not in any distress
Patient is A&Ox^4, denies pain, chest pain, shortness of breath, nausea, vomiting or dizziness. BP 91/50 all other vital signs stable.
patients blood pressure 95/47 heart rate 62. Patient in no distress.
patients blood pressure 95/50 heart rate 67. Patient in no distress. patient denies dizziness, blurry vision. patient denies AM bp medication

## 2024-05-23 NOTE — PROVIDER CONTACT NOTE (CRITICAL VALUE NOTIFICATION) - TEST AND RESULT REPORTED:
No polymorphonuclear cells seen per lower power field; Moderate gram positive cocci in pairs and chains seen per oil power field;  Rare gram positive cocci in clusters seen per oil power field

## 2024-05-23 NOTE — DISCHARGE NOTE PROVIDER - ATTENDING DISCHARGE PHYSICAL EXAMINATION:
78 yr old woman PMH HTN, HLD, T2DM (on insulin and orals), NSTEMI  11/2023-medically managed, HFrEF (EF 20-25% 11/2023), LV thrombus (resolved on repeat TTE outpt) previously on warfarin (Dced by outpt cards on 1/2024), chronic hypotension (baseline SBP 90s) p/w LLE swelling and necrotic left toe wound, Found to have MRI findings c/f osteomyelitis s/p L foot partial hallux amputation 5/23.    Physical exam as per progress note. Overall patient NAD, standing up and looking forward to going home.     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 f/u OR cx  5/21 Bcx NGTD, MRSA swab negative. 5/22 left wound cx with staph hemolyticus resistant to PCN. DC Unasyn, switch to Bactrim until OR cx returns  D/C on home insulin dose  PT reeval: no needs    DC planning home .

## 2024-05-23 NOTE — BRIEF OPERATIVE NOTE - SPECIMENS
Pathology: 1) Left foot infected bone and soft tissue, 2) left foot clean bone margin. Microbiology: 1) left foot clean bone margin

## 2024-05-23 NOTE — DISCHARGE NOTE PROVIDER - HOSPITAL COURSE
HPI:  78 F w/ pmhx of HTN, HLD, T2DM (on insulin) CAD, NSTEMI on 11/2023, HFrEF, p/w LLE swelling and left toe wound. Patient denies any known trauma to the left toe but endorses left toe swelling that began 3-4 weeks prior without any pain. Patient was evaluated by her primary podiatrist and per patient she had some injection preformed. Patient was later evaluated by a primary medical provider and was advised to come to the hospital given the wound. Patient denies any fever, chill, pain to the foot or wound, N/V, or chest pain. Endorses left leg swelling without redness. Denies any other previous wounds. Denies any chest pain, N/V, abdominal pain.      ED course: Given Darrell and Zosyn  (21 May 2024 10:13)    Hospital Course:      Important Medication Changes and Reason:    Active or Pending Issues Requiring Follow-up:    Advanced Directives:   [ ] Full code  [ ] DNR  [ ] Hospice    Discharge Diagnoses:         HPI:  78 F w/ pmhx of HTN, HLD, T2DM (on insulin) CAD, NSTEMI on 11/2023, HFrEF, p/w LLE swelling and left toe wound. Patient denies any known trauma to the left toe but endorses left toe swelling that began 3-4 weeks prior without any pain. Patient was evaluated by her primary podiatrist and per patient she had some injection preformed. Patient was later evaluated by a primary medical provider and was advised to come to the hospital given the wound. Patient denies any fever, chill, pain to the foot or wound, N/V, or chest pain. Endorses left leg swelling without redness. Denies any other previous wounds. Denies any chest pain, N/V, abdominal pain.      ED course: Given Vanc and Zosyn  (21 May 2024 10:13)    Hospital Course:  Admitted to medicine for left 1st toe wound. MR of the left foot obtained and was positive for left 1st toe osteomyelitis. Patient was started on Vancomycin and transitioned to Bactrim when sensitivities resulted from wound cultures. Podiatry was consulted and recommended Left 1st hallux resection. Patient was evaluated by Vascular surgery and determined to have good vascular flow without any need for intervention. Left 1st Hallux resection was done on 5/23 with Podiatry, low concern for residual bone or tissue infection.    Will continue Bactrim DS for a total 5 days course through 5/28 and follow up outpatient with podiatry.     Important Medication Changes and Reason:    Active or Pending Issues Requiring Follow-up:    Advanced Directives:   [ x ] Full code  [ ] DNR  [ ] Hospice    Discharge Diagnoses: Left toe Osteomyelitis          HPI:  78 F w/ pmhx of HTN, HLD, T2DM (on insulin) CAD, NSTEMI on 11/2023, HFrEF, p/w LLE swelling and left toe wound. Patient denies any known trauma to the left toe but endorses left toe swelling that began 3-4 weeks prior without any pain. Patient was evaluated by her primary podiatrist and per patient she had some injection preformed. Patient was later evaluated by a primary medical provider and was advised to come to the hospital given the wound. Patient denies any fever, chill, pain to the foot or wound, N/V, or chest pain. Endorses left leg swelling without redness. Denies any other previous wounds. Denies any chest pain, N/V, abdominal pain.      ED course: Given Vanc and Zosyn  (21 May 2024 10:13)    Hospital Course:  The patient was admitted to medicine for left 1st toe wound. MR of the left foot obtained and was positive for left 1st toe osteomyelitis. Patient was started on Vancomycin and transitioned to Bactrim when sensitivities resulted from wound cultures. Podiatry was consulted and recommended Left 1st hallux resection. Patient was evaluated by Vascular surgery and determined to have good vascular flow without any need for intervention. Left 1st Hallux resection was done on 5/23 with Podiatry, low concern for residual bone or tissue infection. Post-op clean margin bone cultures remained negative at 48h. Patient to be discharged on Bactrim DS 160mg-800mg daily for a total 7 days course through 5/29 and follow up outpatient with podiatry. She was seen by PT and did not have any PT needs. Patient declined home health services including nursing and wound care.    Important Medication Changes and Reason:  - Bactrim 160mg-800mg daily through 5/29 to complete 7d course of abx    Active or Pending Issues Requiring Follow-up:  - podiatry follow up for L foot osteomyelitis  - PCP follow up for general bloodwork  - endocrinology follow up for DM2    Advanced Directives:   [ x ] Full code  [ ] DNR  [ ] Hospice    Discharge Diagnoses: Left foot Osteomyelitis

## 2024-05-23 NOTE — PROGRESS NOTE ADULT - PROBLEM SELECTOR PLAN 2
- Hx of T2DM, per chart review on Levemir 13u qAM and 23u qHS. Also on Jardiance 10mg, Metformin 1000mg BID.   - Last A1c 11/28/2023 6.4%    Plan:  - FS thus far well controlled on JO-ANN. Will continue JO-ANN with Lantus 18u qHS.   - f/u A1c  - DASH/CC diet - Hx of T2DM, per chart review on Levemir 13u qAM and 23u qHS. Also on Jardiance 10mg, Metformin 1000mg BID.   - Last A1c 11/28/2023 6.4%  - repeat A1c 6.9%    Plan:  - FS thus far well controlled on JO-ANN. Will continue JO-ANN with Lantus 18u qHS.   - DASH/CC diet

## 2024-05-23 NOTE — PROGRESS NOTE ADULT - ASSESSMENT
78 F w/ pmhx of HTN, HLD, T2DM (on insulin) CAD, NSTEMI on 11/2023, HFrEF, p/w LLE swelling and left toe wound, MR findings c/w osteomyelitis, pending resection with Podiatry on 5/22, on broad spectrum antibiotics.  78 F w/ pmhx of HTN, HLD, T2DM (on insulin) CAD, NSTEMI on 11/2023, HFrEF, p/w LLE swelling and left toe wound, MR findings c/w osteomyelitis, s/p resection with Podiatry 5/23, currently stable on IV antibiotics.

## 2024-05-23 NOTE — PROVIDER CONTACT NOTE (OTHER) - NAME OF MD/NP/PA/DO NOTIFIED:
MD Nathaniel Flores
Wilfredo Castanon
Dr.Nwaogbe ISRAEL
Wilfredo Castanon
MD Nathaniel Flores
Maile Huitron ([id number])
Wilfredo Castanon
Wilfredo Castanon
Dr.Nwaogbe ISRAEL

## 2024-05-23 NOTE — DISCHARGE NOTE PROVIDER - NSDCFUADDAPPT_GEN_ALL_CORE_FT
Podiatry Discharge Instructions:  Follow up: Please follow up with Dr. Grande within 1 week of discharge from the hospital, please call [phone] for appointment and discuss that you recently were seen in the hospital.  Wound Care: Please leave your dressing clean dry intact until your follow up appointment .  Weight bearing: Please weight bear as tolerated to left heel in a surgical shoe.  Antibiotics: Please continue as instructed.

## 2024-05-23 NOTE — PROGRESS NOTE ADULT - PROBLEM SELECTOR PLAN 4
Hx of NSTEMI in 11/2023, not stented due to likely irreversible defects, c/b HFrEF    - continue with Plavix  - Cards consulted for further optimization, appreciate recs prior to OR. Hx of NSTEMI in 11/2023, not stented due to likely irreversible defects, c/b HFrEF    - continue with Plavix  - Cards following; appreciate recs prior to OR

## 2024-05-23 NOTE — PROGRESS NOTE ADULT - ATTENDING COMMENTS
Seen at bedside. To OR today.  Risks and benefits discussed.  Case discussed with vascular, no further intervention and cleared for procedure. Please continue to medically optimize. Universal Safety Interventions

## 2024-05-23 NOTE — PRE-OP CHECKLIST - MD NOTIFIED
BP low 97/53/MD Notified (Please specify) BP low 97/53 held this am  Dr Eric brooks/MD Notified (Please specify) BP low 97/53 metoprolol  held this am  Dr Eric brooks/MD Notified (Please specify)

## 2024-05-23 NOTE — PROGRESS NOTE ADULT - ASSESSMENT
Plan:   To OR today at 4pmwith Dr. Grande for left foot first digit amputation.   CXR on sunrise.  EKG on sunrise.  Medical/Cardiac clearance since 5/22 and documented in chart.  Consent signed and in chart.  Procedure was explained to patient in detail. All alternatives, risks and complications were discussed. All questions answered.

## 2024-05-23 NOTE — PROGRESS NOTE ADULT - SUBJECTIVE AND OBJECTIVE BOX
*******************************  Nathaniel Flores MD (PGY-2)  Internal Medicine  Contact via [employer] TEAMS  *******************************    PROGRESS NOTE:     Patient is a 78y old  Female who presents with a chief complaint of LLE Wound and swelling (23 May 2024 09:41)      INTERVAL EVENTS: No acute overnight events.     SUBJECTIVE: Patient seen and examined at bedside. This morning, the patient is comfortable and doing well. No acute complaints.    MEDICATIONS  (STANDING):  ampicillin/sulbactam  IVPB      ampicillin/sulbactam  IVPB 3 Gram(s) IV Intermittent every 6 hours  atorvastatin 80 milliGRAM(s) Oral at bedtime  clopidogrel Tablet 75 milliGRAM(s) Oral daily  dextrose 10% Bolus 125 milliLiter(s) IV Bolus once  dextrose 5%. 1000 milliLiter(s) (100 mL/Hr) IV Continuous <Continuous>  dextrose 5%. 1000 milliLiter(s) (50 mL/Hr) IV Continuous <Continuous>  dextrose 50% Injectable 12.5 Gram(s) IV Push once  dextrose 50% Injectable 25 Gram(s) IV Push once  glucagon  Injectable 1 milliGRAM(s) IntraMuscular once  insulin glargine Injectable (LANTUS) 18 Unit(s) SubCutaneous at bedtime  insulin lispro (ADMELOG) corrective regimen sliding scale   SubCutaneous every 6 hours  metoprolol succinate ER 25 milliGRAM(s) Oral daily  sacubitril 24 mG/valsartan 26 mG 1 Tablet(s) Oral two times a day  spironolactone 25 milliGRAM(s) Oral daily    MEDICATIONS  (PRN):  acetaminophen     Tablet .. 650 milliGRAM(s) Oral every 6 hours PRN Temp greater or equal to 38C (100.4F), Mild Pain (1 - 3)  dextrose Oral Gel 15 Gram(s) Oral once PRN Blood Glucose LESS THAN 70 milliGRAM(s)/deciliter      CAPILLARY BLOOD GLUCOSE      POCT Blood Glucose.: 127 mg/dL (23 May 2024 12:22)  POCT Blood Glucose.: 150 mg/dL (23 May 2024 05:58)  POCT Blood Glucose.: 210 mg/dL (22 May 2024 21:59)  POCT Blood Glucose.: 198 mg/dL (22 May 2024 17:30)    I&O's Summary    22 May 2024 07:01  -  23 May 2024 07:00  --------------------------------------------------------  IN: 300 mL / OUT: 150 mL / NET: 150 mL        PHYSICAL EXAM:  Vital Signs Last 24 Hrs  T(C): 36.6 (23 May 2024 05:39), Max: 36.8 (22 May 2024 21:23)  T(F): 97.8 (23 May 2024 05:39), Max: 98.2 (22 May 2024 21:23)  HR: 67 (23 May 2024 05:39) (65 - 74)  BP: 92/49 (23 May 2024 05:39) (92/49 - 96/55)  BP(mean): --  RR: 16 (23 May 2024 05:39) (16 - 17)  SpO2: 97% (23 May 2024 05:39) (97% - 100%)    Parameters below as of 23 May 2024 05:39  Patient On (Oxygen Delivery Method): room air        GENERAL: NAD, lying in bed comfortably  HEAD: Atraumatic, normocephalic  EYES: EOMI, PERRLA, conjunctiva and sclera clear  ENT: Moist mucous membranes  NECK: Supple, no JVD  HEART: S1, S2, Regular rate and rhythm, no murmurs, rubs, or gallops  LUNGS: Unlabored respirations, clear to auscultation bilaterally, no crackles, wheezing, or rhonchi  ABDOMEN: Soft, nontender, nondistended, +BS  EXTREMITIES: 2+ peripheral pulses bilaterally. No clubbing, cyanosis, or edema  NERVOUS SYSTEM:  A&Ox3, no focal deficits   SKIN: No rashes or lesions    LABS:                        10.1   7.51  )-----------( 349      ( 23 May 2024 03:10 )             30.4     05-23    137  |  104  |  38<H>  ----------------------------<  145<H>  4.5   |  22  |  1.05    Ca    9.4      23 May 2024 03:10  Phos  3.4     05-23  Mg     2.20     05-23      PT/INR - ( 23 May 2024 03:10 )   PT: 12.1 sec;   INR: 1.08 ratio         PTT - ( 23 May 2024 03:10 )  PTT:25.0 sec      Urinalysis Basic - ( 23 May 2024 03:10 )    Color: x / Appearance: x / SG: x / pH: x  Gluc: 145 mg/dL / Ketone: x  / Bili: x / Urobili: x   Blood: x / Protein: x / Nitrite: x   Leuk Esterase: x / RBC: x / WBC x   Sq Epi: x / Non Sq Epi: x / Bacteria: x        Culture - Abscess with Gram Stain (collected 22 May 2024 08:35)  Source: .Abscess L foot wound  Gram Stain (23 May 2024 00:46):    No polymorphonuclear cells seen per low power field    Moderate Gram Positive Cocci in Pairs and Chains seen per oil power field    Rare Gram Positive Cocci in Clusters seen per oil power field    Culture - Blood (collected 21 May 2024 00:10)  Source: .Blood Blood-Venous  Preliminary Report (23 May 2024 07:02):    No growth at 48 Hours    Culture - Blood (collected 21 May 2024 00:00)  Source: .Blood Blood-Peripheral  Preliminary Report (23 May 2024 07:02):    No growth at 48 Hours        RADIOLOGY & ADDITIONAL TESTS:  Results Reviewed:   Imaging Personally Reviewed:  Electrocardiogram Personally Reviewed:  Tele: *******************************  Nathaniel Flores MD (PGY-2)  Internal Medicine  Contact via [employer] TEAMS  *******************************    PROGRESS NOTE:     Patient is a 78y old  Female who presents with a chief complaint of LLE Wound and swelling (23 May 2024 09:41)    INTERVAL EVENTS: No acute overnight events.     SUBJECTIVE: Patient seen and examined at bedside. This morning, the patient is comfortable and doing well. No acute complaints.    MEDICATIONS  (STANDING):  ampicillin/sulbactam  IVPB      ampicillin/sulbactam  IVPB 3 Gram(s) IV Intermittent every 6 hours  atorvastatin 80 milliGRAM(s) Oral at bedtime  clopidogrel Tablet 75 milliGRAM(s) Oral daily  dextrose 10% Bolus 125 milliLiter(s) IV Bolus once  dextrose 5%. 1000 milliLiter(s) (100 mL/Hr) IV Continuous <Continuous>  dextrose 5%. 1000 milliLiter(s) (50 mL/Hr) IV Continuous <Continuous>  dextrose 50% Injectable 12.5 Gram(s) IV Push once  dextrose 50% Injectable 25 Gram(s) IV Push once  glucagon  Injectable 1 milliGRAM(s) IntraMuscular once  insulin glargine Injectable (LANTUS) 18 Unit(s) SubCutaneous at bedtime  insulin lispro (ADMELOG) corrective regimen sliding scale   SubCutaneous every 6 hours  metoprolol succinate ER 25 milliGRAM(s) Oral daily  sacubitril 24 mG/valsartan 26 mG 1 Tablet(s) Oral two times a day  spironolactone 25 milliGRAM(s) Oral daily    MEDICATIONS  (PRN):  acetaminophen     Tablet .. 650 milliGRAM(s) Oral every 6 hours PRN Temp greater or equal to 38C (100.4F), Mild Pain (1 - 3)  dextrose Oral Gel 15 Gram(s) Oral once PRN Blood Glucose LESS THAN 70 milliGRAM(s)/deciliter      CAPILLARY BLOOD GLUCOSE  POCT Blood Glucose.: 127 mg/dL (23 May 2024 12:22)  POCT Blood Glucose.: 150 mg/dL (23 May 2024 05:58)  POCT Blood Glucose.: 210 mg/dL (22 May 2024 21:59)  POCT Blood Glucose.: 198 mg/dL (22 May 2024 17:30)    I&O's Summary    22 May 2024 07:01  -  23 May 2024 07:00  --------------------------------------------------------  IN: 300 mL / OUT: 150 mL / NET: 150 mL    PHYSICAL EXAM:  Vital Signs Last 24 Hrs  T(C): 36.6 (23 May 2024 05:39), Max: 36.8 (22 May 2024 21:23)  T(F): 97.8 (23 May 2024 05:39), Max: 98.2 (22 May 2024 21:23)  HR: 67 (23 May 2024 05:39) (65 - 74)  BP: 92/49 (23 May 2024 05:39) (92/49 - 96/55)  BP(mean): --  RR: 16 (23 May 2024 05:39) (16 - 17)  SpO2: 97% (23 May 2024 05:39) (97% - 100%)    Parameters below as of 23 May 2024 05:39  Patient On (Oxygen Delivery Method): room air    GENERAL: NAD, lying in bed comfortably  HEART: S1, S2, Regular rate and rhythm, no murmurs, rubs, or gallops  LUNGS: Unlabored respirations, clear to auscultation bilaterally, no crackles, wheezing, or rhonchi  ABDOMEN: Soft, nontender, nondistended, +BS  EXTREMITIES: poorly palpated LLE pulses but warm  NERVOUS SYSTEM:  A&Ox3, no focal deficits   SKIN: Known left toe wound with peripheral necrosis and edema    LABS:                        10.1   7.51  )-----------( 349      ( 23 May 2024 03:10 )             30.4     05-23    137  |  104  |  38<H>  ----------------------------<  145<H>  4.5   |  22  |  1.05    Ca    9.4      23 May 2024 03:10  Phos  3.4     05-23  Mg     2.20     05-23    PT/INR - ( 23 May 2024 03:10 )   PT: 12.1 sec;   INR: 1.08 ratio    PTT - ( 23 May 2024 03:10 )  PTT:25.0 sec    Culture - Abscess with Gram Stain (collected 22 May 2024 08:35)  Source: .Abscess L foot wound  Gram Stain (23 May 2024 00:46):    No polymorphonuclear cells seen per low power field    Moderate Gram Positive Cocci in Pairs and Chains seen per oil power field    Rare Gram Positive Cocci in Clusters seen per oil power field    Culture - Blood (collected 21 May 2024 00:10)  Source: .Blood Blood-Venous  Preliminary Report (23 May 2024 07:02):    No growth at 48 Hours    Culture - Blood (collected 21 May 2024 00:00)  Source: .Blood Blood-Peripheral  Preliminary Report (23 May 2024 07:02):    No growth at 48 Hours    RADIOLOGY & ADDITIONAL TESTS:  Results Reviewed:   Imaging Personally Reviewed:  Electrocardiogram Personally Reviewed:  Tele:

## 2024-05-23 NOTE — DISCHARGE NOTE PROVIDER - CARE PROVIDERS DIRECT ADDRESSES
[email or web address]

## 2024-05-23 NOTE — PROVIDER CONTACT NOTE (OTHER) - RECOMMENDATIONS
MD to be notified
Notify MD.
As per ACP Maile Melchor ([id number]) continue to monitor at this time.
MD to be notified
Notify MD.
notify MD
notify MD

## 2024-05-23 NOTE — DISCHARGE NOTE PROVIDER - CARE PROVIDER_API CALL
Sandro Grande  Podiatric Medicine and Surgery  [address] [place] [address]  Phone: [phone]  Fax: [phone]  Follow Up Time: 1 week    Adria Coe  Internal Medicine  [address] [place] [address], [address]  [place] [address]  Phone: [phone]  Fax: [phone]  Follow Up Time: 1 week   Sandro Grande  Podiatric Medicine and Surgery  [address] [place] [address]  Phone: [phone]  Fax: [phone]  Follow Up Time: 1 week    Adria Coe  Internal Medicine  [address] [facility], [address]  [place] [address]  Phone: [phone]  Fax: [phone]  Follow Up Time: 1 week    Tyrell Rai  Endocrinology/Metab/Diabetes  [address] [facility], [address]  [place] [address]  Phone: [phone]  Fax: [phone]  Established Patient  Follow Up Time: 2 weeks

## 2024-05-23 NOTE — BRIEF OPERATIVE NOTE - OPERATION/FINDINGS
Pt is s/p left foot hallux incision and drainage down to bone, closed  - Bone at proximal resection was hard and good quality  - Adequate intraop bleeding  - Incision closed primarily with 3-0 vicryl and 3-0 nylon

## 2024-05-23 NOTE — PROGRESS NOTE ADULT - SUBJECTIVE AND OBJECTIVE BOX
TEAM [ *C* ] Surgery Daily Progress Note  =====================================================    SUBJECTIVE: Patient seen and examined at bedside on AM rounds. Patient reports no complaints. Denies fever, chills, N/V, chest pain, SOB. Going to OR today with podiatry.     ALLERGIES:  No Known Allergies      --------------------------------------------------------------------------------------    MEDICATIONS:    Neurologic Medications  acetaminophen     Tablet .. 650 milliGRAM(s) Oral every 6 hours PRN Temp greater or equal to 38C (100.4F), Mild Pain (1 - 3)    Respiratory Medications    Cardiovascular Medications  metoprolol succinate ER 25 milliGRAM(s) Oral daily  sacubitril 24 mG/valsartan 26 mG 1 Tablet(s) Oral two times a day  spironolactone 25 milliGRAM(s) Oral daily    Gastrointestinal Medications  dextrose 10% Bolus 125 milliLiter(s) IV Bolus once  dextrose 5%. 1000 milliLiter(s) IV Continuous <Continuous>  dextrose 5%. 1000 milliLiter(s) IV Continuous <Continuous>    Genitourinary Medications    Hematologic/Oncologic Medications  clopidogrel Tablet 75 milliGRAM(s) Oral daily    Antimicrobial/Immunologic Medications  ampicillin/sulbactam  IVPB      ampicillin/sulbactam  IVPB 3 Gram(s) IV Intermittent every 6 hours    Endocrine/Metabolic Medications  atorvastatin 80 milliGRAM(s) Oral at bedtime  dextrose 50% Injectable 25 Gram(s) IV Push once  dextrose 50% Injectable 12.5 Gram(s) IV Push once  dextrose Oral Gel 15 Gram(s) Oral once PRN Blood Glucose LESS THAN 70 milliGRAM(s)/deciliter  glucagon  Injectable 1 milliGRAM(s) IntraMuscular once  insulin glargine Injectable (LANTUS) 18 Unit(s) SubCutaneous at bedtime  insulin lispro (ADMELOG) corrective regimen sliding scale   SubCutaneous every 6 hours    Topical/Other Medications    --------------------------------------------------------------------------------------    VITAL SIGNS:  T(C): 36.6 (05-23-24 @ 05:39), Max: 36.8 (05-22-24 @ 11:04)  HR: 67 (05-23-24 @ 05:39) (62 - 74)  BP: 92/49 (05-23-24 @ 05:39) (92/49 - 110/63)  RR: 16 (05-23-24 @ 05:39) (16 - 17)  SpO2: 97% (05-23-24 @ 05:39) (97% - 100%)  --------------------------------------------------------------------------------------    INS AND OUTS:    05-22-24 @ 07:01  -  05-23-24 @ 07:00  --------------------------------------------------------  IN: 300 mL / OUT: 150 mL / NET: 150 mL      --------------------------------------------------------------------------------------      EXAM    Physical Examination:  General:   no acute distress.  Respiratory: breathing comfortably, no increased WOB   Extremities: Moves all four. 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 LLE pulses not palpable (likely due to edema), RLE AT + DP pulses faintly palpable.     --------------------------------------------------------------------------------------    LABS                          10.1   7.51  )-----------( 349      ( 23 May 2024 03:10 )             30.4     05-23    137  |  104  |  38<H>  ----------------------------<  145<H>  4.5   |  22  |  1.05    Ca    9.4      23 May 2024 03:10  Phos  3.4     05-23  Mg     2.20     05-23      PT/INR - ( 23 May 2024 03:10 )   PT: 12.1 sec;   INR: 1.08 ratio         PTT - ( 23 May 2024 03:10 )  PTT:25.0 sec  Urinalysis Basic - ( 23 May 2024 03:10 )    Color: x / Appearance: x / SG: x / pH: x  Gluc: 145 mg/dL / Ketone: x  / Bili: x / Urobili: x   Blood: x / Protein: x / Nitrite: x   Leuk Esterase: x / RBC: x / WBC x   Sq Epi: x / Non Sq Epi: x / Bacteria: x      No Known Allergies    T(C): 36.6 (05-23-24 @ 05:39), Max: 36.8 (05-22-24 @ 11:04)  HR: 67 (05-23-24 @ 05:39) (62 - 74)  BP: 92/49 (05-23-24 @ 05:39) (92/49 - 110/63)  RR: 16 (05-23-24 @ 05:39) (16 - 17)  SpO2: 97% (05-23-24 @ 05:39) (97% - 100%)    05-22-24 @ 07:01  -  05-23-24 @ 07:00  --------------------------------------------------------  IN: 300 mL / OUT: 150 mL / NET: 150 mL

## 2024-05-23 NOTE — PROGRESS NOTE ADULT - PROBLEM SELECTOR PLAN 1
P/w left 1st toe ulcerated and necrotic wound with +bone probing. MR c/w osteomyelitis of the 1st toe, likely 2/2 direct extension of wound i/s/o T2DM.  - XIOMY/PVR overall normal    Plan:  - Continue Vancomycin 1g q24h (5/20 - )  and Cefepime 2000mg q12h (5/20 - )  - F/u BCx (5/21)   - Podiatry following, appreciate recs. Local wound care per Podiatry.  > Vascular c/s per Podiatry. Pod tentatively planning for OR for resection on 5/23  > No inpatient Vascular procedure needed.   - Obtaining Cardiac optimization given HFrEF, appreciate recs P/w left 1st toe ulcerated and necrotic wound with +bone probing. MR c/w osteomyelitis of the 1st toe, likely 2/2 direct extension of wound i/s/o T2DM.  - XIOMY/PVR overall normal  - BCx NGTD  - wound cx (5/22) showing numerous staph haemolyticus, group B strep    Plan:  - Podiatry following, appreciate recs- s/p L foot partial hallux amputation 5/23 with low c/f residual bone infection  - vascular surgery consulted; no planned inpatient procedure (was planned for LLE angiogram 5/22 but cancelled)  - f/u OR bone cx; needs 48h of clean cx prior to d/c planning  - c/w IV Unasyn (5/22- )   - monitor WBC count, temperature curve, vitals

## 2024-05-23 NOTE — PROVIDER CONTACT NOTE (OTHER) - DATE AND TIME:
21-May-2024 06:06
21-May-2024 21:39
22-May-2024 06:13
21-May-2024 06:09
23-May-2024 16:24
23-May-2024 12:24
23-May-2024 05:39
21-May-2024 04:00
22-May-2024 21:23

## 2024-05-23 NOTE — DISCHARGE NOTE PROVIDER - NSDCCPTREATMENT_GEN_ALL_CORE_FT
PRINCIPAL PROCEDURE  Procedure: MRI left foot  Findings and Treatment: IMPRESSION:  1.  Soft tissue swelling with skin wound concerning for synovitis.  2.  Abnormal marrow signal within the first distal phalanx. Given   overlying skin wound, findings are concerning for osteomyelitis.     PRINCIPAL PROCEDURE  Procedure: MRI left foot  Findings and Treatment: FINDINGS:  BONE MARROW: Abnormal marrow signal including T2 signal hyperintensity   and enhancing hypointense T1 signal throughout the first distal phalanx   with small erosion noted at the distal first phalangeal tuft. The   remaining bone marrow signal is preserved. No fracture or osteonecrosis   is seen.  SYNOVIUM/ JOINT FLUID: No joint effusion  TENDONS: Intact tendons  MUSCLES: Preserved muscles  NEUROVASCULAR STRUCTURES: Unremarkable  SUBCUTANEOUS SOFT TISSUES: Skin wound at the distal first digit measuring   0.7 cm. Subjacent edema. No drainable fluid collection.  IMPRESSION:  1.  Soft tissue swelling with skin wound concerning for synovitis.  2.  Abnormal marrow signal within the first distal phalanx. Given   overlying skin wound, findings are concerning for osteomyelitis.      SECONDARY PROCEDURE  Procedure: X-ray left foot, 3+ views  Findings and Treatment: FINDINGS: Ulceration of the soft tissue surrounding the first distal   phalanx. There is no acute fracture or dislocation. No osseous erosion or   destruction. Mild first metatarsophalangeal hallux sesamoid joint   arthrosis. Plantar calcaneal spur.  IMPRESSION:  No acute fracture or dislocation.  Ulceration of the soft tissue surrounding the first distal phalanx. No   radiographic evidence of osteomyelitis.    Procedure: US venous duplex scan extremity lower left  Findings and Treatment: FINDINGS:  There is normal compressibility of the left common femoral, femoral and   popliteal veins.  The contralateral common femoral vein is patent.  Doppler examination shows normal spontaneous and phasic flow.  Noncompressible greater saphenous vein in the mid and distal thigh.   Echogenic thrombus is noted within superficial veins of the mid calf  Limited evaluation of the deep veins of the calf due to edema.  IMPRESSION:  No evidence of left lower extremity deep venous thrombosis.  Superficial thrombophlebitis in the mid to distal thigh and mid calf.

## 2024-05-23 NOTE — BRIEF OPERATIVE NOTE - COMMENTS
Pt is s/p left foot hallux incision and drainage down to bone, closed  - Low concern residual bone or soft tissue infection  - Low concern viability; adequate intraop bleeding  - Pod plan: stable for discharge pending clean bone margins x 48hrs Pt is s/p left foot hallux incision and drainage down to bone, closed  - Low concern residual bone or soft tissue infection  - Low concern viability; adequate intraop bleeding.  - Pod plan: stable for discharge pending clean bone margins x 48hrs

## 2024-05-23 NOTE — DISCHARGE NOTE PROVIDER - NSDCCPCAREPLAN_GEN_ALL_CORE_FT
PRINCIPAL DISCHARGE DIAGNOSIS  Diagnosis: Acute osteomyelitis  Assessment and Plan of Treatment: You were found to have a bone infection involving your left 1st toe, which is called osteomyelitis. You were treated with the antibiotic Vancomycin initially and were switched to Bactrim.   The Podiatry team preformed a surgery to remove the 1st toe to remove the infected bone and infection. You should follow instructions for home wound care provided by the podiatry team within the discharge instructions.   You will continue Bactrim through 5/28 to complete a 5 day couse of antibiotics since your surgery. If you develop any thick discharge from the wound site, worsening foot pain, fever or chills, please return to the hospital for further evaluation.     PRINCIPAL DISCHARGE DIAGNOSIS  Diagnosis: Acute osteomyelitis  Assessment and Plan of Treatment: You were found to have a bone infection involving your left 1st toe, which is called osteomyelitis. You were treated with the antibiotic Vancomycin initially and were switched to Bactrim once the cultures from the wound came back.  The Podiatry team preformed a surgery to remove the 1st toe to remove the infected bone and infection. Cultures from the bone were negative for 48h after the surgery. You should follow instructions for home wound care provided by the podiatry team within the discharge instructions and follow up with podiatry when you leave the hospital.  You will continue Bactrim through 5/29 to complete a 7 day couse of antibiotics since your surgery. If you develop any thick discharge from the wound site, worsening foot pain, fever or chills, please return to the hospital for further evaluation.

## 2024-05-23 NOTE — PROGRESS NOTE ADULT - PROBLEM SELECTOR PLAN 3
Hx of HFrEF (last EF 20-25% in 11/2023), likely ischemic cardiomyopathy i/s/o CAD and T2DM.   - on GDMT with Entresto 24-26mg BID, Metorpolol succinate 25mg QD, Aldactone 25mg QD, and Jardiance 10mg QD.   - Takes Lasix 20mg QD to 40mg BID based on symptoms. Currently appearing euvolemic  - baseline BPs in 90s - 100s SBP  - Previous outpatient TTE was negative for LV thrombus on 1/12/2024, patient was stopped on Coumadin at that time.     Plan:  - continue home GDMT with Entresto, Metoprolol, Aldactone as above with hold parameters  - holding diruesis as patient appears euvolemic.   - Obtain Repeat TTE.   - strict I&Os, daily standing weights Hx of HFrEF (last EF 20-25% in 11/2023), likely ischemic cardiomyopathy i/s/o CAD and T2DM.   - on GDMT with Entresto 24-26mg BID, Metorpolol succinate 25mg QD, Aldactone 25mg QD, and Jardiance 10mg QD.   - Takes Lasix 20mg QD to 40mg BID based on symptoms. Currently appearing euvolemic  - baseline BPs in 90s - 100s SBP  - Previous outpatient TTE was negative for LV thrombus on 1/12/2024, patient was stopped on Coumadin at that time.   - repeat limited TTE (5/22) showing EF 20-25%, no evidence of LV thrombus    Plan:  - cardiology following; appreciate recs  - continue home GDMT with Entresto, Metoprolol, Aldactone as above with hold parameters  - holding diuresis for now as patient appears euvolemic, can resume as outpatient  - strict I&Os, daily standing weights

## 2024-05-24 LAB
-  CLINDAMYCIN: SIGNIFICANT CHANGE UP
-  ERYTHROMYCIN: SIGNIFICANT CHANGE UP
-  GENTAMICIN: SIGNIFICANT CHANGE UP
-  OXACILLIN: SIGNIFICANT CHANGE UP
-  PENICILLIN: SIGNIFICANT CHANGE UP
-  RIFAMPIN: SIGNIFICANT CHANGE UP
-  TETRACYCLINE: SIGNIFICANT CHANGE UP
-  TRIMETHOPRIM/SULFAMETHOXAZOLE: SIGNIFICANT CHANGE UP
-  VANCOMYCIN: SIGNIFICANT CHANGE UP
ALBUMIN SERPL ELPH-MCNC: 3.3 G/DL — SIGNIFICANT CHANGE UP (ref 3.3–5)
ALP SERPL-CCNC: 57 U/L — SIGNIFICANT CHANGE UP (ref 40–120)
ALT FLD-CCNC: 7 U/L — SIGNIFICANT CHANGE UP (ref 4–33)
ANION GAP SERPL CALC-SCNC: 11 MMOL/L — SIGNIFICANT CHANGE UP (ref 7–14)
AST SERPL-CCNC: 9 U/L — SIGNIFICANT CHANGE UP (ref 4–32)
BILIRUB SERPL-MCNC: 0.3 MG/DL — SIGNIFICANT CHANGE UP (ref 0.2–1.2)
BUN SERPL-MCNC: 23 MG/DL — SIGNIFICANT CHANGE UP (ref 7–23)
CALCIUM SERPL-MCNC: 9.1 MG/DL — SIGNIFICANT CHANGE UP (ref 8.4–10.5)
CHLORIDE SERPL-SCNC: 108 MMOL/L — HIGH (ref 98–107)
CO2 SERPL-SCNC: 20 MMOL/L — LOW (ref 22–31)
CREAT SERPL-MCNC: 0.77 MG/DL — SIGNIFICANT CHANGE UP (ref 0.5–1.3)
CULTURE RESULTS: ABNORMAL
EGFR: 78 ML/MIN/1.73M2 — SIGNIFICANT CHANGE UP
GLUCOSE SERPL-MCNC: 130 MG/DL — HIGH (ref 70–99)
HCT VFR BLD CALC: 31 % — LOW (ref 34.5–45)
HGB BLD-MCNC: 10.1 G/DL — LOW (ref 11.5–15.5)
MAGNESIUM SERPL-MCNC: 2.1 MG/DL — SIGNIFICANT CHANGE UP (ref 1.6–2.6)
MCHC RBC-ENTMCNC: 27.5 PG — SIGNIFICANT CHANGE UP (ref 27–34)
MCHC RBC-ENTMCNC: 32.6 GM/DL — SIGNIFICANT CHANGE UP (ref 32–36)
MCV RBC AUTO: 84.5 FL — SIGNIFICANT CHANGE UP (ref 80–100)
METHOD TYPE: SIGNIFICANT CHANGE UP
NRBC # BLD: 0 /100 WBCS — SIGNIFICANT CHANGE UP (ref 0–0)
NRBC # FLD: 0 K/UL — SIGNIFICANT CHANGE UP (ref 0–0)
ORGANISM # SPEC MICROSCOPIC CNT: ABNORMAL
ORGANISM # SPEC MICROSCOPIC CNT: ABNORMAL
PHOSPHATE SERPL-MCNC: 3.3 MG/DL — SIGNIFICANT CHANGE UP (ref 2.5–4.5)
PLATELET # BLD AUTO: 354 K/UL — SIGNIFICANT CHANGE UP (ref 150–400)
POTASSIUM SERPL-MCNC: 4.3 MMOL/L — SIGNIFICANT CHANGE UP (ref 3.5–5.3)
POTASSIUM SERPL-SCNC: 4.3 MMOL/L — SIGNIFICANT CHANGE UP (ref 3.5–5.3)
PROT SERPL-MCNC: 6.6 G/DL — SIGNIFICANT CHANGE UP (ref 6–8.3)
RBC # BLD: 3.67 M/UL — LOW (ref 3.8–5.2)
RBC # FLD: 19.1 % — HIGH (ref 10.3–14.5)
SODIUM SERPL-SCNC: 139 MMOL/L — SIGNIFICANT CHANGE UP (ref 135–145)
SPECIMEN SOURCE: SIGNIFICANT CHANGE UP
WBC # BLD: 6.9 K/UL — SIGNIFICANT CHANGE UP (ref 3.8–10.5)
WBC # FLD AUTO: 6.9 K/UL — SIGNIFICANT CHANGE UP (ref 3.8–10.5)

## 2024-05-24 PROCEDURE — 99232 SBSQ HOSP IP/OBS MODERATE 35: CPT | Mod: GC

## 2024-05-24 RX ORDER — SENNA PLUS 8.6 MG/1
2 TABLET ORAL AT BEDTIME
Refills: 0 | Status: DISCONTINUED | OUTPATIENT
Start: 2024-05-24 | End: 2024-05-26

## 2024-05-24 RX ORDER — POLYETHYLENE GLYCOL 3350 17 G/17G
17 POWDER, FOR SOLUTION ORAL DAILY
Refills: 0 | Status: DISCONTINUED | OUTPATIENT
Start: 2024-05-24 | End: 2024-05-26

## 2024-05-24 RX ORDER — OXYCODONE AND ACETAMINOPHEN 5; 325 MG/1; MG/1
1 TABLET ORAL EVERY 4 HOURS
Refills: 0 | Status: DISCONTINUED | OUTPATIENT
Start: 2024-05-24 | End: 2024-05-26

## 2024-05-24 RX ADMIN — SENNA PLUS 2 TABLET(S): 8.6 TABLET ORAL at 21:41

## 2024-05-24 RX ADMIN — CLOPIDOGREL BISULFATE 75 MILLIGRAM(S): 75 TABLET, FILM COATED ORAL at 11:16

## 2024-05-24 RX ADMIN — Medication 25 MILLIGRAM(S): at 06:46

## 2024-05-24 RX ADMIN — ATORVASTATIN CALCIUM 80 MILLIGRAM(S): 80 TABLET, FILM COATED ORAL at 21:42

## 2024-05-24 RX ADMIN — Medication 1 TABLET(S): at 12:28

## 2024-05-24 RX ADMIN — AMPICILLIN SODIUM AND SULBACTAM SODIUM 200 GRAM(S): 250; 125 INJECTION, POWDER, FOR SUSPENSION INTRAMUSCULAR; INTRAVENOUS at 06:32

## 2024-05-24 RX ADMIN — Medication 1: at 18:32

## 2024-05-24 RX ADMIN — INSULIN GLARGINE 18 UNIT(S): 100 INJECTION, SOLUTION SUBCUTANEOUS at 21:42

## 2024-05-24 RX ADMIN — SACUBITRIL AND VALSARTAN 1 TABLET(S): 24; 26 TABLET, FILM COATED ORAL at 17:07

## 2024-05-24 RX ADMIN — SPIRONOLACTONE 25 MILLIGRAM(S): 25 TABLET, FILM COATED ORAL at 06:46

## 2024-05-24 RX ADMIN — Medication 1: at 12:29

## 2024-05-24 RX ADMIN — POLYETHYLENE GLYCOL 3350 17 GRAM(S): 17 POWDER, FOR SOLUTION ORAL at 11:19

## 2024-05-24 RX ADMIN — SACUBITRIL AND VALSARTAN 1 TABLET(S): 24; 26 TABLET, FILM COATED ORAL at 06:46

## 2024-05-24 RX ADMIN — AMPICILLIN SODIUM AND SULBACTAM SODIUM 200 GRAM(S): 250; 125 INJECTION, POWDER, FOR SUSPENSION INTRAMUSCULAR; INTRAVENOUS at 00:48

## 2024-05-24 NOTE — PHYSICAL THERAPY INITIAL EVALUATION ADULT - NSPTDISCHREC_GEN_A_CORE
No skilled PT needs
Pt. not placed on skilled therapy services secondary to pt. performing at their baseline functional mobility performance. Discharge to home with no skilled PT services./No skilled PT needs

## 2024-05-24 NOTE — PROGRESS NOTE ADULT - PROBLEM SELECTOR PLAN 6
DVT: Lovenox  Diet: DASH/CC  Pharmacy:  Dispo: pending PT evaluation after surgery, Son willing to take patient home. DVT: Lovenox  Diet: DASH/CC  Pharmacy:  Dispo: No PT needs per PT eval. Son willing to take patient home.

## 2024-05-24 NOTE — PROGRESS NOTE ADULT - ASSESSMENT
EKG SR non specific changes    Echo < from: TTE W or WO Ultrasound Enhancing Agent (11.28.23 @ 07:02) >   1. The left ventricular cavity is normal. Left ventricular wall thickness is normal. Left ventricular systolic function is severely decreased with an ejection fraction visually estimated at 20 to 25%. There are regional wall motion abnormalities present. There is mild (grade 1) left ventricular diastolic dysfunction. There is a left ventricular thrombus. A large (~ 2.7 cm X 1.9 cm) left ventricular apical thrombus is visualized. Hypokinesis of the apex, mid to distal septum, and mid to distal anterior wall.   2. Normal right ventricular cavity size and systolic function.   3. Structurally normal mitral valve with normal leaflet excursion. There is calcification of the mitral valve annulus. There is mild to moderate mitral regurgitation.   4. No prior echocardiogram is available for comparison.    < end of copied text >    Assessment and Plan     1) Pre op eval : Denies CP SOB , EKG non ischemic , optimized from a cardiac standpoint for LE angio , moderate risk for MACE     2) HFrEF with LV thrombus:  - 2/2 LAD MI non viable   - needs to be on a/c start a heparin drip , transition to coumadin with INR goal of 2-3 once procedure done   - c/w  metoprolol , entresto and aldactone   - repeat echo with definity this admission to assess LV clot and EF for AICD candidacy     3) PVD   t/t per vascular  EKG SR non specific changes    Echo < from: TTE W or WO Ultrasound Enhancing Agent (11.28.23 @ 07:02) >   1. The left ventricular cavity is normal. Left ventricular wall thickness is normal. Left ventricular systolic function is severely decreased with an ejection fraction visually estimated at 20 to 25%. There are regional wall motion abnormalities present. There is mild (grade 1) left ventricular diastolic dysfunction. There is a left ventricular thrombus. A large (~ 2.7 cm X 1.9 cm) left ventricular apical thrombus is visualized. Hypokinesis of the apex, mid to distal septum, and mid to distal anterior wall.   2. Normal right ventricular cavity size and systolic function.   3. Structurally normal mitral valve with normal leaflet excursion. There is calcification of the mitral valve annulus. There is mild to moderate mitral regurgitation.   4. No prior echocardiogram is available for comparison.    < end of copied text >    Assessment and Plan     1) Pre op eval : Denies CP SOB , EKG non ischemic , tolerated LLE Hallux procedure tolerated the procedure well      2) HFrEF with LV thrombus:  - 2/2 LAD MI non viable   - c/w  metoprolol , entresto and aldactone   - repeat echo with definity this admission with no LV thrombus     3) PVD   t/t per vascular

## 2024-05-24 NOTE — PROGRESS NOTE ADULT - SUBJECTIVE AND OBJECTIVE BOX
Mark Cameron MD  Interventional Cardiology / Endovascular Specialist  [place] Office : [phone] [place] [address] [place] [address]  Tel:   [place] Office : [phone] [facility] [place] [address]  Tel: [phone]    pt is lying in bed in NAD  	  MEDICATIONS:  clopidogrel Tablet 75 milliGRAM(s) Oral daily  metoprolol succinate ER 25 milliGRAM(s) Oral daily  sacubitril 24 mG/valsartan 26 mG 1 Tablet(s) Oral two times a day  spironolactone 25 milliGRAM(s) Oral daily    trimethoprim  160 mG/sulfamethoxazole 800 mG 1 Tablet(s) Oral daily      acetaminophen     Tablet .. 650 milliGRAM(s) Oral every 6 hours PRN  oxycodone    5 mG/acetaminophen 325 mG 1 Tablet(s) Oral every 4 hours PRN    polyethylene glycol 3350 17 Gram(s) Oral daily  senna 2 Tablet(s) Oral at bedtime    atorvastatin 80 milliGRAM(s) Oral at bedtime  dextrose 50% Injectable 12.5 Gram(s) IV Push once  dextrose 50% Injectable 25 Gram(s) IV Push once  dextrose Oral Gel 15 Gram(s) Oral once PRN  glucagon  Injectable 1 milliGRAM(s) IntraMuscular once  insulin glargine Injectable (LANTUS) 18 Unit(s) SubCutaneous at bedtime  insulin lispro (ADMELOG) corrective regimen sliding scale   SubCutaneous three times a day before meals  insulin lispro (ADMELOG) corrective regimen sliding scale   SubCutaneous at bedtime    dextrose 10% Bolus 125 milliLiter(s) IV Bolus once  dextrose 5%. 1000 milliLiter(s) IV Continuous <Continuous>  dextrose 5%. 1000 milliLiter(s) IV Continuous <Continuous>      PAST MEDICAL/SURGICAL HISTORY  PAST MEDICAL & SURGICAL HISTORY:  DMII (diabetes mellitus, type 2)      Hyperlipidemia      HTN (hypertension)      Vitamin D deficiency      [place] (hard of hearing)      NSTEMI (non-ST elevation myocardial infarction)      CAD (coronary artery disease)      HFrEF (heart failure with reduced ejection fraction)      History of cholecystectomy      S/P right cataract extraction          SOCIAL HISTORY: Substance Use (street drugs): ( x ) never used  (  ) other:    FAMILY HISTORY:  No pertinent family history in first degree relatives          PHYSICAL EXAM:  T(C): 36.8 (05-24-24 @ 05:33), Max: 36.9 (05-23-24 @ 16:40)  HR: 69 (05-24-24 @ 05:33) (64 - 70)  BP: 102/60 (05-24-24 @ 05:33) (93/50 - 111/52)  RR: 16 (05-24-24 @ 05:33) (7 - 17)  SpO2: 96% (05-24-24 @ 05:33) (96% - 100%)  Wt(kg): --  I&O's Summary    Height (cm): 165.1 (05-23 @ 16:48)  Weight (kg): 63.5 (05-23 @ 16:48)  BMI (kg/m2): 23.3 (05-23 @ 16:48)  BSA (m2): 1.7 (05-23 @ 16:48)        GENERAL: NAD  EYES: conjunctiva and sclera clear  ENMT: No tonsillar erythema, exudates, or enlargement  Cardiovascular: Normal S1 S2, No JVD, No murmurs  Respiratory: Lungs clear to auscultation	  Gastrointestinal:  Soft, Non-tender, + BS	  Extremities: No edema                                10.1   6.90  )-----------( 354      ( 24 May 2024 06:15 )             31.0     05-24    139  |  108<H>  |  23  ----------------------------<  130<H>  4.3   |  20<L>  |  0.77    Ca    9.1      24 May 2024 06:15  Phos  3.3     05-24  Mg     2.10     05-24    TPro  6.6  /  Alb  3.3  /  TBili  0.3  /  DBili  x   /  AST  9   /  ALT  7   /  AlkPhos  57  05-24    proBNP:   Lipid Profile:   HgA1c:   TSH:     Consultant(s) Notes Reviewed:  [x ] YES  [ ] NO    Care Discussed with Consultants/Other Providers [ x] YES  [ ] NO    Imaging Personally Reviewed independently:  [x] YES  [ ] NO    All labs, radiologic studies, vitals, orders and medications list reviewed. Patient is seen and examined at bedside. Case discussed with medical team.

## 2024-05-24 NOTE — PROGRESS NOTE ADULT - PROBLEM SELECTOR PLAN 3
Hx of HFrEF (last EF 20-25% in 11/2023), likely ischemic cardiomyopathy i/s/o CAD and T2DM.   - on GDMT with Entresto 24-26mg BID, Metorpolol succinate 25mg QD, Aldactone 25mg QD, and Jardiance 10mg QD.   - Takes Lasix 20mg QD to 40mg BID based on symptoms. Currently appearing euvolemic  - baseline BPs in 90s - 100s SBP  - Previous outpatient TTE was negative for LV thrombus on 1/12/2024, patient was stopped on Coumadin at that time.   - repeat limited TTE (5/22) showing EF 20-25%, no evidence of LV thrombus    Plan:  - cardiology following; appreciate recs  - continue home GDMT with Entresto, Metoprolol, Aldactone as above with hold parameters  - holding diuresis for now as patient appears euvolemic, can resume as outpatient  - strict I&Os, daily standing weights show

## 2024-05-24 NOTE — PROGRESS NOTE ADULT - ASSESSMENT
78F w/ PMH HTN, HLD, DM, CAD, NSTEMI, HFrEF presents with likely ischemic arterial disease of 1st MTP. XIOMY/PVRs 5/21 w/ LLE small vessel disease. S/p L foot hallux I&D w/ pods 5/23.    PLAN:  -S/p L foot hallux I&D w/ pods 5/23 - documented adequate intraop bleeding w/ low concern for residual infection/viability  -Monitor for wound healing  -Rest of care/dispo per primary    Vascular Surgery   [id number]

## 2024-05-24 NOTE — PROGRESS NOTE ADULT - SUBJECTIVE AND OBJECTIVE BOX
Vascular Surgery Progress Note     Subjective:  Patient seen and examined on AM rounds. Pain is controlled, patient has no complaints.      Vital Signs:  Vital Signs Last 24 Hrs  T(C): 36.8 (24 May 2024 05:33), Max: 36.9 (23 May 2024 16:40)  T(F): 98.2 (24 May 2024 05:33), Max: 98.4 (23 May 2024 16:40)  HR: 69 (24 May 2024 05:33) (64 - 70)  BP: 102/60 (24 May 2024 05:33) (93/50 - 111/52)  BP(mean): 62 (23 May 2024 20:00) (58 - 62)  RR: 16 (24 May 2024 05:33) (7 - 17)  SpO2: 96% (24 May 2024 05:33) (96% - 100%)    Parameters below as of 24 May 2024 05:33  Patient On (Oxygen Delivery Method): room air        CAPILLARY BLOOD GLUCOSE      POCT Blood Glucose.: 135 mg/dL (23 May 2024 22:11)  POCT Blood Glucose.: 92 mg/dL (23 May 2024 19:53)  POCT Blood Glucose.: 95 mg/dL (23 May 2024 16:16)  POCT Blood Glucose.: 127 mg/dL (23 May 2024 12:22)      I&O's Detail        Physical Exam:  General: NAD, resting comfortably in bed  Respiratory: unlabored breathing  Cardio: regular rate  Extremities: L foot surgical dressing intact, RLE DP/PT pulses faintly palpable      Labs:    05-24    139  |  108<H>  |  23  ----------------------------<  130<H>  4.3   |  20<L>  |  0.77    Ca    9.1      24 May 2024 06:15  Phos  3.3     05-24  Mg     2.10     05-24    TPro  6.6  /  Alb  3.3  /  TBili  0.3  /  DBili  x   /  AST  9   /  ALT  7   /  AlkPhos  57  05-24    LIVER FUNCTIONS - ( 24 May 2024 06:15 )  Alb: 3.3 g/dL / Pro: 6.6 g/dL / ALK PHOS: 57 U/L / ALT: 7 U/L / AST: 9 U/L / GGT: x                                 10.1   6.90  )-----------( 354      ( 24 May 2024 06:15 )             31.0     PT/INR - ( 23 May 2024 03:10 )   PT: 12.1 sec;   INR: 1.08 ratio         PTT - ( 23 May 2024 03:10 )  PTT:25.0 sec

## 2024-05-24 NOTE — PHYSICAL THERAPY INITIAL EVALUATION ADULT - STANDING BALANCE: STATIC
PRIMARY DIAGNOSIS: ACUTE PAIN  OUTPATIENT/OBSERVATION GOALS TO BE MET BEFORE DISCHARGE:  1. Pain Status: Improved-controlled with oral pain medications.    2. Return to near baseline physical activity: No    3. Cleared for discharge by consultants (if involved): No    Discharge Planner Nurse   Safe discharge environment identified: No  Barriers to discharge: Yes       Entered by: Mellissa Patterson RN 05/19/2023 10:38 PM     Please review provider order for any additional goals.   Nurse to notify provider when observation goals have been met and patient is ready for discharge.Goal Outcome Evaluation:       Pt presented to the hospital on 5/18 with left neck pain. Imaging reveals severe cervical stenosis of C3-4. Neurosurgery consulting. No immediate surgical needs at this time. Pt will continue to see [facility] out patient. Admitted for pain management. Received prn Norco at [phone] along with prn Robaxin at 2115. Neurologically the patient has prominent weakness noted on the left upper and left lower extremity. Chronic tingling in the bilateral lower extremities below the knee and bilateral hands. A&O x4 with some forgetfulness noted. She is hard of hearing. Independent in her room with transfers/ambulation. Pt is incontinent of bladder, self-managed. Bladder scan performed x1 and was 12. She reports urinary frequency. UA positive. Urine culture is pending. Required oxygen via NC in the ED. Weaned to RA and maintaining O2 sats >92%. She does have 2+ bilateral lower extremity edema present. Received one dose of oral Lasix. Tolerating a regular diet. Appetite is fair.                  
good minus
good balance

## 2024-05-24 NOTE — PHYSICAL THERAPY INITIAL EVALUATION ADULT - GAIT DEVIATIONS NOTED, PT EVAL
decreased sandro/decreased step length/decreased stride length
decreased sandro/decreased step length/decreased stride length

## 2024-05-24 NOTE — PHYSICAL THERAPY INITIAL EVALUATION ADULT - GENERAL OBSERVATIONS, REHAB EVAL
Pt received semi-supine in bed, all lines intact, in NAD. Pt agreeable to Pt evaluation.
Pt received semi-supine in bed, with all lines intact, NAD, all precautions maintained.

## 2024-05-24 NOTE — PROGRESS NOTE ADULT - PROBLEM SELECTOR PLAN 4
Hx of NSTEMI in 11/2023, not stented due to likely irreversible defects, c/b HFrEF    - continue with Plavix  - Cards following; appreciate recs prior to OR

## 2024-05-24 NOTE — PROGRESS NOTE ADULT - ASSESSMENT
78 F w/ pmhx of HTN, HLD, T2DM (on insulin) CAD, NSTEMI on 11/2023, HFrEF, p/w LLE swelling and left toe wound, MR findings c/w osteomyelitis, s/p resection with Podiatry 5/23, currently stable on IV antibiotics.  78 F w/ pmhx of HTN, HLD, T2DM (on insulin) CAD, NSTEMI on 11/2023, HFrEF, p/w LLE swelling and left toe wound, MR findings c/w osteomyelitis, s/p resection with Podiatry 5/23, currently stable on IV antibiotics. Bone Bx pending.

## 2024-05-24 NOTE — PROGRESS NOTE ADULT - ASSESSMENT
78F 5/23 s/p L foot partial hallux amputation, closed   - Patient seen and evaluated  - Afebrile, no leukocytosis  - L foot hallux distal necrotic wound to periosteum, slight malodor, no purulence, no tracking/ tunnelling/ erythema. R foot no open wound  - Intraop findings low concern for residual infection or vaibility  - OR data pending  - Cont IV abx, ID recs pending  - Left foot wound culture pending   - No further podiatric intervention necessary stable for discharge from podiatry standpoint, pending no growth of clean bone margin culture x 48 hours  - Discussed with attending

## 2024-05-24 NOTE — PROGRESS NOTE ADULT - ATTENDING COMMENTS
78 yr old woman PMH HTN, HLD, T2DM (on insulin and orals), NSTEMI  11/2023-medically managed, HFrEF (EF 20-25% 11/2023), LV thrombus (resolved on repeat TTE outpt) previously on warfarin (Dced by outpt cards on 1/2024), chronic hypotension (baseline SBP 90s) p/w LLE swelling and necrotic left toe wound, Found to have MRI findings c/f osteomyelitis s/p L foot partial hallux amputation 5/23.    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 f/u OR cx  5/21 Bcx NGTD, MRSA swab negative. 5/22 left wound cx with staph hemolyticus resistant to PCN. DC Unasyn, switch to Bactrim  C/w Lantus 18U qhs, on levemir BID at home-resume likely lower dose on DC, A1C: 6.9  PT reeval: no needs    DC planning home once OR cx negative for 48 hrs and cleared by podiatry 78 yr old woman PMH HTN, HLD, T2DM (on insulin and orals), NSTEMI  11/2023-medically managed, HFrEF (EF 20-25% 11/2023), LV thrombus (resolved on repeat TTE outpt) previously on warfarin (Dced by outpt cards on 1/2024), chronic hypotension (baseline SBP 90s) p/w LLE swelling and necrotic left toe wound, Found to have MRI findings c/f osteomyelitis s/p L foot partial hallux amputation 5/23.    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 f/u OR cx  5/21 Bcx NGTD, MRSA swab negative. 5/22 left wound cx with staph hemolyticus resistant to PCN. DC Unasyn, switch to Bactrim until OR cx returns  C/w Lantus 18U qhs, on levemir BID at home-resume likely lower dose on DC, A1C: 6.9  PT reeval: no needs    DC planning home once OR cx negative for 48 hrs and cleared by podiatry

## 2024-05-24 NOTE — PHYSICAL THERAPY INITIAL EVALUATION ADULT - MANUAL MUSCLE TESTING RESULTS, REHAB EVAL
Bilateral UE grossly >/= 3/5, bilateral LE grossly >/= 3+/5/grossly assessed due to
bilateral UE and Right LE grossly 5/5 throughout; Left LE grossly 4/5 throughout

## 2024-05-24 NOTE — PROGRESS NOTE ADULT - SUBJECTIVE AND OBJECTIVE BOX
Patient is a 78y old  Female who presents with a chief complaint of LLE Wound and swelling (24 May 2024 08:04)       INTERVAL HPI/OVERNIGHT EVENTS:  Patient seen and evaluated at bedside.  Pt is resting comfortable in NAD. Denies N/V/F/C.    Allergies    No Known Allergies    Intolerances        Vital Signs Last 24 Hrs  T(C): 36.8 (24 May 2024 05:33), Max: 36.9 (23 May 2024 16:40)  T(F): 98.2 (24 May 2024 05:33), Max: 98.4 (23 May 2024 16:40)  HR: 69 (24 May 2024 05:33) (64 - 70)  BP: 102/60 (24 May 2024 05:33) (93/50 - 111/52)  BP(mean): 62 (23 May 2024 20:00) (58 - 62)  RR: 16 (24 May 2024 05:33) (7 - 17)  SpO2: 96% (24 May 2024 05:33) (96% - 100%)    Parameters below as of 24 May 2024 05:33  Patient On (Oxygen Delivery Method): room air        LABS:                        10.1   6.90  )-----------( 354      ( 24 May 2024 06:15 )             31.0     05-24    139  |  108<H>  |  23  ----------------------------<  130<H>  4.3   |  20<L>  |  0.77    Ca    9.1      24 May 2024 06:15  Phos  3.3     05-24  Mg     2.10     05-24    TPro  6.6  /  Alb  3.3  /  TBili  0.3  /  DBili  x   /  AST  9   /  ALT  7   /  AlkPhos  57  05-24    PT/INR - ( 23 May 2024 03:10 )   PT: 12.1 sec;   INR: 1.08 ratio         PTT - ( 23 May 2024 03:10 )  PTT:25.0 sec  Urinalysis Basic - ( 24 May 2024 06:15 )    Color: x / Appearance: x / SG: x / pH: x  Gluc: 130 mg/dL / Ketone: x  / Bili: x / Urobili: x   Blood: x / Protein: x / Nitrite: x   Leuk Esterase: x / RBC: x / WBC x   Sq Epi: x / Non Sq Epi: x / Bacteria: x      CAPILLARY BLOOD GLUCOSE      POCT Blood Glucose.: 109 mg/dL (24 May 2024 08:37)  POCT Blood Glucose.: 135 mg/dL (23 May 2024 22:11)  POCT Blood Glucose.: 92 mg/dL (23 May 2024 19:53)  POCT Blood Glucose.: 95 mg/dL (23 May 2024 16:16)  POCT Blood Glucose.: 127 mg/dL (23 May 2024 12:22)      Lower Extremity Physical Exam:  Vascular: DP/PT 0/4, B/L, CFT <3 seconds B/L, Temperature gradient warm to cool, B/L.   Neuro: Epicritic sensation diminished to the level of digits, B/L.  Musculoskeletal/Ortho: unremarkable   Skin:5/23 s/p L foot partial hallux amputation, closed, warm flap, intact sutures, no signs of dehiscence or infection.  R foot no open wound   RADIOLOGY & ADDITIONAL TESTS:

## 2024-05-24 NOTE — PROGRESS NOTE ADULT - PROBLEM SELECTOR PLAN 2
- Hx of T2DM, per chart review on Levemir 13u qAM and 23u qHS. Also on Jardiance 10mg, Metformin 1000mg BID.   - Last A1c 11/28/2023 6.4%  - repeat A1c 6.9%    Plan:  - FS thus far well controlled on JO-ANN. Will continue JO-ANN with Lantus 18u qHS.   - DASH/CC diet

## 2024-05-24 NOTE — PROGRESS NOTE ADULT - SUBJECTIVE AND OBJECTIVE BOX
Internal Medicine Daily Progress Note  Marshall David MD  Internal Medicine PGY-1  Available on Teams    |:::::::::::::::::::::::::::| SUBJECTIVE |:::::::::::::::::::::::::::|  PROGRESS NOTE:   Patient is a 78y old  Female who presents with a chief complaint of LLE Wound and swelling (23 May 2024 18:49)      SUBJECTIVE / OVERNIGHT EVENTS: No acute overnight events.      |:::::::::::::::::::::::::::| OBJECTIVE |:::::::::::::::::::::::::::|    MEDICATIONS  (STANDING):  ampicillin/sulbactam  IVPB      ampicillin/sulbactam  IVPB 3 Gram(s) IV Intermittent every 6 hours  atorvastatin 80 milliGRAM(s) Oral at bedtime  clopidogrel Tablet 75 milliGRAM(s) Oral daily  dextrose 10% Bolus 125 milliLiter(s) IV Bolus once  dextrose 5%. 1000 milliLiter(s) (100 mL/Hr) IV Continuous <Continuous>  dextrose 5%. 1000 milliLiter(s) (50 mL/Hr) IV Continuous <Continuous>  dextrose 50% Injectable 25 Gram(s) IV Push once  dextrose 50% Injectable 12.5 Gram(s) IV Push once  glucagon  Injectable 1 milliGRAM(s) IntraMuscular once  insulin glargine Injectable (LANTUS) 18 Unit(s) SubCutaneous at bedtime  insulin lispro (ADMELOG) corrective regimen sliding scale   SubCutaneous three times a day before meals  insulin lispro (ADMELOG) corrective regimen sliding scale   SubCutaneous at bedtime  metoprolol succinate ER 25 milliGRAM(s) Oral daily  sacubitril 24 mG/valsartan 26 mG 1 Tablet(s) Oral two times a day  spironolactone 25 milliGRAM(s) Oral daily    MEDICATIONS  (PRN):  acetaminophen     Tablet .. 650 milliGRAM(s) Oral every 6 hours PRN Temp greater or equal to 38C (100.4F), Mild Pain (1 - 3)  dextrose Oral Gel 15 Gram(s) Oral once PRN Blood Glucose LESS THAN 70 milliGRAM(s)/deciliter  HYDROmorphone  Injectable 0.5 milliGRAM(s) IV Push every 4 hours PRN Severe Pain (7 - 10)  oxycodone    5 mG/acetaminophen 325 mG 1 Tablet(s) Oral every 4 hours PRN Moderate Pain (4 - 6)      CAPILLARY BLOOD GLUCOSE      POCT Blood Glucose.: 135 mg/dL (23 May 2024 22:11)  POCT Blood Glucose.: 92 mg/dL (23 May 2024 19:53)  POCT Blood Glucose.: 95 mg/dL (23 May 2024 16:16)  POCT Blood Glucose.: 127 mg/dL (23 May 2024 12:22)    I&O's Summary    22 May 2024 07:01  -  23 May 2024 07:00  --------------------------------------------------------  IN: 300 mL / OUT: 150 mL / NET: 150 mL        PHYSICAL EXAM:  Vital Signs Last 24 Hrs  T(C): 36.8 (24 May 2024 05:33), Max: 36.9 (23 May 2024 16:40)  T(F): 98.2 (24 May 2024 05:33), Max: 98.4 (23 May 2024 16:40)  HR: 69 (24 May 2024 05:33) (64 - 70)  BP: 111/52 (23 May 2024 21:17) (93/50 - 111/52)  BP(mean): 62 (23 May 2024 20:00) (58 - 62)  RR: 16 (24 May 2024 05:33) (7 - 17)  SpO2: 96% (24 May 2024 05:33) (96% - 100%)    Parameters below as of 24 May 2024 05:33  Patient On (Oxygen Delivery Method): room air        CONSTITUTIONAL: NAD,  HEENT: EOMI, moist mucous membranes.  NECK: Supple. No JVD.  RESPIRATORY: Normal respiratory effort, Lungs CTAB  CARDIOVASCULAR: Regular rate and rhythm with normal S1 and S2. No murmurs.  ABDOMEN: Soft, non-tender, non-distended. Normoactive bowel sounds, no rebound/guarding; No hepatosplenomegaly  EXTREMITIES: 2+ peripheral pulses. No clubbing, cyanosis, or edema.  MUSCULOSKELETAL: No joint swelling or tenderness to palpation  SKIN: No rashes or lesions  NEUROLOGIC: A&Ox3. No focal deficits.  PSYCH: Affect appropriate    LABS:                        10.1   7.51  )-----------( 349      ( 23 May 2024 03:10 )             30.4     05-23    137  |  104  |  38<H>  ----------------------------<  145<H>  4.5   |  22  |  1.05    Ca    9.4      23 May 2024 03:10  Phos  3.4     05-23  Mg     2.20     05-23      PT/INR - ( 23 May 2024 03:10 )   PT: 12.1 sec;   INR: 1.08 ratio         PTT - ( 23 May 2024 03:10 )  PTT:25.0 sec      Urinalysis Basic - ( 23 May 2024 03:10 )    Color: x / Appearance: x / SG: x / pH: x  Gluc: 145 mg/dL / Ketone: x  / Bili: x / Urobili: x   Blood: x / Protein: x / Nitrite: x   Leuk Esterase: x / RBC: x / WBC x   Sq Epi: x / Non Sq Epi: x / Bacteria: x        Culture - Abscess with Gram Stain (collected 22 May 2024 08:35)  Source: .Abscess L foot wound  Gram Stain (23 May 2024 00:46):    No polymorphonuclear cells seen per low power field    Moderate Gram Positive Cocci in Pairs and Chains seen per oil power field    Rare Gram Positive Cocci in Clusters seen per oil power field  Preliminary Report (23 May 2024 15:41):    Numerous Staphylococcus haemolyticus    Numerous Streptococcus agalactiae (Group B)    Streptococcus agalactiae (Group B) isolated    Group B streptococci are susceptible to ampicillin,    penicillin and cefazolin, but may be resistant to    erythromycin andclindamycin.        RADIOLOGY & ADDITIONAL TESTS:  Results Reviewed:   Imaging Personally Reviewed:  Electrocardiogram Personally Reviewed:    COORDINATION OF CARE:  Care Discussed with Consultants/Other Providers [Y/N]:  Prior or Outpatient Records Reviewed [Y/N]: Internal Medicine Daily Progress Note  Marshall David MD  Internal Medicine PGY-1  Available on Teams    |:::::::::::::::::::::::::::| SUBJECTIVE |:::::::::::::::::::::::::::|  PROGRESS NOTE:   Patient is a 78y old  Female who presents with a chief complaint of LLE Wound and swelling (23 May 2024 18:49)      SUBJECTIVE / OVERNIGHT EVENTS: No acute overnight events. Evaluated at bedside, no foot pain, chest pain, SOB, or LE edema.       |:::::::::::::::::::::::::::| OBJECTIVE |:::::::::::::::::::::::::::|    MEDICATIONS  (STANDING):  ampicillin/sulbactam  IVPB      ampicillin/sulbactam  IVPB 3 Gram(s) IV Intermittent every 6 hours  atorvastatin 80 milliGRAM(s) Oral at bedtime  clopidogrel Tablet 75 milliGRAM(s) Oral daily  dextrose 10% Bolus 125 milliLiter(s) IV Bolus once  dextrose 5%. 1000 milliLiter(s) (100 mL/Hr) IV Continuous <Continuous>  dextrose 5%. 1000 milliLiter(s) (50 mL/Hr) IV Continuous <Continuous>  dextrose 50% Injectable 25 Gram(s) IV Push once  dextrose 50% Injectable 12.5 Gram(s) IV Push once  glucagon  Injectable 1 milliGRAM(s) IntraMuscular once  insulin glargine Injectable (LANTUS) 18 Unit(s) SubCutaneous at bedtime  insulin lispro (ADMELOG) corrective regimen sliding scale   SubCutaneous three times a day before meals  insulin lispro (ADMELOG) corrective regimen sliding scale   SubCutaneous at bedtime  metoprolol succinate ER 25 milliGRAM(s) Oral daily  sacubitril 24 mG/valsartan 26 mG 1 Tablet(s) Oral two times a day  spironolactone 25 milliGRAM(s) Oral daily    MEDICATIONS  (PRN):  acetaminophen     Tablet .. 650 milliGRAM(s) Oral every 6 hours PRN Temp greater or equal to 38C (100.4F), Mild Pain (1 - 3)  dextrose Oral Gel 15 Gram(s) Oral once PRN Blood Glucose LESS THAN 70 milliGRAM(s)/deciliter  HYDROmorphone  Injectable 0.5 milliGRAM(s) IV Push every 4 hours PRN Severe Pain (7 - 10)  oxycodone    5 mG/acetaminophen 325 mG 1 Tablet(s) Oral every 4 hours PRN Moderate Pain (4 - 6)      CAPILLARY BLOOD GLUCOSE      POCT Blood Glucose.: 135 mg/dL (23 May 2024 22:11)  POCT Blood Glucose.: 92 mg/dL (23 May 2024 19:53)  POCT Blood Glucose.: 95 mg/dL (23 May 2024 16:16)  POCT Blood Glucose.: 127 mg/dL (23 May 2024 12:22)    I&O's Summary    22 May 2024 07:01  -  23 May 2024 07:00  --------------------------------------------------------  IN: 300 mL / OUT: 150 mL / NET: 150 mL        PHYSICAL EXAM:  Vital Signs Last 24 Hrs  T(C): 36.8 (24 May 2024 05:33), Max: 36.9 (23 May 2024 16:40)  T(F): 98.2 (24 May 2024 05:33), Max: 98.4 (23 May 2024 16:40)  HR: 69 (24 May 2024 05:33) (64 - 70)  BP: 111/52 (23 May 2024 21:17) (93/50 - 111/52)  BP(mean): 62 (23 May 2024 20:00) (58 - 62)  RR: 16 (24 May 2024 05:33) (7 - 17)  SpO2: 96% (24 May 2024 05:33) (96% - 100%)    Parameters below as of 24 May 2024 05:33  Patient On (Oxygen Delivery Method): room air        CONSTITUTIONAL: NAD,  HEENT: EOMI, moist mucous membranes.  NECK: Supple. No JVD.  RESPIRATORY: Normal respiratory effort, Lungs CTAB  CARDIOVASCULAR: Regular rate and rhythm with normal S1 and S2. No murmurs.  ABDOMEN: Soft, non-tender, non-distended. Normoactive bowel sounds, no rebound/guarding; No hepatosplenomegaly  EXTREMITIES: No clubbing, cyanosis, or edema.  MUSCULOSKELETAL: No joint swelling or tenderness to palpation  SKIN: No rashes or lesions. s/p left toe resection.  NEUROLOGIC: A&Ox3. No focal deficits.  PSYCH: Affect appropriate    LABS:                        10.1   7.51  )-----------( 349      ( 23 May 2024 03:10 )             30.4     05-23    137  |  104  |  38<H>  ----------------------------<  145<H>  4.5   |  22  |  1.05    Ca    9.4      23 May 2024 03:10  Phos  3.4     05-23  Mg     2.20     05-23      PT/INR - ( 23 May 2024 03:10 )   PT: 12.1 sec;   INR: 1.08 ratio         PTT - ( 23 May 2024 03:10 )  PTT:25.0 sec      Urinalysis Basic - ( 23 May 2024 03:10 )    Color: x / Appearance: x / SG: x / pH: x  Gluc: 145 mg/dL / Ketone: x  / Bili: x / Urobili: x   Blood: x / Protein: x / Nitrite: x   Leuk Esterase: x / RBC: x / WBC x   Sq Epi: x / Non Sq Epi: x / Bacteria: x        Culture - Abscess with Gram Stain (collected 22 May 2024 08:35)  Source: .Abscess L foot wound  Gram Stain (23 May 2024 00:46):    No polymorphonuclear cells seen per low power field    Moderate Gram Positive Cocci in Pairs and Chains seen per oil power field    Rare Gram Positive Cocci in Clusters seen per oil power field  Preliminary Report (23 May 2024 15:41):    Numerous Staphylococcus haemolyticus    Numerous Streptococcus agalactiae (Group B)    Streptococcus agalactiae (Group B) isolated    Group B streptococci are susceptible to ampicillin,    penicillin and cefazolin, but may be resistant to    erythromycin andclindamycin.        RADIOLOGY & ADDITIONAL TESTS:  Results Reviewed:   Imaging Personally Reviewed:  Electrocardiogram Personally Reviewed:    COORDINATION OF CARE:  Care Discussed with Consultants/Other Providers [Y/N]:  Prior or Outpatient Records Reviewed [Y/N]:

## 2024-05-24 NOTE — PHYSICAL THERAPY INITIAL EVALUATION ADULT - CRITERIA FOR SKILLED THERAPEUTIC INTERVENTIONS
impairments found/anticipated discharge recommendation
I personally performed the service described in the documentation recorded by the scribe in my presence, and it accurately and completely records my words and actions.

## 2024-05-24 NOTE — PHYSICAL THERAPY INITIAL EVALUATION ADULT - ADDITIONAL COMMENTS
Pt lives in a co-op alone, +1 flight to negotiate, was independent with all mobility and ADLs without an assistive device.     Pt left semi-supine in bed, all lines intact, all needs in reach, bed alarm set, in NAD. RAYMOND Rangel aware. Heart rate 62 beats per minute.
Pt is [place] and requires lip reading. Pt states she lives alone in an apartment with 1 flight of stairs +bilateral close handrails. Prior to admission, pt was ambulating independently without any assistive devices. Pt owns a rolling walker. Pt states she can also stay at her son's house which has 2 steps and she can stay on the main level.   Post PT evaluation, pt left semi-supine, alarm on, call bell and remote within reach, all precautions maintained, NAD. RN aware.

## 2024-05-24 NOTE — PROGRESS NOTE ADULT - PROBLEM SELECTOR PLAN 1
P/w left 1st toe ulcerated and necrotic wound with +bone probing. MR c/w osteomyelitis of the 1st toe, likely 2/2 direct extension of wound i/s/o T2DM.  - XIOMY/PVR overall normal  - BCx NGTD  - wound cx (5/22) showing numerous staph haemolyticus, group B strep    Plan:  - Podiatry following, appreciate recs- s/p L foot partial hallux amputation 5/23 with low c/f residual bone infection  - vascular surgery consulted; no planned inpatient procedure (was planned for LLE angiogram 5/22 but cancelled)  - f/u OR bone cx; needs 48h of clean cx prior to d/c planning  - c/w IV Unasyn (5/22- )   - monitor WBC count, temperature curve, vitals P/w left 1st toe ulcerated and necrotic wound with +bone probing. MR c/w osteomyelitis of the 1st toe, likely 2/2 direct extension of wound i/s/o T2DM.  - XIOMY/PVR overall normal  - BCx NGTD  - wound cx (5/22) showing numerous staph haemolyticus, group B strep    Plan:  - Podiatry following, appreciate recs- s/p L foot partial hallux amputation 5/23 with low c/f residual bone infection. d/c pending bone margin result.   - vascular surgery consulted; no planned inpatient procedure (was planned for LLE angiogram 5/22 but cancelled)  - f/u OR bone cx; needs 48h of clean cx prior to d/c planning  - c/w IV Unasyn (5/22- 5/24). Now on Bactrim DS (5/24 - )  - monitor WBC count, temperature curve, vitals

## 2024-05-25 LAB
ALBUMIN SERPL ELPH-MCNC: 3.4 G/DL — SIGNIFICANT CHANGE UP (ref 3.3–5)
ALP SERPL-CCNC: 56 U/L — SIGNIFICANT CHANGE UP (ref 40–120)
ALT FLD-CCNC: 7 U/L — SIGNIFICANT CHANGE UP (ref 4–33)
ANION GAP SERPL CALC-SCNC: 11 MMOL/L — SIGNIFICANT CHANGE UP (ref 7–14)
AST SERPL-CCNC: 10 U/L — SIGNIFICANT CHANGE UP (ref 4–32)
BASOPHILS # BLD AUTO: 0.05 K/UL — SIGNIFICANT CHANGE UP (ref 0–0.2)
BASOPHILS NFR BLD AUTO: 0.8 % — SIGNIFICANT CHANGE UP (ref 0–2)
BILIRUB SERPL-MCNC: 0.3 MG/DL — SIGNIFICANT CHANGE UP (ref 0.2–1.2)
BUN SERPL-MCNC: 16 MG/DL — SIGNIFICANT CHANGE UP (ref 7–23)
CALCIUM SERPL-MCNC: 9.1 MG/DL — SIGNIFICANT CHANGE UP (ref 8.4–10.5)
CHLORIDE SERPL-SCNC: 108 MMOL/L — HIGH (ref 98–107)
CO2 SERPL-SCNC: 20 MMOL/L — LOW (ref 22–31)
CREAT SERPL-MCNC: 0.72 MG/DL — SIGNIFICANT CHANGE UP (ref 0.5–1.3)
EGFR: 85 ML/MIN/1.73M2 — SIGNIFICANT CHANGE UP
EOSINOPHIL # BLD AUTO: 0.2 K/UL — SIGNIFICANT CHANGE UP (ref 0–0.5)
EOSINOPHIL NFR BLD AUTO: 3.3 % — SIGNIFICANT CHANGE UP (ref 0–6)
GLUCOSE SERPL-MCNC: 99 MG/DL — SIGNIFICANT CHANGE UP (ref 70–99)
HCT VFR BLD CALC: 31.2 % — LOW (ref 34.5–45)
HGB BLD-MCNC: 10.1 G/DL — LOW (ref 11.5–15.5)
IANC: 3.91 K/UL — SIGNIFICANT CHANGE UP (ref 1.8–7.4)
IMM GRANULOCYTES NFR BLD AUTO: 0.3 % — SIGNIFICANT CHANGE UP (ref 0–0.9)
LYMPHOCYTES # BLD AUTO: 1.39 K/UL — SIGNIFICANT CHANGE UP (ref 1–3.3)
LYMPHOCYTES # BLD AUTO: 22.7 % — SIGNIFICANT CHANGE UP (ref 13–44)
MAGNESIUM SERPL-MCNC: 2.1 MG/DL — SIGNIFICANT CHANGE UP (ref 1.6–2.6)
MCHC RBC-ENTMCNC: 27.2 PG — SIGNIFICANT CHANGE UP (ref 27–34)
MCHC RBC-ENTMCNC: 32.4 GM/DL — SIGNIFICANT CHANGE UP (ref 32–36)
MCV RBC AUTO: 84.1 FL — SIGNIFICANT CHANGE UP (ref 80–100)
MONOCYTES # BLD AUTO: 0.56 K/UL — SIGNIFICANT CHANGE UP (ref 0–0.9)
MONOCYTES NFR BLD AUTO: 9.1 % — SIGNIFICANT CHANGE UP (ref 2–14)
NEUTROPHILS # BLD AUTO: 3.91 K/UL — SIGNIFICANT CHANGE UP (ref 1.8–7.4)
NEUTROPHILS NFR BLD AUTO: 63.8 % — SIGNIFICANT CHANGE UP (ref 43–77)
NRBC # BLD: 0 /100 WBCS — SIGNIFICANT CHANGE UP (ref 0–0)
NRBC # FLD: 0 K/UL — SIGNIFICANT CHANGE UP (ref 0–0)
PHOSPHATE SERPL-MCNC: 3 MG/DL — SIGNIFICANT CHANGE UP (ref 2.5–4.5)
PLATELET # BLD AUTO: 341 K/UL — SIGNIFICANT CHANGE UP (ref 150–400)
POTASSIUM SERPL-MCNC: 4.6 MMOL/L — SIGNIFICANT CHANGE UP (ref 3.5–5.3)
POTASSIUM SERPL-SCNC: 4.6 MMOL/L — SIGNIFICANT CHANGE UP (ref 3.5–5.3)
PROT SERPL-MCNC: 6.6 G/DL — SIGNIFICANT CHANGE UP (ref 6–8.3)
RBC # BLD: 3.71 M/UL — LOW (ref 3.8–5.2)
RBC # FLD: 19 % — HIGH (ref 10.3–14.5)
SODIUM SERPL-SCNC: 139 MMOL/L — SIGNIFICANT CHANGE UP (ref 135–145)
WBC # BLD: 6.13 K/UL — SIGNIFICANT CHANGE UP (ref 3.8–10.5)
WBC # FLD AUTO: 6.13 K/UL — SIGNIFICANT CHANGE UP (ref 3.8–10.5)

## 2024-05-25 PROCEDURE — 99232 SBSQ HOSP IP/OBS MODERATE 35: CPT | Mod: GC

## 2024-05-25 RX ADMIN — ATORVASTATIN CALCIUM 80 MILLIGRAM(S): 80 TABLET, FILM COATED ORAL at 21:19

## 2024-05-25 RX ADMIN — SPIRONOLACTONE 25 MILLIGRAM(S): 25 TABLET, FILM COATED ORAL at 06:16

## 2024-05-25 RX ADMIN — INSULIN GLARGINE 18 UNIT(S): 100 INJECTION, SOLUTION SUBCUTANEOUS at 21:35

## 2024-05-25 RX ADMIN — POLYETHYLENE GLYCOL 3350 17 GRAM(S): 17 POWDER, FOR SOLUTION ORAL at 12:53

## 2024-05-25 RX ADMIN — Medication 1: at 12:50

## 2024-05-25 RX ADMIN — SACUBITRIL AND VALSARTAN 1 TABLET(S): 24; 26 TABLET, FILM COATED ORAL at 17:44

## 2024-05-25 RX ADMIN — CLOPIDOGREL BISULFATE 75 MILLIGRAM(S): 75 TABLET, FILM COATED ORAL at 12:53

## 2024-05-25 RX ADMIN — Medication 25 MILLIGRAM(S): at 06:15

## 2024-05-25 RX ADMIN — Medication 1 TABLET(S): at 12:53

## 2024-05-25 RX ADMIN — SACUBITRIL AND VALSARTAN 1 TABLET(S): 24; 26 TABLET, FILM COATED ORAL at 06:16

## 2024-05-25 NOTE — PROGRESS NOTE ADULT - SUBJECTIVE AND OBJECTIVE BOX
Internal Medicine Daily Progress Note  Marshall David MD  Internal Medicine PGY-1  Available on Teams    |:::::::::::::::::::::::::::| SUBJECTIVE |:::::::::::::::::::::::::::|  PROGRESS NOTE:   Patient is a 78y old  Female who presents with a chief complaint of LLE Wound and swelling (24 May 2024 14:59)      SUBJECTIVE / OVERNIGHT EVENTS: No acute overnight events.      |:::::::::::::::::::::::::::| OBJECTIVE |:::::::::::::::::::::::::::|    MEDICATIONS  (STANDING):  atorvastatin 80 milliGRAM(s) Oral at bedtime  clopidogrel Tablet 75 milliGRAM(s) Oral daily  dextrose 10% Bolus 125 milliLiter(s) IV Bolus once  dextrose 5%. 1000 milliLiter(s) (50 mL/Hr) IV Continuous <Continuous>  dextrose 5%. 1000 milliLiter(s) (100 mL/Hr) IV Continuous <Continuous>  dextrose 50% Injectable 25 Gram(s) IV Push once  dextrose 50% Injectable 12.5 Gram(s) IV Push once  glucagon  Injectable 1 milliGRAM(s) IntraMuscular once  insulin glargine Injectable (LANTUS) 18 Unit(s) SubCutaneous at bedtime  insulin lispro (ADMELOG) corrective regimen sliding scale   SubCutaneous three times a day before meals  insulin lispro (ADMELOG) corrective regimen sliding scale   SubCutaneous at bedtime  metoprolol succinate ER 25 milliGRAM(s) Oral daily  polyethylene glycol 3350 17 Gram(s) Oral daily  sacubitril 24 mG/valsartan 26 mG 1 Tablet(s) Oral two times a day  senna 2 Tablet(s) Oral at bedtime  spironolactone 25 milliGRAM(s) Oral daily  trimethoprim  160 mG/sulfamethoxazole 800 mG 1 Tablet(s) Oral daily    MEDICATIONS  (PRN):  acetaminophen     Tablet .. 650 milliGRAM(s) Oral every 6 hours PRN Temp greater or equal to 38C (100.4F), Mild Pain (1 - 3)  dextrose Oral Gel 15 Gram(s) Oral once PRN Blood Glucose LESS THAN 70 milliGRAM(s)/deciliter  oxycodone    5 mG/acetaminophen 325 mG 1 Tablet(s) Oral every 4 hours PRN moderate-severe pain      CAPILLARY BLOOD GLUCOSE      POCT Blood Glucose.: 177 mg/dL (24 May 2024 21:37)  POCT Blood Glucose.: 173 mg/dL (24 May 2024 18:31)  POCT Blood Glucose.: 200 mg/dL (24 May 2024 17:25)  POCT Blood Glucose.: 190 mg/dL (24 May 2024 12:26)  POCT Blood Glucose.: 109 mg/dL (24 May 2024 08:37)    I&O's Summary      PHYSICAL EXAM:  Vital Signs Last 24 Hrs  T(C): 36.5 (25 May 2024 06:13), Max: 37.1 (24 May 2024 21:12)  T(F): 97.7 (25 May 2024 06:13), Max: 98.8 (24 May 2024 21:12)  HR: 66 (25 May 2024 06:13) (66 - 77)  BP: 110/58 (25 May 2024 06:13) (101/68 - 110/58)  BP(mean): --  RR: 17 (25 May 2024 06:13) (16 - 17)  SpO2: 99% (25 May 2024 06:13) (99% - 100%)    Parameters below as of 25 May 2024 06:13  Patient On (Oxygen Delivery Method): room air        CONSTITUTIONAL: NAD,  HEENT: EOMI, moist mucous membranes.  NECK: Supple. No JVD.  RESPIRATORY: Normal respiratory effort, Lungs CTAB  CARDIOVASCULAR: Regular rate and rhythm with normal S1 and S2. No murmurs.  ABDOMEN: Soft, non-tender, non-distended. Normoactive bowel sounds, no rebound/guarding; No hepatosplenomegaly  EXTREMITIES: 2+ peripheral pulses. No clubbing, cyanosis, or edema.  MUSCULOSKELETAL: No joint swelling or tenderness to palpation  SKIN: No rashes or lesions  NEUROLOGIC: A&Ox3. No focal deficits.  PSYCH: Affect appropriate    LABS:                        10.1   6.90  )-----------( 354      ( 24 May 2024 06:15 )             31.0     05-24    139  |  108<H>  |  23  ----------------------------<  130<H>  4.3   |  20<L>  |  0.77    Ca    9.1      24 May 2024 06:15  Phos  3.3     05-24  Mg     2.10     05-24    TPro  6.6  /  Alb  3.3  /  TBili  0.3  /  DBili  x   /  AST  9   /  ALT  7   /  AlkPhos  57  05-24          Urinalysis Basic - ( 24 May 2024 06:15 )    Color: x / Appearance: x / SG: x / pH: x  Gluc: 130 mg/dL / Ketone: x  / Bili: x / Urobili: x   Blood: x / Protein: x / Nitrite: x   Leuk Esterase: x / RBC: x / WBC x   Sq Epi: x / Non Sq Epi: x / Bacteria: x        Culture - Acid Fast - Tissue w/Smear (collected 24 May 2024 08:06)  Source: .Tissue left foot clean margin    Culture - Tissue with Gram Stain (collected 24 May 2024 08:06)  Source: .Tissue LEFT FOOT CLEAN MARGIN  Gram Stain (24 May 2024 14:49):    Rare polymorphonuclear leukocytes seen per low power field    No organisms seen    Culture - Abscess with Gram Stain (collected 22 May 2024 08:35)  Source: .Abscess L foot wound  Gram Stain (23 May 2024 00:46):    No polymorphonuclear cells seen per low power field    Moderate Gram Positive Cocci in Pairs and Chains seen per oil power field    Rare Gram Positive Cocci in Clusters seen per oil power field  Final Report (24 May 2024 12:25):    Numerous Staphylococcus haemolyticus    Few Prevotella disiens "Susceptibilities not performed"    Numerous Streptococcus agalactiae (Group B)    Streptococcus agalactiae (Group B) isolated    Group B streptococci are susceptible to ampicillin,    penicillinand cefazolin, but may be resistant to    erythromycin and clindamycin.  Organism: Staphylococcus haemolyticus (24 May 2024 12:25)  Organism: Staphylococcus haemolyticus (24 May 2024 12:25)        RADIOLOGY & ADDITIONAL TESTS:  Results Reviewed:   Imaging Personally Reviewed:  Electrocardiogram Personally Reviewed:    COORDINATION OF CARE:  Care Discussed with Consultants/Other Providers [Y/N]:  Prior or Outpatient Records Reviewed [Y/N]: Internal Medicine Daily Progress Note  Marshall David MD  Internal Medicine PGY-1  Available on Teams    |:::::::::::::::::::::::::::| SUBJECTIVE |:::::::::::::::::::::::::::|  PROGRESS NOTE:   Patient is a 78y old  Female who presents with a chief complaint of LLE Wound and swelling (24 May 2024 14:59)      SUBJECTIVE / OVERNIGHT EVENTS: No acute overnight events. Evaluated at bedside, denies any SOB, chest pain, N/V, no foot or leg pain.       |:::::::::::::::::::::::::::| OBJECTIVE |:::::::::::::::::::::::::::|    MEDICATIONS  (STANDING):  atorvastatin 80 milliGRAM(s) Oral at bedtime  clopidogrel Tablet 75 milliGRAM(s) Oral daily  dextrose 10% Bolus 125 milliLiter(s) IV Bolus once  dextrose 5%. 1000 milliLiter(s) (50 mL/Hr) IV Continuous <Continuous>  dextrose 5%. 1000 milliLiter(s) (100 mL/Hr) IV Continuous <Continuous>  dextrose 50% Injectable 25 Gram(s) IV Push once  dextrose 50% Injectable 12.5 Gram(s) IV Push once  glucagon  Injectable 1 milliGRAM(s) IntraMuscular once  insulin glargine Injectable (LANTUS) 18 Unit(s) SubCutaneous at bedtime  insulin lispro (ADMELOG) corrective regimen sliding scale   SubCutaneous three times a day before meals  insulin lispro (ADMELOG) corrective regimen sliding scale   SubCutaneous at bedtime  metoprolol succinate ER 25 milliGRAM(s) Oral daily  polyethylene glycol 3350 17 Gram(s) Oral daily  sacubitril 24 mG/valsartan 26 mG 1 Tablet(s) Oral two times a day  senna 2 Tablet(s) Oral at bedtime  spironolactone 25 milliGRAM(s) Oral daily  trimethoprim  160 mG/sulfamethoxazole 800 mG 1 Tablet(s) Oral daily    MEDICATIONS  (PRN):  acetaminophen     Tablet .. 650 milliGRAM(s) Oral every 6 hours PRN Temp greater or equal to 38C (100.4F), Mild Pain (1 - 3)  dextrose Oral Gel 15 Gram(s) Oral once PRN Blood Glucose LESS THAN 70 milliGRAM(s)/deciliter  oxycodone    5 mG/acetaminophen 325 mG 1 Tablet(s) Oral every 4 hours PRN moderate-severe pain      CAPILLARY BLOOD GLUCOSE      POCT Blood Glucose.: 177 mg/dL (24 May 2024 21:37)  POCT Blood Glucose.: 173 mg/dL (24 May 2024 18:31)  POCT Blood Glucose.: 200 mg/dL (24 May 2024 17:25)  POCT Blood Glucose.: 190 mg/dL (24 May 2024 12:26)  POCT Blood Glucose.: 109 mg/dL (24 May 2024 08:37)    I&O's Summary      PHYSICAL EXAM:  Vital Signs Last 24 Hrs  T(C): 36.5 (25 May 2024 06:13), Max: 37.1 (24 May 2024 21:12)  T(F): 97.7 (25 May 2024 06:13), Max: 98.8 (24 May 2024 21:12)  HR: 66 (25 May 2024 06:13) (66 - 77)  BP: 110/58 (25 May 2024 06:13) (101/68 - 110/58)  BP(mean): --  RR: 17 (25 May 2024 06:13) (16 - 17)  SpO2: 99% (25 May 2024 06:13) (99% - 100%)    Parameters below as of 25 May 2024 06:13  Patient On (Oxygen Delivery Method): room air        CONSTITUTIONAL: NAD on RA.   HEENT: EOMI, moist mucous membranes.  NECK: Supple. No JVD.  RESPIRATORY: Normal respiratory effort, Lungs CTAB  CARDIOVASCULAR: Regular rate and rhythm with normal S1 and S2. No murmurs.  ABDOMEN: Soft, non-tender, non-distended. Normoactive bowel sounds, no rebound/guarding; No hepatosplenomegaly  EXTREMITIES: No clubbing, cyanosis, or edema. s/p 1st left toe resection.   MUSCULOSKELETAL: No joint swelling or tenderness to palpation  SKIN: No rashes or lesions  NEUROLOGIC: A&Ox3. No focal deficits.  PSYCH: Affect appropriate    LABS:                        10.1   6.90  )-----------( 354      ( 24 May 2024 06:15 )             31.0     05-24    139  |  108<H>  |  23  ----------------------------<  130<H>  4.3   |  20<L>  |  0.77    Ca    9.1      24 May 2024 06:15  Phos  3.3     05-24  Mg     2.10     05-24    TPro  6.6  /  Alb  3.3  /  TBili  0.3  /  DBili  x   /  AST  9   /  ALT  7   /  AlkPhos  57  05-24          Urinalysis Basic - ( 24 May 2024 06:15 )    Color: x / Appearance: x / SG: x / pH: x  Gluc: 130 mg/dL / Ketone: x  / Bili: x / Urobili: x   Blood: x / Protein: x / Nitrite: x   Leuk Esterase: x / RBC: x / WBC x   Sq Epi: x / Non Sq Epi: x / Bacteria: x        Culture - Acid Fast - Tissue w/Smear (collected 24 May 2024 08:06)  Source: .Tissue left foot clean margin    Culture - Tissue with Gram Stain (collected 24 May 2024 08:06)  Source: .Tissue LEFT FOOT CLEAN MARGIN  Gram Stain (24 May 2024 14:49):    Rare polymorphonuclear leukocytes seen per low power field    No organisms seen    Culture - Abscess with Gram Stain (collected 22 May 2024 08:35)  Source: .Abscess L foot wound  Gram Stain (23 May 2024 00:46):    No polymorphonuclear cells seen per low power field    Moderate Gram Positive Cocci in Pairs and Chains seen per oil power field    Rare Gram Positive Cocci in Clusters seen per oil power field  Final Report (24 May 2024 12:25):    Numerous Staphylococcus haemolyticus    Few Prevotella disiens "Susceptibilities not performed"    Numerous Streptococcus agalactiae (Group B)    Streptococcus agalactiae (Group B) isolated    Group B streptococci are susceptible to ampicillin,    penicillinand cefazolin, but may be resistant to    erythromycin and clindamycin.  Organism: Staphylococcus haemolyticus (24 May 2024 12:25)  Organism: Staphylococcus haemolyticus (24 May 2024 12:25)        RADIOLOGY & ADDITIONAL TESTS:  Results Reviewed:   Imaging Personally Reviewed:  Electrocardiogram Personally Reviewed:    COORDINATION OF CARE:  Care Discussed with Consultants/Other Providers [Y/N]:  Prior or Outpatient Records Reviewed [Y/N]:

## 2024-05-25 NOTE — PROGRESS NOTE ADULT - PROBLEM SELECTOR PLAN 3
Hx of HFrEF (last EF 20-25% in 11/2023), likely ischemic cardiomyopathy i/s/o CAD and T2DM.   - on GDMT with Entresto 24-26mg BID, Metorpolol succinate 25mg QD, Aldactone 25mg QD, and Jardiance 10mg QD.   - Takes Lasix 20mg QD to 40mg BID based on symptoms. Currently appearing euvolemic  - baseline BPs in 90s - 100s SBP  - Previous outpatient TTE was negative for LV thrombus on 1/12/2024, patient was stopped on Coumadin at that time.   - repeat limited TTE (5/22) showing EF 20-25%, no evidence of LV thrombus    Plan:  - cardiology following; appreciate recs  - continue home GDMT with Entresto, Metoprolol, Aldactone as above with hold parameters  - holding diuresis for now as patient appears euvolemic, can resume as outpatient  - strict I&Os, daily standing weights

## 2024-05-25 NOTE — PROGRESS NOTE ADULT - ASSESSMENT
78 F w/ pmhx of HTN, HLD, T2DM (on insulin) CAD, NSTEMI on 11/2023, HFrEF, p/w LLE swelling and left toe wound, MR findings c/w osteomyelitis, s/p resection with Podiatry 5/23, currently stable on IV antibiotics. Bone Bx pending.

## 2024-05-25 NOTE — PROGRESS NOTE ADULT - PROBLEM SELECTOR PLAN 2
- Hx of T2DM, per chart review on Levemir 13u qAM and 23u qHS. Also on Jardiance 10mg, Metformin 1000mg BID.   - Last A1c 11/28/2023 6.4%  - repeat A1c 6.9%    Plan:  - FS thus far well controlled on JO-ANN. Will continue JO-ANN with Lantus 18u qHS.   - DASH/CC diet - Hx of T2DM, per chart review on Levemir 13u qAM and 23u qHS. Also on Jardiance 10mg, Metformin 1000mg BID.   - Last A1c 11/28/2023 6.4%  - repeat A1c 6.9%    Plan:  - will need to switch back to Levemir BID dosing when d/c planning.   - FS thus far well controlled on JO-ANN. Will continue JO-ANN with Lantus 18u qHS.   - DASH/CC diet

## 2024-05-25 NOTE — PROGRESS NOTE ADULT - PROBLEM SELECTOR PLAN 6
DVT: Lovenox  Diet: DASH/CC  Pharmacy:  Dispo: No PT needs per PT eval. Son willing to take patient home.

## 2024-05-25 NOTE — PROGRESS NOTE ADULT - PROBLEM SELECTOR PLAN 1
P/w left 1st toe ulcerated and necrotic wound with +bone probing. MR c/w osteomyelitis of the 1st toe, likely 2/2 direct extension of wound i/s/o T2DM.  - XIOMY/PVR overall normal  - BCx NGTD  - wound cx (5/22) showing numerous staph haemolyticus, group B strep    Plan:  - Podiatry following, appreciate recs- s/p L foot partial hallux amputation 5/23 with low c/f residual bone infection. d/c pending bone margin result.   - vascular surgery consulted; no planned inpatient procedure (was planned for LLE angiogram 5/22 but cancelled)  - f/u OR bone cx; needs 48h of clean cx prior to d/c planning  - c/w IV Unasyn (5/22- 5/24). Now on Bactrim DS (5/24 - )  - monitor WBC count, temperature curve, vitals P/w left 1st toe ulcerated and necrotic wound with +bone probing. MR c/w osteomyelitis of the 1st toe, likely 2/2 direct extension of wound i/s/o T2DM.  - XIOMY/PVR overall normal  - BCx NGTD  - wound cx (5/22) showing numerous staph haemolyticus, group B strep    Plan:  - Podiatry following, appreciate recs- s/p L foot partial hallux amputation 5/23 with low c/f residual bone infection. d/c pending bone margin result.   - f/u OR bone cx; needs 48h of clean cx prior to d/c planning, likely 5/26.   - c/w IV Unasyn (5/22- 5/24). Now on Bactrim DS (5/24 - )  - monitor WBC count, temperature curve, vitals

## 2024-05-25 NOTE — PROGRESS NOTE ADULT - ATTENDING COMMENTS
78 yr old woman PMH HTN, HLD, T2DM (on insulin and orals), NSTEMI  11/2023-medically managed, HFrEF (EF 20-25% 11/2023), LV thrombus (resolved on repeat TTE outpt) previously on warfarin (Dced by outpt cards on 1/2024), chronic hypotension (baseline SBP 90s) p/w LLE swelling and necrotic left toe wound, Found to have MRI findings c/f osteomyelitis s/p L foot partial hallux amputation 5/23.    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 f/u OR cx  5/21 Bcx NGTD, MRSA swab negative. 5/22 left wound cx with staph hemolyticus resistant to PCN. DC Unasyn, switch to Bactrim until OR cx returns  C/w Lantus 18U qhs, on levemir BID at home-resume likely lower dose on DC, A1C: 6.9  PT reeval: no needs    DC planning home once OR cx negative for 48 hrs and cleared by podiatry

## 2024-05-25 NOTE — PROGRESS NOTE ADULT - SUBJECTIVE AND OBJECTIVE BOX
Patient is a 78y old  Female who presents with a chief complaint of LLE Wound and swelling (25 May 2024 06:36)       INTERVAL HPI/OVERNIGHT EVENTS:  Patient seen and evaluated at bedside.  Pt is resting comfortable in NAD. Denies N/V/F/C.      Allergies    No Known Allergies    Intolerances        Vital Signs Last 24 Hrs  T(C): 36.5 (25 May 2024 06:13), Max: 37.1 (24 May 2024 21:12)  T(F): 97.7 (25 May 2024 06:13), Max: 98.8 (24 May 2024 21:12)  HR: 66 (25 May 2024 06:13) (66 - 77)  BP: 110/58 (25 May 2024 06:13) (101/68 - 110/58)  BP(mean): --  RR: 17 (25 May 2024 06:13) (16 - 17)  SpO2: 99% (25 May 2024 06:13) (99% - 100%)    Parameters below as of 25 May 2024 06:13  Patient On (Oxygen Delivery Method): room air        LABS:                        10.1   6.13  )-----------( 341      ( 25 May 2024 06:56 )             31.2     05-25    139  |  108<H>  |  16  ----------------------------<  99  4.6   |  20<L>  |  0.72    Ca    9.1      25 May 2024 06:56  Phos  3.0     05-25  Mg     2.10     05-25    TPro  6.6  /  Alb  3.4  /  TBili  0.3  /  DBili  x   /  AST  10  /  ALT  7   /  AlkPhos  56  05-25      Urinalysis Basic - ( 25 May 2024 06:56 )    Color: x / Appearance: x / SG: x / pH: x  Gluc: 99 mg/dL / Ketone: x  / Bili: x / Urobili: x   Blood: x / Protein: x / Nitrite: x   Leuk Esterase: x / RBC: x / WBC x   Sq Epi: x / Non Sq Epi: x / Bacteria: x      CAPILLARY BLOOD GLUCOSE      POCT Blood Glucose.: 105 mg/dL (25 May 2024 08:26)  POCT Blood Glucose.: 177 mg/dL (24 May 2024 21:37)  POCT Blood Glucose.: 173 mg/dL (24 May 2024 18:31)  POCT Blood Glucose.: 200 mg/dL (24 May 2024 17:25)  POCT Blood Glucose.: 190 mg/dL (24 May 2024 12:26)      Lower Extremity Physical Exam:  Vascular: DP/PT 0/4, B/L, CFT <3 seconds B/L, Temperature gradient warm to cool, B/L.   Neuro: Epicritic sensation diminished to the level of digits, B/L.  Musculoskeletal/Ortho: unremarkable   Skin: 5/23 s/p L foot partial hallux amputation, closed, warm flap, intact sutures, no signs of dehiscence or infection, no hematoma    RADIOLOGY & ADDITIONAL TESTS:

## 2024-05-25 NOTE — PROGRESS NOTE ADULT - ASSESSMENT
78F 5/23 s/p L foot partial hallux amputation, closed   - Patient seen and evaluated  - Afebrile, no leukocytosis  - 5/23 s/p L foot partial hallux amputation, closed, warm flap, intact sutures, no signs of dehiscence or infection, no hematoma  - Intraop findings low concern for residual infection or viability  - OR data: no growth prelim  -Stable for discharge from podiatry standpoint,  - Follow up and wound care information in d/c note provider   - Discussed with attending

## 2024-05-26 ENCOUNTER — TRANSCRIPTION ENCOUNTER (OUTPATIENT)
Age: 79
End: 2024-05-26

## 2024-05-26 ENCOUNTER — NON-APPOINTMENT (OUTPATIENT)
Age: 79
End: 2024-05-26

## 2024-05-26 VITALS
TEMPERATURE: 98 F | DIASTOLIC BLOOD PRESSURE: 55 MMHG | HEART RATE: 66 BPM | SYSTOLIC BLOOD PRESSURE: 107 MMHG | OXYGEN SATURATION: 100 % | RESPIRATION RATE: 18 BRPM

## 2024-05-26 LAB
CULTURE RESULTS: SIGNIFICANT CHANGE UP
CULTURE RESULTS: SIGNIFICANT CHANGE UP
SPECIMEN SOURCE: SIGNIFICANT CHANGE UP
SPECIMEN SOURCE: SIGNIFICANT CHANGE UP

## 2024-05-26 PROCEDURE — 99239 HOSP IP/OBS DSCHRG MGMT >30: CPT

## 2024-05-26 RX ADMIN — SPIRONOLACTONE 25 MILLIGRAM(S): 25 TABLET, FILM COATED ORAL at 06:04

## 2024-05-26 RX ADMIN — SACUBITRIL AND VALSARTAN 1 TABLET(S): 24; 26 TABLET, FILM COATED ORAL at 06:04

## 2024-05-26 RX ADMIN — Medication 25 MILLIGRAM(S): at 06:04

## 2024-05-26 RX ADMIN — Medication 1 TABLET(S): at 12:13

## 2024-05-26 RX ADMIN — CLOPIDOGREL BISULFATE 75 MILLIGRAM(S): 75 TABLET, FILM COATED ORAL at 12:13

## 2024-05-26 NOTE — PROGRESS NOTE ADULT - PROBLEM SELECTOR PLAN 1
P/w left 1st toe ulcerated and necrotic wound with +bone probing. MR c/w osteomyelitis of the 1st toe, likely 2/2 direct extension of wound i/s/o T2DM.  - XIOMY/PVR overall normal  - BCx NGTD  - wound cx (5/22) showing numerous staph haemolyticus, group B strep    Plan:  - Podiatry following, appreciate recs- s/p L foot partial hallux amputation 5/23 with low c/f residual bone infection. d/c pending bone margin result.   - f/u OR bone cx; needs 48h of clean cx prior to d/c planning, likely 5/26.   - c/w IV Unasyn (5/22- 5/24). Now on Bactrim DS (5/24 - 5/28). Will Plan for 5 day course Bactrim DS   - monitor WBC count, temperature curve, vitals

## 2024-05-26 NOTE — PROGRESS NOTE ADULT - PROBLEM SELECTOR PLAN 2
- Hx of T2DM, per chart review on Levemir 13u qAM and 23u qHS. Also on Jardiance 10mg, Metformin 1000mg BID.   - Last A1c 11/28/2023 6.4%  - repeat A1c 6.9%    Plan:  - will need to switch back to Levemir BID dosing when d/c planning.   - FS thus far well controlled on JO-ANN. Will continue JO-ANN with Lantus 18u qHS.   - DASH/CC diet

## 2024-05-26 NOTE — PROGRESS NOTE ADULT - PROBLEM SELECTOR PLAN 6
DVT: Lovenox  Diet: DASH/CC  Pharmacy:  Dispo: No PT needs per PT eval. Son willing to take patient home. Declined [facility]HA services.

## 2024-05-26 NOTE — PROGRESS NOTE ADULT - SUBJECTIVE AND OBJECTIVE BOX
Internal Medicine Daily Progress Note  Marshall David MD  Internal Medicine PGY-1  Available on Teams    |:::::::::::::::::::::::::::| SUBJECTIVE |:::::::::::::::::::::::::::|  PROGRESS NOTE:   Patient is a 78y old  Female who presents with a chief complaint of LLE Wound and swelling (25 May 2024 11:08)      SUBJECTIVE / OVERNIGHT EVENTS: No acute overnight events.      |:::::::::::::::::::::::::::| OBJECTIVE |:::::::::::::::::::::::::::|    MEDICATIONS  (STANDING):  atorvastatin 80 milliGRAM(s) Oral at bedtime  clopidogrel Tablet 75 milliGRAM(s) Oral daily  dextrose 10% Bolus 125 milliLiter(s) IV Bolus once  dextrose 5%. 1000 milliLiter(s) (100 mL/Hr) IV Continuous <Continuous>  dextrose 5%. 1000 milliLiter(s) (50 mL/Hr) IV Continuous <Continuous>  dextrose 50% Injectable 25 Gram(s) IV Push once  dextrose 50% Injectable 12.5 Gram(s) IV Push once  glucagon  Injectable 1 milliGRAM(s) IntraMuscular once  insulin glargine Injectable (LANTUS) 18 Unit(s) SubCutaneous at bedtime  insulin lispro (ADMELOG) corrective regimen sliding scale   SubCutaneous three times a day before meals  insulin lispro (ADMELOG) corrective regimen sliding scale   SubCutaneous at bedtime  metoprolol succinate ER 25 milliGRAM(s) Oral daily  polyethylene glycol 3350 17 Gram(s) Oral daily  sacubitril 24 mG/valsartan 26 mG 1 Tablet(s) Oral two times a day  senna 2 Tablet(s) Oral at bedtime  spironolactone 25 milliGRAM(s) Oral daily  trimethoprim  160 mG/sulfamethoxazole 800 mG 1 Tablet(s) Oral daily    MEDICATIONS  (PRN):  acetaminophen     Tablet .. 650 milliGRAM(s) Oral every 6 hours PRN Temp greater or equal to 38C (100.4F), Mild Pain (1 - 3)  dextrose Oral Gel 15 Gram(s) Oral once PRN Blood Glucose LESS THAN 70 milliGRAM(s)/deciliter  oxycodone    5 mG/acetaminophen 325 mG 1 Tablet(s) Oral every 4 hours PRN moderate-severe pain      CAPILLARY BLOOD GLUCOSE      POCT Blood Glucose.: 192 mg/dL (25 May 2024 21:17)  POCT Blood Glucose.: 127 mg/dL (25 May 2024 18:01)  POCT Blood Glucose.: 170 mg/dL (25 May 2024 12:28)  POCT Blood Glucose.: 105 mg/dL (25 May 2024 08:26)    I&O's Summary      PHYSICAL EXAM:  Vital Signs Last 24 Hrs  T(C): 36.5 (26 May 2024 05:29), Max: 36.8 (25 May 2024 15:20)  T(F): 97.7 (26 May 2024 05:29), Max: 98.3 (25 May 2024 15:20)  HR: 66 (26 May 2024 05:29) (66 - 70)  BP: 107/55 (26 May 2024 05:29) (100/55 - 109/53)  BP(mean): --  RR: 18 (26 May 2024 05:29) (17 - 18)  SpO2: 100% (26 May 2024 05:29) (100% - 100%)    Parameters below as of 26 May 2024 05:29  Patient On (Oxygen Delivery Method): room air        CONSTITUTIONAL: NAD,  HEENT: EOMI, moist mucous membranes.  NECK: Supple. No JVD.  RESPIRATORY: Normal respiratory effort, Lungs CTAB  CARDIOVASCULAR: Regular rate and rhythm with normal S1 and S2. No murmurs.  ABDOMEN: Soft, non-tender, non-distended. Normoactive bowel sounds, no rebound/guarding; No hepatosplenomegaly  EXTREMITIES: 2+ peripheral pulses. No clubbing, cyanosis, or edema.  MUSCULOSKELETAL: No joint swelling or tenderness to palpation  SKIN: No rashes or lesions  NEUROLOGIC: A&Ox3. No focal deficits.  PSYCH: Affect appropriate    LABS:                        10.1   6.13  )-----------( 341      ( 25 May 2024 06:56 )             31.2     05-25    139  |  108<H>  |  16  ----------------------------<  99  4.6   |  20<L>  |  0.72    Ca    9.1      25 May 2024 06:56  Phos  3.0     05-25  Mg     2.10     05-25    TPro  6.6  /  Alb  3.4  /  TBili  0.3  /  DBili  x   /  AST  10  /  ALT  7   /  AlkPhos  56  05-25          Urinalysis Basic - ( 25 May 2024 06:56 )    Color: x / Appearance: x / SG: x / pH: x  Gluc: 99 mg/dL / Ketone: x  / Bili: x / Urobili: x   Blood: x / Protein: x / Nitrite: x   Leuk Esterase: x / RBC: x / WBC x   Sq Epi: x / Non Sq Epi: x / Bacteria: x        Culture - Acid Fast - Tissue w/Smear (collected 24 May 2024 08:06)  Source: .Tissue left foot clean margin  Preliminary Report (25 May 2024 23:09):    Culture is being performed.    Culture - Fungal, Tissue (collected 24 May 2024 08:06)  Source: .Tissue LEFT FOOT CLEAN MARGIN  Preliminary Report (25 May 2024 23:03):    Culture is being performed. Fungal cultures are held for 4 weeks.    Culture - Tissue with Gram Stain (collected 24 May 2024 08:06)  Source: .Tissue LEFT FOOT CLEAN MARGIN  Gram Stain (24 May 2024 14:49):    Rare polymorphonuclear leukocytes seen per low power field    No organisms seen  Preliminary Report (25 May 2024 10:31):    No growth to date        RADIOLOGY & ADDITIONAL TESTS:  Results Reviewed:   Imaging Personally Reviewed:  Electrocardiogram Personally Reviewed:    COORDINATION OF CARE:  Care Discussed with Consultants/Other Providers [Y/N]:  Prior or Outpatient Records Reviewed [Y/N]:

## 2024-05-26 NOTE — PROGRESS NOTE ADULT - ATTENDING COMMENTS
78 yr old woman PMH HTN, HLD, T2DM (on insulin and orals), NSTEMI  11/2023-medically managed, HFrEF (EF 20-25% 11/2023), LV thrombus (resolved on repeat TTE outpt) previously on warfarin (Dced by outpt cards on 1/2024), chronic hypotension (baseline SBP 90s) p/w LLE swelling and necrotic left toe wound, Found to have MRI findings c/f osteomyelitis s/p L foot partial hallux amputation 5/23.    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 f/u OR cx  5/21 Bcx NGTD, MRSA swab negative. 5/22 left wound cx with staph hemolyticus resistant to PCN. DC Unasyn, switch to Bactrim until OR cx returns  D/C on home insulin dose  PT reeval: no needs    DC planning home

## 2024-05-26 NOTE — PROGRESS NOTE ADULT - REASON FOR ADMISSION
Dr. Owens's Preoperative Instructions Before and After UroLift Procedure at the surgery center  The following instructions will help you care for yourself, or be cared for before and after your procedure.      Diet  Drink plenty of liquids and eat light meals after the procedure.  It is very important to treat plenty of fluids  after your procedure.   Do not eat or drink anything after midnight the night before your procedure.    Anesthesia Precautions & Expectations  You will receive a twilight sedation anesthetic.  After anesthesia, rest for 24 hours.    Do not drive, drink alcoholic beverages or make any important decisions during this time.  You will need someone to drive you the day of your procedure.  Please take the medications 1 hour prior to your procedure.      What to take before the procedure  We will give you an antibiotic to be taken one hour ahead of time that day and for the next 3 days.   We will also give you an anti-inflammatory medication and a medication for urinary burning to be taken starting that day and for the next few days.     Take all the medicines 1 hour before your procedure.    Activity  Start normal activities in twenty-four (24) hours.    Wound Care and Hygiene  No restrictions, start normal routine.    What to Expect after Surgery  Mild pain with voiding.  Frequency or urgency - expect these to occur for 2-4 weeks after surgery. Call if these are severe and we will give a medication to help with them.  Bladder cramps.  Minimal bleeding with voiding.    Call your Doctor  Passing clots in urine preventing bladder emptying  Severe pain not controlled by oral medication  Temperature above 101.5 degrees  Inability to urinate within eight (8) hours after surgery    Other Contacts  Urology Office:  [address] [id number]   [place] [id number] [phone] office  [phone] fax  
LLE Wound and swelling

## 2024-05-26 NOTE — DISCHARGE NOTE NURSING/CASE MANAGEMENT/SOCIAL WORK - NSPROEXTENSIONSOFSELF_GEN_A_NUR
"Physical Therapy Evaluation completed.   Bed Mobility:  Supine to Sit: Supervised  Transfers: Sit to Stand: Supervised  Gait: Level Of Assist: Stand by Assist with Using therapist as guide       Plan of Care: Patient with no further skilled PT needs in the acute care setting at this time and Patient demonstrates safety with mobility in this environment at this time.   Discharge Recommendations: Equipment: No Equipment Needed. Post-acute therapy recommended after discharged home.    See \"Rehab Therapy-Acute\" Patient Summary Report for complete documentation.     "
hearing aid

## 2024-05-26 NOTE — PROGRESS NOTE ADULT - PROBLEM SELECTOR PROBLEM 1
Diabetic ulcer of left foot

## 2024-05-26 NOTE — PROGRESS NOTE ADULT - PROBLEM SELECTOR PLAN 5
Hx of HTN, continue home GDMT as above, BPs acceptable

## 2024-05-26 NOTE — DISCHARGE NOTE NURSING/CASE MANAGEMENT/SOCIAL WORK - PATIENT PORTAL LINK FT
You can access the FollowMyHealth Patient Portal offered by [facility] by registering at the following website: [email or web address]. By joining [employer]’s FollowMyHealth portal, you will also be able to view your health information using other applications (apps) compatible with our system.

## 2024-05-26 NOTE — PROGRESS NOTE ADULT - PROVIDER SPECIALTY LIST ADULT
Cardiology
Internal Medicine
Podiatry
Podiatry
Vascular Surgery
Podiatry
Vascular Surgery
Podiatry
Vascular Surgery
Cardiology
Vascular Surgery
Internal Medicine

## 2024-05-26 NOTE — DISCHARGE NOTE NURSING/CASE MANAGEMENT/SOCIAL WORK - NSDCFUADDAPPT_GEN_ALL_CORE_FT
Podiatry Discharge Instructions:  Follow up: Please follow up with Dr. Grande within 1 week of discharge from the hospital, please call [phone] for appointment and discuss that you recently were seen in the hospital.  Wound Care: Please leave your dressing clean dry intact until your follow up appointment .  Weight bearing: Please weight bear as tolerated to left heel in a surgical shoe.  Antibiotics: Please continue as instructed.

## 2024-05-27 ENCOUNTER — NON-APPOINTMENT (OUTPATIENT)
Age: 79
End: 2024-05-27

## 2024-05-28 PROBLEM — I21.4 NON-ST ELEVATION (NSTEMI) MYOCARDIAL INFARCTION: Chronic | Status: ACTIVE | Noted: 2024-05-21

## 2024-05-28 PROBLEM — I50.20 UNSPECIFIED SYSTOLIC (CONGESTIVE) HEART FAILURE: Chronic | Status: ACTIVE | Noted: 2024-05-21

## 2024-05-28 PROBLEM — I25.10 ATHEROSCLEROTIC HEART DISEASE OF NATIVE CORONARY ARTERY WITHOUT ANGINA PECTORIS: Chronic | Status: ACTIVE | Noted: 2024-05-21

## 2024-05-30 ENCOUNTER — RESULT CHARGE (OUTPATIENT)
Age: 79
End: 2024-05-30

## 2024-06-04 ENCOUNTER — APPOINTMENT (OUTPATIENT)
Dept: INTERNAL MEDICINE | Facility: CLINIC | Age: 79
End: 2024-06-04
Payer: MEDICARE

## 2024-06-04 VITALS
HEIGHT: 63 IN | DIASTOLIC BLOOD PRESSURE: 52 MMHG | OXYGEN SATURATION: 98 % | SYSTOLIC BLOOD PRESSURE: 84 MMHG | TEMPERATURE: 97.8 F | HEART RATE: 73 BPM | BODY MASS INDEX: 24.8 KG/M2 | WEIGHT: 140 LBS

## 2024-06-04 DIAGNOSIS — E83.52 HYPERCALCEMIA: ICD-10-CM

## 2024-06-04 DIAGNOSIS — R21 RASH AND OTHER NONSPECIFIC SKIN ERUPTION: ICD-10-CM

## 2024-06-04 DIAGNOSIS — Z79.01 LONG TERM (CURRENT) USE OF ANTICOAGULANTS: ICD-10-CM

## 2024-06-04 DIAGNOSIS — Z01.818 ENCOUNTER FOR OTHER PREPROCEDURAL EXAMINATION: ICD-10-CM

## 2024-06-04 DIAGNOSIS — I24.0 ACUTE CORONARY THROMBOSIS NOT RESULTING IN MYOCARDIAL INFARCTION: ICD-10-CM

## 2024-06-04 DIAGNOSIS — R05.8 OTHER SPECIFIED COUGH: ICD-10-CM

## 2024-06-04 DIAGNOSIS — M86.10 OTHER ACUTE OSTEOMYELITIS, UNSPECIFIED SITE: ICD-10-CM

## 2024-06-04 DIAGNOSIS — Z09 ENCOUNTER FOR FOLLOW-UP EXAMINATION AFTER COMPLETED TREATMENT FOR CONDITIONS OTHER THAN MALIGNANT NEOPLASM: ICD-10-CM

## 2024-06-04 PROCEDURE — 99496 TRANSJ CARE MGMT HIGH F2F 7D: CPT

## 2024-06-04 RX ORDER — BUDESONIDE AND FORMOTEROL FUMARATE DIHYDRATE 160; 4.5 UG/1; UG/1
160-4.5 AEROSOL RESPIRATORY (INHALATION)
Qty: 1 | Refills: 3 | Status: COMPLETED | COMMUNITY
Start: 2024-02-07 | End: 2024-06-04

## 2024-06-04 RX ORDER — NYSTATIN AND TRIAMCINOLONE ACETONIDE 100000; 1 MG/G; MG/G
100000-0.1 CREAM TOPICAL 3 TIMES DAILY
Qty: 60 | Refills: 0 | Status: COMPLETED | COMMUNITY
Start: 2023-03-24 | End: 2024-06-04

## 2024-06-05 NOTE — PHYSICAL EXAM
[No Acute Distress] : no acute distress [Well-Appearing] : well-appearing [Normal Voice/Communication] : normal voice/communication [Grossly Normal Strength/Tone] : grossly normal strength/tone [Normal Affect] : the affect was normal [Normal Insight/Judgement] : insight and judgment were intact [Normal] : normal rate, regular rhythm, normal S1 and S2 and no murmur heard [de-identified] : left lower leg swelling, nontender  [de-identified] : left foot bandaged with surgical boot on

## 2024-06-05 NOTE — ASSESSMENT
[FreeTextEntry1] : --- Left toe osteomyelitis: No pain, dressing left in place with surgery f/u scheduled in 2 days.  Left lower leg swelling - nonpitting, nontender, no erythema, no worsening per patient Completed discharge antibiotics Nightly foot checks encouraged  DM2: Controlled, A1C 6.8 --> 7.1 at endo follow up last month No med changes.   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 Follow up annually.  NSTEMI 12/203 with LV thrombus: clot resolved - discontinued warfarin remains on Entresto, metoprolol, spironolactone, Jardiance, Lipitor, plavix, and Lasix (40mg daily) She will follow-up with cariology in 1 month Find she is a little slower with activities, no shortness of breath, no dizziness, no palpitations

## 2024-06-05 NOTE — REVIEW OF SYSTEMS
[Fever] : no fever [Chills] : no chills [Fatigue] : no fatigue [Night Sweats] : no night sweats [Chest Pain] : no chest pain [Orthopnea] : no orthopnea [Shortness Of Breath] : no shortness of breath [Wheezing] : no wheezing [Cough] : no cough [Abdominal Pain] : no abdominal pain [Nausea] : no nausea [Diarrhea] : diarrhea [Vomiting] : no vomiting [Joint Pain] : no joint pain [Muscle Pain] : no muscle pain

## 2024-06-05 NOTE — HISTORY OF PRESENT ILLNESS
[Post-hospitalization from ___ Hospital] : Post-hospitalization from [unfilled] Hospital [Admitted on: ___] : The patient was admitted on [unfilled] [Discharged on ___] : discharged on [unfilled] [Discharge Summary] : discharge summary [Pertinent Labs] : pertinent labs [Radiology Findings] : radiology findings [Discharge Med List] : discharge medication list [Med Reconciliation] : medication reconciliation has been completed [Patient Contacted By: ____] : and contacted by [unfilled] [FreeTextEntry2] : Reason for Admission LLE Wound and swelling Hospital Course  HPI: 78 F w/ pmhx of HTN, HLD, T2DM (on insulin) CAD, NSTEMI on 11/2023, HFrEF, p/w LLE swelling and left toe wound. Patient denies any known trauma to the left toe but endorses left toe swelling that began 3-4 weeks prior without any pain. Patient was evaluated by her primary podiatrist and per patient she had some injection preformed. Patient was later evaluated by a primary medical provider and was advised to come to the hospital given the wound. Patient denies any fever, chill, pain to the foot or wound, N/V, or chest pain. Endorses left leg swelling without redness. Denies any other previous wounds. Denies any chest pain, N/V, abdominal pain.  ED course: Given Vanc and Zosyn  (21 May 2024 10:13)  Hospital Course: The patient was admitted to medicine for left 1st toe wound. MR of the left foot obtained and was positive for left 1st toe osteomyelitis. Patient was started on Vancomycin and transitioned to Bactrim when sensitivities resulted from wound cultures. Podiatry was consulted and recommended Left 1st hallux resection. Patient was evaluated by Vascular surgery and determined to have good vascular flow without any need for intervention. Left 1st Hallux resection was done on 5/23 with Podiatry, low concern for residual bone or tissue infection. Post-op clean margin bone cultures remained negative at 48h. Patient to be discharged on Bactrim DS 160mg-800mg daily for a total 7 days course through 5/29 and follow up outpatient with podiatry. She was seen by PT and did not have any PT needs. Patient declined home health services including nursing and wound care.  Important Medication Changes and Reason: - Bactrim 160mg-800mg daily through 5/29 to complete 7d course of abx  Active or Pending Issues Requiring Follow-up: - podiatry follow up for L foot osteomyelitis - PCP follow up for general bloodwork - endocrinology follow up for DM2  Discharge Diagnoses: Left foot Osteomyelitis  Discharge Medications: atorvastatin 80 mg oral tablet: 1 tab(s) orally once a day (at bedtime) Bactrim  mg-160 mg oral tablet: 1 tab(s) orally once a day take 5/28-5/29 clopidogrel 75 mg oral tablet: 1 tab(s) orally once a day furosemide 40 mg oral tablet: 1 tab(s) orally 2 times a day Jardiance 10 mg oral tablet: 1 tab(s) orally once a day Levemir FlexPen 100 units/mL subcutaneous solution: 13 unit(s) subcutaneous once a day Levemir FlexPen 100 units/mL subcutaneous solution: 23 unit(s) subcutaneous once a day (at bedtime) metFORMIN 500 mg oral tablet: 2 tab(s) orally 2 times a day metoprolol succinate 25 mg oral tablet, extended release: 1 tab(s) orally once a day sacubitril-valsartan 24 mg-26 mg oral tablet: 1 tab(s) orally 2 times a day spironolactone 25 mg oral tablet: 1 tab(s) orally once a day  Presents for follow up today at primary care.  Feeling well.  No pain.  No fever/chills/sweats/nausea.  Scheduled for follow up with her podiatrist in 2 days for wound check and again next week (Dr. Sandro Grande).

## 2024-06-06 ENCOUNTER — RESULT CHARGE (OUTPATIENT)
Age: 79
End: 2024-06-06

## 2024-06-12 ENCOUNTER — APPOINTMENT (OUTPATIENT)
Dept: INTERNAL MEDICINE | Facility: CLINIC | Age: 79
End: 2024-06-12
Payer: MEDICARE

## 2024-06-12 VITALS
OXYGEN SATURATION: 98 % | WEIGHT: 142 LBS | DIASTOLIC BLOOD PRESSURE: 48 MMHG | RESPIRATION RATE: 16 BRPM | HEART RATE: 71 BPM | TEMPERATURE: 97.3 F | HEIGHT: 63 IN | BODY MASS INDEX: 25.15 KG/M2 | SYSTOLIC BLOOD PRESSURE: 88 MMHG

## 2024-06-12 VITALS — DIASTOLIC BLOOD PRESSURE: 43 MMHG | SYSTOLIC BLOOD PRESSURE: 76 MMHG | HEART RATE: 70 BPM

## 2024-06-12 PROCEDURE — 99214 OFFICE O/P EST MOD 30 MIN: CPT

## 2024-06-12 RX ORDER — BENZONATATE 100 MG/1
100 CAPSULE ORAL 3 TIMES DAILY
Qty: 42 | Refills: 0 | Status: COMPLETED | COMMUNITY
Start: 2023-12-13 | End: 2024-06-12

## 2024-06-12 RX ORDER — BLOOD PRESSURE TEST KIT-LARGE
KIT MISCELLANEOUS
Qty: 1 | Refills: 0 | Status: ACTIVE | COMMUNITY
Start: 2024-06-12 | End: 1900-01-01

## 2024-06-12 RX ORDER — FUROSEMIDE 40 MG/1
40 TABLET ORAL TWICE DAILY
Qty: 180 | Refills: 1 | Status: ACTIVE | COMMUNITY
Start: 2023-12-13

## 2024-06-12 NOTE — ASSESSMENT
[FreeTextEntry1] : --- Hypotension: Asymptomatic  Patient unsure of medications she is taking, will call and review.  If taking as on med list, suggest holding spironolactone, will confirm with PCP and cardiology  Check labs today F/u 1 week for BP check   Left leg swelling: Reports historically with left leg swelling, however given recent surgery recommend eval with duplex Discussed compression stockings, but need open toe d/t recent toe surgery   DM2: A1C today, has been controlled Trying to schedule sooner appointment with endo, currently September   Cardio f/u scheduled next month PCP f/u scheduled in 3 months

## 2024-06-12 NOTE — REVIEW OF SYSTEMS
[Fever] : no fever [Chills] : no chills [Fatigue] : no fatigue [Night Sweats] : no night sweats [Chest Pain] : no chest pain [Orthopnea] : no orthopnea [Wheezing] : no wheezing [Cough] : no cough [Dyspnea on Exertion] : no dyspnea on exertion [Abdominal Pain] : no abdominal pain [Nausea] : no nausea [Diarrhea] : diarrhea [Vomiting] : no vomiting [Dysuria] : no dysuria [Incontinence] : no incontinence [Hematuria] : no hematuria [Joint Pain] : no joint pain [Muscle Pain] : no muscle pain [Back Pain] : no back pain [Skin Rash] : no skin rash [Easy Bleeding] : no easy bleeding [Easy Bruising] : no easy bruising [Swollen Glands] : no swollen glands [FreeTextEntry5] : persistent left lower leg swelling

## 2024-06-12 NOTE — HISTORY OF PRESENT ILLNESS
[de-identified] : 78 y.o. female, PMHx HTN, HLD, DM2 on insulin, NSTEMI 12/2023 with LV thrombus and CHF (HFrEF), left toe osteo s/p amputation/halux resection 5/23/24.   Presents for follow up BP.  Low BP at visit last week, reports feeling overall well. No dizziness/lightheadedness, no fatigue, no chest pain, no dyspnea, no fever/chills/sweats, no nausea, no pain, no rash.  Completed antibiotic from discharge.  Weekly follow up with surgeon/podiatry.  Reports wound healing well.

## 2024-06-12 NOTE — PHYSICAL EXAM
[No Acute Distress] : no acute distress [Well-Appearing] : well-appearing [Normal Voice/Communication] : normal voice/communication [Normal Rate] : normal rate  [Regular Rhythm] : with a regular rhythm [Normal S1, S2] : normal S1 and S2 [No Murmur] : no murmur heard [Pedal Pulses Present] : the pedal pulses are present [Normal Supraclavicular Nodes] : no supraclavicular lymphadenopathy [Normal Posterior Cervical Nodes] : no posterior cervical lymphadenopathy [Normal Anterior Cervical Nodes] : no anterior cervical lymphadenopathy [Normal] : no CVA or spinal tenderness [Grossly Normal Strength/Tone] : grossly normal strength/tone [Coordination Grossly Intact] : coordination grossly intact [No Focal Deficits] : no focal deficits [Normal Gait] : normal gait [Normal Affect] : the affect was normal [Alert and Oriented x3] : oriented to person, place, and time [Normal Insight/Judgement] : insight and judgment were intact [de-identified] : left lower leg swelling

## 2024-06-13 ENCOUNTER — RESULT CHARGE (OUTPATIENT)
Age: 79
End: 2024-06-13

## 2024-06-13 ENCOUNTER — APPOINTMENT (OUTPATIENT)
Dept: ULTRASOUND IMAGING | Facility: IMAGING CENTER | Age: 79
End: 2024-06-13

## 2024-06-13 ENCOUNTER — OUTPATIENT (OUTPATIENT)
Dept: OUTPATIENT SERVICES | Facility: HOSPITAL | Age: 79
LOS: 1 days | End: 2024-06-13
Payer: MEDICARE

## 2024-06-13 DIAGNOSIS — Z98.89 OTHER SPECIFIED POSTPROCEDURAL STATES: Chronic | ICD-10-CM

## 2024-06-13 DIAGNOSIS — M79.89 OTHER SPECIFIED SOFT TISSUE DISORDERS: ICD-10-CM

## 2024-06-13 PROCEDURE — 93971 EXTREMITY STUDY: CPT

## 2024-06-13 PROCEDURE — 93971 EXTREMITY STUDY: CPT | Mod: 26,LT

## 2024-06-14 LAB
ALBUMIN SERPL ELPH-MCNC: 4.5 G/DL
ALP BLD-CCNC: 77 U/L
ALT SERPL-CCNC: 11 U/L
ANION GAP SERPL CALC-SCNC: 15 MMOL/L
APPEARANCE: CLEAR
AST SERPL-CCNC: 14 U/L
BACTERIA: NEGATIVE /HPF
BASOPHILS # BLD AUTO: 0.06 K/UL
BASOPHILS NFR BLD AUTO: 0.7 %
BILIRUB SERPL-MCNC: 0.4 MG/DL
BILIRUBIN URINE: NEGATIVE
BLOOD URINE: NEGATIVE
BUN SERPL-MCNC: 52 MG/DL
CALCIUM SERPL-MCNC: 10 MG/DL
CAST: 4 /LPF
CHLORIDE SERPL-SCNC: 101 MMOL/L
CO2 SERPL-SCNC: 21 MMOL/L
COLOR: YELLOW
CREAT SERPL-MCNC: 1.1 MG/DL
EGFR: 51 ML/MIN/1.73M2
EOSINOPHIL # BLD AUTO: 0.16 K/UL
EOSINOPHIL NFR BLD AUTO: 1.9 %
EPITHELIAL CELLS: 9 /HPF
ESTIMATED AVERAGE GLUCOSE: 157 MG/DL
GLUCOSE QUALITATIVE U: 250 MG/DL
GLUCOSE SERPL-MCNC: 97 MG/DL
HBA1C MFR BLD HPLC: 7.1 %
HCT VFR BLD CALC: 31 %
HGB BLD-MCNC: 10.3 G/DL
IMM GRANULOCYTES NFR BLD AUTO: 0.4 %
KETONES URINE: NEGATIVE MG/DL
LEUKOCYTE ESTERASE URINE: ABNORMAL
LYMPHOCYTES # BLD AUTO: 1.58 K/UL
LYMPHOCYTES NFR BLD AUTO: 19.1 %
MAN DIFF?: NORMAL
MCHC RBC-ENTMCNC: 28 PG
MCHC RBC-ENTMCNC: 33.2 GM/DL
MCV RBC AUTO: 84.2 FL
MICROSCOPIC-UA: NORMAL
MONOCYTES # BLD AUTO: 0.41 K/UL
MONOCYTES NFR BLD AUTO: 5 %
NEUTROPHILS # BLD AUTO: 6.04 K/UL
NEUTROPHILS NFR BLD AUTO: 72.9 %
NITRITE URINE: NEGATIVE
PH URINE: 5.5
PLATELET # BLD AUTO: 363 K/UL
POTASSIUM SERPL-SCNC: 4.5 MMOL/L
PROT SERPL-MCNC: 7.6 G/DL
PROTEIN URINE: NEGATIVE MG/DL
RBC # BLD: 3.68 M/UL
RBC # FLD: 19.7 %
RED BLOOD CELLS URINE: 1 /HPF
SODIUM SERPL-SCNC: 137 MMOL/L
SPECIFIC GRAVITY URINE: 1.01
UROBILINOGEN URINE: 0.2 MG/DL
WBC # FLD AUTO: 8.28 K/UL
WHITE BLOOD CELLS URINE: 6 /HPF

## 2024-06-17 ENCOUNTER — NON-APPOINTMENT (OUTPATIENT)
Age: 79
End: 2024-06-17

## 2024-06-18 DIAGNOSIS — N39.0 URINARY TRACT INFECTION, SITE NOT SPECIFIED: ICD-10-CM

## 2024-06-18 LAB — BACTERIA UR CULT: ABNORMAL

## 2024-06-18 RX ORDER — SULFAMETHOXAZOLE AND TRIMETHOPRIM 800; 160 MG/1; MG/1
800-160 TABLET ORAL
Qty: 6 | Refills: 0 | Status: ACTIVE | COMMUNITY
Start: 2024-06-18 | End: 1900-01-01

## 2024-06-19 ENCOUNTER — APPOINTMENT (OUTPATIENT)
Dept: INTERNAL MEDICINE | Facility: CLINIC | Age: 79
End: 2024-06-19
Payer: MEDICARE

## 2024-06-19 VITALS
BODY MASS INDEX: 24.63 KG/M2 | WEIGHT: 139 LBS | TEMPERATURE: 97.4 F | SYSTOLIC BLOOD PRESSURE: 96 MMHG | DIASTOLIC BLOOD PRESSURE: 56 MMHG | HEIGHT: 63 IN | HEART RATE: 66 BPM | OXYGEN SATURATION: 98 %

## 2024-06-19 DIAGNOSIS — M79.89 OTHER SPECIFIED SOFT TISSUE DISORDERS: ICD-10-CM

## 2024-06-19 PROCEDURE — 99213 OFFICE O/P EST LOW 20 MIN: CPT

## 2024-06-19 NOTE — ASSESSMENT
[FreeTextEntry1] : --- Hypotension: Improved with holding Entresto Continues all other meds Cardio f/u next week   UTI: Bactrim sent to pharmacy, reminded to start  Anemia: Repeat today FIT if not improved   s/p toe amputation 2/2 osteomyelitis: Healing well, continues surgery follow up  LE swelling: Duplex with no DVT Discussed compression stocking, Rx provided, has some at home as well

## 2024-06-19 NOTE — PHYSICAL EXAM
[No Acute Distress] : no acute distress [Well-Appearing] : well-appearing [Normal Voice/Communication] : normal voice/communication [Pedal Pulses Present] : the pedal pulses are present [Coordination Grossly Intact] : coordination grossly intact [No Focal Deficits] : no focal deficits [Normal Gait] : normal gait [Normal] : affect was normal and insight and judgment were intact [de-identified] : mild left lower leg swelling

## 2024-06-19 NOTE — HISTORY OF PRESENT ILLNESS
[de-identified] : Presents for blood pressure check.  Feels too nervous to check it at home, returned the blood pressure cuff she bought.  Continues to feel well.   Not taking Entresto as we discussed.   One more follow up with podiatry/surgeon.

## 2024-06-20 ENCOUNTER — RESULT CHARGE (OUTPATIENT)
Age: 79
End: 2024-06-20

## 2024-06-20 LAB
HCT VFR BLD CALC: 33.2 %
HGB BLD-MCNC: 10.4 G/DL
MCHC RBC-ENTMCNC: 28.2 PG
MCHC RBC-ENTMCNC: 31.3 GM/DL
MCV RBC AUTO: 90 FL
PLATELET # BLD AUTO: 285 K/UL
RBC # BLD: 3.69 M/UL
RBC # FLD: 19.8 %
WBC # FLD AUTO: 6.98 K/UL

## 2024-06-21 ENCOUNTER — APPOINTMENT (OUTPATIENT)
Dept: ENDOCRINOLOGY | Facility: CLINIC | Age: 79
End: 2024-06-21
Payer: MEDICARE

## 2024-06-21 VITALS
HEART RATE: 68 BPM | HEIGHT: 72 IN | SYSTOLIC BLOOD PRESSURE: 102 MMHG | WEIGHT: 141 LBS | DIASTOLIC BLOOD PRESSURE: 50 MMHG | OXYGEN SATURATION: 98 % | BODY MASS INDEX: 19.1 KG/M2

## 2024-06-21 DIAGNOSIS — E78.5 HYPERLIPIDEMIA, UNSPECIFIED: ICD-10-CM

## 2024-06-21 DIAGNOSIS — E55.9 VITAMIN D DEFICIENCY, UNSPECIFIED: ICD-10-CM

## 2024-06-21 LAB — GLUCOSE BLDC GLUCOMTR-MCNC: 142

## 2024-06-21 PROCEDURE — 82962 GLUCOSE BLOOD TEST: CPT

## 2024-06-21 PROCEDURE — 99214 OFFICE O/P EST MOD 30 MIN: CPT

## 2024-06-21 PROCEDURE — 36415 COLL VENOUS BLD VENIPUNCTURE: CPT

## 2024-06-21 NOTE — HISTORY OF PRESENT ILLNESS
[FreeTextEntry1] : Ms. SAWYER is a 78-year-old female who returns today in follow up with regard to a history of type 2 diabetes mellitus. The diabetes mellitus has been present for about 20 years There is no known history of nephropathy. She too denies any history of neuropathy. There is a hx of retinopathy.  In may 2024 she presented to hospital for LLE wound and swelling, diagnosed with left toe osteomyelitits . Left 1st Hallux resection was done on 5/23/24 with Podiatry, low concern for residual bone or tissue infection. Now following with podiatry   The patient suffered an MI at the end of November 2023 the developed left ventricular thrombosis a few months later. Patient reports left leg swelling for the past few months. She is being closely followed by cardiology.  She has been having low BP following with PCP and cardiology. she is holding entresto at this time. pending appt with cardiology on 6/24/24  Current dm medications include Metformin 500 mg [2 tabs BID], jardiance 10 mgand Levemir pen [13 units in the AM and 23 units HS].  Patient denies any chest pain, sob, neurologic or ophthalmologic complaints. She too denies any new podiatric concerns. She is NOT up to date with her ophthalmologic visit.  HGM of late has shown values to be running AM  and later in the day 150-160s  She does deny any significant hypoglycemic signs and symptoms of late.  a1c returned 7.1% on 6/14/24  POCT glucose returned today at   142 mg/dl  Patient notes that she has lost weight. She does note that she is watching her diet, but not enough to cause weight change. She does live alone. She cooks at times, and gets takeout at other times. Her current weight is 142  ____________________________________________  Additional medical history includes that of Hyperlipidemia and hypertension. 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  Additional medication: Plavix, atorvastatin 80 mf, furosemide 40 mg  , spirinolactone 25 mg

## 2024-06-21 NOTE — ADDENDUM
[FreeTextEntry1] :  blood will be drawn in the office today Today's evaluation reviewed with Dr. Rai along with any changes and plan of care.

## 2024-06-22 LAB — HEMOCCULT STL QL IA: POSITIVE

## 2024-06-24 ENCOUNTER — APPOINTMENT (OUTPATIENT)
Dept: CARDIOLOGY | Facility: CLINIC | Age: 79
End: 2024-06-24
Payer: MEDICARE

## 2024-06-24 VITALS — SYSTOLIC BLOOD PRESSURE: 100 MMHG | DIASTOLIC BLOOD PRESSURE: 48 MMHG

## 2024-06-24 VITALS
SYSTOLIC BLOOD PRESSURE: 98 MMHG | HEART RATE: 64 BPM | OXYGEN SATURATION: 100 % | HEIGHT: 63 IN | DIASTOLIC BLOOD PRESSURE: 42 MMHG | WEIGHT: 141 LBS | BODY MASS INDEX: 24.98 KG/M2

## 2024-06-24 DIAGNOSIS — I21.02 ST ELEVATION (STEMI) MYOCARDIAL INFARCTION INVOLVING LEFT ANTERIOR DESCENDING CORONARY ARTERY: ICD-10-CM

## 2024-06-24 DIAGNOSIS — I95.9 HYPOTENSION, UNSPECIFIED: ICD-10-CM

## 2024-06-24 DIAGNOSIS — E11.9 TYPE 2 DIABETES MELLITUS W/OUT COMPLICATIONS: ICD-10-CM

## 2024-06-24 DIAGNOSIS — I10 ESSENTIAL (PRIMARY) HYPERTENSION: ICD-10-CM

## 2024-06-24 DIAGNOSIS — I50.9 HEART FAILURE, UNSPECIFIED: ICD-10-CM

## 2024-06-24 DIAGNOSIS — R06.02 SHORTNESS OF BREATH: ICD-10-CM

## 2024-06-24 LAB
25(OH)D3 SERPL-MCNC: 43 NG/ML
T3FREE SERPL-MCNC: 2.36 PG/ML
T4 FREE SERPL-MCNC: 1.2 NG/DL
TSH SERPL-ACNC: 1.67 UIU/ML

## 2024-06-24 PROCEDURE — 99213 OFFICE O/P EST LOW 20 MIN: CPT

## 2024-06-25 DIAGNOSIS — R79.89 OTHER SPECIFIED ABNORMAL FINDINGS OF BLOOD CHEMISTRY: ICD-10-CM

## 2024-06-25 DIAGNOSIS — R19.5 OTHER FECAL ABNORMALITIES: ICD-10-CM

## 2024-06-25 LAB
ANION GAP SERPL CALC-SCNC: 15 MMOL/L
BUN SERPL-MCNC: 44 MG/DL
CALCIUM SERPL-MCNC: 9.6 MG/DL
CHLORIDE SERPL-SCNC: 100 MMOL/L
CO2 SERPL-SCNC: 23 MMOL/L
CREAT SERPL-MCNC: 1.1 MG/DL
EGFR: 51 ML/MIN/1.73M2
GLUCOSE SERPL-MCNC: 221 MG/DL
POTASSIUM SERPL-SCNC: 4.7 MMOL/L
SODIUM SERPL-SCNC: 138 MMOL/L

## 2024-06-26 PROBLEM — Z79.01 LONG TERM (CURRENT) USE OF ANTICOAGULANTS: Status: RESOLVED | Noted: 2023-12-13 | Resolved: 2024-06-26

## 2024-06-26 PROBLEM — E83.52 SERUM CALCIUM ELEVATED: Noted: 2022-05-26

## 2024-06-26 PROBLEM — Z09 HOSPITAL DISCHARGE FOLLOW-UP: Status: ACTIVE | Noted: 2023-12-13

## 2024-06-26 PROBLEM — M86.10 OSTEOMYELITIS, ACUTE: Status: ACTIVE | Noted: 2024-06-26

## 2024-06-26 PROBLEM — R21 RASH OF GROIN: Status: RESOLVED | Noted: 2023-03-24 | Resolved: 2024-06-26

## 2024-06-26 PROBLEM — R05.8 COUGH WITH SPUTUM: Status: RESOLVED | Noted: 2023-11-27 | Resolved: 2024-06-26

## 2024-06-26 PROBLEM — Z01.818 PREOP TESTING: Status: RESOLVED | Noted: 2021-08-29 | Resolved: 2024-06-26

## 2024-06-26 PROBLEM — I24.0 ACUTE THROMBUS OF LEFT VENTRICLE: Noted: 2023-12-08

## 2024-06-27 ENCOUNTER — RESULT CHARGE (OUTPATIENT)
Age: 79
End: 2024-06-27

## 2024-07-08 ENCOUNTER — RX RENEWAL (OUTPATIENT)
Age: 79
End: 2024-07-08

## 2024-07-09 DIAGNOSIS — R79.89 OTHER SPECIFIED ABNORMAL FINDINGS OF BLOOD CHEMISTRY: ICD-10-CM

## 2024-07-09 DIAGNOSIS — R19.5 OTHER FECAL ABNORMALITIES: ICD-10-CM

## 2024-07-10 LAB
FERRITIN SERPL-MCNC: 45 NG/ML
HCT VFR BLD CALC: 36.9 %
HGB BLD-MCNC: 11.6 G/DL
IRON SATN MFR SERPL: 15 %
IRON SERPL-MCNC: 57 UG/DL
MCHC RBC-ENTMCNC: 29.5 PG
MCHC RBC-ENTMCNC: 31.4 GM/DL
MCV RBC AUTO: 93.9 FL
PLATELET # BLD AUTO: 329 K/UL
RBC # BLD: 3.93 M/UL
RBC # FLD: 17.8 %
UIBC SERPL-MCNC: 329 UG/DL
VIT B12 SERPL-MCNC: 396 PG/ML
WBC # FLD AUTO: 6.67 K/UL

## 2024-07-23 ENCOUNTER — RX RENEWAL (OUTPATIENT)
Age: 79
End: 2024-07-23

## 2024-08-31 ENCOUNTER — RX RENEWAL (OUTPATIENT)
Age: 79
End: 2024-08-31

## 2024-09-09 ENCOUNTER — LABORATORY RESULT (OUTPATIENT)
Age: 79
End: 2024-09-09

## 2024-09-09 ENCOUNTER — APPOINTMENT (OUTPATIENT)
Dept: INTERNAL MEDICINE | Facility: CLINIC | Age: 79
End: 2024-09-09
Payer: MEDICARE

## 2024-09-09 VITALS
HEART RATE: 73 BPM | DIASTOLIC BLOOD PRESSURE: 66 MMHG | OXYGEN SATURATION: 98 % | HEIGHT: 63 IN | WEIGHT: 147 LBS | SYSTOLIC BLOOD PRESSURE: 125 MMHG | TEMPERATURE: 97.6 F | BODY MASS INDEX: 26.05 KG/M2 | RESPIRATION RATE: 16 BRPM

## 2024-09-09 DIAGNOSIS — Z23 ENCOUNTER FOR IMMUNIZATION: ICD-10-CM

## 2024-09-09 DIAGNOSIS — M65.341 TRIGGER FINGER, RIGHT RING FINGER: ICD-10-CM

## 2024-09-09 DIAGNOSIS — I50.9 HEART FAILURE, UNSPECIFIED: ICD-10-CM

## 2024-09-09 PROCEDURE — G0008: CPT

## 2024-09-09 PROCEDURE — 99214 OFFICE O/P EST MOD 30 MIN: CPT

## 2024-09-09 PROCEDURE — G2211 COMPLEX E/M VISIT ADD ON: CPT

## 2024-09-09 PROCEDURE — 36415 COLL VENOUS BLD VENIPUNCTURE: CPT

## 2024-09-09 PROCEDURE — 90662 IIV NO PRSV INCREASED AG IM: CPT

## 2024-09-09 NOTE — ASSESSMENT
[FreeTextEntry1] : Right ring finger trigger finger - hand sp evaluation   s/p toe amputation 2/2 osteomyelitis 5/23/24 : Healing well, continues surgery follow up 5/1/24 XIOMY b/l --nl  5/21/24- arterial doppler normal   #NSTEMI (non-ST elevation myocardial infarction).11/27/23  -ECHO 5/22/2024 EF 20-25 % ECHO 11/28/23 -TTE: 11/28/23, LV cavity size and wall thickness is normal. LV systolic function is severely decreased with EF visually estimated 20 to 25%. There is mild (grade 1) left ventricular diastolic dysfunction. A large (~ 2.7 cm X 1.9 cm) left ventricular apical thrombus is visualized. There is hypokinesis of the apex, mid to distal septum, and mid to distal anterior wall. Normal RV cavity size and systolic function. Structurally normal mitral valve with normal leaflet excursion. There is calcification of the mitral valve annulus. There is mild to moderate mitral regurgitation. -Continue furosemide 40mg po BID - continue clopidogrel 75mg qd - continue sacubitril-valsartan 24-26mg BID, hold for SBP <90 (DC'd Lisinopril) - continue metop succ 25mg qd, hold for HR <50 - continue spironolactone-25 mg qd, hold for SBP <90 - continue atorvastatin 80mg  # LV thrombus 11/27/23  --clot resolved, discontinued warfarin, remains on Entresto, metoprolol, spironolactone, Jardiance, Lipitor, and Lasix  # Diabetes-AIC 6,4 11/22/23 improved form 6.6 10/23--> 7.1 6/12/24 - saw endo 7/20/23 -Ophtho 2/2023 -podiatrist 9/2023 -discussed comp with diet and medications -f/u with endo consult reviewed - cont metformin 500 mg 2 tab BID and Levemir 13 U am 23 PM and Jardiance 10  -educated pt risk of uncontrolled DM , including microvascular and macrovascular complications , retinopathy leading to blindness , nephropathy leading to ESRD requiring Dialysis , neuropathy leading to amputations , poor wound healing , CAD- leading to MI and death. -pt verbalized and understands , compliance with medication , diet and exercise enforced  -educated to Monitor Accu-Cheks daily fasting and 2 hrs post prandial , ophthalmology consult requested ,Pt to Monitor for signs of hypoglycemia. urine r/o proteins -d/w pt to eat 1800 kcal ADA diet, avoid rice, pasta, sugar, sweet, soda, juices, exercise daily.  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 Follow up annually.  Hyperlipidemia- ASCVD risk 40 %on atorva 80 qd - LDL 82 4/2024  -eat Low-fat diet, avoid red meat, cheese, butter, peanuts and exercise daily for 40 minutes.  Health maintenance PAP-3/2019- advised dexa -11/2023 nl Mammogram -11/2023 bi rad 2  Colonoscopy 10/2016 due 3 yr repeated 9/2/21 - tubular adenoma - due 3 yrs 2024-has appt 9/16/24  Flu vaccine -9/9/24  Tetanus vaccine -given 2014 Pneumovax - given 2014, Prevnar 13 given 2016 - completed skin check - referral to derm given Hep c 2016 neg shingrex advised. Moderna Covid shot first dose 1/13/21, second dose 2/13. Booster 11/2021, 10/2022 dermatologist - 12/2020 skin check - due pt to schedule appt.

## 2024-09-09 NOTE — HISTORY OF PRESENT ILLNESS
[de-identified] : this is a  78 y.o. female, PMHx HTN, HLD, DM2 on insulin, NSTEMI 11/2023 with LV thrombus and CHF (HFrEF), left toe osteo s/p amputation/halux resection 5/23/24.seen today for f/u on ch medical issues   c/o right right finger getting stuck for 8 months painful now   s/p toe amputation 2/2 osteomyelitis 5/23/24 : Healing well, continues surgery follow up  NSTEMI 11/2023 with LV thrombus and CHF (HFrEF),-fllowed by cardio   DMdx 2011  Followed by endo  -on levemir 15 units nazario m 25 units in qhs and metformin 500 mg 2 tabs BID- jardiance 10 qd compliance with medication admits - has not been taking medications regularly   -started Ozempic 0.50 q weekly 5/2023  -AIC was 8.2 3/2023 --> 7.4 6/2023  - pt agrees being compliant with all her medications  --saw eye doctor 12/2021 + retinopathy Dr Gutierrez 2/2023 has appt 10/2023  saw pod 2023 ok    Hyperlipidemia -on crestor 20 4/2022

## 2024-09-11 ENCOUNTER — APPOINTMENT (OUTPATIENT)
Dept: ENDOCRINOLOGY | Facility: CLINIC | Age: 79
End: 2024-09-11
Payer: MEDICARE

## 2024-09-11 VITALS
HEART RATE: 77 BPM | HEIGHT: 63 IN | WEIGHT: 147.38 LBS | SYSTOLIC BLOOD PRESSURE: 90 MMHG | BODY MASS INDEX: 26.11 KG/M2 | DIASTOLIC BLOOD PRESSURE: 50 MMHG | TEMPERATURE: 98 F | OXYGEN SATURATION: 99 %

## 2024-09-11 DIAGNOSIS — E11.9 TYPE 2 DIABETES MELLITUS W/OUT COMPLICATIONS: ICD-10-CM

## 2024-09-11 DIAGNOSIS — I10 ESSENTIAL (PRIMARY) HYPERTENSION: ICD-10-CM

## 2024-09-11 DIAGNOSIS — E55.9 VITAMIN D DEFICIENCY, UNSPECIFIED: ICD-10-CM

## 2024-09-11 DIAGNOSIS — E78.5 HYPERLIPIDEMIA, UNSPECIFIED: ICD-10-CM

## 2024-09-11 LAB
ALBUMIN MFR SERPL ELPH: 58.4 %
ALBUMIN SERPL ELPH-MCNC: 4.7 G/DL
ALBUMIN SERPL-MCNC: 4.4 G/DL
ALBUMIN/GLOB SERPL: 1.4 RATIO
ALP BLD-CCNC: 76 U/L
ALPHA1 GLOB MFR SERPL ELPH: 2.9 %
ALPHA1 GLOB SERPL ELPH-MCNC: 0.2 G/DL
ALPHA2 GLOB MFR SERPL ELPH: 11.5 %
ALPHA2 GLOB SERPL ELPH-MCNC: 0.9 G/DL
ALT SERPL-CCNC: 15 U/L
ANION GAP SERPL CALC-SCNC: 15 MMOL/L
AST SERPL-CCNC: 18 U/L
B-GLOBULIN MFR SERPL ELPH: 10.8 %
B-GLOBULIN SERPL ELPH-MCNC: 0.8 G/DL
BILIRUB SERPL-MCNC: 0.4 MG/DL
BUN SERPL-MCNC: 41 MG/DL
CALCIUM SERPL-MCNC: 10.1 MG/DL
CHLORIDE SERPL-SCNC: 102 MMOL/L
CHOLEST SERPL-MCNC: 142 MG/DL
CO2 SERPL-SCNC: 23 MMOL/L
CREAT SERPL-MCNC: 1.02 MG/DL
CREAT SPEC-SCNC: 25 MG/DL
EGFR: 56 ML/MIN/1.73M2
ESTIMATED AVERAGE GLUCOSE: 160 MG/DL
GAMMA GLOB FLD ELPH-MCNC: 1.2 G/DL
GAMMA GLOB MFR SERPL ELPH: 16.4 %
GLUCOSE BLDC GLUCOMTR-MCNC: 120
GLUCOSE SERPL-MCNC: 100 MG/DL
HBA1C MFR BLD HPLC: 7.2 %
HDLC SERPL-MCNC: 53 MG/DL
INTERPRETATION SERPL IEP-IMP: NORMAL
LDLC SERPL CALC-MCNC: 71 MG/DL
MICROALBUMIN 24H UR DL<=1MG/L-MCNC: <1.2 MG/DL
MICROALBUMIN/CREAT 24H UR-RTO: NORMAL MG/G
NONHDLC SERPL-MCNC: 88 MG/DL
POTASSIUM SERPL-SCNC: 4.5 MMOL/L
PROT SERPL-MCNC: 7.5 G/DL
SODIUM SERPL-SCNC: 139 MMOL/L
TRIGL SERPL-MCNC: 90 MG/DL

## 2024-09-11 PROCEDURE — G2211 COMPLEX E/M VISIT ADD ON: CPT

## 2024-09-11 PROCEDURE — 82962 GLUCOSE BLOOD TEST: CPT

## 2024-09-11 PROCEDURE — 99214 OFFICE O/P EST MOD 30 MIN: CPT

## 2024-09-11 NOTE — HISTORY OF PRESENT ILLNESS
[FreeTextEntry1] : Ms. SAWYER is a 78-year-old female who returns today in follow up with regard to a history of type 2 diabetes mellitus. The diabetes mellitus has been present for about 20 years. There is no known history of nephropathy.  There is a hx of retinopathy.  With regard to neuropathy, she denies any neurologic s/s.  The patient suffered an MI at the end of November 2023 then developed left ventricular thrombosis a few months later.  She is being closely followed by cardiology.   Current DM medications include Metformin 500mg [2 tabs BID], and Levemir pen [13 units in the AM and 23 units HS].   Home glucose monitoring has shown values of late to be  in AM  She does deny any significant hypoglycemic signs and symptoms of late.  Recent A1C returned at 7.2% on labs 09/09/2024 per Dr. Coe. Was previously 7.1% in April 2024.  POCT glucose today at 120mg/dL.  Patient has noted weight loss at the last visit in April 2024 when she weighed 142lbs, and was previously 158lbs a few weeks prior in March. She did note that she is watching her diet, but not enough to cause weight change. She does live alone. She cooks at times, and gets takeout at other times.  Her current weight is  147 pounds.  ____________________________________________________________________________________________________ Additional medical history includes that of Hyperlipidemia and hypertension. 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   Additional medication: Entresto 80 mg, Plavix Supplements: off vitamin d

## 2024-09-12 LAB — KAPPA LC 24H UR QL: NORMAL

## 2024-09-16 ENCOUNTER — APPOINTMENT (OUTPATIENT)
Dept: GASTROENTEROLOGY | Facility: CLINIC | Age: 79
End: 2024-09-16
Payer: MEDICARE

## 2024-09-16 VITALS
BODY MASS INDEX: 26.05 KG/M2 | SYSTOLIC BLOOD PRESSURE: 99 MMHG | WEIGHT: 147 LBS | OXYGEN SATURATION: 99 % | DIASTOLIC BLOOD PRESSURE: 65 MMHG | HEIGHT: 63 IN | HEART RATE: 84 BPM

## 2024-09-16 DIAGNOSIS — R19.5 OTHER FECAL ABNORMALITIES: ICD-10-CM

## 2024-09-16 DIAGNOSIS — Z86.010 PERSONAL HISTORY OF COLONIC POLYPS: ICD-10-CM

## 2024-09-16 PROCEDURE — 99204 OFFICE O/P NEW MOD 45 MIN: CPT

## 2024-09-16 NOTE — ASSESSMENT
[FreeTextEntry1] : This is a 78-year-old female with multiple comorbidities including an MI on plavix, diabetes on Jardiance, insulin and metformin, presents for a positive FIT ordered by her primary care physician.  She underwent a screening colonoscopy 3 years ago for family history of colon cancer in her sister and found to have several tubular adenomas.  Given the positive FIT testing, she will need a colonoscopy this year.  She is high risk for endoscopic procedures given her comorbidities.  She will undergo presurgical testing.  She will need to stop Jardiance 3 days prior to the planned procedure.  She will need to stop Plavix 5 days prior to the planned procedure.  The procedure will be scheduled at [facility].

## 2024-09-16 NOTE — REASON FOR VISIT
[Initial Evaluation] : an initial evaluation [FreeTextEntry1] : FH colon cancer, hx of adenomatous colon polyp, positive FIT test

## 2024-09-16 NOTE — HISTORY OF PRESENT ILLNESS
[FreeTextEntry1] : This is a pleasant 78-year-old female with history of diabetes on insulin, metformin and Jardiance, status post MI on Plavix and congestive heart failure, presents for positive FIT test performed by her primary care physician.  She denies abdominal pain, nausea or vomiting.  She denies changes in bowel habits, rectal bleeding or melena.  She denies weight loss or anemia.  She underwent a screening colonoscopy September 2021 for family history of colon cancer in her sister.  She had several tubular adenomas that were removed.

## 2024-09-16 NOTE — PHYSICAL EXAM
[Alert] : alert [Normal Voice/Communication] : normal voice/communication [Healthy Appearing] : healthy appearing [No Acute Distress] : no acute distress [Sclera] : the sclera and conjunctiva were normal [Hearing Threshold Finger Rub Not [place]] : hearing was normal [Normal Lips/Gums] : the lips and gums were normal [Oropharynx] : the oropharynx was normal [Normal Appearance] : the appearance of the neck was normal [No Neck Mass] : no neck mass was observed [No Respiratory Distress] : no respiratory distress [No Acc Muscle Use] : no accessory muscle use [Respiration, Rhythm And Depth] : normal respiratory rhythm and effort [Auscultation Breath Sounds / Voice Sounds] : lungs were clear to auscultation bilaterally [Heart Rate And Rhythm] : heart rate was normal and rhythm regular [Normal S1, S2] : normal S1 and S2 [Murmurs] : no murmurs [Bowel Sounds] : normal bowel sounds [Abdomen Tenderness] : non-tender [No Masses] : no abdominal mass palpated [Abdomen Soft] : soft [] : no hepatosplenomegaly [Oriented To Time, Place, And Person] : oriented to person, place, and time

## 2024-09-19 ENCOUNTER — OUTPATIENT (OUTPATIENT)
Dept: OUTPATIENT SERVICES | Facility: HOSPITAL | Age: 79
LOS: 1 days | Discharge: HOME | End: 2024-09-19

## 2024-09-19 DIAGNOSIS — R79.82 ELEVATED C-REACTIVE PROTEIN (CRP): ICD-10-CM

## 2024-09-19 DIAGNOSIS — Z98.41 CATARACT EXTRACTION STATUS, RIGHT EYE: Chronic | ICD-10-CM

## 2024-09-19 DIAGNOSIS — Z98.89 OTHER SPECIFIED POSTPROCEDURAL STATES: Chronic | ICD-10-CM

## 2024-09-27 ENCOUNTER — APPOINTMENT (OUTPATIENT)
Dept: HEMATOLOGY ONCOLOGY | Facility: CLINIC | Age: 79
End: 2024-09-27

## 2024-09-30 ENCOUNTER — RX RENEWAL (OUTPATIENT)
Age: 79
End: 2024-09-30

## 2024-10-02 ENCOUNTER — APPOINTMENT (OUTPATIENT)
Dept: CARDIOLOGY | Facility: CLINIC | Age: 79
End: 2024-10-02

## 2024-10-03 ENCOUNTER — APPOINTMENT (OUTPATIENT)
Dept: CARDIOLOGY | Facility: CLINIC | Age: 79
End: 2024-10-03
Payer: MEDICARE

## 2024-10-03 ENCOUNTER — NON-APPOINTMENT (OUTPATIENT)
Age: 79
End: 2024-10-03

## 2024-10-03 VITALS
HEART RATE: 70 BPM | OXYGEN SATURATION: 98 % | WEIGHT: 148 LBS | DIASTOLIC BLOOD PRESSURE: 44 MMHG | SYSTOLIC BLOOD PRESSURE: 76 MMHG | BODY MASS INDEX: 26.22 KG/M2

## 2024-10-03 DIAGNOSIS — R19.5 OTHER FECAL ABNORMALITIES: ICD-10-CM

## 2024-10-03 DIAGNOSIS — I50.9 HEART FAILURE, UNSPECIFIED: ICD-10-CM

## 2024-10-03 DIAGNOSIS — E11.9 TYPE 2 DIABETES MELLITUS W/OUT COMPLICATIONS: ICD-10-CM

## 2024-10-03 DIAGNOSIS — I95.9 HYPOTENSION, UNSPECIFIED: ICD-10-CM

## 2024-10-03 DIAGNOSIS — I21.02 ST ELEVATION (STEMI) MYOCARDIAL INFARCTION INVOLVING LEFT ANTERIOR DESCENDING CORONARY ARTERY: ICD-10-CM

## 2024-10-03 PROCEDURE — 99214 OFFICE O/P EST MOD 30 MIN: CPT

## 2024-10-03 PROCEDURE — 93000 ELECTROCARDIOGRAM COMPLETE: CPT

## 2024-10-03 PROCEDURE — G2211 COMPLEX E/M VISIT ADD ON: CPT

## 2024-10-03 NOTE — DISCUSSION/SUMMARY
[FreeTextEntry1] : Ms Howell is comfortable and functional at present with no change in her exercise capacity.  Her exam shows repeat blood pressure of 90/60, clear lungs, normal cardiac exam, no peripheral edema.  Her EKG shows sinus rhythm with diffuse nonspecific T wave changes.  Her cardiac status appears stable at present.  I told her she was safe to proceed with colonoscopy as scheduled.  She will continue on her current regimen of Lipitor 80, Plavix, Entresto 24-26, Lasix 40, metoprolol 25, spironolactone 25, metformin 500.  She will follow-up in 4 months. [EKG obtained to assist in diagnosis and management of assessed problem(s)] : EKG obtained to assist in diagnosis and management of assessed problem(s)

## 2024-10-03 NOTE — HISTORY OF PRESENT ILLNESS
[FreeTextEntry1] : 78-year-old female with a history of coronary artery disease, severe left ventricular dysfunction with HFrEF and a prior apical clot.  Hypertension, hypercholesterolemia, diabetes.  She is comfortable and functional at present with no shortness of breath, chest pain, or change in her exercise capacity.  Her blood pressures run low, but she denies any orthostatic symptoms.  Recent blood work shows an A1c of 7.2 and an LDL cholesterol of 71 on metformin and Lipitor.  She had a recently positive fecal occult blood test and is being seen by GI.  She is planning to have a colonoscopy in December.

## 2024-10-14 ENCOUNTER — RX RENEWAL (OUTPATIENT)
Age: 79
End: 2024-10-14

## 2024-10-16 ENCOUNTER — NON-APPOINTMENT (OUTPATIENT)
Age: 79
End: 2024-10-16

## 2024-10-17 ENCOUNTER — OUTPATIENT (OUTPATIENT)
Dept: OUTPATIENT SERVICES | Facility: HOSPITAL | Age: 79
LOS: 1 days | End: 2024-10-17
Payer: MEDICARE

## 2024-10-17 VITALS
TEMPERATURE: 98 F | WEIGHT: 147.05 LBS | HEART RATE: 68 BPM | SYSTOLIC BLOOD PRESSURE: 98 MMHG | OXYGEN SATURATION: 98 % | RESPIRATION RATE: 16 BRPM | HEIGHT: 63 IN | DIASTOLIC BLOOD PRESSURE: 60 MMHG

## 2024-10-17 DIAGNOSIS — R19.5 OTHER FECAL ABNORMALITIES: ICD-10-CM

## 2024-10-17 DIAGNOSIS — Z98.89 OTHER SPECIFIED POSTPROCEDURAL STATES: Chronic | ICD-10-CM

## 2024-10-17 DIAGNOSIS — E11.9 TYPE 2 DIABETES MELLITUS WITHOUT COMPLICATIONS: ICD-10-CM

## 2024-10-17 DIAGNOSIS — I25.10 ATHEROSCLEROTIC HEART DISEASE OF NATIVE CORONARY ARTERY WITHOUT ANGINA PECTORIS: ICD-10-CM

## 2024-10-17 LAB
ANION GAP SERPL CALC-SCNC: 14 MMOL/L — SIGNIFICANT CHANGE UP (ref 5–17)
BUN SERPL-MCNC: 41 MG/DL — HIGH (ref 7–23)
CALCIUM SERPL-MCNC: 9.9 MG/DL — SIGNIFICANT CHANGE UP (ref 8.4–10.5)
CHLORIDE SERPL-SCNC: 100 MMOL/L — SIGNIFICANT CHANGE UP (ref 96–108)
CO2 SERPL-SCNC: 23 MMOL/L — SIGNIFICANT CHANGE UP (ref 22–31)
CREAT SERPL-MCNC: 0.97 MG/DL — SIGNIFICANT CHANGE UP (ref 0.5–1.3)
EGFR: 59 ML/MIN/1.73M2 — LOW
GLUCOSE SERPL-MCNC: 130 MG/DL — HIGH (ref 70–99)
HCT VFR BLD CALC: 36.9 % — SIGNIFICANT CHANGE UP (ref 34.5–45)
HGB BLD-MCNC: 12.1 G/DL — SIGNIFICANT CHANGE UP (ref 11.5–15.5)
MCHC RBC-ENTMCNC: 30.5 PG — SIGNIFICANT CHANGE UP (ref 27–34)
MCHC RBC-ENTMCNC: 32.8 GM/DL — SIGNIFICANT CHANGE UP (ref 32–36)
MCV RBC AUTO: 92.9 FL — SIGNIFICANT CHANGE UP (ref 80–100)
NRBC # BLD: 0 /100 WBCS — SIGNIFICANT CHANGE UP (ref 0–0)
PLATELET # BLD AUTO: 293 K/UL — SIGNIFICANT CHANGE UP (ref 150–400)
POTASSIUM SERPL-MCNC: 4 MMOL/L — SIGNIFICANT CHANGE UP (ref 3.5–5.3)
POTASSIUM SERPL-SCNC: 4 MMOL/L — SIGNIFICANT CHANGE UP (ref 3.5–5.3)
RBC # BLD: 3.97 M/UL — SIGNIFICANT CHANGE UP (ref 3.8–5.2)
RBC # FLD: 13.9 % — SIGNIFICANT CHANGE UP (ref 10.3–14.5)
SODIUM SERPL-SCNC: 137 MMOL/L — SIGNIFICANT CHANGE UP (ref 135–145)
WBC # BLD: 7.78 K/UL — SIGNIFICANT CHANGE UP (ref 3.8–10.5)
WBC # FLD AUTO: 7.78 K/UL — SIGNIFICANT CHANGE UP (ref 3.8–10.5)

## 2024-10-17 NOTE — H&P PST ADULT - PROBLEM SELECTOR PLAN 2
Finger stick on day of surgery  Hold Metformin day of surgery   Jardiance last dose 10/27/24  Hold Insulin day of surgery   Levemir 18 units night prior to surgery

## 2024-10-17 NOTE — H&P PST ADULT - HISTORY OF PRESENT ILLNESS
77 y/o F with h/o T2DM, MI on Plavix, CHF, Severe left ventricular dysfunction with HFrEF c/o positive FIT test performed by her PCP. She denies abdominal pain, nausea or vomiting. She denies changes in bowel habits, rectal bleeding or melena. She denies weight loss or anemia. Today she presents to [email or web address] for scheduled Colonoscopy on 10/31/24.

## 2024-10-17 NOTE — H&P PST ADULT - NSICDXPASTMEDICALHX_GEN_ALL_CORE_FT
PAST MEDICAL HISTORY:  CAD (coronary artery disease)     DMII (diabetes mellitus, type 2)     HFrEF (heart failure with reduced ejection fraction)     [place] (hard of hearing)     HTN (hypertension)     Hyperlipidemia     NSTEMI (non-ST elevation myocardial infarction)     Vitamin D deficiency

## 2024-10-31 ENCOUNTER — TRANSCRIPTION ENCOUNTER (OUTPATIENT)
Age: 79
End: 2024-10-31

## 2024-10-31 ENCOUNTER — APPOINTMENT (OUTPATIENT)
Dept: GASTROENTEROLOGY | Facility: HOSPITAL | Age: 79
End: 2024-10-31

## 2024-10-31 ENCOUNTER — OUTPATIENT (OUTPATIENT)
Dept: OUTPATIENT SERVICES | Facility: HOSPITAL | Age: 79
LOS: 1 days | End: 2024-10-31
Payer: MEDICARE

## 2024-10-31 VITALS
HEIGHT: 63 IN | OXYGEN SATURATION: 100 % | HEART RATE: 66 BPM | WEIGHT: 149.91 LBS | TEMPERATURE: 97 F | DIASTOLIC BLOOD PRESSURE: 55 MMHG | RESPIRATION RATE: 21 BRPM | SYSTOLIC BLOOD PRESSURE: 114 MMHG

## 2024-10-31 VITALS
OXYGEN SATURATION: 100 % | HEART RATE: 63 BPM | DIASTOLIC BLOOD PRESSURE: 55 MMHG | RESPIRATION RATE: 21 BRPM | SYSTOLIC BLOOD PRESSURE: 108 MMHG

## 2024-10-31 DIAGNOSIS — R19.5 OTHER FECAL ABNORMALITIES: ICD-10-CM

## 2024-10-31 DIAGNOSIS — Z98.89 OTHER SPECIFIED POSTPROCEDURAL STATES: Chronic | ICD-10-CM

## 2024-10-31 DIAGNOSIS — Z98.41 CATARACT EXTRACTION STATUS, RIGHT EYE: Chronic | ICD-10-CM

## 2024-10-31 LAB — GLUCOSE BLDC GLUCOMTR-MCNC: 141 MG/DL — HIGH (ref 70–99)

## 2024-10-31 PROCEDURE — 45378 DIAGNOSTIC COLONOSCOPY: CPT | Mod: GC

## 2024-10-31 DEVICE — NET RETRV ROT ROTH 2.5MMX230CM: Type: IMPLANTABLE DEVICE | Status: FUNCTIONAL

## 2024-10-31 RX ORDER — SODIUM CHLORIDE 9 MG/ML
500 INJECTION, SOLUTION INTRAMUSCULAR; INTRAVENOUS; SUBCUTANEOUS
Refills: 0 | Status: ACTIVE | OUTPATIENT
Start: 2024-10-31

## 2024-10-31 NOTE — ASU DISCHARGE PLAN (ADULT/PEDIATRIC) - FINANCIAL ASSISTANCE
[facility] provides services at a reduced cost to those who are determined to be eligible through [facility]’s financial assistance program. Information regarding [facility]’s financial assistance program can be found by going to [email or web address] or by calling [phone].

## 2024-10-31 NOTE — ASU PATIENT PROFILE, ADULT - FALL HARM RISK - HARM RISK INTERVENTIONS

## 2024-10-31 NOTE — ASU DISCHARGE PLAN (ADULT/PEDIATRIC) - DISCHARGE TO
pericardial fluid should be determined clinically. RETROPERITONEAL ULTRASOUND OF THE KIDNEYS AND URINARY BLADDER 9/23/2018   FINDINGS:   1. No focal abnormality related to either kidney. 2. Sonographer questions the presence of uterine fibroids. Medications:      [START ON 9/29/2018] furosemide  80 mg Oral Daily    polyethylene glycol  34 g Oral Daily    zinc sulfate  220 mg Oral Daily    therapeutic multivitamin-minerals  1 tablet Oral Daily    nicotine  1 patch Transdermal Daily    linezolid  600 mg Oral 2 times per day    metoprolol tartrate  25 mg Oral BID    darbepoetin reva-polysorbate  60 mcg Subcutaneous Weekly    vitamin C  500 mg Oral Daily    pantoprazole  40 mg Intravenous BID    And    sodium chloride (PF)  10 mL Intravenous Daily    sodium hypochlorite   Irrigation Daily    calcium gluconate  1 g Intravenous Once     Thank you for allowing us to participate in the care of this patient. Please call with questions. Eleni Seth M.D. Internal Medicine Resident ,  PGY-1  [address] [place] [address]  09/28/18 8:36 PM        I have discussed the care of the patient, including pertinent history and exam findings,  with the resident. I have seen and examined the patient and the key elements of all parts of the encounter have been performed by me. I agree with the assessment, plan and orders as documented by the resident.     Polly Vázquez, [facility]

## 2024-10-31 NOTE — ASU PATIENT PROFILE, ADULT - NS TRANSFER DISPOSITION PATIENT BELONGINGS
[id number]: A&O x 4, speech garbled but logical; Afebrile, VSS on RA; , treated according to sliding scale; denies pain, nausea and SOB; Regular diet; L.PIV saline locked; Voiding using the urinal; Loose/Watery BM x 2; Bedrest, reposition with assist of 2; Skin buzz and blotchy; Will continue to monitor and update [facility] service with any significant changes.   with patient

## 2024-10-31 NOTE — ASU PATIENT PROFILE, ADULT - NSICDXPASTMEDICALHX_GEN_ALL_CORE_FT
PAST MEDICAL HISTORY:  CAD (coronary artery disease)     DMII (diabetes mellitus, type 2)     HFrEF (heart failure with reduced ejection fraction)     [place] (hard of hearing)     HTN (hypertension)     Hyperlipidemia     NSTEMI (non-ST elevation myocardial infarction)     Vitamin D deficiency

## 2024-10-31 NOTE — ASU DISCHARGE PLAN (ADULT/PEDIATRIC) - NS MD DC FALL RISK RISK
For information on Fall & Injury Prevention, visit: [email or web address] OR  [email or web address] OR  https://www.cdc.gov/steadi/patient.html

## 2024-10-31 NOTE — PRE PROCEDURE NOTE - PRE PROCEDURE EVALUATION
Attending Physician:             Delmy Shah               Procedure: colonoscopy    Indication for Procedure: positive FIT test, hx of adenomatous colon polyps  ________________________________________________________  PAST MEDICAL & SURGICAL HISTORY:  DMII (diabetes mellitus, type 2)      Hyperlipidemia      HTN (hypertension)      Vitamin D deficiency      [place] (hard of hearing)      NSTEMI (non-ST elevation myocardial infarction)      CAD (coronary artery disease)      HFrEF (heart failure with reduced ejection fraction)      History of cholecystectomy      S/P right cataract extraction        ALLERGIES:  No Known Allergies    HOME MEDICATIONS:  Jardiance 10 mg oral tablet: 1 tab(s) orally once a day  Levemir FlexPen 100 units/mL subcutaneous solution: 13 unit(s) subcutaneous once a day  Levemir FlexPen 100 units/mL subcutaneous solution: 23 unit(s) subcutaneous once a day (at bedtime)  metFORMIN 500 mg oral tablet: 2 tab(s) orally 2 times a day  spironolactone 25 mg oral tablet: 1 tab(s) orally once a day    AICD/PPM: [ ] yes   [ ] no    PERTINENT LAB DATA:                      PHYSICAL EXAMINATION:    T(C): --  HR: --  BP: --  RR: --  SpO2: --    Constitutional: NAD  HEENT: PERRLA, EOMI,    Neck:  No JVD  Respiratory: CTAB/L  Cardiovascular: S1 and S2  Gastrointestinal: BS+, soft, NT/ND  Extremities: No peripheral edema  Neurological: A/O x 3, no focal deficits  Psychiatric: Normal mood, normal affect  Skin: No rashes    ASA Class: I [ ]  II [ ]  III [ ]  IV [ ]    COMMENTS:    The patient is a suitable candidate for the planned procedure unless box checked [ ]  No, explain:

## 2024-11-12 ENCOUNTER — RX RENEWAL (OUTPATIENT)
Age: 79
End: 2024-11-12

## 2024-11-20 ENCOUNTER — APPOINTMENT (OUTPATIENT)
Dept: ENDOCRINOLOGY | Facility: CLINIC | Age: 79
End: 2024-11-20

## 2024-11-20 DIAGNOSIS — E55.9 VITAMIN D DEFICIENCY, UNSPECIFIED: ICD-10-CM

## 2024-11-20 DIAGNOSIS — E78.5 HYPERLIPIDEMIA, UNSPECIFIED: ICD-10-CM

## 2024-11-20 PROCEDURE — 80048 BASIC METABOLIC PNL TOTAL CA: CPT

## 2024-11-20 PROCEDURE — 36415 COLL VENOUS BLD VENIPUNCTURE: CPT

## 2024-11-20 PROCEDURE — G0463: CPT

## 2024-11-20 PROCEDURE — 85027 COMPLETE CBC AUTOMATED: CPT

## 2024-11-20 PROCEDURE — 82962 GLUCOSE BLOOD TEST: CPT

## 2024-11-20 PROCEDURE — G0105: CPT

## 2024-11-25 ENCOUNTER — INPATIENT (INPATIENT)
Facility: HOSPITAL | Age: 79
LOS: 3 days | Discharge: HOME HEALTH | End: 2024-11-29
Attending: INTERNAL MEDICINE | Admitting: INTERNAL MEDICINE
Payer: MEDICARE

## 2024-11-25 ENCOUNTER — APPOINTMENT (OUTPATIENT)
Dept: INTERNAL MEDICINE | Facility: CLINIC | Age: 79
End: 2024-11-25
Payer: MEDICARE

## 2024-11-25 ENCOUNTER — NON-APPOINTMENT (OUTPATIENT)
Age: 79
End: 2024-11-25

## 2024-11-25 VITALS
DIASTOLIC BLOOD PRESSURE: 68 MMHG | HEART RATE: 74 BPM | HEIGHT: 63 IN | WEIGHT: 115.08 LBS | SYSTOLIC BLOOD PRESSURE: 95 MMHG | OXYGEN SATURATION: 99 % | RESPIRATION RATE: 16 BRPM | TEMPERATURE: 98 F

## 2024-11-25 VITALS
HEIGHT: 63 IN | TEMPERATURE: 97.3 F | OXYGEN SATURATION: 98 % | HEART RATE: 71 BPM | DIASTOLIC BLOOD PRESSURE: 49 MMHG | WEIGHT: 148 LBS | SYSTOLIC BLOOD PRESSURE: 84 MMHG | BODY MASS INDEX: 26.22 KG/M2

## 2024-11-25 VITALS — DIASTOLIC BLOOD PRESSURE: 44 MMHG | SYSTOLIC BLOOD PRESSURE: 74 MMHG

## 2024-11-25 DIAGNOSIS — Z98.89 OTHER SPECIFIED POSTPROCEDURAL STATES: Chronic | ICD-10-CM

## 2024-11-25 DIAGNOSIS — R53.1 WEAKNESS: ICD-10-CM

## 2024-11-25 DIAGNOSIS — I95.9 HYPOTENSION, UNSPECIFIED: ICD-10-CM

## 2024-11-25 DIAGNOSIS — Z98.41 CATARACT EXTRACTION STATUS, RIGHT EYE: Chronic | ICD-10-CM

## 2024-11-25 DIAGNOSIS — I50.9 HEART FAILURE, UNSPECIFIED: ICD-10-CM

## 2024-11-25 LAB
ALBUMIN SERPL ELPH-MCNC: 4.1 G/DL — SIGNIFICANT CHANGE UP (ref 3.3–5)
ALP SERPL-CCNC: 68 U/L — SIGNIFICANT CHANGE UP (ref 40–120)
ALT FLD-CCNC: 14 U/L — SIGNIFICANT CHANGE UP (ref 4–33)
ANION GAP SERPL CALC-SCNC: 20 MMOL/L — HIGH (ref 7–14)
APPEARANCE UR: CLEAR — SIGNIFICANT CHANGE UP
APTT BLD: 26.2 SEC — SIGNIFICANT CHANGE UP (ref 24.5–35.6)
AST SERPL-CCNC: 17 U/L — SIGNIFICANT CHANGE UP (ref 4–32)
BACTERIA # UR AUTO: ABNORMAL /HPF
BASOPHILS # BLD AUTO: 0.03 K/UL — SIGNIFICANT CHANGE UP (ref 0–0.2)
BASOPHILS NFR BLD AUTO: 0.3 % — SIGNIFICANT CHANGE UP (ref 0–2)
BILIRUB SERPL-MCNC: 0.5 MG/DL — SIGNIFICANT CHANGE UP (ref 0.2–1.2)
BILIRUB UR-MCNC: NEGATIVE — SIGNIFICANT CHANGE UP
BLOOD GAS VENOUS COMPREHENSIVE RESULT: SIGNIFICANT CHANGE UP
BUN SERPL-MCNC: 89 MG/DL — HIGH (ref 7–23)
CALCIUM SERPL-MCNC: 9.7 MG/DL — SIGNIFICANT CHANGE UP (ref 8.4–10.5)
CAST: 16 /LPF — HIGH (ref 0–4)
CHLORIDE SERPL-SCNC: 99 MMOL/L — SIGNIFICANT CHANGE UP (ref 98–107)
CK MB CFR SERPL CALC: 4.1 NG/ML — SIGNIFICANT CHANGE UP
CO2 SERPL-SCNC: 13 MMOL/L — LOW (ref 22–31)
COLOR SPEC: YELLOW — SIGNIFICANT CHANGE UP
CREAT SERPL-MCNC: 1.72 MG/DL — HIGH (ref 0.5–1.3)
DIFF PNL FLD: NEGATIVE — SIGNIFICANT CHANGE UP
EGFR: 30 ML/MIN/1.73M2 — LOW
EOSINOPHIL # BLD AUTO: 0.03 K/UL — SIGNIFICANT CHANGE UP (ref 0–0.5)
EOSINOPHIL NFR BLD AUTO: 0.3 % — SIGNIFICANT CHANGE UP (ref 0–6)
GLUCOSE BLDC GLUCOMTR-MCNC: 104 MG/DL — HIGH (ref 70–99)
GLUCOSE BLDC GLUCOMTR-MCNC: 69
GLUCOSE BLDC GLUCOMTR-MCNC: 90 MG/DL — SIGNIFICANT CHANGE UP (ref 70–99)
GLUCOSE SERPL-MCNC: 164 MG/DL — HIGH (ref 70–99)
GLUCOSE UR QL: >=1000 MG/DL
HCT VFR BLD CALC: 32.8 % — LOW (ref 34.5–45)
HGB BLD-MCNC: 11 G/DL — LOW (ref 11.5–15.5)
IANC: 9.01 K/UL — HIGH (ref 1.8–7.4)
IMM GRANULOCYTES NFR BLD AUTO: 0.4 % — SIGNIFICANT CHANGE UP (ref 0–0.9)
INR BLD: 1.01 RATIO — SIGNIFICANT CHANGE UP (ref 0.85–1.16)
KETONES UR-MCNC: 15 MG/DL
LEUKOCYTE ESTERASE UR-ACNC: ABNORMAL
LYMPHOCYTES # BLD AUTO: 1 K/UL — SIGNIFICANT CHANGE UP (ref 1–3.3)
LYMPHOCYTES # BLD AUTO: 9.6 % — LOW (ref 13–44)
MAGNESIUM SERPL-MCNC: 2.1 MG/DL — SIGNIFICANT CHANGE UP (ref 1.6–2.6)
MCHC RBC-ENTMCNC: 31 PG — SIGNIFICANT CHANGE UP (ref 27–34)
MCHC RBC-ENTMCNC: 33.5 G/DL — SIGNIFICANT CHANGE UP (ref 32–36)
MCV RBC AUTO: 92.4 FL — SIGNIFICANT CHANGE UP (ref 80–100)
MONOCYTES # BLD AUTO: 0.3 K/UL — SIGNIFICANT CHANGE UP (ref 0–0.9)
MONOCYTES NFR BLD AUTO: 2.9 % — SIGNIFICANT CHANGE UP (ref 2–14)
NEUTROPHILS # BLD AUTO: 9.01 K/UL — HIGH (ref 1.8–7.4)
NEUTROPHILS NFR BLD AUTO: 86.5 % — HIGH (ref 43–77)
NITRITE UR-MCNC: NEGATIVE — SIGNIFICANT CHANGE UP
NRBC # BLD: 0 /100 WBCS — SIGNIFICANT CHANGE UP (ref 0–0)
NRBC # FLD: 0 K/UL — SIGNIFICANT CHANGE UP (ref 0–0)
PH UR: 5.5 — SIGNIFICANT CHANGE UP (ref 5–8)
PLATELET # BLD AUTO: 249 K/UL — SIGNIFICANT CHANGE UP (ref 150–400)
POTASSIUM SERPL-MCNC: 4.9 MMOL/L — SIGNIFICANT CHANGE UP (ref 3.5–5.3)
POTASSIUM SERPL-SCNC: 4.9 MMOL/L — SIGNIFICANT CHANGE UP (ref 3.5–5.3)
PROT SERPL-MCNC: 7.3 G/DL — SIGNIFICANT CHANGE UP (ref 6–8.3)
PROT UR-MCNC: NEGATIVE MG/DL — SIGNIFICANT CHANGE UP
PROTHROM AB SERPL-ACNC: 11.7 SEC — SIGNIFICANT CHANGE UP (ref 9.9–13.4)
RBC # BLD: 3.55 M/UL — LOW (ref 3.8–5.2)
RBC # FLD: 13.7 % — SIGNIFICANT CHANGE UP (ref 10.3–14.5)
RBC CASTS # UR COMP ASSIST: 1 /HPF — SIGNIFICANT CHANGE UP (ref 0–4)
REVIEW: SIGNIFICANT CHANGE UP
SODIUM SERPL-SCNC: 132 MMOL/L — LOW (ref 135–145)
SP GR SPEC: 1.01 — SIGNIFICANT CHANGE UP (ref 1–1.03)
SQUAMOUS # UR AUTO: 9 /HPF — HIGH (ref 0–5)
TROPONIN T, HIGH SENSITIVITY RESULT: 49 NG/L — SIGNIFICANT CHANGE UP
TSH SERPL-MCNC: 0.8 UIU/ML — SIGNIFICANT CHANGE UP (ref 0.27–4.2)
UROBILINOGEN FLD QL: 0.2 MG/DL — SIGNIFICANT CHANGE UP (ref 0.2–1)
WBC # BLD: 10.41 K/UL — SIGNIFICANT CHANGE UP (ref 3.8–10.5)
WBC # FLD AUTO: 10.41 K/UL — SIGNIFICANT CHANGE UP (ref 3.8–10.5)
WBC UR QL: 10 /HPF — HIGH (ref 0–5)

## 2024-11-25 PROCEDURE — 99285 EMERGENCY DEPT VISIT HI MDM: CPT | Mod: GC

## 2024-11-25 PROCEDURE — 82962 GLUCOSE BLOOD TEST: CPT

## 2024-11-25 PROCEDURE — G2211 COMPLEX E/M VISIT ADD ON: CPT

## 2024-11-25 PROCEDURE — 71045 X-RAY EXAM CHEST 1 VIEW: CPT | Mod: 26

## 2024-11-25 PROCEDURE — 93000 ELECTROCARDIOGRAM COMPLETE: CPT | Mod: PD

## 2024-11-25 PROCEDURE — 36415 COLL VENOUS BLD VENIPUNCTURE: CPT | Mod: PD

## 2024-11-25 PROCEDURE — 99214 OFFICE O/P EST MOD 30 MIN: CPT

## 2024-11-25 RX ORDER — ACETAMINOPHEN 500MG 500 MG/1
650 TABLET, COATED ORAL EVERY 6 HOURS
Refills: 0 | Status: DISCONTINUED | OUTPATIENT
Start: 2024-11-25 | End: 2024-11-29

## 2024-11-25 RX ORDER — 0.9 % SODIUM CHLORIDE 0.9 %
1000 INTRAVENOUS SOLUTION INTRAVENOUS
Refills: 0 | Status: DISCONTINUED | OUTPATIENT
Start: 2024-11-25 | End: 2024-11-29

## 2024-11-25 RX ORDER — SODIUM CHLORIDE 9 MG/ML
250 INJECTION, SOLUTION INTRAMUSCULAR; INTRAVENOUS; SUBCUTANEOUS ONCE
Refills: 0 | Status: COMPLETED | OUTPATIENT
Start: 2024-11-25 | End: 2024-11-25

## 2024-11-25 RX ORDER — ONDANSETRON HYDROCHLORIDE 4 MG/1
4 TABLET, FILM COATED ORAL EVERY 8 HOURS
Refills: 0 | Status: DISCONTINUED | OUTPATIENT
Start: 2024-11-25 | End: 2024-11-29

## 2024-11-25 RX ORDER — 0.9 % SODIUM CHLORIDE 0.9 %
1000 INTRAVENOUS SOLUTION INTRAVENOUS
Refills: 0 | Status: DISCONTINUED | OUTPATIENT
Start: 2024-11-25 | End: 2024-11-26

## 2024-11-25 RX ORDER — MAGNESIUM, ALUMINUM HYDROXIDE 200-225/5
30 SUSPENSION, ORAL (FINAL DOSE FORM) ORAL EVERY 4 HOURS
Refills: 0 | Status: DISCONTINUED | OUTPATIENT
Start: 2024-11-25 | End: 2024-11-29

## 2024-11-25 RX ORDER — INFLUENZA VIRUS VACCINE 15; 15; 15; 15 UG/.5ML; UG/.5ML; UG/.5ML; UG/.5ML
0.5 SUSPENSION INTRAMUSCULAR ONCE
Refills: 0 | Status: DISCONTINUED | OUTPATIENT
Start: 2024-11-25 | End: 2024-11-29

## 2024-11-25 RX ORDER — CEFTRIAXONE SODIUM 1 G
1000 VIAL (EA) INJECTION EVERY 24 HOURS
Refills: 0 | Status: COMPLETED | OUTPATIENT
Start: 2024-11-25 | End: 2024-11-28

## 2024-11-25 RX ORDER — GLUCAGON INJECTION, SOLUTION 0.5 MG/.1ML
1 INJECTION, SOLUTION SUBCUTANEOUS ONCE
Refills: 0 | Status: DISCONTINUED | OUTPATIENT
Start: 2024-11-25 | End: 2024-11-29

## 2024-11-25 RX ORDER — ACETAMINOPHEN, DIPHENHYDRAMINE HCL, PHENYLEPHRINE HCL 325; 25; 5 MG/1; MG/1; MG/1
3 TABLET ORAL AT BEDTIME
Refills: 0 | Status: DISCONTINUED | OUTPATIENT
Start: 2024-11-25 | End: 2024-11-29

## 2024-11-25 RX ADMIN — SODIUM CHLORIDE 250 MILLILITER(S): 9 INJECTION, SOLUTION INTRAMUSCULAR; INTRAVENOUS; SUBCUTANEOUS at 16:03

## 2024-11-25 RX ADMIN — SODIUM CHLORIDE 250 MILLILITER(S): 9 INJECTION, SOLUTION INTRAMUSCULAR; INTRAVENOUS; SUBCUTANEOUS at 14:55

## 2024-11-25 NOTE — ED ADULT NURSE NOTE - NSFALLRISKINTERV_ED_ALL_ED

## 2024-11-25 NOTE — ED ADULT NURSE REASSESSMENT NOTE - NS ED NURSE REASSESS COMMENT FT1
BREAK RN: Pt resting in spot 26a. Pt endorses partial relief of dizziness. Urine sent per provider orders. Airway is patent, respirations are even and unlabored. Plan of care ongoing, safety maintained.

## 2024-11-25 NOTE — ED ADULT NURSE REASSESSMENT NOTE - NS ED NURSE REASSESS COMMENT FT1
report received from day shift ED RN. pt resting in stretcher in NAD, RR even and unlabored. tolerated po juice well , repeat fingerstick as documented. pt transported up via stretcher, safety measures maintained, side rails up x2. all belongings with pt

## 2024-11-25 NOTE — ED ADULT NURSE NOTE - OBJECTIVE STATEMENT
pt received spot 26a. pt A+Ox3 c/o generalized weakness for past few days. pt denies fever/chills/dysuria. no neuro deficits noted, equal strength and sensation noted to all extremities. respirations even and unlabored. denies cp/sob. MD hernandez in progress. awaiting MD orders. will monitor.

## 2024-11-25 NOTE — ED ADULT NURSE NOTE - NSFALLFACTORS_ED_ALL_ED
Counseling and Referral of Other Preventative  (Italic type indicates deductible and co-insurance are waived)    Patient Name: Riana Kay  Today's Date: 4/12/2022    Health Maintenance       Date Due Completion Date    Aspirin/Antiplatelet Therapy Never done ---    High Dose Statin Never done ---    Shingles Vaccine (3 of 3) 07/01/2021 5/6/2021    Pneumococcal Vaccines (Age 65+) (2 - PPSV23 or PCV20) 01/22/2022 1/22/2021    Lipid Panel 04/09/2022 4/9/2021    Diabetes Urine Screening 04/09/2022 4/9/2021    Eye Exam 05/17/2022 5/17/2021    Hemoglobin A1c 08/02/2022 2/2/2022    DEXA Scan 02/01/2023 2/1/2021    Mammogram 02/03/2023 2/3/2022    Foot Exam 02/09/2023 2/9/2022    Override on 1/22/2021: Done    Override on 10/3/2019: Done    Override on 1/23/2018: Done    Colorectal Cancer Screening 01/06/2026 1/6/2021    Override on 10/30/2007: Done (rpt 5yrs)    TETANUS VACCINE 03/21/2031 3/21/2021    Override on 1/25/2018: Declined        No orders of the defined types were placed in this encounter.    The following information is provided to all patients.  This information is to help you find resources for any of the problems found today that may be affecting your health:                Living healthy guide: www.[facility].[place].gov      Understanding Diabetes: www.diabetes.org      Eating healthy: www.cdc.gov/healthyweight      CDC home safety checklist: www.cdc.gov/steadi/patient.html      Agency on Aging: www.goea.[place].gov      Alcoholics anonymous (AA): www.aa.org      Physical Activity: www.aneesh.nih.gov/db1xbud      Tobacco use: www.quitwithusla.org     
Dizziness

## 2024-11-25 NOTE — ED PROVIDER NOTE - ATTENDING CONTRIBUTION TO CARE
GEN - NAD; nontoxic appearing; A+O x3, [place]  EYES- PERRL, EOMI  ENT: Airway patent, dry mm, Oral cavity and pharynx normal. No inflammation, swelling, exudate, or lesions.  NECK: Neck supple  PULMONARY - CTA b/l, symmetric breath sounds, unlabored.   CARDIAC -s1s2, RRR, no M,G,R  ABDOMEN - +BS, ND, NT, soft, no guarding, no rebound, no masses   BACK - no CVA tenderness, Normal  spine   EXTREMITIES - FROM, symmetric pulses, capillary refill < 2 seconds, no edema   SKIN - no rash or bruising   NEUROLOGIC - alert, speech clear, no focal deficits  PSYCH -nl mood/affect, nl insight.  a/p-agree with above hpi, mentating well, appears nontoxic, noted bp, noted pmh, plan for labs, ua, cxr, pocus to eval room for ivf as does appear dry, patient agreeable to plan.

## 2024-11-25 NOTE — ED PROVIDER NOTE - NSICDXPASTMEDICALHX_GEN_ALL_CORE_FT
PAST MEDICAL HISTORY:  CAD (coronary artery disease)     DMII (diabetes mellitus, type 2)     HFrEF (heart failure with reduced ejection fraction)     [place] (hard of hearing)     HTN (hypertension)     Hyperlipidemia     NSTEMI (non-ST elevation myocardial infarction)     Vitamin D deficiency

## 2024-11-25 NOTE — ED ADULT NURSE NOTE - AS PAIN REST
[de-identified] : At this time, due to osteoarthritis of right knee, I recommended a course of viscosupplementation to be scheduled at this time.\par \par The patient has been advised that their blood pressure today was outside normal ranges and the patient was instructed accordingly. Our Blood Pressure Monitoring Sheet with instructions was given to the patient.\par   3 (mild pain)

## 2024-11-25 NOTE — ED ADULT TRIAGE NOTE - CHIEF COMPLAINT QUOTE
Pt history of CHF, CAD(on Plavix), hard of hearing, presents from MD office for hypotension. Pt endorses dizziness x3 days, +epigastric discomfort accompanied with nausea, denies headache, no chest pain, afebrile.

## 2024-11-25 NOTE — ED PROVIDER NOTE - CLINICAL SUMMARY MEDICAL DECISION MAKING FREE TEXT BOX
78F, PMHx CAD, DM2, HFrEF (last EF 20 to 25%), hard of feeling, HTN, HLD, NSTEMI presents to ED complaining of generalized weakness.  Patient states weakness began approximately 1 week ago, worsening over the past 3 days.  Patient was initially evaluated on Saturday at urgent care, workup reportedly unremarkable per patient.  Over the past 3 days, patient feeling increasingly weak, with subjective nausea, dizziness/presyncopal symptoms, intermittent headache.  Today at PMD, patient was found to be hypotensive with systolic blood pressure in the 90s, sent to ED for further management.  Denies focal neurosymptoms, palpitations, chest pain, shortness of breath, abdominal pain, vomiting, diarrhea, constipation, urinary or bowel symptoms, trauma.    Vital signs:  BP 9 5/6 8  HR 74  Temp 98 oral  Respirate 16  SpO2 99 room air    EKG NSR, first-degree AV block, T wave abnormalities V3, V4, V5, V6.  PACs noted.  When compared with prior, morphology is largely unchanged.  No ST elevation or depression.  Patient outside the window for acute stroke intervention.  Differential diagnosis is broad given general presentation, includes anemia, metabolic disturbance, worsening CHF exacerbation, CVA, infection.  Will evaluate with labs, chest x-ray.  Likely admit. 78F, PMHx CAD, DM2, HFrEF (last EF 20 to 25%), hard of hearing, HTN, HLD, NSTEMI presents to ED complaining of generalized weakness.  Patient states weakness began approximately 1 week ago, worsening over the past 3 days.  Patient was initially evaluated on Saturday at urgent care, workup reportedly unremarkable per patient.  Over the past 3 days, patient feeling increasingly weak, with subjective nausea, dizziness/presyncopal symptoms, intermittent headache.  Today at PMD, patient was found to be hypotensive with systolic blood pressure in the 90s, sent to ED for further management.  Denies focal neurosymptoms, palpitations, chest pain, shortness of breath, abdominal pain, vomiting, diarrhea, constipation, urinary or bowel symptoms, trauma.    Vital signs:  BP 9 5/6 8  HR 74  Temp 98 oral  Respirate 16  SpO2 99 room air    EKG NSR, first-degree AV block, T wave abnormalities V3, V4, V5, V6.  PACs noted.  When compared with prior, morphology is largely unchanged.  No ST elevation or depression.  Patient outside the window for acute stroke intervention.  Differential diagnosis is broad given general presentation, includes anemia, metabolic disturbance, worsening CHF exacerbation, CVA, infection.  Will evaluate with labs, chest x-ray.  Likely admit.

## 2024-11-25 NOTE — ED ADULT NURSE NOTE - TEMPLATE
[facility] Consultation / Initial Visit   Today's Date: 3/27/2024    PCP: Victor Manuel Espinoza MD  Referring Provider: No ref. provider found  Outpatient Cardiologist: Dr. 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  Denies any loss of consciousness or dizziness.  He says he tripped and was mechanical fall had a L2 fracture and neurosurgery is recommending surgical stabilization under general anesthesia.  Discussed with the patient his wife and with Dr. Butler his cardiologist.  The patient is not on anticoagulation because of bleeding issues and jaundice.  He is physically active enough at home to go a flight of stairs and denies any chest pain shortness of breath palpitation dizziness or syncope.  He is on midodrine for chronic hypotension        Relevant Past Medical History:  As above    Social History:  Social History     Tobacco Use   Smoking Status Never   Smokeless Tobacco Never     Social History     Substance and Sexual Activity   Alcohol Use Not Currently     History   Drug Use Unknown       Family History:   No family history on file.    Home Medications:  Prior to Admission medications    Medication Sig Start Date End Date Taking? Authorizing Provider   HYDROcodone-acetaminophen (NORCO)  MG per tablet Take 1 tablet by mouth every 4 hours as needed for Pain. 3/26/24  Yes Suray Roy MD   ondansetron (ZOFRAN ODT) 4 MG disintegrating tablet Place 1 tablet onto the tongue every 8 hours as needed for Nausea. 3/26/24  Yes Orlin  MD Aden   naLOXone (NARCAN) 4 MG/0.1ML nasal liquid Spray the content of 1 device into 1 nostril. Call 911. May repeat with 2nd device in alternate nostril if no response in 2-3 minutes. 3/26/24  Yes Aden Ordaz MD   morphine (RAIMUNDO) 10 MG 24 hr capsule Take 1 capsule by mouth every 12 hours. 3/26/24 4/25/24 Yes Aden Ordaz MD   midodrine (PROAMATINE) 5 MG tablet Take 1 tablet by mouth 3 times daily (before meals). Hold for SBP >140 mmHg. 3/16/24  Yes Stef Graham MD       Allergy:   As listed in patient's chart    ROS:  14 point review of systems is negative other than what is detailed above.    Physical Examination/Objective:        Vital Last Value 24 Hour Range   Temperature 98.1 °F (36.7 °C) (03/27/24 0815) Temp  Min: 98.1 °F (36.7 °C)  Max: 98.1 °F (36.7 °C)   Pulse 84 (03/27/24 1240) Pulse  Min: 80  Max: 118   Respiratory 18 (03/27/24 1434) Resp  Min: 15  Max: 20   Non-Invasive  Blood Pressure 108/70 (03/27/24 1240) BP  Min: 96/71  Max: 122/79   Pulse Oximetry 98 % (03/27/24 1240) SpO2  Min: 93 %  Max: 99 %   Arterial   Blood Pressure   No data recorded       Intake/Output Summary (Last 24 hours) at 3/27/2024 1554  Last data filed at 3/27/2024 0911  Gross per 24 hour   Intake 500 ml   Output --   Net 500 ml        Weight    03/27/24 0520 03/27/24 1308   Weight: 118.1 kg (260 lb 5.8 oz) 118.1 kg (260 lb 5.8 oz)       CONSTITUTIONAL: No acute distress.  Laying in bed  EYES: PERRL, EOMI. Anicteric sclera.  CARDIOVASCULAR: S1-S2 irregularly irregular no S3-S4  RESPIRATORY: Clear to auscultation  GASTROINTESTINAL: normoactive bowel sounds, nontender/nondistended, no organomegaly  CHEST:  No chest wall tenderness to palpation  MUSCULOSKELETAL: no joint effusions; no asymmetry   NEURO:  Grossly normal, AAO x3  SKIN:  No rashes  PSYCH: Appropriate     Laboratory Data:     Cardiac Labs  No results found    CMP  Recent Labs   Lab 03/27/24  0553 03/26/24  0544 03/25/24  0236   SODIUM 138 138 136    POTASSIUM 3.6 4.0 4.7   CHLORIDE 103 104 101   CO2 23 24 25   GLUCOSE 136* 128* 129*   BUN 17 21* 25*   CREATININE 1.08 1.08 1.17   CALCIUM 9.7 9.1 9.3   TOTPROTEIN 7.6 6.9 7.2   ALBUMIN 2.3* 2.2* 2.0*   BILIRUBIN 2.4* 2.0* 2.8*   AST 21 19 20   GPT 18 18 18   ALKPT 137* 121* 134*       CBC  Recent Labs   Lab 03/27/24  0553 03/26/24  0544 03/25/24  0236   WBC 10.0 7.3 11.5*   HCT 32.0* 27.6* 28.8*   HGB 9.2* 8.0* 8.3*    337 361       Coags  No results found    Imaging:       Tele: I personally reviewed interpreted telemetry strips which shows atrial fibrillation with controlled heart rate    Imaging Last 24hrs   MRI LUMBAR SPINE WO CONTRAST    Assessment and Plan:      66 year old male with a complex medical history who also has a history of CHF/nonischemic cardiomyopathy with recovered EF, permanent atrial fibrillation not on anticoagulation due to bleeding issues and liver issues.  Now here after a fall and L2 fracture and going for e surgical stabilization preop cardiovascular exam was requested    Preop cardiovascular exam.  Before the hospitalization as per the wife the patient is physically active enough to go a flight of stairs at home which did every day as he lives in a multilevel house.  He is clinically stable from cardiac point of view without any chest pain shortness of breath palpitation or any other cardiac symptoms.  Regularly follows up with his cardiologist who he saw just 2 weeks ago.      He is optimized from cardiac point of view for the surgical stabilization of the spine under general anesthesia.  He had also undergone abdominal surgery recently without any cardiac complications.  The patient and the family understand that he will be still intermediate cardiovascular risk for this.      History of CHF/cardiomyopathy with recovered EF     as per the wife and his cardiologist his EF had recovered by last echo a few months ago.  He is asymptomatic and euvolemic.  He is not on any GDMT  due to low blood pressure.  No further workup at this point is needed from cardiac point of view    Permanent atrial fibrillation  The patient had atrial fibrillation with a controlled heart rate for several years.  His anticoagulation was stopped recently due to jaundice and bleeding as per his cardiologist.  Continue to follow-up with his cardiologist as an outpatient    Fall/L2 fracture  No syncope.  Appears mechanical fall.  Surgery will stabilization as planned as above.    Hypotension  Patient has history of chronic hypotension and is on midodrine continue the same.    History of jaundice and GI bleed  History of renal cell carcinoma/recurrence  Recent abdominal surgery  All of the above are being followed by hospitalist/PCP  All of the above discussed with the patient his wife's cardiologist Dr. Butler and the hospitalist..  No further cardiac workup is needed at this time he is optimized from cardiac point of view..  Follow-up will be with his cardiologist as an outpatient  Thank you for allowing us to participate in the care of this patient. Please do not hesitate to call with any questions or concerns.    Kenny Mckeon MD  Cardiology     General

## 2024-11-25 NOTE — ED PROVIDER NOTE - PHYSICAL EXAMINATION
Gen: AAOx3, pale  HEENT: NCAT. PEERLA, EOMI, oral mucosa moist, normal conjunctiva  Lung: CTAB, no respiratory distress, no wheezes/rhonchi/rales B/L, speaking in full sentences  CV: RRR, no murmurs, rubs or gallops  Abd: soft, NTND, no guarding, no CVA tenderness  MSK: no visible deformities  Neuro: No focal sensory or motor deficits  Skin: Warm, pale, no rash  Psych: normal affect.

## 2024-11-25 NOTE — PATIENT PROFILE ADULT - FALL HARM RISK - HARM RISK INTERVENTIONS

## 2024-11-25 NOTE — ED ADULT TRIAGE NOTE - RESPIRATORY RATE (BREATHS/MIN)
Dr Eduardo is advising for pt to cut dose in 1/2 for now instead of stopping- to see if this helps. Called pt, pt states that she has \"researched this, that other people has had this with this med. I got a migraine last week, and have daily headaches. I have cystic acne on my chin, jaw, mouth- I work in [employer] dept, I can't have acne all over. My skin is dry, hair is dry. It started a few weeks ago. This is the only med I'm on, no other changes have been done. I won't take this anymore. I need something else.\" Explained that I will have Dr Eduardo review and I will call pt back tomorrow.    16

## 2024-11-26 DIAGNOSIS — Z29.9 ENCOUNTER FOR PROPHYLACTIC MEASURES, UNSPECIFIED: ICD-10-CM

## 2024-11-26 DIAGNOSIS — N17.9 ACUTE KIDNEY FAILURE, UNSPECIFIED: ICD-10-CM

## 2024-11-26 DIAGNOSIS — N39.0 URINARY TRACT INFECTION, SITE NOT SPECIFIED: ICD-10-CM

## 2024-11-26 DIAGNOSIS — I50.22 CHRONIC SYSTOLIC (CONGESTIVE) HEART FAILURE: ICD-10-CM

## 2024-11-26 DIAGNOSIS — Z79.899 OTHER LONG TERM (CURRENT) DRUG THERAPY: ICD-10-CM

## 2024-11-26 DIAGNOSIS — E11.9 TYPE 2 DIABETES MELLITUS WITHOUT COMPLICATIONS: ICD-10-CM

## 2024-11-26 LAB
A1C WITH ESTIMATED AVERAGE GLUCOSE RESULT: 7.5 % — HIGH (ref 4–5.6)
ALBUMIN SERPL ELPH-MCNC: 3.8 G/DL — SIGNIFICANT CHANGE UP (ref 3.3–5)
ALBUMIN SERPL ELPH-MCNC: 4.5 G/DL
ALP BLD-CCNC: 71 U/L
ALP SERPL-CCNC: 60 U/L — SIGNIFICANT CHANGE UP (ref 40–120)
ALT FLD-CCNC: 10 U/L — SIGNIFICANT CHANGE UP (ref 4–33)
ALT SERPL-CCNC: 15 U/L
ANION GAP SERPL CALC-SCNC: 15 MMOL/L — HIGH (ref 7–14)
ANION GAP SERPL CALC-SCNC: 25 MMOL/L
AST SERPL-CCNC: 15 U/L — SIGNIFICANT CHANGE UP (ref 4–32)
AST SERPL-CCNC: 20 U/L
BASOPHILS # BLD AUTO: 0.03 K/UL — SIGNIFICANT CHANGE UP (ref 0–0.2)
BASOPHILS # BLD AUTO: 0.04 K/UL
BASOPHILS NFR BLD AUTO: 0.4 %
BASOPHILS NFR BLD AUTO: 0.5 % — SIGNIFICANT CHANGE UP (ref 0–2)
BILIRUB SERPL-MCNC: 0.4 MG/DL — SIGNIFICANT CHANGE UP (ref 0.2–1.2)
BILIRUB SERPL-MCNC: 0.5 MG/DL
BUN SERPL-MCNC: 69 MG/DL — HIGH (ref 7–23)
BUN SERPL-MCNC: 94 MG/DL
CALCIUM SERPL-MCNC: 10.2 MG/DL
CALCIUM SERPL-MCNC: 9.1 MG/DL — SIGNIFICANT CHANGE UP (ref 8.4–10.5)
CHLORIDE SERPL-SCNC: 104 MMOL/L — SIGNIFICANT CHANGE UP (ref 98–107)
CHLORIDE SERPL-SCNC: 99 MMOL/L
CO2 SERPL-SCNC: 11 MMOL/L
CO2 SERPL-SCNC: 16 MMOL/L — LOW (ref 22–31)
CREAT SERPL-MCNC: 1.2 MG/DL — SIGNIFICANT CHANGE UP (ref 0.5–1.3)
CREAT SERPL-MCNC: 1.98 MG/DL
EGFR: 25 ML/MIN/1.73M2
EGFR: 46 ML/MIN/1.73M2 — LOW
EOSINOPHIL # BLD AUTO: 0.09 K/UL
EOSINOPHIL # BLD AUTO: 0.11 K/UL — SIGNIFICANT CHANGE UP (ref 0–0.5)
EOSINOPHIL NFR BLD AUTO: 0.9 %
EOSINOPHIL NFR BLD AUTO: 2 % — SIGNIFICANT CHANGE UP (ref 0–6)
ESTIMATED AVERAGE GLUCOSE: 166 MG/DL
ESTIMATED AVERAGE GLUCOSE: 169 — SIGNIFICANT CHANGE UP
GLUCOSE BLDC GLUCOMTR-MCNC: 171 MG/DL — HIGH (ref 70–99)
GLUCOSE BLDC GLUCOMTR-MCNC: 236 MG/DL — HIGH (ref 70–99)
GLUCOSE BLDC GLUCOMTR-MCNC: 277 MG/DL — HIGH (ref 70–99)
GLUCOSE BLDC GLUCOMTR-MCNC: 294 MG/DL — HIGH (ref 70–99)
GLUCOSE SERPL-MCNC: 162 MG/DL — HIGH (ref 70–99)
GLUCOSE SERPL-MCNC: 79 MG/DL
HBA1C MFR BLD HPLC: 7.4 %
HCT VFR BLD CALC: 29.6 % — LOW (ref 34.5–45)
HCT VFR BLD CALC: 35.3 %
HGB BLD-MCNC: 10.1 G/DL — LOW (ref 11.5–15.5)
HGB BLD-MCNC: 11.5 G/DL
IANC: 3.82 K/UL — SIGNIFICANT CHANGE UP (ref 1.8–7.4)
IMM GRANULOCYTES NFR BLD AUTO: 0.5 % — SIGNIFICANT CHANGE UP (ref 0–0.9)
IMM GRANULOCYTES NFR BLD AUTO: 0.7 %
LYMPHOCYTES # BLD AUTO: 1.21 K/UL — SIGNIFICANT CHANGE UP (ref 1–3.3)
LYMPHOCYTES # BLD AUTO: 1.25 K/UL
LYMPHOCYTES # BLD AUTO: 21.5 % — SIGNIFICANT CHANGE UP (ref 13–44)
LYMPHOCYTES NFR BLD AUTO: 12.7 %
MAGNESIUM SERPL-MCNC: 2 MG/DL — SIGNIFICANT CHANGE UP (ref 1.6–2.6)
MAN DIFF?: NORMAL
MCHC RBC-ENTMCNC: 31.2 PG
MCHC RBC-ENTMCNC: 31.5 PG — SIGNIFICANT CHANGE UP (ref 27–34)
MCHC RBC-ENTMCNC: 32.6 G/DL
MCHC RBC-ENTMCNC: 34.1 G/DL — SIGNIFICANT CHANGE UP (ref 32–36)
MCV RBC AUTO: 92.2 FL — SIGNIFICANT CHANGE UP (ref 80–100)
MCV RBC AUTO: 95.7 FL
MONOCYTES # BLD AUTO: 0.4 K/UL
MONOCYTES # BLD AUTO: 0.44 K/UL — SIGNIFICANT CHANGE UP (ref 0–0.9)
MONOCYTES NFR BLD AUTO: 4.1 %
MONOCYTES NFR BLD AUTO: 7.8 % — SIGNIFICANT CHANGE UP (ref 2–14)
NEUTROPHILS # BLD AUTO: 3.82 K/UL — SIGNIFICANT CHANGE UP (ref 1.8–7.4)
NEUTROPHILS # BLD AUTO: 8.01 K/UL
NEUTROPHILS NFR BLD AUTO: 67.7 % — SIGNIFICANT CHANGE UP (ref 43–77)
NEUTROPHILS NFR BLD AUTO: 81.2 %
NRBC # BLD: 0 /100 WBCS — SIGNIFICANT CHANGE UP (ref 0–0)
NRBC # FLD: 0 K/UL — SIGNIFICANT CHANGE UP (ref 0–0)
OSMOLALITY UR: 391 MOSM/KG — SIGNIFICANT CHANGE UP (ref 50–1200)
PHOSPHATE SERPL-MCNC: 3.2 MG/DL — SIGNIFICANT CHANGE UP (ref 2.5–4.5)
PLATELET # BLD AUTO: 220 K/UL — SIGNIFICANT CHANGE UP (ref 150–400)
PLATELET # BLD AUTO: 287 K/UL
POTASSIUM SERPL-MCNC: 4.3 MMOL/L — SIGNIFICANT CHANGE UP (ref 3.5–5.3)
POTASSIUM SERPL-SCNC: 4.3 MMOL/L — SIGNIFICANT CHANGE UP (ref 3.5–5.3)
POTASSIUM SERPL-SCNC: 4.6 MMOL/L
PROT SERPL-MCNC: 6.4 G/DL — SIGNIFICANT CHANGE UP (ref 6–8.3)
PROT SERPL-MCNC: 7.2 G/DL
RBC # BLD: 3.21 M/UL — LOW (ref 3.8–5.2)
RBC # BLD: 3.69 M/UL
RBC # FLD: 13.4 % — SIGNIFICANT CHANGE UP (ref 10.3–14.5)
RBC # FLD: 13.8 %
SODIUM SERPL-SCNC: 135 MMOL/L
SODIUM SERPL-SCNC: 135 MMOL/L — SIGNIFICANT CHANGE UP (ref 135–145)
WBC # BLD: 5.64 K/UL — SIGNIFICANT CHANGE UP (ref 3.8–10.5)
WBC # FLD AUTO: 5.64 K/UL — SIGNIFICANT CHANGE UP (ref 3.8–10.5)
WBC # FLD AUTO: 9.86 K/UL

## 2024-11-26 PROCEDURE — 76770 US EXAM ABDO BACK WALL COMP: CPT | Mod: 26

## 2024-11-26 PROCEDURE — G0316 PROLONG INPT EVAL ADD15 M: CPT

## 2024-11-26 PROCEDURE — 99223 1ST HOSP IP/OBS HIGH 75: CPT

## 2024-11-26 RX ORDER — INSULIN DETEMIR 100 [IU]/ML
0 INJECTION, SOLUTION SUBCUTANEOUS
Refills: 0 | DISCHARGE

## 2024-11-26 RX ORDER — SPIRONOLACTONE 25 MG
0.5 TABLET ORAL
Refills: 0 | DISCHARGE

## 2024-11-26 RX ORDER — CLOPIDOGREL 75 MG/1
75 TABLET, FILM COATED ORAL DAILY
Refills: 0 | Status: DISCONTINUED | OUTPATIENT
Start: 2024-11-26 | End: 2024-11-29

## 2024-11-26 RX ADMIN — Medication 3: at 18:17

## 2024-11-26 RX ADMIN — Medication 2: at 13:07

## 2024-11-26 RX ADMIN — Medication 40 MILLIGRAM(S): at 21:22

## 2024-11-26 RX ADMIN — Medication 1: at 08:45

## 2024-11-26 RX ADMIN — Medication 100 MILLIGRAM(S): at 00:43

## 2024-11-26 RX ADMIN — Medication 100 MILLILITER(S): at 00:44

## 2024-11-26 RX ADMIN — ACETAMINOPHEN, DIPHENHYDRAMINE HCL, PHENYLEPHRINE HCL 3 MILLIGRAM(S): 325; 25; 5 TABLET ORAL at 21:22

## 2024-11-26 NOTE — H&P ADULT - NSHPPHYSICALEXAM_GEN_ALL_CORE
PHYSICAL EXAM:  VITALS: Vital Signs Last 24 Hrs  T(C): 36.4 (25 Nov 2024 21:33), Max: 36.8 (25 Nov 2024 15:58)  T(F): 97.5 (25 Nov 2024 21:33), Max: 98.2 (25 Nov 2024 15:58)  HR: 79 (25 Nov 2024 21:33) (68 - 79)  BP: 114/45 (25 Nov 2024 21:33) (95/68 - 115/40)  BP(mean): --  RR: 17 (25 Nov 2024 21:33) (16 - 17)  SpO2: 100% (25 Nov 2024 21:33) (99% - 100%)    Parameters below as of 25 Nov 2024 21:33  Patient On (Oxygen Delivery Method): room air      GENERAL: NAD, comfortable at bedside  HEAD:  Atraumatic, Normocephalic  EYES: EOMI, PERRL, conjunctiva and sclera clear  ENT: Moist Mucus Membranes present, no ulcers appreciated  NECK: Supple, No JVD  CHEST/LUNG: Clear to auscultation bilaterally; No wheezes, rales or rhonchi, no accessory muscle use  HEART: Regular rate and rhythm; No murmurs, rubs, or gallops, (+)S1, S2  ABDOMEN: Soft, Nontender, Nondistended; Normal Bowel sounds, no suprapubic tenderness noted  EXTREMITIES: No clubbing, cyanosis, or edema  PSYCH: normal mood and affect  NEUROLOGY: AAOx3, non-focal  SKIN: No rashes or lesions

## 2024-11-26 NOTE — H&P ADULT - NSHPREVIEWOFSYSTEMS_GEN_ALL_CORE
REVIEW OF SYSTEMS:    CONSTITUTIONAL: No weakness, fevers or chills  EYES/ENT: No visual changes;  No dysphagia; No sore throat; No rhinorrhea; No sinus pain/pressure  NECK: No pain or stiffness  RESPIRATORY: No cough, wheezing, hemoptysis; No shortness of breath  CARDIOVASCULAR: No chest pain or palpitations; No lower extremity edema  GASTROINTESTINAL: No abdominal or epigastric pain. No nausea, vomiting, or hematemesis; No diarrhea or constipation. No melena or hematochezia.  GENITOURINARY: No dysuria, frequency or hematuria  NEUROLOGICAL: No numbness or focal weakness  MSK: ambulates without assistance  SKIN: No itching, burning, rashes, or lesions   All other review of systems is negative unless indicated above. REVIEW OF SYSTEMS:    CONSTITUTIONAL: + generalized weakness, no fevers or chills  EYES/ENT: No visual changes;  No dysphagia; No sore throat; No rhinorrhea; No sinus pain/pressure  NECK: No pain or stiffness  RESPIRATORY: No cough, wheezing, hemoptysis; No shortness of breath  CARDIOVASCULAR: No chest pain or palpitations; No lower extremity edema, + dizziness  GASTROINTESTINAL: No abdominal or epigastric pain. No nausea, vomiting, or hematemesis; No diarrhea or constipation. No melena or hematochezia.  GENITOURINARY: No dysuria, frequency or hematuria  NEUROLOGICAL: No numbness or focal weakness  MSK: ambulates without assistance  SKIN: No itching, burning, rashes, or lesions   All other review of systems is negative unless indicated above.

## 2024-11-26 NOTE — H&P ADULT - NSHPLABSRESULTS_GEN_ALL_CORE
11.0   10.41 )-----------( 249      ( 2024 14:24 )             32.8           132[L]  |  99  |  89[H]  ----------------------------<  164[H]  4.9   |  13[L]  |  1.72[H]    Ca    9.7      2024 14:24  Mg     2.10         TPro  7.3  /  Alb  4.1  /  TBili  0.5  /  DBili  x   /  AST  17  /  ALT  14  /  AlkPhos  68        CARDIAC MARKERS ( 2024 14:24 )  x     / x     / x     / x     / 4.1 ng/mL        PT/INR - ( 2024 14:24 )   PT: 11.7 sec;   INR: 1.01 ratio         PTT - ( 2024 14:24 )  PTT:26.2 sec    14:24 - VBG - pH: 7.24  | pCO2: 35    | pO2: <20   | Lactate: 1.5        Urinalysis Basic - ( 2024 16:55 )    Color: Yellow / Appearance: Clear / S.015 / pH: x  Gluc: x / Ketone: 15 mg/dL  / Bili: Negative / Urobili: 0.2 mg/dL   Blood: x / Protein: Negative mg/dL / Nitrite: Negative   Leuk Esterase: Small / RBC: 1 /HPF / WBC 10 /HPF   Sq Epi: x / Non Sq Epi: 9 /HPF / Bacteria: Many /HPF          Urinalysis with Rflx Culture (collected 2024 16:55)        CXR: As per my read - no opacities noted, Will wait for prelim/official read    EKG: As per my read - ____________    CT: 11.0   10.41 )-----------( 249      ( 2024 14:24 )             32.8           132[L]  |  99  |  89[H]  ----------------------------<  164[H]  4.9   |  13[L]  |  1.72[H]    Ca    9.7      2024 14:24  Mg     2.10         TPro  7.3  /  Alb  4.1  /  TBili  0.5  /  DBili  x   /  AST  17  /  ALT  14  /  AlkPhos  68        CARDIAC MARKERS ( 2024 14:24 )  x     / x     / x     / x     / 4.1 ng/mL        PT/INR - ( 2024 14:24 )   PT: 11.7 sec;   INR: 1.01 ratio         PTT - ( 2024 14:24 )  PTT:26.2 sec    14:24 - VBG - pH: 7.24  | pCO2: 35    | pO2: <20   | Lactate: 1.5        Urinalysis Basic - ( 2024 16:55 )    Color: Yellow / Appearance: Clear / S.015 / pH: x  Gluc: x / Ketone: 15 mg/dL  / Bili: Negative / Urobili: 0.2 mg/dL   Blood: x / Protein: Negative mg/dL / Nitrite: Negative   Leuk Esterase: Small / RBC: 1 /HPF / WBC 10 /HPF   Sq Epi: x / Non Sq Epi: 9 /HPF / Bacteria: Many /HPF          Urinalysis with Rflx Culture (collected 2024 16:55)        CXR: As per my read - no opacities noted, Will wait for prelim/official read    EKG: As per my read - NSR QTc 448ms

## 2024-11-26 NOTE — H&P ADULT - HISTORY OF PRESENT ILLNESS
This is a 78 y/o F with pmhx of CAD, DM2, HFrEF (last EF 20 to 25%), hard of hearing, HTN, HLD, NSTEMI presents to the ED for new onset generalized malaise. She is hard of hearing therefore difficult to obtain history, some of the history was obtained by writing on paper and pen. The patient stated that she felt unwell, and went to her pcp. PCP told her to go to the hospital because she is found to have hypotension with systolic 90s. The patient denies dysuria, also denies cloudy urine. Denies fevers at home. Endorsed dizziness for the last few days, denies LOC. ROS otherwise negative.

## 2024-11-26 NOTE — H&P ADULT - ASSESSMENT
80 y/o F with pmhx of CAD, DM2, HFrEF (last EF 20 to 25%), hard of hearing, HTN, HLD, NSTEMI presents to the ED for new onset generalized malaise. Patient found to have Navya and UTI. Admit for nephrology eval and IV abx.

## 2024-11-26 NOTE — H&P ADULT - PROBLEM SELECTOR PLAN 3
- will start with low dose sliding scale  - patient on metformin 1000mg bid at home, will hold due to TABITHA

## 2024-11-26 NOTE — H&P ADULT - PROBLEM SELECTOR PLAN 2
- patient has new onset TABITHA  - likely secondary to dehydration, sepsis and taking entresto at home  - will start with maintenance fluids, monitor for CHF symptoms closely  - trend Cr  - nephrology consult  - ultrasound kidneys pending  - urine lytes pending

## 2024-11-26 NOTE — H&P ADULT - PROBLEM SELECTOR PLAN 4
- currently euvolemic  - hold entresto  - monitor for chf symptoms while getting treated for TABITHA

## 2024-11-26 NOTE — PHYSICAL THERAPY INITIAL EVALUATION ADULT - PERTINENT HX OF CURRENT PROBLEM, REHAB EVAL
Patient is 79 year old female, history of CAD, DM2, HFrEF (last EF 20 to 25%), hard of hearing, HTN, HLD, NSTEMI presents to the ED for new onset generalized malaise.

## 2024-11-26 NOTE — H&P ADULT - NSICDXPASTMEDICALHX_GEN_ALL_CORE_FT
PAST MEDICAL HISTORY:  CAD (coronary artery disease)     DMII (diabetes mellitus, type 2)     HFrEF (heart failure with reduced ejection fraction)     [place] (hard of hearing)     HTN (hypertension)     Hyperlipidemia     NSTEMI (non-ST elevation myocardial infarction)     Vitamin D deficiency

## 2024-11-26 NOTE — H&P ADULT - PROBLEM SELECTOR PLAN 1
- patient has new onset UTI likely causing dizziness and generalized weakness  - will start ceftriaxone  - f/u urine cultures  - monitor for worsening sepsis

## 2024-11-26 NOTE — H&P ADULT - NSHPADDITIONALINFOADULT_GEN_ALL_CORE
- extra 15 mins spent to attempt to obtain history from patient who is hard of hearing and explained plan as above

## 2024-11-27 ENCOUNTER — RESULT REVIEW (OUTPATIENT)
Age: 79
End: 2024-11-27

## 2024-11-27 LAB
ALBUMIN SERPL ELPH-MCNC: 3.7 G/DL — SIGNIFICANT CHANGE UP (ref 3.3–5)
ALP SERPL-CCNC: 63 U/L — SIGNIFICANT CHANGE UP (ref 40–120)
ALT FLD-CCNC: 10 U/L — SIGNIFICANT CHANGE UP (ref 4–33)
ANION GAP SERPL CALC-SCNC: 13 MMOL/L — SIGNIFICANT CHANGE UP (ref 7–14)
AST SERPL-CCNC: 17 U/L — SIGNIFICANT CHANGE UP (ref 4–32)
BASOPHILS # BLD AUTO: 0.04 K/UL — SIGNIFICANT CHANGE UP (ref 0–0.2)
BASOPHILS NFR BLD AUTO: 0.6 % — SIGNIFICANT CHANGE UP (ref 0–2)
BILIRUB SERPL-MCNC: 0.2 MG/DL — SIGNIFICANT CHANGE UP (ref 0.2–1.2)
BUN SERPL-MCNC: 42 MG/DL — HIGH (ref 7–23)
CALCIUM SERPL-MCNC: 9 MG/DL — SIGNIFICANT CHANGE UP (ref 8.4–10.5)
CHLORIDE SERPL-SCNC: 106 MMOL/L — SIGNIFICANT CHANGE UP (ref 98–107)
CO2 SERPL-SCNC: 17 MMOL/L — LOW (ref 22–31)
CREAT SERPL-MCNC: 0.93 MG/DL — SIGNIFICANT CHANGE UP (ref 0.5–1.3)
EGFR: 62 ML/MIN/1.73M2 — SIGNIFICANT CHANGE UP
EOSINOPHIL # BLD AUTO: 0.18 K/UL — SIGNIFICANT CHANGE UP (ref 0–0.5)
EOSINOPHIL NFR BLD AUTO: 2.7 % — SIGNIFICANT CHANGE UP (ref 0–6)
GLUCOSE BLDC GLUCOMTR-MCNC: 158 MG/DL — HIGH (ref 70–99)
GLUCOSE BLDC GLUCOMTR-MCNC: 176 MG/DL — HIGH (ref 70–99)
GLUCOSE BLDC GLUCOMTR-MCNC: 216 MG/DL — HIGH (ref 70–99)
GLUCOSE BLDC GLUCOMTR-MCNC: 222 MG/DL — HIGH (ref 70–99)
GLUCOSE BLDC GLUCOMTR-MCNC: 224 MG/DL — HIGH (ref 70–99)
GLUCOSE SERPL-MCNC: 176 MG/DL — HIGH (ref 70–99)
HCT VFR BLD CALC: 28.1 % — LOW (ref 34.5–45)
HGB BLD-MCNC: 9.9 G/DL — LOW (ref 11.5–15.5)
IANC: 3.73 K/UL — SIGNIFICANT CHANGE UP (ref 1.8–7.4)
IMM GRANULOCYTES NFR BLD AUTO: 0.4 % — SIGNIFICANT CHANGE UP (ref 0–0.9)
LYMPHOCYTES # BLD AUTO: 2.19 K/UL — SIGNIFICANT CHANGE UP (ref 1–3.3)
LYMPHOCYTES # BLD AUTO: 32.4 % — SIGNIFICANT CHANGE UP (ref 13–44)
MAGNESIUM SERPL-MCNC: 1.8 MG/DL — SIGNIFICANT CHANGE UP (ref 1.6–2.6)
MCHC RBC-ENTMCNC: 31.7 PG — SIGNIFICANT CHANGE UP (ref 27–34)
MCHC RBC-ENTMCNC: 35.2 G/DL — SIGNIFICANT CHANGE UP (ref 32–36)
MCV RBC AUTO: 90.1 FL — SIGNIFICANT CHANGE UP (ref 80–100)
MONOCYTES # BLD AUTO: 0.58 K/UL — SIGNIFICANT CHANGE UP (ref 0–0.9)
MONOCYTES NFR BLD AUTO: 8.6 % — SIGNIFICANT CHANGE UP (ref 2–14)
NEUTROPHILS # BLD AUTO: 3.73 K/UL — SIGNIFICANT CHANGE UP (ref 1.8–7.4)
NEUTROPHILS NFR BLD AUTO: 55.3 % — SIGNIFICANT CHANGE UP (ref 43–77)
NRBC # BLD: 0 /100 WBCS — SIGNIFICANT CHANGE UP (ref 0–0)
NRBC # FLD: 0 K/UL — SIGNIFICANT CHANGE UP (ref 0–0)
PHOSPHATE SERPL-MCNC: 3 MG/DL — SIGNIFICANT CHANGE UP (ref 2.5–4.5)
PLATELET # BLD AUTO: 214 K/UL — SIGNIFICANT CHANGE UP (ref 150–400)
POTASSIUM SERPL-MCNC: 4.6 MMOL/L — SIGNIFICANT CHANGE UP (ref 3.5–5.3)
POTASSIUM SERPL-SCNC: 4.6 MMOL/L — SIGNIFICANT CHANGE UP (ref 3.5–5.3)
PROT SERPL-MCNC: 6.2 G/DL — SIGNIFICANT CHANGE UP (ref 6–8.3)
RBC # BLD: 3.12 M/UL — LOW (ref 3.8–5.2)
RBC # FLD: 13.4 % — SIGNIFICANT CHANGE UP (ref 10.3–14.5)
SODIUM SERPL-SCNC: 136 MMOL/L — SIGNIFICANT CHANGE UP (ref 135–145)
WBC # BLD: 6.75 K/UL — SIGNIFICANT CHANGE UP (ref 3.8–10.5)
WBC # FLD AUTO: 6.75 K/UL — SIGNIFICANT CHANGE UP (ref 3.8–10.5)

## 2024-11-27 PROCEDURE — 93306 TTE W/DOPPLER COMPLETE: CPT | Mod: 26

## 2024-11-27 PROCEDURE — 99232 SBSQ HOSP IP/OBS MODERATE 35: CPT

## 2024-11-27 RX ORDER — INSULIN GLARGINE 100 [IU]/ML
8 INJECTION, SOLUTION SUBCUTANEOUS AT BEDTIME
Refills: 0 | Status: DISCONTINUED | OUTPATIENT
Start: 2024-11-27 | End: 2024-11-29

## 2024-11-27 RX ORDER — METOPROLOL TARTRATE 100 MG/1
25 TABLET, FILM COATED ORAL DAILY
Refills: 0 | Status: DISCONTINUED | OUTPATIENT
Start: 2024-11-27 | End: 2024-11-29

## 2024-11-27 RX ADMIN — CLOPIDOGREL 75 MILLIGRAM(S): 75 TABLET, FILM COATED ORAL at 13:15

## 2024-11-27 RX ADMIN — INSULIN GLARGINE 8 UNIT(S): 100 INJECTION, SOLUTION SUBCUTANEOUS at 21:22

## 2024-11-27 RX ADMIN — Medication 2: at 13:16

## 2024-11-27 RX ADMIN — Medication 40 MILLIGRAM(S): at 21:18

## 2024-11-27 RX ADMIN — Medication 1: at 18:32

## 2024-11-27 RX ADMIN — Medication 100 MILLIGRAM(S): at 00:14

## 2024-11-27 RX ADMIN — Medication 3 UNIT(S): at 13:16

## 2024-11-27 RX ADMIN — Medication 3 UNIT(S): at 18:33

## 2024-11-27 RX ADMIN — ACETAMINOPHEN, DIPHENHYDRAMINE HCL, PHENYLEPHRINE HCL 3 MILLIGRAM(S): 325; 25; 5 TABLET ORAL at 21:18

## 2024-11-27 RX ADMIN — Medication 1: at 08:58

## 2024-11-27 NOTE — PHARMACOTHERAPY INTERVENTION NOTE - COMMENTS
Medication history is complete. Medication list updated in Outpatient Medication Record (OMR). Please call [facility] [phone] if you have any questions. 
Patient counseled on heart failure medication indications, administration, and side effects. Also discussed fluid and salt restrictions, and maintaining a log of daily weights. In addition to this, discussed warning signs of volume overload (SOB, WILLIS, orthopnea, edema, etc.) and when to seek medical attention. Patient verbalized understanding.    Mayelin SamuelD (Clinical Pharmacy Specialist, [facility] #[id number])

## 2024-11-28 ENCOUNTER — TRANSCRIPTION ENCOUNTER (OUTPATIENT)
Age: 79
End: 2024-11-28

## 2024-11-28 LAB
-  AMOXICILLIN/CLAVULANIC ACID: SIGNIFICANT CHANGE UP
-  AMPICILLIN/SULBACTAM: SIGNIFICANT CHANGE UP
-  AMPICILLIN: SIGNIFICANT CHANGE UP
-  AZTREONAM: SIGNIFICANT CHANGE UP
-  CEFAZOLIN: SIGNIFICANT CHANGE UP
-  CEFEPIME: SIGNIFICANT CHANGE UP
-  CEFOXITIN: SIGNIFICANT CHANGE UP
-  CEFTRIAXONE: SIGNIFICANT CHANGE UP
-  CEFUROXIME: SIGNIFICANT CHANGE UP
-  CIPROFLOXACIN: SIGNIFICANT CHANGE UP
-  ERTAPENEM: SIGNIFICANT CHANGE UP
-  GENTAMICIN: SIGNIFICANT CHANGE UP
-  IMIPENEM: SIGNIFICANT CHANGE UP
-  LEVOFLOXACIN: SIGNIFICANT CHANGE UP
-  MEROPENEM: SIGNIFICANT CHANGE UP
-  NITROFURANTOIN: SIGNIFICANT CHANGE UP
-  PIPERACILLIN/TAZOBACTAM: SIGNIFICANT CHANGE UP
-  TOBRAMYCIN: SIGNIFICANT CHANGE UP
-  TRIMETHOPRIM/SULFAMETHOXAZOLE: SIGNIFICANT CHANGE UP
ALBUMIN SERPL ELPH-MCNC: 3.4 G/DL — SIGNIFICANT CHANGE UP (ref 3.3–5)
ALP SERPL-CCNC: 57 U/L — SIGNIFICANT CHANGE UP (ref 40–120)
ALT FLD-CCNC: 10 U/L — SIGNIFICANT CHANGE UP (ref 4–33)
ANION GAP SERPL CALC-SCNC: 12 MMOL/L — SIGNIFICANT CHANGE UP (ref 7–14)
AST SERPL-CCNC: 12 U/L — SIGNIFICANT CHANGE UP (ref 4–32)
BASOPHILS # BLD AUTO: 0.04 K/UL — SIGNIFICANT CHANGE UP (ref 0–0.2)
BASOPHILS NFR BLD AUTO: 0.6 % — SIGNIFICANT CHANGE UP (ref 0–2)
BILIRUB SERPL-MCNC: 0.2 MG/DL — SIGNIFICANT CHANGE UP (ref 0.2–1.2)
BUN SERPL-MCNC: 24 MG/DL — HIGH (ref 7–23)
CALCIUM SERPL-MCNC: 8.9 MG/DL — SIGNIFICANT CHANGE UP (ref 8.4–10.5)
CHLORIDE SERPL-SCNC: 108 MMOL/L — HIGH (ref 98–107)
CO2 SERPL-SCNC: 19 MMOL/L — LOW (ref 22–31)
CREAT SERPL-MCNC: 0.82 MG/DL — SIGNIFICANT CHANGE UP (ref 0.5–1.3)
CULTURE RESULTS: ABNORMAL
EGFR: 72 ML/MIN/1.73M2 — SIGNIFICANT CHANGE UP
EOSINOPHIL # BLD AUTO: 0.22 K/UL — SIGNIFICANT CHANGE UP (ref 0–0.5)
EOSINOPHIL NFR BLD AUTO: 3.6 % — SIGNIFICANT CHANGE UP (ref 0–6)
GLUCOSE BLDC GLUCOMTR-MCNC: 151 MG/DL — HIGH (ref 70–99)
GLUCOSE BLDC GLUCOMTR-MCNC: 183 MG/DL — HIGH (ref 70–99)
GLUCOSE BLDC GLUCOMTR-MCNC: 187 MG/DL — HIGH (ref 70–99)
GLUCOSE BLDC GLUCOMTR-MCNC: 206 MG/DL — HIGH (ref 70–99)
GLUCOSE SERPL-MCNC: 136 MG/DL — HIGH (ref 70–99)
HCT VFR BLD CALC: 29.2 % — LOW (ref 34.5–45)
HGB BLD-MCNC: 9.9 G/DL — LOW (ref 11.5–15.5)
IANC: 3.34 K/UL — SIGNIFICANT CHANGE UP (ref 1.8–7.4)
IMM GRANULOCYTES NFR BLD AUTO: 0.6 % — SIGNIFICANT CHANGE UP (ref 0–0.9)
LYMPHOCYTES # BLD AUTO: 2.05 K/UL — SIGNIFICANT CHANGE UP (ref 1–3.3)
LYMPHOCYTES # BLD AUTO: 33.1 % — SIGNIFICANT CHANGE UP (ref 13–44)
MAGNESIUM SERPL-MCNC: 1.8 MG/DL — SIGNIFICANT CHANGE UP (ref 1.6–2.6)
MCHC RBC-ENTMCNC: 31.5 PG — SIGNIFICANT CHANGE UP (ref 27–34)
MCHC RBC-ENTMCNC: 33.9 G/DL — SIGNIFICANT CHANGE UP (ref 32–36)
MCV RBC AUTO: 93 FL — SIGNIFICANT CHANGE UP (ref 80–100)
METHOD TYPE: SIGNIFICANT CHANGE UP
MONOCYTES # BLD AUTO: 0.5 K/UL — SIGNIFICANT CHANGE UP (ref 0–0.9)
MONOCYTES NFR BLD AUTO: 8.1 % — SIGNIFICANT CHANGE UP (ref 2–14)
NEUTROPHILS # BLD AUTO: 3.34 K/UL — SIGNIFICANT CHANGE UP (ref 1.8–7.4)
NEUTROPHILS NFR BLD AUTO: 54 % — SIGNIFICANT CHANGE UP (ref 43–77)
NRBC # BLD: 0 /100 WBCS — SIGNIFICANT CHANGE UP (ref 0–0)
NRBC # FLD: 0 K/UL — SIGNIFICANT CHANGE UP (ref 0–0)
ORGANISM # SPEC MICROSCOPIC CNT: ABNORMAL
ORGANISM # SPEC MICROSCOPIC CNT: ABNORMAL
PHOSPHATE SERPL-MCNC: 3.2 MG/DL — SIGNIFICANT CHANGE UP (ref 2.5–4.5)
PHOSPHOLIPID SERPL-MCNC: 163 MG/DL — SIGNIFICANT CHANGE UP (ref 151–288)
PLATELET # BLD AUTO: 203 K/UL — SIGNIFICANT CHANGE UP (ref 150–400)
POTASSIUM SERPL-MCNC: 4.6 MMOL/L — SIGNIFICANT CHANGE UP (ref 3.5–5.3)
POTASSIUM SERPL-SCNC: 4.6 MMOL/L — SIGNIFICANT CHANGE UP (ref 3.5–5.3)
PROT SERPL-MCNC: 6 G/DL — SIGNIFICANT CHANGE UP (ref 6–8.3)
RBC # BLD: 3.14 M/UL — LOW (ref 3.8–5.2)
RBC # FLD: 13.5 % — SIGNIFICANT CHANGE UP (ref 10.3–14.5)
SODIUM SERPL-SCNC: 139 MMOL/L — SIGNIFICANT CHANGE UP (ref 135–145)
SPECIMEN SOURCE: SIGNIFICANT CHANGE UP
WBC # BLD: 6.19 K/UL — SIGNIFICANT CHANGE UP (ref 3.8–10.5)
WBC # FLD AUTO: 6.19 K/UL — SIGNIFICANT CHANGE UP (ref 3.8–10.5)

## 2024-11-28 PROCEDURE — 99232 SBSQ HOSP IP/OBS MODERATE 35: CPT

## 2024-11-28 RX ADMIN — CLOPIDOGREL 75 MILLIGRAM(S): 75 TABLET, FILM COATED ORAL at 13:10

## 2024-11-28 RX ADMIN — Medication 3 UNIT(S): at 09:20

## 2024-11-28 RX ADMIN — Medication 100 MILLIGRAM(S): at 00:06

## 2024-11-28 RX ADMIN — Medication 1: at 09:19

## 2024-11-28 RX ADMIN — Medication 3 UNIT(S): at 18:20

## 2024-11-28 RX ADMIN — Medication 1: at 13:11

## 2024-11-28 RX ADMIN — INSULIN GLARGINE 8 UNIT(S): 100 INJECTION, SOLUTION SUBCUTANEOUS at 21:22

## 2024-11-28 RX ADMIN — Medication 40 MILLIGRAM(S): at 21:22

## 2024-11-28 RX ADMIN — Medication 3 UNIT(S): at 13:11

## 2024-11-28 RX ADMIN — Medication 1: at 18:20

## 2024-11-28 NOTE — DISCHARGE NOTE PROVIDER - PROVIDER TOKENS
PROVIDER:[TOKEN:[[id number]]],PROVIDER:[TOKEN:[[id number]]],PROVIDER:[TOKEN:[[id number]]]

## 2024-11-28 NOTE — DISCHARGE NOTE PROVIDER - CARE PROVIDERS DIRECT ADDRESSES
[email or web address]

## 2024-11-28 NOTE — DISCHARGE NOTE PROVIDER - CARE PROVIDER_API CALL
Adria Coe  Internal Medicine  [address], [address]  [place] [address]  Phone: [phone]  Fax: [phone]  Follow Up Time:     Acosta Mahmood  Cardiology  [address]  [place] [address]  Phone: [phone]  Fax: [phone]  Follow Up Time:     Tyrell Rai  Endocrinology/Metab/Diabetes  [address], [address]  [place] [address]  Phone: [phone]  Fax: [phone]  Follow Up Time:

## 2024-11-28 NOTE — DISCHARGE NOTE PROVIDER - NSDCFUSCHEDAPPT_GEN_ALL_CORE_FT
Adria Coe  [facility] Physician [facility]  INTMED [address]  Scheduled Appointment: 01/08/2025    Acosta Mahmood  [facility]  CARDIOLOGY [address] [facility]   Scheduled Appointment: 02/05/2025    Tyrell Rai  [facility]  ENDOCRIN [address] [facility] [address]  Scheduled Appointment: 02/19/2025

## 2024-11-28 NOTE — PROGRESS NOTE ADULT - PROBLEM SELECTOR PLAN 4
currently euvolemic  - entresto, metoprolol, aldactone, lasix on hold due to low BP  - echo LVEF 20-25% this admission, similar to prior studies  -CHF consult tomorrow to optimize cardiac meds give persistent hypotension
currently euvolemic  - entresto, metoprolol, aldactone, lasix on hold due to low BP  - echo LVEF 20-25% this admission, similar to prior studies
currently euvolemic  - entresto, metoprolol, aldactone, lasix on hold due to low BP  - f/u echo

## 2024-11-28 NOTE — PROGRESS NOTE ADULT - PROBLEM SELECTOR PLAN 1
currently afebrile. leukocytosis resolved  - c/w ceftriaxone for 3 day course  - f/u urine cultures
currently afebrile. leukocytosis resolved  - c/w ceftriaxone for 3 day course  - f/u urine cultures
currently afebrile. leukocytosis resolved  - s/p ceftriaxone for 3 day course  - urine cultures sensitive klebsiella

## 2024-11-28 NOTE — DISCHARGE NOTE PROVIDER - HOSPITAL COURSE
HPI:  This is a 80 y/o F with pmhx of CAD, DM2, HFrEF (last EF 20 to 25%), hard of hearing, HTN, HLD, NSTEMI presents to the ED for new onset generalized malaise. She is hard of hearing therefore difficult to obtain history, some of the history was obtained by writing on paper and pen. The patient stated that she felt unwell, and went to her pcp. PCP told her to go to the hospital because she is found to have hypotension with systolic 90s. The patient denies dysuria, also denies cloudy urine. Denies fevers at home. Endorsed dizziness for the last few days, denies LOC. ROS otherwise negative. (26 Nov 2024 04:16)    Hospital Course:   Problem/Plan - 1:  ·  Problem: Acute UTI.   ·  Plan: currently afebrile. leukocytosis resolved  - s/p ceftriaxone for 3 day course  - urine cultures sensitive klebsiella.     Problem/Plan - 2:  ·  Problem: TABITHA (acute kidney injury).   ·  Plan: TABITHA likely pre-renal, now resolved after hydration   Cr baseline 1, Peaked at 1.9, returned to baseline on the day of dc  -kidney US wnl   -hold standing IVF given low LVEF. encourage po intake   -lasix, aldactone, entresto on hold during admission. resume on dc      Problem/Plan - 3:  ·  Problem: DM2 (diabetes mellitus, type 2).   ·  Plan: a1c 7.5. -200  -on Levemir 15/25, Jardiance, metformin at home.   -hold Jardiance on dc. resume rest of the home meds       Problem/Plan - 4:  ·  Problem: Chronic systolic heart failure.   ·  Plan: currently euvolemic  - entresto, metoprolol, aldactone, lasix on hold due to low BP  - echo LVEF 20-25% this admission, similar to prior studies  - cardiology consulted- appreciate rec for meds on dc         Advanced Directives:   [ x] Full code     Discharge Diagnoses:  TABITHA  UTI  chronic systolic CHF

## 2024-11-28 NOTE — PROGRESS NOTE ADULT - ASSESSMENT
80 y/o F with pmhx of CAD, DM2, HFrEF (last EF 20 to 25%), hard of hearing, HTN, HLD, NSTEMI presents to the ED for new onset generalized malaise, found to have Navya and UTI.

## 2024-11-28 NOTE — DISCHARGE NOTE PROVIDER - NSDCCPCAREPLAN_GEN_ALL_CORE_FT
PRINCIPAL DISCHARGE DIAGNOSIS  Diagnosis: Acute UTI  Assessment and Plan of Treatment: you were found to have UTI and  completed a course of antibiotics      SECONDARY DISCHARGE DIAGNOSES  Diagnosis: TABITHA (acute kidney injury)  Assessment and Plan of Treatment: your kidney function was impaired on admission, now returned to based after hydration    Diagnosis: DM2 (diabetes mellitus, type 2)  Assessment and Plan of Treatment: Hold Jardiance. continue rest of the home meds. Monitor blood glucose level closely. Follow with your PCP to further titrate your meds    Diagnosis: Chronic systolic heart failure  Assessment and Plan of Treatment: You were evaluated by cardiology. Continue medications as intructed

## 2024-11-28 NOTE — PROGRESS NOTE ADULT - PROBLEM SELECTOR PLAN 2
TABITHA likely pre-renal, now resolved after hydration   -kidney US wnl   -hold standing IVF given low LVEF. encourage po intake   -lasix, aldactone, entresto on hold

## 2024-11-28 NOTE — PROGRESS NOTE ADULT - PROBLEM SELECTOR PLAN 3
a1c 7.5. -200  -on Levemir 15/25, Jardiance, metformin at home.   -started lantus 8, lipro 3 premeal. c.w ISS
a1c 7.5. -200  -on Levemir 15/25, Jardiance, metformin at home.   -ISS for now
a1c 7.5. -200  -on Levemir 15/25, Jardiance, metformin at home.   -started lantus 8, lipro 3 premeal. c.w ISS

## 2024-11-28 NOTE — PROGRESS NOTE ADULT - SUBJECTIVE AND OBJECTIVE BOX
[facility] Division of Hospital Medicine  Esperanza Thomas MD  Pager [phone]      Patient is a 79y old  Female who presents with a chief complaint of not feeling well (28 Nov 2024 10:02)      SUBJECTIVE / OVERNIGHT EVENTS:    no acute event o/n. BP remains soft. Did not get metoprolol due to hold parameter. no new complaints     ADDITIONAL REVIEW OF SYSTEMS:    RESPIRATORY: No cough, wheezing, chills or hemoptysis; No shortness of breath  CARDIOVASCULAR: No chest pain, palpitations, dizziness, or leg swelling  GASTROINTESTINAL: No abdominal or epigastric pain. No nausea, vomiting, or hematemesis; No diarrhea or constipation. No melena or hematochezia.      MEDICATIONS  (STANDING):  atorvastatin 40 milliGRAM(s) Oral at bedtime  clopidogrel Tablet 75 milliGRAM(s) Oral daily  dextrose 5%. 1000 milliLiter(s) (100 mL/Hr) IV Continuous <Continuous>  dextrose 5%. 1000 milliLiter(s) (50 mL/Hr) IV Continuous <Continuous>  dextrose 50% Injectable 25 Gram(s) IV Push once  dextrose 50% Injectable 12.5 Gram(s) IV Push once  dextrose 50% Injectable 25 Gram(s) IV Push once  glucagon  Injectable 1 milliGRAM(s) IntraMuscular once  influenza  Vaccine (HIGH DOSE) 0.5 milliLiter(s) IntraMuscular once  insulin glargine Injectable (LANTUS) 8 Unit(s) SubCutaneous at bedtime  insulin lispro (ADMELOG) corrective regimen sliding scale   SubCutaneous three times a day before meals  insulin lispro (ADMELOG) corrective regimen sliding scale   SubCutaneous at bedtime  insulin lispro Injectable (ADMELOG) 3 Unit(s) SubCutaneous three times a day before meals  metoprolol succinate ER 25 milliGRAM(s) Oral daily    MEDICATIONS  (PRN):  acetaminophen     Tablet .. 650 milliGRAM(s) Oral every 6 hours PRN Temp greater or equal to 38C (100.4F), Mild Pain (1 - 3)  aluminum hydroxide/magnesium hydroxide/simethicone Suspension 30 milliLiter(s) Oral every 4 hours PRN Dyspepsia  dextrose Oral Gel 15 Gram(s) Oral once PRN Blood Glucose LESS THAN 70 milliGRAM(s)/deciliter  melatonin 3 milliGRAM(s) Oral at bedtime PRN Insomnia  ondansetron Injectable 4 milliGRAM(s) IV Push every 8 hours PRN Nausea and/or Vomiting      CAPILLARY BLOOD GLUCOSE      POCT Blood Glucose.: 187 mg/dL (28 Nov 2024 12:38)  POCT Blood Glucose.: 151 mg/dL (28 Nov 2024 08:41)  POCT Blood Glucose.: 224 mg/dL (27 Nov 2024 21:20)  POCT Blood Glucose.: 158 mg/dL (27 Nov 2024 18:10)    I&O's Summary      PHYSICAL EXAM:  Vital Signs Last 24 Hrs  T(C): 36.8 (28 Nov 2024 11:35), Max: 36.8 (28 Nov 2024 04:52)  T(F): 98.3 (28 Nov 2024 11:35), Max: 98.3 (28 Nov 2024 04:52)  HR: 73 (28 Nov 2024 11:35) (61 - 73)  BP: 92/46 (28 Nov 2024 11:35) (92/46 - 106/53)  BP(mean): --  RR: 18 (28 Nov 2024 11:35) (17 - 18)  SpO2: 100% (28 Nov 2024 11:35) (100% - 100%)    Parameters below as of 28 Nov 2024 11:35  Patient On (Oxygen Delivery Method): room air        CONSTITUTIONAL: NAD,  EYES: PERRLA; conjunctiva and sclera clear  ENMT: Moist oral mucosa, no pharyngeal injection or exudates;   NECK: Supple, no palpable masses;  RESPIRATORY: Normal respiratory effort; lungs are clear to auscultation bilaterally  CARDIOVASCULAR: Regular rate and rhythm, normal S1 and S2, no murmur/rub/gallop; No lower extremity edema; Peripheral pulses are 2+ bilaterally  ABDOMEN: Nontender to palpation, normoactive bowel sounds, no rebound/guarding;   MUSCLOSKELETAL:   no clubbing or cyanosis of digits; no joint swelling or tenderness to palpation  PSYCH: A+O to person, place, and time; affect appropriate  NEUROLOGY: CN 2-12 are intact and symmetric; no gross sensory deficits;   SKIN: No rashes;     LABS:                        9.9    6.19  )-----------( 203      ( 28 Nov 2024 06:00 )             29.2     11-28    139  |  108[H]  |  24[H]  ----------------------------<  136[H]  4.6   |  19[L]  |  0.82    Ca    8.9      28 Nov 2024 06:00  Phos  3.2     11-28  Mg     1.80     11-28    TPro  6.0  /  Alb  3.4  /  TBili  0.2  /  DBili  x   /  AST  12  /  ALT  10  /  AlkPhos  57  11-28          Urinalysis Basic - ( 28 Nov 2024 06:00 )    Color: x / Appearance: x / SG: x / pH: x  Gluc: 136 mg/dL / Ketone: x  / Bili: x / Urobili: x   Blood: x / Protein: x / Nitrite: x   Leuk Esterase: x / RBC: x / WBC x   Sq Epi: x / Non Sq Epi: x / Bacteria: x        Culture - Urine (collected 25 Nov 2024 16:55)  Source: Clean Catch  Final Report (28 Nov 2024 08:05):    >100,000 CFU/ml Klebsiella pneumoniae  Organism: Klebsiella pneumoniae (28 Nov 2024 08:05)  Organism: Klebsiella pneumoniae (28 Nov 2024 08:05)    Urinalysis with Rflx Culture (collected 25 Nov 2024 16:55)        RADIOLOGY & ADDITIONAL TESTS:  Results Reviewed:   Imaging Personally Reviewed:  Electrocardiogram Personally Reviewed:    COORDINATION OF CARE:  Care Discussed with Consultants/Other Providers [Y/N]:  Prior or Outpatient Records Reviewed [Y/N]:  
[facility] Division of Hospital Medicine  Esperanza Thomas MD  Pager [phone]      Patient is a 79y old  Female who presents with a chief complaint of not feeling well (26 Nov 2024 04:16)      SUBJECTIVE / OVERNIGHT EVENTS:    no acute event o/n. reports feeling ok however blood pressure remains low. Denies dizziness, chest pain, sob, palpitation    ADDITIONAL REVIEW OF SYSTEMS:    RESPIRATORY: No cough, wheezing, chills or hemoptysis; No shortness of breath  CARDIOVASCULAR: No chest pain, palpitations, dizziness, or leg swelling  GASTROINTESTINAL: No abdominal or epigastric pain. No nausea, vomiting, or hematemesis; No diarrhea or constipation. No melena or hematochezia.      MEDICATIONS  (STANDING):  atorvastatin 40 milliGRAM(s) Oral at bedtime  cefTRIAXone   IVPB 1000 milliGRAM(s) IV Intermittent every 24 hours  clopidogrel Tablet 75 milliGRAM(s) Oral daily  dextrose 5%. 1000 milliLiter(s) (100 mL/Hr) IV Continuous <Continuous>  dextrose 5%. 1000 milliLiter(s) (50 mL/Hr) IV Continuous <Continuous>  dextrose 50% Injectable 25 Gram(s) IV Push once  dextrose 50% Injectable 12.5 Gram(s) IV Push once  dextrose 50% Injectable 25 Gram(s) IV Push once  glucagon  Injectable 1 milliGRAM(s) IntraMuscular once  influenza  Vaccine (HIGH DOSE) 0.5 milliLiter(s) IntraMuscular once  insulin lispro (ADMELOG) corrective regimen sliding scale   SubCutaneous three times a day before meals  insulin lispro (ADMELOG) corrective regimen sliding scale   SubCutaneous at bedtime  lactated ringers. 1000 milliLiter(s) (100 mL/Hr) IV Continuous <Continuous>    MEDICATIONS  (PRN):  acetaminophen     Tablet .. 650 milliGRAM(s) Oral every 6 hours PRN Temp greater or equal to 38C (100.4F), Mild Pain (1 - 3)  aluminum hydroxide/magnesium hydroxide/simethicone Suspension 30 milliLiter(s) Oral every 4 hours PRN Dyspepsia  dextrose Oral Gel 15 Gram(s) Oral once PRN Blood Glucose LESS THAN 70 milliGRAM(s)/deciliter  melatonin 3 milliGRAM(s) Oral at bedtime PRN Insomnia  ondansetron Injectable 4 milliGRAM(s) IV Push every 8 hours PRN Nausea and/or Vomiting      CAPILLARY BLOOD GLUCOSE      POCT Blood Glucose.: 236 mg/dL (26 Nov 2024 12:58)  POCT Blood Glucose.: 171 mg/dL (26 Nov 2024 08:36)  POCT Blood Glucose.: 104 mg/dL (25 Nov 2024 20:25)  POCT Blood Glucose.: 90 mg/dL (25 Nov 2024 20:02)    I&O's Summary      PHYSICAL EXAM:  Vital Signs Last 24 Hrs  T(C): 36.5 (26 Nov 2024 12:07), Max: 36.8 (25 Nov 2024 15:58)  T(F): 97.7 (26 Nov 2024 12:07), Max: 98.2 (25 Nov 2024 15:58)  HR: 78 (26 Nov 2024 12:07) (68 - 79)  BP: 97/42 (26 Nov 2024 12:07) (97/42 - 115/40)  BP(mean): --  RR: 17 (26 Nov 2024 12:07) (16 - 17)  SpO2: 100% (26 Nov 2024 12:07) (99% - 100%)    Parameters below as of 26 Nov 2024 05:00  Patient On (Oxygen Delivery Method): room air        CONSTITUTIONAL: NAD,  EYES: PERRLA; conjunctiva and sclera clear  ENMT: Moist oral mucosa, no pharyngeal injection or exudates;   NECK: Supple, no palpable masses;  RESPIRATORY: Normal respiratory effort; lungs are clear to auscultation bilaterally  CARDIOVASCULAR: Regular rate and rhythm, normal S1 and S2, no murmur/rub/gallop; No lower extremity edema; Peripheral pulses are 2+ bilaterally  ABDOMEN: Nontender to palpation, normoactive bowel sounds, no rebound/guarding;   MUSCLOSKELETAL:   no clubbing or cyanosis of digits; no joint swelling or tenderness to palpation  PSYCH: A+O to person, place.  affect appropriate  NEUROLOGY: CN 2-12 are intact and symmetric; no gross sensory deficits;   SKIN: No rashes;     LABS:                        10.1   5.64  )-----------( 220      ( 26 Nov 2024 06:35 )             29.6     11-26    135  |  104  |  69[H]  ----------------------------<  162[H]  4.3   |  16[L]  |  1.20    Ca    9.1      26 Nov 2024 06:35  Phos  3.2     11-26  Mg     2.00     11-26    TPro  6.4  /  Alb  3.8  /  TBili  0.4  /  DBili  x   /  AST  15  /  ALT  10  /  AlkPhos  60  11-26    PT/INR - ( 25 Nov 2024 14:24 )   PT: 11.7 sec;   INR: 1.01 ratio         PTT - ( 25 Nov 2024 14:24 )  PTT:26.2 sec  CARDIAC MARKERS ( 25 Nov 2024 14:24 )  x     / x     / x     / x     / 4.1 ng/mL      Urinalysis Basic - ( 26 Nov 2024 06:35 )    Color: x / Appearance: x / SG: x / pH: x  Gluc: 162 mg/dL / Ketone: x  / Bili: x / Urobili: x   Blood: x / Protein: x / Nitrite: x   Leuk Esterase: x / RBC: x / WBC x   Sq Epi: x / Non Sq Epi: x / Bacteria: x        Urinalysis with Rflx Culture (collected 25 Nov 2024 16:55)        RADIOLOGY & ADDITIONAL TESTS:  Results Reviewed:   Imaging Personally Reviewed:  Electrocardiogram Personally Reviewed:    COORDINATION OF CARE:  Care Discussed with Consultants/Other Providers [Y/N]:  Prior or Outpatient Records Reviewed [Y/N]:  
[facility] Division of Hospital Medicine  Esperanza Thomas MD  Pager [phone]      Patient is a 79y old  Female who presents with a chief complaint of not feeling well (26 Nov 2024 14:45)      SUBJECTIVE / OVERNIGHT EVENTS:    no acute event o/n. pt offers no new complaints. BP remains soft.     ADDITIONAL REVIEW OF SYSTEMS:    RESPIRATORY: No cough, wheezing, chills or hemoptysis; No shortness of breath  CARDIOVASCULAR: No chest pain, palpitations, dizziness, or leg swelling  GASTROINTESTINAL: No abdominal or epigastric pain. No nausea, vomiting, or hematemesis; No diarrhea or constipation. No melena or hematochezia.      MEDICATIONS  (STANDING):  atorvastatin 40 milliGRAM(s) Oral at bedtime  cefTRIAXone   IVPB 1000 milliGRAM(s) IV Intermittent every 24 hours  clopidogrel Tablet 75 milliGRAM(s) Oral daily  dextrose 5%. 1000 milliLiter(s) (100 mL/Hr) IV Continuous <Continuous>  dextrose 5%. 1000 milliLiter(s) (50 mL/Hr) IV Continuous <Continuous>  dextrose 50% Injectable 25 Gram(s) IV Push once  dextrose 50% Injectable 12.5 Gram(s) IV Push once  dextrose 50% Injectable 25 Gram(s) IV Push once  glucagon  Injectable 1 milliGRAM(s) IntraMuscular once  influenza  Vaccine (HIGH DOSE) 0.5 milliLiter(s) IntraMuscular once  insulin glargine Injectable (LANTUS) 8 Unit(s) SubCutaneous at bedtime  insulin lispro (ADMELOG) corrective regimen sliding scale   SubCutaneous three times a day before meals  insulin lispro (ADMELOG) corrective regimen sliding scale   SubCutaneous at bedtime  insulin lispro Injectable (ADMELOG) 3 Unit(s) SubCutaneous three times a day before meals  metoprolol succinate ER 25 milliGRAM(s) Oral daily    MEDICATIONS  (PRN):  acetaminophen     Tablet .. 650 milliGRAM(s) Oral every 6 hours PRN Temp greater or equal to 38C (100.4F), Mild Pain (1 - 3)  aluminum hydroxide/magnesium hydroxide/simethicone Suspension 30 milliLiter(s) Oral every 4 hours PRN Dyspepsia  dextrose Oral Gel 15 Gram(s) Oral once PRN Blood Glucose LESS THAN 70 milliGRAM(s)/deciliter  melatonin 3 milliGRAM(s) Oral at bedtime PRN Insomnia  ondansetron Injectable 4 milliGRAM(s) IV Push every 8 hours PRN Nausea and/or Vomiting      CAPILLARY BLOOD GLUCOSE      POCT Blood Glucose.: 222 mg/dL (27 Nov 2024 12:27)  POCT Blood Glucose.: 176 mg/dL (27 Nov 2024 08:43)  POCT Blood Glucose.: 216 mg/dL (27 Nov 2024 00:16)  POCT Blood Glucose.: 277 mg/dL (26 Nov 2024 21:46)  POCT Blood Glucose.: 294 mg/dL (26 Nov 2024 18:00)    I&O's Summary    26 Nov 2024 07:01  -  27 Nov 2024 07:00  --------------------------------------------------------  IN: 800 mL / OUT: 0 mL / NET: 800 mL        PHYSICAL EXAM:  Vital Signs Last 24 Hrs  T(C): 36.6 (27 Nov 2024 12:06), Max: 36.8 (27 Nov 2024 05:06)  T(F): 97.8 (27 Nov 2024 12:06), Max: 98.2 (27 Nov 2024 05:06)  HR: 70 (27 Nov 2024 12:06) (60 - 75)  BP: 104/58 (27 Nov 2024 12:23) (100/41 - 146/68)  BP(mean): --  RR: 18 (27 Nov 2024 12:06) (18 - 18)  SpO2: 100% (27 Nov 2024 12:06) (100% - 100%)    Parameters below as of 27 Nov 2024 05:06  Patient On (Oxygen Delivery Method): room air        CONSTITUTIONAL: NAD,  EYES: PERRLA; conjunctiva and sclera clear  ENMT: Moist oral mucosa, no pharyngeal injection or exudates;   NECK: Supple, no palpable masses;  RESPIRATORY: Normal respiratory effort; lungs are clear to auscultation bilaterally  CARDIOVASCULAR: Regular rate and rhythm, normal S1 and S2, no murmur/rub/gallop; No lower extremity edema; Peripheral pulses are 2+ bilaterally  ABDOMEN: Nontender to palpation, normoactive bowel sounds, no rebound/guarding;   MUSCLOSKELETAL:   no clubbing or cyanosis of digits; no joint swelling or tenderness to palpation  PSYCH: A+O to person, place, and time; affect appropriate  NEUROLOGY: CN 2-12 are intact and symmetric; no gross sensory deficits;   SKIN: No rashes;     LABS:                        9.9    6.75  )-----------( 214      ( 27 Nov 2024 04:04 )             28.1     11-27    136  |  106  |  42[H]  ----------------------------<  176[H]  4.6   |  17[L]  |  0.93    Ca    9.0      27 Nov 2024 04:04  Phos  3.0     11-27  Mg     1.80     11-27    TPro  6.2  /  Alb  3.7  /  TBili  0.2  /  DBili  x   /  AST  17  /  ALT  10  /  AlkPhos  63  11-27          Urinalysis Basic - ( 27 Nov 2024 04:04 )    Color: x / Appearance: x / SG: x / pH: x  Gluc: 176 mg/dL / Ketone: x  / Bili: x / Urobili: x   Blood: x / Protein: x / Nitrite: x   Leuk Esterase: x / RBC: x / WBC x   Sq Epi: x / Non Sq Epi: x / Bacteria: x        Culture - Urine (collected 25 Nov 2024 16:55)  Source: Clean Catch  Preliminary Report (26 Nov 2024 23:36):    >100,000 CFU/ml Gram Negative Rods    Urinalysis with Rflx Culture (collected 25 Nov 2024 16:55)        RADIOLOGY & ADDITIONAL TESTS:  Results Reviewed:   Imaging Personally Reviewed:  Electrocardiogram Personally Reviewed:    COORDINATION OF CARE:  Care Discussed with Consultants/Other Providers [Y/N]:  Prior or Outpatient Records Reviewed [Y/N]:

## 2024-11-28 NOTE — DISCHARGE NOTE PROVIDER - NSDCMRMEDTOKEN_GEN_ALL_CORE_FT
clopidogrel 75 mg oral tablet: 1 tab(s) orally once a day  furosemide 40 mg oral tablet: 1 tab(s) orally 2 times a day  Jardiance 10 mg oral tablet: 1 tab(s) orally once a day  Levemir 100 units/mL subcutaneous solution: Inject 15 units SQ in the AM and 25 units SQ in the evening  metFORMIN 500 mg oral tablet: 2 tab(s) orally 2 times a day  metoprolol succinate 25 mg oral tablet, extended release: 1 tab(s) orally once a day  sacubitril-valsartan 24 mg-26 mg oral tablet: 1 tab(s) orally 2 times a day  spironolactone 25 mg oral tablet: 0.5 tab(s) orally once a day   atorvastatin 40 mg oral tablet: 1 tab(s) orally once a day (at bedtime)  clopidogrel 75 mg oral tablet: 1 tab(s) orally once a day  furosemide 40 mg oral tablet: 1 tab(s) orally 2 times a day  Levemir 100 units/mL subcutaneous solution: Inject 15 units SQ in the AM and 25 units SQ in the evening  metFORMIN 500 mg oral tablet: 2 tab(s) orally 2 times a day  metoprolol succinate 25 mg oral tablet, extended release: 1 tab(s) orally once a day  sacubitril-valsartan 24 mg-26 mg oral tablet: 1 tab(s) orally 2 times a day  spironolactone 25 mg oral tablet: 0.5 tab(s) orally once a day

## 2024-11-29 ENCOUNTER — TRANSCRIPTION ENCOUNTER (OUTPATIENT)
Age: 79
End: 2024-11-29

## 2024-11-29 VITALS — DIASTOLIC BLOOD PRESSURE: 46 MMHG | SYSTOLIC BLOOD PRESSURE: 104 MMHG

## 2024-11-29 LAB
ALBUMIN SERPL ELPH-MCNC: 3.5 G/DL — SIGNIFICANT CHANGE UP (ref 3.3–5)
ALP SERPL-CCNC: 58 U/L — SIGNIFICANT CHANGE UP (ref 40–120)
ALT FLD-CCNC: 10 U/L — SIGNIFICANT CHANGE UP (ref 4–33)
ANION GAP SERPL CALC-SCNC: 7 MMOL/L — SIGNIFICANT CHANGE UP (ref 7–14)
AST SERPL-CCNC: 15 U/L — SIGNIFICANT CHANGE UP (ref 4–32)
BASOPHILS # BLD AUTO: 0.04 K/UL — SIGNIFICANT CHANGE UP (ref 0–0.2)
BASOPHILS NFR BLD AUTO: 0.6 % — SIGNIFICANT CHANGE UP (ref 0–2)
BILIRUB SERPL-MCNC: 0.2 MG/DL — SIGNIFICANT CHANGE UP (ref 0.2–1.2)
BUN SERPL-MCNC: 21 MG/DL — SIGNIFICANT CHANGE UP (ref 7–23)
CALCIUM SERPL-MCNC: 9.1 MG/DL — SIGNIFICANT CHANGE UP (ref 8.4–10.5)
CHLORIDE SERPL-SCNC: 108 MMOL/L — HIGH (ref 98–107)
CO2 SERPL-SCNC: 22 MMOL/L — SIGNIFICANT CHANGE UP (ref 22–31)
CREAT SERPL-MCNC: 0.96 MG/DL — SIGNIFICANT CHANGE UP (ref 0.5–1.3)
EGFR: 60 ML/MIN/1.73M2 — SIGNIFICANT CHANGE UP
EOSINOPHIL # BLD AUTO: 0.18 K/UL — SIGNIFICANT CHANGE UP (ref 0–0.5)
EOSINOPHIL NFR BLD AUTO: 2.7 % — SIGNIFICANT CHANGE UP (ref 0–6)
GLUCOSE BLDC GLUCOMTR-MCNC: 150 MG/DL — HIGH (ref 70–99)
GLUCOSE BLDC GLUCOMTR-MCNC: 197 MG/DL — HIGH (ref 70–99)
GLUCOSE SERPL-MCNC: 115 MG/DL — HIGH (ref 70–99)
HCT VFR BLD CALC: 29.1 % — LOW (ref 34.5–45)
HGB BLD-MCNC: 9.8 G/DL — LOW (ref 11.5–15.5)
IANC: 3.72 K/UL — SIGNIFICANT CHANGE UP (ref 1.8–7.4)
IMM GRANULOCYTES NFR BLD AUTO: 0.7 % — SIGNIFICANT CHANGE UP (ref 0–0.9)
LYMPHOCYTES # BLD AUTO: 2.17 K/UL — SIGNIFICANT CHANGE UP (ref 1–3.3)
LYMPHOCYTES # BLD AUTO: 32.5 % — SIGNIFICANT CHANGE UP (ref 13–44)
MAGNESIUM SERPL-MCNC: 1.8 MG/DL — SIGNIFICANT CHANGE UP (ref 1.6–2.6)
MCHC RBC-ENTMCNC: 31.1 PG — SIGNIFICANT CHANGE UP (ref 27–34)
MCHC RBC-ENTMCNC: 33.7 G/DL — SIGNIFICANT CHANGE UP (ref 32–36)
MCV RBC AUTO: 92.4 FL — SIGNIFICANT CHANGE UP (ref 80–100)
MONOCYTES # BLD AUTO: 0.52 K/UL — SIGNIFICANT CHANGE UP (ref 0–0.9)
MONOCYTES NFR BLD AUTO: 7.8 % — SIGNIFICANT CHANGE UP (ref 2–14)
NEUTROPHILS # BLD AUTO: 3.72 K/UL — SIGNIFICANT CHANGE UP (ref 1.8–7.4)
NEUTROPHILS NFR BLD AUTO: 55.7 % — SIGNIFICANT CHANGE UP (ref 43–77)
NRBC # BLD: 0 /100 WBCS — SIGNIFICANT CHANGE UP (ref 0–0)
NRBC # FLD: 0 K/UL — SIGNIFICANT CHANGE UP (ref 0–0)
PHOSPHATE SERPL-MCNC: 3.4 MG/DL — SIGNIFICANT CHANGE UP (ref 2.5–4.5)
PLATELET # BLD AUTO: 198 K/UL — SIGNIFICANT CHANGE UP (ref 150–400)
POTASSIUM SERPL-MCNC: 4.6 MMOL/L — SIGNIFICANT CHANGE UP (ref 3.5–5.3)
POTASSIUM SERPL-SCNC: 4.6 MMOL/L — SIGNIFICANT CHANGE UP (ref 3.5–5.3)
PROT SERPL-MCNC: 5.9 G/DL — LOW (ref 6–8.3)
RBC # BLD: 3.15 M/UL — LOW (ref 3.8–5.2)
RBC # FLD: 13.7 % — SIGNIFICANT CHANGE UP (ref 10.3–14.5)
SODIUM SERPL-SCNC: 137 MMOL/L — SIGNIFICANT CHANGE UP (ref 135–145)
WBC # BLD: 6.68 K/UL — SIGNIFICANT CHANGE UP (ref 3.8–10.5)
WBC # FLD AUTO: 6.68 K/UL — SIGNIFICANT CHANGE UP (ref 3.8–10.5)

## 2024-11-29 PROCEDURE — 99222 1ST HOSP IP/OBS MODERATE 55: CPT | Mod: GC

## 2024-11-29 PROCEDURE — 99239 HOSP IP/OBS DSCHRG MGMT >30: CPT

## 2024-11-29 RX ADMIN — Medication 3 UNIT(S): at 09:48

## 2024-11-29 RX ADMIN — Medication 1: at 13:27

## 2024-11-29 RX ADMIN — Medication 3 UNIT(S): at 13:38

## 2024-11-29 RX ADMIN — CLOPIDOGREL 75 MILLIGRAM(S): 75 TABLET, FILM COATED ORAL at 13:24

## 2024-11-29 NOTE — DISCHARGE NOTE NURSING/CASE MANAGEMENT/SOCIAL WORK - FINANCIAL ASSISTANCE
[facility] provides services at a reduced cost to those who are determined to be eligible through [facility]’s financial assistance program. Information regarding [facility]’s financial assistance program can be found by going to [email or web address] or by calling [phone].

## 2024-11-29 NOTE — DISCHARGE NOTE NURSING/CASE MANAGEMENT/SOCIAL WORK - PATIENT PORTAL LINK FT
You can access the FollowMyHealth Patient Portal offered by [facility] by registering at the following website: [email or web address]. By joining [facility]’s FollowMyHealth portal, you will also be able to view your health information using other applications (apps) compatible with our system.

## 2024-11-29 NOTE — CONSULT NOTE ADULT - ATTENDING COMMENTS
The patient was seen and examined with the Cardiology Consultation Teaching Service.   Her son was at bedside    She is an 79-year-old woman with coronary artery disease, prior myocardial infarction, chronic heart failure with reduced LV function who presented feeling generally unwell, noted to have a UTI that has been adequately treated.     We are consulted regarding adjustments to her outpatient cardiac heart failure regimen. As an outpatient she was previously taking sacubitril-valsartan, metoprolol succinate, spironolactone, and   empagliflozin. Her maintenance diuretics are furosemide 40mg BID. Her blood pressure in the outpatient records, and as corroborated by her son, is always in the 90s, and she tolerates her medications without problem.     We are consulted for help with management of her heart failure medications given her low blood pressure while hospitalized.    No chest pain  No dyspnea, orthopnea or PND  No palpitations, lightheadedness or syncope    PMH/PSH:  Coronary artery disease    Myocardial infarction, 11/2023    Severe LAD lesion without revascularization (non-viable territory)  Chronic heart failure with reduced LV function    Ischemic cardiomyopathy, LVEF 20-25%    LV thrombus, resolved  Diabetes  Dyslipidemia  Hearing impairment  Cholecystectomy  Right cataract surgery    Comfortable-appearing woman in no acute distress  Alert and oriented  Afebrile  Vital signs stable  Blood pressure is low normal  JVP is not elevated  Clear lungs  Normal heart sounds  Extremities are warm and perfused  No peripheral edema     Normocytic anemia Hb 9.8  GFR 60    Echocardiography demonstrates severely reduced LV function, LVEF 20-25% with hypokinesis of the apex, and mid to distal septum and anterior wall. No LV thrombus. Normal LA size. Mild MR    Impression and Recommendations:   79-year-old woman with coronary artery disease, prior myocardial infarction, chronic heart failure with reduced LV function who presented feeling generally unwell, noted to have a UTI that has been adequately treated. We are now consulted prior to her discharge regarding her heart failure medications.    The patient's blood pressure is at her baseline range. She reports tolerating her heart failure medications without dizziness or other symptoms, and her outpatient blood pressures have been noted to be at this level. Given that the patient was hospitalized for a non-cardiac condition which is now resolved, I expect that the patient will be able to resume her outpatient medications without trouble. This would include her maintenance diuretics as well as her ARNI, beta-blocker and MRA.    While the patient may also want to continue on her SGLT2-inhibitor, in the setting of her UTI, she may want to recover further from her UTI prior to reinitiating this medication, as it may increase the likelihood of UTIs. I suspect that this medication will be restarted in time, since it confers a mortality benefit in patients such as her.     The patient should follow-up with her outpatient physician within 1-2 weeks, and should make an appointment to be seen by her cardiologist to reassess reinitiation of her SGLT2-inhibitor. No objections to discharge from a cardiac perspective.    Sharan Jimenez MD [facility] FACP  Cardiology  [phone]

## 2024-11-29 NOTE — CONSULT NOTE ADULT - ASSESSMENT
78 y/o F PMH NSTEMI (no stent), DM2, HFrEF (EF 20-25%), prior LV thrombus (now resolved) presenting for UTI c/b TABITHA. TABITHA now resolved. Cardiology consulted for management of GDMT medications i/s/o low blood pressure.     #HFrEF  -EF this admission stable at 20-25%. No improvement since NSTEMI 1 year prior. Patient states she is able to take all medications as prescribed without symptoms of lightheadedness/orthostatics. Baseline BP 90/50.   -Would resume home regimen as follows: lasix 40 BID, Aldactone 25 QD, Entresto 24-26 BID, Toprol 25 QD.   -Can hold Jardiance given recent UTI and glucosuria   -Patient with EF <35%, may qualify for ICD placement for primary prevention of SCD, recommend GOC and if in line with GOC, outpatient EP followup    #NSTEMI  -History of NSTEMI with cath showing 95% stenosis in LAD, on medical management with plavix     NOTE INCOMPLETE - To be staffed with attending    Jennifer Cerda PGY 3

## 2024-11-29 NOTE — CONSULT NOTE ADULT - SUBJECTIVE AND OBJECTIVE BOX
CC: Patient is a 79y old  Female who presents with a chief complaint of not feeling well (28 Nov 2024 13:33)    HPI:  This is a 80 y/o F with pmhx of NSTEMI (no intervention), DM2, HFrEF (last EF 20 to 25%), prior history of LV thrombus (resolved on repeat TTE), hard of hearing, HLD presents to the ED for new onset generalized malaise. She is hard of hearing therefore difficult to obtain history, some of the history was obtained by writing on paper and pen. The patient stated that she felt unwell, and went to her pcp. PCP told her to go to the hospital because she is found to have hypotension with systolic 90s. The patient denies dysuria, also denies cloudy urine. Denies fevers at home. Endorsed dizziness for the last few days, denies LOC. ROS otherwise negative. (26 Nov 2024 04:16)    Patient admitted for management of UTI, Ucx growing pan sensitive Klebsiella, s/p 3 days Ceftriaxone. Cardiology consulted for management of GDMT for HFrEF i/s/o low BP. Patient follows with Dr. Mahmood for Cardiology. Recently seen by Dr. Mahmood in October 2024. At that time, baseline BP 90/50. Home regimen was: Lasix 40 BID, Aldactone 25 QD, Entresto 24-26 BID, Toprol 25 QD, Jardiance 10 QD. Patient also on plavix for medical management of NSTEMI. Patient previously on warfarin for LV thrombus, however discontinued upon resolution. While admitted, BP ranging from SBP . Also with pre-renal TABITHA. Patient received 500 cc bolus of IVF for sepsis i/s/o UTI and had resolution of TABITHA.     Currently, patient feeling well. Denying symptoms of SOB, chest pain, palpitations, lightheadedness. Denies orthostatic symptoms. Patient not receiving GDMT here (ordered for Toprol 25 QD however given hold parameters, patient not receiving).     PAST MEDICAL & SURGICAL HISTORY:  DMII (diabetes mellitus, type 2)      Hyperlipidemia      HTN (hypertension)      Vitamin D deficiency      [place] (hard of hearing)      NSTEMI (non-ST elevation myocardial infarction)      CAD (coronary artery disease)      HFrEF (heart failure with reduced ejection fraction)      History of cholecystectomy      S/P right cataract extraction        SOCIAL HISTORY:  Tobacco Usage:  ( x  ) never smoked   (   ) former smoker   (   ) current smoker  (     ) pack years    Tobacco Quit Date:  Substance Use (Street drugs): (x  ) never used  (  ) other:  Alcohol Usage:    Family history reviewed and otherwise non-contributory  ALLERGIES:  No Known Allergies    MEDICATIONS:  acetaminophen     Tablet .. 650 milliGRAM(s) Oral every 6 hours PRN  aluminum hydroxide/magnesium hydroxide/simethicone Suspension 30 milliLiter(s) Oral every 4 hours PRN  atorvastatin 40 milliGRAM(s) Oral at bedtime  clopidogrel Tablet 75 milliGRAM(s) Oral daily  dextrose 5%. 1000 milliLiter(s) IV Continuous <Continuous>  dextrose 5%. 1000 milliLiter(s) IV Continuous <Continuous>  dextrose 50% Injectable 25 Gram(s) IV Push once  dextrose 50% Injectable 12.5 Gram(s) IV Push once  dextrose 50% Injectable 25 Gram(s) IV Push once  dextrose Oral Gel 15 Gram(s) Oral once PRN  glucagon  Injectable 1 milliGRAM(s) IntraMuscular once  influenza  Vaccine (HIGH DOSE) 0.5 milliLiter(s) IntraMuscular once  insulin glargine Injectable (LANTUS) 8 Unit(s) SubCutaneous at bedtime  insulin lispro (ADMELOG) corrective regimen sliding scale   SubCutaneous three times a day before meals  insulin lispro (ADMELOG) corrective regimen sliding scale   SubCutaneous at bedtime  insulin lispro Injectable (ADMELOG) 3 Unit(s) SubCutaneous three times a day before meals  melatonin 3 milliGRAM(s) Oral at bedtime PRN  metoprolol succinate ER 25 milliGRAM(s) Oral daily  ondansetron Injectable 4 milliGRAM(s) IV Push every 8 hours PRN    REVIEW OF SYSTEMS:  CONSTITUTIONAL: No fever, weight loss, or fatigue  EYES: No visual disturbances  ENMT: [place] of hearing  NECK: No neck pain   RESPIRATORY: No cough, wheezing, chills or hemoptysis; No shortness of breath  CARDIOVASCULAR: No chest pain, palpitations, dizziness. Chronic left lower extremity leg swelling  GASTROINTESTINAL: No abdominal pain, No nausea, vomiting, or hematemesis; No diarrhea or constipation  GENITOURINARY: No dysuria, frequency, hematuria, or incontinence  NEUROLOGICAL: No headaches, memory loss, loss of strength, numbness, or tremors  SKIN: No itching, burning, rashes, or lesions   HEME/LYMPH: No easy bruising or bleeding      All other ROS reviewed and negative except as otherwise stated    Vital Signs Last 24 Hrs  T(F): 98.1 (29 Nov 2024 11:46), Max: 98.3 (28 Nov 2024 20:45)  HR: 69 (29 Nov 2024 11:46) (64 - 75)  BP: 98/40 (29 Nov 2024 11:46) (98/40 - 113/55)  RR: 17 (29 Nov 2024 11:46) (17 - 18)  SpO2: 100% (29 Nov 2024 11:46) (100% - 100%)  I&O's Summary    PHYSICAL EXAM:  GENERAL: NAD, well-groomed, well-developed  HEAD:  Atraumatic, Normocephalic  EYES: EOMI, PERRLA, conjunctiva and sclera clear  ENMT: No tonsillar erythema, exudates, or enlargement; Moist mucous membranes, Good dentition  NECK: Supple, No JVD  CHEST/LUNG: Clear to auscultation bilaterally; No rales, rhonchi, wheezing, or rubs  HEART: Regular rate and rhythm; S1/S2, No murmurs, rubs, or gallops  ABDOMEN: Soft, Nontender, Nondistended; Bowel sounds present  VASCULAR: Normal pulses, Normal capillary refill  EXTREMITIES:  2+ Peripheral Pulses, No cyanosis, LLE with swelling and varicosities non pitting  LYMPH: No lymphadenopathy noted  SKIN: Warm, Intact  PSYCH: Normal mood and affect  NERVOUS SYSTEM:  A/O x3    LABS:                        9.8    6.68  )-----------( 198      ( 29 Nov 2024 05:55 )             29.1     11-29    137  |  108  |  21  ----------------------------<  115  4.6   |  22  |  0.96    Ca    9.1      29 Nov 2024 05:55  Phos  3.4     11-29  Mg     1.80     11-29    TPro  5.9  /  Alb  3.5  /  TBili  0.2  /  DBili  x   /  AST  15  /  ALT  10  /  AlkPhos  58  11-29                          POCT Blood Glucose.: 150 mg/dL (29 Nov 2024 08:49)  POCT Blood Glucose.: 206 mg/dL (28 Nov 2024 21:15)  POCT Blood Glucose.: 183 mg/dL (28 Nov 2024 17:56)  POCT Blood Glucose.: 187 mg/dL (28 Nov 2024 12:38)      Urinalysis Basic - ( 29 Nov 2024 05:55 )    Color: x / Appearance: x / SG: x / pH: x  Gluc: 115 mg/dL / Ketone: x  / Bili: x / Urobili: x   Blood: x / Protein: x / Nitrite: x   Leuk Esterase: x / RBC: x / WBC x   Sq Epi: x / Non Sq Epi: x / Bacteria: x        Culture - Urine (collected 25 Nov 2024 16:55)  Source: Clean Catch  Final Report (28 Nov 2024 08:05):    >100,000 CFU/ml Klebsiella pneumoniae  Organism: Klebsiella pneumoniae (28 Nov 2024 08:05)  Organism: Klebsiella pneumoniae (28 Nov 2024 08:05)      Method Type: CHELSEA      -  Amoxicillin/Clavulanic Acid: S <=8/4      -  Ampicillin: R >16 These ampicillin results predict results for amoxicillin      -  Ampicillin/Sulbactam: S 8/4      -  Aztreonam: S <=4      -  Cefazolin: S 8 For uncomplicated UTI with K. pneumoniae, E. coli, or P. mirablis: CHELSEA <=16 is sensitive and CHELSEA >=32 is resistant. This also predicts results for oral agents cefaclor, cefdinir, cefpodoxime, cefprozil, cefuroxime axetil, cephalexin and locarbef for uncomplicated UTI. Note that some isolates may be susceptible to these agents while testing resistant to cefazolin.      -  Cefepime: S <=2      -  Cefoxitin: R >16      -  Ceftriaxone: S <=1      -  Cefuroxime: R >16      -  Ciprofloxacin: S <=0.25      -  Ertapenem: S <=0.5      -  Gentamicin: S <=2      -  Imipenem: S <=1      -  Levofloxacin: S <=0.5      -  Meropenem: S <=1      -  Nitrofurantoin: I 64 Should not be used to treat pyelonephritis      -  Piperacillin/Tazobactam: S <=8      -  Tobramycin: S <=2      -  Trimethoprim/Sulfamethoxazole: S <=0.5/9.5          RADIOLOGY & ADDITIONAL TESTS:  < from: TTE W or WO Ultrasound Enhancing Agent (11.27.24 @ 10:29) >     CONCLUSIONS:      1. The left ventricular cavity is normal in size. Left ventricular wall thickness is normal. Left ventricular systolicfunction is severely decreased with an ejection fraction visually estimated at 20 to 25%. There are regional wall motion abnormalities present. There is no evidence of a left ventricular thrombus. Hypokinesis of the apex, mid to distal septum, and mid to distal anterior wall.   2. Normal right ventricular cavity size and normal right ventricular systolic function.   3. Structurally normal mitral valve with normal leaflet excursion. There is calcification of the mitral valve annulus. There is mildmitral regurgitation.   4. Compared to the transthoracic echocardiogram performed on 5/22/2024, there have been no significant interval changes.      < end of copied text >    Care Discussed with Consultants/Other Providers: Primary team

## 2024-12-02 ENCOUNTER — NON-APPOINTMENT (OUTPATIENT)
Age: 79
End: 2024-12-02

## 2024-12-05 ENCOUNTER — APPOINTMENT (OUTPATIENT)
Dept: ENDOCRINOLOGY | Facility: CLINIC | Age: 79
End: 2024-12-05

## 2024-12-05 VITALS
TEMPERATURE: 98 F | HEART RATE: 82 BPM | WEIGHT: 148.31 LBS | DIASTOLIC BLOOD PRESSURE: 70 MMHG | BODY MASS INDEX: 26.28 KG/M2 | SYSTOLIC BLOOD PRESSURE: 100 MMHG | OXYGEN SATURATION: 97 % | HEIGHT: 63 IN

## 2024-12-05 DIAGNOSIS — E11.9 TYPE 2 DIABETES MELLITUS W/OUT COMPLICATIONS: ICD-10-CM

## 2024-12-05 DIAGNOSIS — I10 ESSENTIAL (PRIMARY) HYPERTENSION: ICD-10-CM

## 2024-12-05 DIAGNOSIS — E55.9 VITAMIN D DEFICIENCY, UNSPECIFIED: ICD-10-CM

## 2024-12-05 DIAGNOSIS — E78.5 HYPERLIPIDEMIA, UNSPECIFIED: ICD-10-CM

## 2024-12-05 LAB — GLUCOSE BLDC GLUCOMTR-MCNC: 201

## 2024-12-05 PROCEDURE — 99214 OFFICE O/P EST MOD 30 MIN: CPT

## 2024-12-05 PROCEDURE — 36415 COLL VENOUS BLD VENIPUNCTURE: CPT

## 2024-12-05 PROCEDURE — 95250 CONT GLUC MNTR PHYS/QHP EQP: CPT

## 2024-12-05 RX ORDER — BLOOD-GLUCOSE SENSOR
EACH MISCELLANEOUS
Qty: 9 | Refills: 3 | Status: ACTIVE | COMMUNITY
Start: 2024-12-05 | End: 1900-01-01

## 2024-12-11 RX ORDER — INSULIN GLARGINE 100 [IU]/ML
100 INJECTION, SOLUTION SUBCUTANEOUS
Qty: 3 | Refills: 2 | Status: ACTIVE | COMMUNITY
Start: 2024-12-11 | End: 1900-01-01

## 2024-12-12 ENCOUNTER — NON-APPOINTMENT (OUTPATIENT)
Age: 79
End: 2024-12-12

## 2024-12-18 RX ORDER — BLOOD-GLUCOSE,RECEIVER,CONT
EACH MISCELLANEOUS
Qty: 1 | Refills: 0 | Status: ACTIVE | COMMUNITY
Start: 2024-12-18 | End: 1900-01-01

## 2024-12-23 ENCOUNTER — APPOINTMENT (OUTPATIENT)
Dept: INTERNAL MEDICINE | Facility: CLINIC | Age: 79
End: 2024-12-23
Payer: MEDICARE

## 2024-12-23 VITALS
TEMPERATURE: 97.2 F | HEIGHT: 63 IN | DIASTOLIC BLOOD PRESSURE: 61 MMHG | SYSTOLIC BLOOD PRESSURE: 103 MMHG | BODY MASS INDEX: 26.22 KG/M2 | WEIGHT: 148 LBS | HEART RATE: 76 BPM

## 2024-12-23 DIAGNOSIS — E11.9 TYPE 2 DIABETES MELLITUS W/OUT COMPLICATIONS: ICD-10-CM

## 2024-12-23 DIAGNOSIS — N39.0 URINARY TRACT INFECTION, SITE NOT SPECIFIED: ICD-10-CM

## 2024-12-23 DIAGNOSIS — E78.5 HYPERLIPIDEMIA, UNSPECIFIED: ICD-10-CM

## 2024-12-23 DIAGNOSIS — I10 ESSENTIAL (PRIMARY) HYPERTENSION: ICD-10-CM

## 2024-12-23 DIAGNOSIS — R41.3 OTHER AMNESIA: ICD-10-CM

## 2024-12-23 DIAGNOSIS — Z86.79 PERSONAL HISTORY OF OTHER DISEASES OF THE CIRCULATORY SYSTEM: ICD-10-CM

## 2024-12-23 DIAGNOSIS — I50.9 HEART FAILURE, UNSPECIFIED: ICD-10-CM

## 2024-12-23 PROCEDURE — G2211 COMPLEX E/M VISIT ADD ON: CPT

## 2024-12-23 PROCEDURE — 99214 OFFICE O/P EST MOD 30 MIN: CPT

## 2024-12-23 PROCEDURE — 36415 COLL VENOUS BLD VENIPUNCTURE: CPT

## 2024-12-24 LAB
ALBUMIN SERPL ELPH-MCNC: 4.6 G/DL
ALP BLD-CCNC: 89 U/L
ALT SERPL-CCNC: 17 U/L
ANION GAP SERPL CALC-SCNC: 14 MMOL/L
AST SERPL-CCNC: 17 U/L
BASOPHILS # BLD AUTO: 0.05 K/UL
BASOPHILS NFR BLD AUTO: 0.5 %
BILIRUB SERPL-MCNC: 0.3 MG/DL
BUN SERPL-MCNC: 36 MG/DL
CALCIUM SERPL-MCNC: 10.2 MG/DL
CHLORIDE SERPL-SCNC: 99 MMOL/L
CO2 SERPL-SCNC: 25 MMOL/L
CREAT SERPL-MCNC: 1.06 MG/DL
EGFR: 53 ML/MIN/1.73M2
EOSINOPHIL # BLD AUTO: 0.17 K/UL
EOSINOPHIL NFR BLD AUTO: 1.8 %
GLUCOSE SERPL-MCNC: 147 MG/DL
HCT VFR BLD CALC: 33.5 %
HGB BLD-MCNC: 11.3 G/DL
IMM GRANULOCYTES NFR BLD AUTO: 0.8 %
LYMPHOCYTES # BLD AUTO: 2.21 K/UL
LYMPHOCYTES NFR BLD AUTO: 24 %
MAN DIFF?: NORMAL
MCHC RBC-ENTMCNC: 31.8 PG
MCHC RBC-ENTMCNC: 33.7 G/DL
MCV RBC AUTO: 94.4 FL
MONOCYTES # BLD AUTO: 0.71 K/UL
MONOCYTES NFR BLD AUTO: 7.7 %
NEUTROPHILS # BLD AUTO: 5.99 K/UL
NEUTROPHILS NFR BLD AUTO: 65.2 %
PLATELET # BLD AUTO: 371 K/UL
POTASSIUM SERPL-SCNC: 5.2 MMOL/L
PROT SERPL-MCNC: 7.5 G/DL
RBC # BLD: 3.55 M/UL
RBC # FLD: 14.5 %
SODIUM SERPL-SCNC: 138 MMOL/L
WBC # FLD AUTO: 9.2 K/UL

## 2024-12-29 NOTE — HISTORY OF PRESENT ILLNESS
[FreeTextEntry1] : Ms. YUSUF SAWYER is a 76 year old female who follows up with regard to a hx of type 2 dm. The diabetes has been present for about 10 years. She denies any history of retinopathy or nephropathy. With regard to neuropathy, she denies any neurologic s/s. For the diabetes, she is currently taking Levemir 12units am and 23 hs along with Metformin 500 mg two bid. She also continues on Jardiance 10 mg daily. She denies polyuria, polydipsia, or any visual changes. She too denies any skin lesions, skin breakdown or non-healing areas of skin. She too denies any podiatric concerns. Ophthalmologic evaluation is up to date. She has received retinopathy injections as per Dr. David. Home glucose monitoring has shown values to be running 117-127 in the am, 160-200 pre-dinner. She does deny any hypoglycemia or hypoglycemic signs or symptoms. A1c over the last 2 years has run from 6.7 to latest value on 11/30 of 12.3%. Had apparently also been on novolog in the past.\par \par POCT A1c returned 8.1% ; previously 1/5/2022 at 10.1%\par POCT glucose returned today at 114 mg/dL\par \par Additional medical history includes that of hypertension, hyperlipidemia, along with vitamin d deficiency.\par She does take Lisinopril 5 mg and Simvastatin 40 mg.\par Taking multivitamins \par Received COVID vaccine x3\par 
No indicators present

## 2025-01-06 ENCOUNTER — RX RENEWAL (OUTPATIENT)
Age: 80
End: 2025-01-06

## 2025-01-08 ENCOUNTER — APPOINTMENT (OUTPATIENT)
Dept: INTERNAL MEDICINE | Facility: CLINIC | Age: 80
End: 2025-01-08

## 2025-01-23 ENCOUNTER — RX RENEWAL (OUTPATIENT)
Age: 80
End: 2025-01-23

## 2025-01-24 NOTE — PHYSICAL THERAPY INITIAL EVALUATION ADULT - PERTINENT HX OF CURRENT PROBLEM, REHAB EVAL
Patient is a 88 y.o. male with pancreatic head neuroendocrine carcinoma, declined surgery, s/p palliative radiation therapy, now on lanreotide. He tolerated radiotherapy well without significant toxicity. Continues following with medical oncology for systemic therapy and continued monitoring of blood counts. At this time, no further radiotherapy is planned. Can follow up as needed.       
78-year-old history of HTN, HLD, DM on metformin, CAD, CHF, pw left leg swelling. Patient states her leg swelling improved at home after lasix but then the past few days her left leg started swelling. Also with swelling and pain in her left big toe.
78-year-old history with past medical history stated below, presents with left leg swelling. Patient states her leg swelling improved at home after lasix but then the past few days her left leg started swelling. Also with swelling and pain in her left big toe.

## 2025-02-04 ENCOUNTER — NON-APPOINTMENT (OUTPATIENT)
Age: 80
End: 2025-02-04

## 2025-02-04 ENCOUNTER — APPOINTMENT (OUTPATIENT)
Dept: NEUROLOGY | Facility: CLINIC | Age: 80
End: 2025-02-04
Payer: MEDICARE

## 2025-02-04 VITALS
SYSTOLIC BLOOD PRESSURE: 106 MMHG | DIASTOLIC BLOOD PRESSURE: 62 MMHG | HEIGHT: 63 IN | BODY MASS INDEX: 27.29 KG/M2 | WEIGHT: 154 LBS | HEART RATE: 73 BPM

## 2025-02-04 DIAGNOSIS — R41.3 OTHER AMNESIA: ICD-10-CM

## 2025-02-04 DIAGNOSIS — H91.93 UNSPECIFIED HEARING LOSS, BILATERAL: ICD-10-CM

## 2025-02-04 PROCEDURE — 99205 OFFICE O/P NEW HI 60 MIN: CPT

## 2025-02-04 PROCEDURE — G2211 COMPLEX E/M VISIT ADD ON: CPT

## 2025-02-05 ENCOUNTER — NON-APPOINTMENT (OUTPATIENT)
Age: 80
End: 2025-02-05

## 2025-02-05 ENCOUNTER — APPOINTMENT (OUTPATIENT)
Dept: CARDIOLOGY | Facility: CLINIC | Age: 80
End: 2025-02-05
Payer: MEDICARE

## 2025-02-05 VITALS
HEART RATE: 73 BPM | WEIGHT: 151 LBS | DIASTOLIC BLOOD PRESSURE: 50 MMHG | OXYGEN SATURATION: 97 % | BODY MASS INDEX: 26.75 KG/M2 | SYSTOLIC BLOOD PRESSURE: 100 MMHG

## 2025-02-05 DIAGNOSIS — I25.10 ATHEROSCLEROTIC HEART DISEASE OF NATIVE CORONARY ARTERY W/OUT ANGINA PECTORIS: ICD-10-CM

## 2025-02-05 DIAGNOSIS — E78.5 HYPERLIPIDEMIA, UNSPECIFIED: ICD-10-CM

## 2025-02-05 DIAGNOSIS — I50.9 HEART FAILURE, UNSPECIFIED: ICD-10-CM

## 2025-02-05 DIAGNOSIS — I21.02 ST ELEVATION (STEMI) MYOCARDIAL INFARCTION INVOLVING LEFT ANTERIOR DESCENDING CORONARY ARTERY: ICD-10-CM

## 2025-02-05 DIAGNOSIS — E11.9 TYPE 2 DIABETES MELLITUS W/OUT COMPLICATIONS: ICD-10-CM

## 2025-02-05 PROCEDURE — G2211 COMPLEX E/M VISIT ADD ON: CPT

## 2025-02-05 PROCEDURE — 93000 ELECTROCARDIOGRAM COMPLETE: CPT

## 2025-02-05 PROCEDURE — 99214 OFFICE O/P EST MOD 30 MIN: CPT

## 2025-02-11 ENCOUNTER — APPOINTMENT (OUTPATIENT)
Dept: MRI IMAGING | Facility: CLINIC | Age: 80
End: 2025-02-11
Payer: MEDICARE

## 2025-02-11 ENCOUNTER — OUTPATIENT (OUTPATIENT)
Dept: OUTPATIENT SERVICES | Facility: HOSPITAL | Age: 80
LOS: 1 days | End: 2025-02-11
Payer: MEDICARE

## 2025-02-11 DIAGNOSIS — Z98.89 OTHER SPECIFIED POSTPROCEDURAL STATES: Chronic | ICD-10-CM

## 2025-02-11 DIAGNOSIS — Z98.41 CATARACT EXTRACTION STATUS, RIGHT EYE: Chronic | ICD-10-CM

## 2025-02-11 DIAGNOSIS — R41.3 OTHER AMNESIA: ICD-10-CM

## 2025-02-11 PROCEDURE — 76377 3D RENDER W/INTRP POSTPROCES: CPT | Mod: 26

## 2025-02-11 PROCEDURE — 76377 3D RENDER W/INTRP POSTPROCES: CPT

## 2025-02-11 PROCEDURE — 70551 MRI BRAIN STEM W/O DYE: CPT

## 2025-02-11 PROCEDURE — 70551 MRI BRAIN STEM W/O DYE: CPT | Mod: 26

## 2025-02-14 ENCOUNTER — NON-APPOINTMENT (OUTPATIENT)
Age: 80
End: 2025-02-14

## 2025-02-19 ENCOUNTER — APPOINTMENT (OUTPATIENT)
Dept: ENDOCRINOLOGY | Facility: CLINIC | Age: 80
End: 2025-02-19
Payer: MEDICARE

## 2025-02-19 VITALS
DIASTOLIC BLOOD PRESSURE: 58 MMHG | OXYGEN SATURATION: 98 % | HEART RATE: 88 BPM | WEIGHT: 160 LBS | SYSTOLIC BLOOD PRESSURE: 100 MMHG | HEIGHT: 63 IN | BODY MASS INDEX: 28.35 KG/M2 | TEMPERATURE: 97.6 F

## 2025-02-19 DIAGNOSIS — I10 ESSENTIAL (PRIMARY) HYPERTENSION: ICD-10-CM

## 2025-02-19 DIAGNOSIS — E55.9 VITAMIN D DEFICIENCY, UNSPECIFIED: ICD-10-CM

## 2025-02-19 DIAGNOSIS — E78.5 HYPERLIPIDEMIA, UNSPECIFIED: ICD-10-CM

## 2025-02-19 LAB
GLUCOSE BLDC GLUCOMTR-MCNC: 86
HBA1C MFR BLD HPLC: 6.4

## 2025-02-19 PROCEDURE — 82962 GLUCOSE BLOOD TEST: CPT

## 2025-02-19 PROCEDURE — 36415 COLL VENOUS BLD VENIPUNCTURE: CPT

## 2025-02-19 PROCEDURE — 83036 HEMOGLOBIN GLYCOSYLATED A1C: CPT | Mod: QW

## 2025-02-19 PROCEDURE — G2211 COMPLEX E/M VISIT ADD ON: CPT

## 2025-02-19 PROCEDURE — 99214 OFFICE O/P EST MOD 30 MIN: CPT

## 2025-02-20 ENCOUNTER — RX RENEWAL (OUTPATIENT)
Age: 80
End: 2025-02-20

## 2025-02-20 DIAGNOSIS — E11.9 TYPE 2 DIABETES MELLITUS W/OUT COMPLICATIONS: ICD-10-CM

## 2025-02-20 DIAGNOSIS — R79.89 OTHER SPECIFIED ABNORMAL FINDINGS OF BLOOD CHEMISTRY: ICD-10-CM

## 2025-02-20 LAB
25(OH)D3 SERPL-MCNC: 45 NG/ML
ALBUMIN SERPL ELPH-MCNC: 4.8 G/DL
ALP BLD-CCNC: 63 U/L
ALT SERPL-CCNC: 12 U/L
ANION GAP SERPL CALC-SCNC: 14 MMOL/L
APO B SERPL-MCNC: 75 MG/DL
AST SERPL-CCNC: 18 U/L
BASOPHILS # BLD AUTO: 0.06 K/UL
BASOPHILS NFR BLD AUTO: 0.7 %
BILIRUB SERPL-MCNC: 0.3 MG/DL
BUN SERPL-MCNC: 63 MG/DL
CALCIUM SERPL-MCNC: 10.4 MG/DL
CHLORIDE SERPL-SCNC: 105 MMOL/L
CHOLEST SERPL-MCNC: 140 MG/DL
CO2 SERPL-SCNC: 20 MMOL/L
CREAT SERPL-MCNC: 1.11 MG/DL
CREAT SPEC-SCNC: 48 MG/DL
EGFR: 50 ML/MIN/1.73M2
EOSINOPHIL # BLD AUTO: 0.21 K/UL
EOSINOPHIL NFR BLD AUTO: 2.5 %
ESTIMATED AVERAGE GLUCOSE: 143 MG/DL
FRUCTOSAMINE SERPL-MCNC: 304 UMOL/L
GLUCOSE SERPL-MCNC: 80 MG/DL
GLYCOMARK.: 10.2 UG/ML
HBA1C MFR BLD HPLC: 6.6 %
HCT VFR BLD CALC: 32 %
HDLC SERPL-MCNC: 53 MG/DL
HGB BLD-MCNC: 10.4 G/DL
IMM GRANULOCYTES NFR BLD AUTO: 0.5 %
LDLC SERPL DIRECT ASSAY-MCNC: 74 MG/DL
LYMPHOCYTES # BLD AUTO: 1.91 K/UL
LYMPHOCYTES NFR BLD AUTO: 23.1 %
MAGNESIUM SERPL-MCNC: 2.5 MG/DL
MAN DIFF?: NORMAL
MCHC RBC-ENTMCNC: 32.4 PG
MCHC RBC-ENTMCNC: 32.5 G/DL
MCV RBC AUTO: 99.7 FL
MICROALBUMIN 24H UR DL<=1MG/L-MCNC: <1.2 MG/DL
MICROALBUMIN/CREAT 24H UR-RTO: NORMAL MG/G
MONOCYTES # BLD AUTO: 0.77 K/UL
MONOCYTES NFR BLD AUTO: 9.3 %
NEUTROPHILS # BLD AUTO: 5.29 K/UL
NEUTROPHILS NFR BLD AUTO: 63.9 %
PLATELET # BLD AUTO: 288 K/UL
POTASSIUM SERPL-SCNC: 6.1 MMOL/L
PROT SERPL-MCNC: 7.6 G/DL
RBC # BLD: 3.21 M/UL
RBC # FLD: 13.6 %
SODIUM SERPL-SCNC: 139 MMOL/L
T3FREE SERPL-MCNC: 2.44 PG/ML
T4 FREE SERPL-MCNC: 1.4 NG/DL
TRIGL SERPL-MCNC: 115 MG/DL
TSH SERPL-ACNC: 3.38 UIU/ML
WBC # FLD AUTO: 8.28 K/UL

## 2025-02-21 DIAGNOSIS — E87.5 HYPERKALEMIA: ICD-10-CM

## 2025-02-21 LAB
ALBUMIN SERPL ELPH-MCNC: 4.4 G/DL
ALP BLD-CCNC: 69 U/L
ALT SERPL-CCNC: 11 U/L
ANION GAP SERPL CALC-SCNC: 14 MMOL/L
AST SERPL-CCNC: 21 U/L
BILIRUB SERPL-MCNC: 0.2 MG/DL
BUN SERPL-MCNC: 62 MG/DL
CALCIUM SERPL-MCNC: 9.5 MG/DL
CHLORIDE SERPL-SCNC: 105 MMOL/L
CO2 SERPL-SCNC: 19 MMOL/L
CREAT SERPL-MCNC: 1.16 MG/DL
EGFR: 48 ML/MIN/1.73M2
GLUCOSE SERPL-MCNC: 80 MG/DL
POTASSIUM SERPL-SCNC: 5.8 MMOL/L
PROT SERPL-MCNC: 7.1 G/DL
SODIUM SERPL-SCNC: 137 MMOL/L

## 2025-02-21 RX ORDER — SODIUM ZIRCONIUM CYCLOSILICATE 10 G/10G
10 POWDER, FOR SUSPENSION ORAL TWICE DAILY
Qty: 2 | Refills: 0 | Status: ACTIVE | COMMUNITY
Start: 2025-02-21 | End: 1900-01-01

## 2025-02-21 RX ORDER — SODIUM POLYSTYRENE SULFONATE 4.1 MEQ/G
POWDER, FOR SUSPENSION ORAL; RECTAL
Qty: 45 | Refills: 0 | Status: ACTIVE | COMMUNITY
Start: 2025-02-21 | End: 1900-01-01

## 2025-02-24 LAB — VIT B1 SERPL-MCNC: 131.9 NMOL/L

## 2025-02-25 LAB — POTASSIUM SERPL-SCNC: 4.6 MMOL/L

## 2025-02-28 ENCOUNTER — APPOINTMENT (OUTPATIENT)
Dept: INTERNAL MEDICINE | Facility: CLINIC | Age: 80
End: 2025-02-28
Payer: MEDICARE

## 2025-02-28 VITALS
SYSTOLIC BLOOD PRESSURE: 129 MMHG | HEART RATE: 73 BPM | OXYGEN SATURATION: 99 % | BODY MASS INDEX: 28 KG/M2 | WEIGHT: 158 LBS | TEMPERATURE: 97.2 F | HEIGHT: 63 IN | DIASTOLIC BLOOD PRESSURE: 72 MMHG

## 2025-02-28 DIAGNOSIS — L85.3 XEROSIS CUTIS: ICD-10-CM

## 2025-02-28 DIAGNOSIS — Z00.00 ENCOUNTER FOR GENERAL ADULT MEDICAL EXAMINATION W/OUT ABNORMAL FINDINGS: ICD-10-CM

## 2025-02-28 PROCEDURE — G0439: CPT

## 2025-02-28 PROCEDURE — G0009: CPT

## 2025-02-28 PROCEDURE — 90677 PCV20 VACCINE IM: CPT

## 2025-02-28 RX ORDER — AMMONIUM LACTATE 12 %
12 CREAM (GRAM) TOPICAL
Qty: 385 | Refills: 1 | Status: ACTIVE | OUTPATIENT
Start: 2025-02-28

## 2025-03-03 ENCOUNTER — APPOINTMENT (OUTPATIENT)
Dept: ULTRASOUND IMAGING | Facility: IMAGING CENTER | Age: 80
End: 2025-03-03
Payer: MEDICARE

## 2025-03-03 ENCOUNTER — RESULT REVIEW (OUTPATIENT)
Age: 80
End: 2025-03-03

## 2025-03-03 ENCOUNTER — APPOINTMENT (OUTPATIENT)
Dept: MAMMOGRAPHY | Facility: IMAGING CENTER | Age: 80
End: 2025-03-03
Payer: MEDICARE

## 2025-03-03 ENCOUNTER — OUTPATIENT (OUTPATIENT)
Dept: OUTPATIENT SERVICES | Facility: HOSPITAL | Age: 80
LOS: 1 days | End: 2025-03-03
Payer: MEDICARE

## 2025-03-03 ENCOUNTER — RX RENEWAL (OUTPATIENT)
Age: 80
End: 2025-03-03

## 2025-03-03 DIAGNOSIS — Z98.41 CATARACT EXTRACTION STATUS, RIGHT EYE: Chronic | ICD-10-CM

## 2025-03-03 DIAGNOSIS — Z98.89 OTHER SPECIFIED POSTPROCEDURAL STATES: Chronic | ICD-10-CM

## 2025-03-03 DIAGNOSIS — N64.89 OTHER SPECIFIED DISORDERS OF BREAST: ICD-10-CM

## 2025-03-03 PROCEDURE — 77067 SCR MAMMO BI INCL CAD: CPT | Mod: 26

## 2025-03-03 PROCEDURE — 77067 SCR MAMMO BI INCL CAD: CPT

## 2025-03-03 PROCEDURE — 77063 BREAST TOMOSYNTHESIS BI: CPT | Mod: 26

## 2025-03-03 PROCEDURE — 76641 ULTRASOUND BREAST COMPLETE: CPT | Mod: 26,50,GA

## 2025-03-03 PROCEDURE — 76641 ULTRASOUND BREAST COMPLETE: CPT

## 2025-03-03 PROCEDURE — 77063 BREAST TOMOSYNTHESIS BI: CPT

## 2025-03-14 ENCOUNTER — APPOINTMENT (OUTPATIENT)
Dept: CARDIOLOGY | Facility: CLINIC | Age: 80
End: 2025-03-14
Payer: MEDICARE

## 2025-03-14 VITALS
SYSTOLIC BLOOD PRESSURE: 118 MMHG | DIASTOLIC BLOOD PRESSURE: 76 MMHG | WEIGHT: 158 LBS | HEART RATE: 70 BPM | BODY MASS INDEX: 28 KG/M2 | HEIGHT: 63 IN | OXYGEN SATURATION: 98 %

## 2025-03-14 DIAGNOSIS — I50.9 HEART FAILURE, UNSPECIFIED: ICD-10-CM

## 2025-03-14 DIAGNOSIS — I10 ESSENTIAL (PRIMARY) HYPERTENSION: ICD-10-CM

## 2025-03-14 DIAGNOSIS — E11.9 TYPE 2 DIABETES MELLITUS W/OUT COMPLICATIONS: ICD-10-CM

## 2025-03-14 PROCEDURE — G2211 COMPLEX E/M VISIT ADD ON: CPT

## 2025-03-14 PROCEDURE — 99213 OFFICE O/P EST LOW 20 MIN: CPT

## 2025-04-09 ENCOUNTER — INPATIENT (INPATIENT)
Facility: HOSPITAL | Age: 80
LOS: 2 days | Discharge: HOME | End: 2025-04-12
Attending: HOSPITALIST | Admitting: HOSPITALIST
Payer: MEDICARE

## 2025-04-09 VITALS
TEMPERATURE: 98 F | RESPIRATION RATE: 17 BRPM | OXYGEN SATURATION: 97 % | SYSTOLIC BLOOD PRESSURE: 99 MMHG | DIASTOLIC BLOOD PRESSURE: 50 MMHG | WEIGHT: 139.99 LBS | HEART RATE: 84 BPM

## 2025-04-09 DIAGNOSIS — Z98.41 CATARACT EXTRACTION STATUS, RIGHT EYE: Chronic | ICD-10-CM

## 2025-04-09 DIAGNOSIS — Z98.89 OTHER SPECIFIED POSTPROCEDURAL STATES: Chronic | ICD-10-CM

## 2025-04-09 LAB
ADD ON TEST-SPECIMEN IN LAB: SIGNIFICANT CHANGE UP
ADD ON TEST-SPECIMEN IN LAB: SIGNIFICANT CHANGE UP
ALBUMIN SERPL ELPH-MCNC: 3.7 G/DL — SIGNIFICANT CHANGE UP (ref 3.3–5)
ALBUMIN SERPL ELPH-MCNC: 4.4 G/DL — SIGNIFICANT CHANGE UP (ref 3.3–5)
ALP SERPL-CCNC: 57 U/L — SIGNIFICANT CHANGE UP (ref 40–120)
ALP SERPL-CCNC: 67 U/L — SIGNIFICANT CHANGE UP (ref 40–120)
ALT FLD-CCNC: 11 U/L — SIGNIFICANT CHANGE UP (ref 4–33)
ALT FLD-CCNC: 20 U/L — SIGNIFICANT CHANGE UP (ref 4–33)
ANION GAP SERPL CALC-SCNC: 15 MMOL/L — HIGH (ref 7–14)
ANION GAP SERPL CALC-SCNC: 15 MMOL/L — HIGH (ref 7–14)
AST SERPL-CCNC: 18 U/L — SIGNIFICANT CHANGE UP (ref 4–32)
AST SERPL-CCNC: 38 U/L — HIGH (ref 4–32)
BASOPHILS # BLD AUTO: 0.07 K/UL — SIGNIFICANT CHANGE UP (ref 0–0.2)
BASOPHILS NFR BLD AUTO: 0.8 % — SIGNIFICANT CHANGE UP (ref 0–2)
BILIRUB SERPL-MCNC: 0.4 MG/DL — SIGNIFICANT CHANGE UP (ref 0.2–1.2)
BILIRUB SERPL-MCNC: 0.4 MG/DL — SIGNIFICANT CHANGE UP (ref 0.2–1.2)
BUN SERPL-MCNC: 62 MG/DL — HIGH (ref 7–23)
BUN SERPL-MCNC: 67 MG/DL — HIGH (ref 7–23)
CALCIUM SERPL-MCNC: 10.1 MG/DL — SIGNIFICANT CHANGE UP (ref 8.4–10.5)
CALCIUM SERPL-MCNC: 8.6 MG/DL — SIGNIFICANT CHANGE UP (ref 8.4–10.5)
CHLORIDE SERPL-SCNC: 103 MMOL/L — SIGNIFICANT CHANGE UP (ref 98–107)
CHLORIDE SERPL-SCNC: 108 MMOL/L — HIGH (ref 98–107)
CO2 SERPL-SCNC: 12 MMOL/L — LOW (ref 22–31)
CO2 SERPL-SCNC: 15 MMOL/L — LOW (ref 22–31)
CREAT SERPL-MCNC: 1.13 MG/DL — SIGNIFICANT CHANGE UP (ref 0.5–1.3)
CREAT SERPL-MCNC: 1.28 MG/DL — SIGNIFICANT CHANGE UP (ref 0.5–1.3)
EGFR: 42 ML/MIN/1.73M2 — LOW
EGFR: 42 ML/MIN/1.73M2 — LOW
EGFR: 49 ML/MIN/1.73M2 — LOW
EGFR: 49 ML/MIN/1.73M2 — LOW
EOSINOPHIL # BLD AUTO: 0.29 K/UL — SIGNIFICANT CHANGE UP (ref 0–0.5)
EOSINOPHIL NFR BLD AUTO: 3.5 % — SIGNIFICANT CHANGE UP (ref 0–6)
GLUCOSE SERPL-MCNC: 130 MG/DL — HIGH (ref 70–99)
GLUCOSE SERPL-MCNC: 97 MG/DL — SIGNIFICANT CHANGE UP (ref 70–99)
HCT VFR BLD CALC: 32 % — LOW (ref 34.5–45)
HGB BLD-MCNC: 11 G/DL — LOW (ref 11.5–15.5)
IANC: 5.35 K/UL — SIGNIFICANT CHANGE UP (ref 1.8–7.4)
IMM GRANULOCYTES NFR BLD AUTO: 0.4 % — SIGNIFICANT CHANGE UP (ref 0–0.9)
LYMPHOCYTES # BLD AUTO: 2.02 K/UL — SIGNIFICANT CHANGE UP (ref 1–3.3)
LYMPHOCYTES # BLD AUTO: 24.2 % — SIGNIFICANT CHANGE UP (ref 13–44)
MCHC RBC-ENTMCNC: 31.3 PG — SIGNIFICANT CHANGE UP (ref 27–34)
MCHC RBC-ENTMCNC: 34.4 G/DL — SIGNIFICANT CHANGE UP (ref 32–36)
MCV RBC AUTO: 90.9 FL — SIGNIFICANT CHANGE UP (ref 80–100)
MONOCYTES # BLD AUTO: 0.6 K/UL — SIGNIFICANT CHANGE UP (ref 0–0.9)
MONOCYTES NFR BLD AUTO: 7.2 % — SIGNIFICANT CHANGE UP (ref 2–14)
NEUTROPHILS # BLD AUTO: 5.35 K/UL — SIGNIFICANT CHANGE UP (ref 1.8–7.4)
NEUTROPHILS NFR BLD AUTO: 63.9 % — SIGNIFICANT CHANGE UP (ref 43–77)
NRBC # BLD AUTO: 0 K/UL — SIGNIFICANT CHANGE UP (ref 0–0)
NRBC # FLD: 0 K/UL — SIGNIFICANT CHANGE UP (ref 0–0)
NRBC BLD AUTO-RTO: 0 /100 WBCS — SIGNIFICANT CHANGE UP (ref 0–0)
PLATELET # BLD AUTO: 302 K/UL — SIGNIFICANT CHANGE UP (ref 150–400)
POTASSIUM SERPL-MCNC: 5 MMOL/L — SIGNIFICANT CHANGE UP (ref 3.5–5.3)
POTASSIUM SERPL-MCNC: SIGNIFICANT CHANGE UP MMOL/L (ref 3.5–5.3)
POTASSIUM SERPL-SCNC: 5 MMOL/L — SIGNIFICANT CHANGE UP (ref 3.5–5.3)
POTASSIUM SERPL-SCNC: SIGNIFICANT CHANGE UP MMOL/L (ref 3.5–5.3)
PROT SERPL-MCNC: 6.7 G/DL — SIGNIFICANT CHANGE UP (ref 6–8.3)
PROT SERPL-MCNC: 8.2 G/DL — SIGNIFICANT CHANGE UP (ref 6–8.3)
RBC # BLD: 3.52 M/UL — LOW (ref 3.8–5.2)
RBC # FLD: 13.9 % — SIGNIFICANT CHANGE UP (ref 10.3–14.5)
SODIUM SERPL-SCNC: 133 MMOL/L — LOW (ref 135–145)
SODIUM SERPL-SCNC: 135 MMOL/L — SIGNIFICANT CHANGE UP (ref 135–145)
WBC # BLD: 8.36 K/UL — SIGNIFICANT CHANGE UP (ref 3.8–10.5)
WBC # FLD AUTO: 8.36 K/UL — SIGNIFICANT CHANGE UP (ref 3.8–10.5)

## 2025-04-09 PROCEDURE — 99285 EMERGENCY DEPT VISIT HI MDM: CPT

## 2025-04-09 PROCEDURE — 71045 X-RAY EXAM CHEST 1 VIEW: CPT | Mod: 26

## 2025-04-09 RX ADMIN — Medication 500 MILLILITER(S): at 19:28

## 2025-04-10 DIAGNOSIS — E87.20 ACIDOSIS, UNSPECIFIED: ICD-10-CM

## 2025-04-10 DIAGNOSIS — I50.22 CHRONIC SYSTOLIC (CONGESTIVE) HEART FAILURE: ICD-10-CM

## 2025-04-10 DIAGNOSIS — G93.41 METABOLIC ENCEPHALOPATHY: ICD-10-CM

## 2025-04-10 DIAGNOSIS — I25.10 ATHEROSCLEROTIC HEART DISEASE OF NATIVE CORONARY ARTERY WITHOUT ANGINA PECTORIS: ICD-10-CM

## 2025-04-10 DIAGNOSIS — I10 ESSENTIAL (PRIMARY) HYPERTENSION: ICD-10-CM

## 2025-04-10 DIAGNOSIS — R41.82 ALTERED MENTAL STATUS, UNSPECIFIED: ICD-10-CM

## 2025-04-10 DIAGNOSIS — E87.29 OTHER ACIDOSIS: ICD-10-CM

## 2025-04-10 DIAGNOSIS — E11.9 TYPE 2 DIABETES MELLITUS WITHOUT COMPLICATIONS: ICD-10-CM

## 2025-04-10 DIAGNOSIS — Z29.9 ENCOUNTER FOR PROPHYLACTIC MEASURES, UNSPECIFIED: ICD-10-CM

## 2025-04-10 DIAGNOSIS — E78.5 HYPERLIPIDEMIA, UNSPECIFIED: ICD-10-CM

## 2025-04-10 DIAGNOSIS — N17.9 ACUTE KIDNEY FAILURE, UNSPECIFIED: ICD-10-CM

## 2025-04-10 LAB
A1C WITH ESTIMATED AVERAGE GLUCOSE RESULT: 6.5 % — HIGH (ref 4–5.6)
ADD ON TEST-SPECIMEN IN LAB: SIGNIFICANT CHANGE UP
AMPHET UR-MCNC: NEGATIVE — SIGNIFICANT CHANGE UP
ANION GAP SERPL CALC-SCNC: 13 MMOL/L — SIGNIFICANT CHANGE UP (ref 7–14)
ANION GAP SERPL CALC-SCNC: 16 MMOL/L — HIGH (ref 7–14)
APAP SERPL-MCNC: <10 UG/ML — LOW (ref 15–25)
APPEARANCE UR: CLEAR — SIGNIFICANT CHANGE UP
BACTERIA # UR AUTO: NEGATIVE /HPF — SIGNIFICANT CHANGE UP
BARBITURATES UR SCN-MCNC: NEGATIVE — SIGNIFICANT CHANGE UP
BENZODIAZ UR-MCNC: NEGATIVE — SIGNIFICANT CHANGE UP
BILIRUB UR-MCNC: NEGATIVE — SIGNIFICANT CHANGE UP
BLOOD GAS VENOUS COMPREHENSIVE RESULT: SIGNIFICANT CHANGE UP
BUN SERPL-MCNC: 47 MG/DL — HIGH (ref 7–23)
BUN SERPL-MCNC: 57 MG/DL — HIGH (ref 7–23)
CALCIUM SERPL-MCNC: 9.1 MG/DL — SIGNIFICANT CHANGE UP (ref 8.4–10.5)
CALCIUM SERPL-MCNC: 9.5 MG/DL — SIGNIFICANT CHANGE UP (ref 8.4–10.5)
CAST: 0 /LPF — SIGNIFICANT CHANGE UP (ref 0–4)
CHLORIDE SERPL-SCNC: 103 MMOL/L — SIGNIFICANT CHANGE UP (ref 98–107)
CHLORIDE SERPL-SCNC: 106 MMOL/L — SIGNIFICANT CHANGE UP (ref 98–107)
CHLORIDE UR-SCNC: 26 MMOL/L — SIGNIFICANT CHANGE UP
CHOLEST SERPL-MCNC: 109 MG/DL — SIGNIFICANT CHANGE UP
CO2 SERPL-SCNC: 15 MMOL/L — LOW (ref 22–31)
CO2 SERPL-SCNC: 16 MMOL/L — LOW (ref 22–31)
COCAINE METAB.OTHER UR-MCNC: NEGATIVE — SIGNIFICANT CHANGE UP
COLOR SPEC: YELLOW — SIGNIFICANT CHANGE UP
CREAT ?TM UR-MCNC: 56 MG/DL — SIGNIFICANT CHANGE UP
CREAT SERPL-MCNC: 1.05 MG/DL — SIGNIFICANT CHANGE UP (ref 0.5–1.3)
CREAT SERPL-MCNC: 1.15 MG/DL — SIGNIFICANT CHANGE UP (ref 0.5–1.3)
CREATININE URINE RESULT, DAU: 56 MG/DL — SIGNIFICANT CHANGE UP
DIFF PNL FLD: NEGATIVE — SIGNIFICANT CHANGE UP
EGFR: 48 ML/MIN/1.73M2 — LOW
EGFR: 48 ML/MIN/1.73M2 — LOW
EGFR: 54 ML/MIN/1.73M2 — LOW
EGFR: 54 ML/MIN/1.73M2 — LOW
ESTIMATED AVERAGE GLUCOSE: 140 — SIGNIFICANT CHANGE UP
ETHANOL SERPL-MCNC: <10 MG/DL — SIGNIFICANT CHANGE UP
FENTANYL UR QL SCN: NEGATIVE — SIGNIFICANT CHANGE UP
FLUAV AG NPH QL: SIGNIFICANT CHANGE UP
FLUBV AG NPH QL: SIGNIFICANT CHANGE UP
GLUCOSE BLDC GLUCOMTR-MCNC: 176 MG/DL — HIGH (ref 70–99)
GLUCOSE BLDC GLUCOMTR-MCNC: 236 MG/DL — HIGH (ref 70–99)
GLUCOSE BLDC GLUCOMTR-MCNC: 275 MG/DL — HIGH (ref 70–99)
GLUCOSE BLDC GLUCOMTR-MCNC: 279 MG/DL — HIGH (ref 70–99)
GLUCOSE BLDC GLUCOMTR-MCNC: 339 MG/DL — HIGH (ref 70–99)
GLUCOSE SERPL-MCNC: 223 MG/DL — HIGH (ref 70–99)
GLUCOSE SERPL-MCNC: 290 MG/DL — HIGH (ref 70–99)
GLUCOSE UR QL: 500 MG/DL
HCT VFR BLD CALC: 26.6 % — LOW (ref 34.5–45)
HDLC SERPL-MCNC: 38 MG/DL — LOW
HGB BLD-MCNC: 9.3 G/DL — LOW (ref 11.5–15.5)
KETONES UR-MCNC: NEGATIVE MG/DL — SIGNIFICANT CHANGE UP
LACTATE SERPL-SCNC: 4 MMOL/L — CRITICAL HIGH (ref 0.5–2)
LDLC SERPL-MCNC: 52 MG/DL — SIGNIFICANT CHANGE UP
LEUKOCYTE ESTERASE UR-ACNC: NEGATIVE — SIGNIFICANT CHANGE UP
LIPID PNL WITH DIRECT LDL SERPL: 52 MG/DL — SIGNIFICANT CHANGE UP
MAGNESIUM SERPL-MCNC: 2.2 MG/DL — SIGNIFICANT CHANGE UP (ref 1.6–2.6)
MCHC RBC-ENTMCNC: 31.7 PG — SIGNIFICANT CHANGE UP (ref 27–34)
MCHC RBC-ENTMCNC: 35 G/DL — SIGNIFICANT CHANGE UP (ref 32–36)
MCV RBC AUTO: 90.8 FL — SIGNIFICANT CHANGE UP (ref 80–100)
METHADONE UR-MCNC: NEGATIVE — SIGNIFICANT CHANGE UP
NITRITE UR-MCNC: NEGATIVE — SIGNIFICANT CHANGE UP
NONHDLC SERPL-MCNC: 71 MG/DL — SIGNIFICANT CHANGE UP
NRBC # BLD AUTO: 0 K/UL — SIGNIFICANT CHANGE UP (ref 0–0)
NRBC # FLD: 0 K/UL — SIGNIFICANT CHANGE UP (ref 0–0)
NRBC BLD AUTO-RTO: 0 /100 WBCS — SIGNIFICANT CHANGE UP (ref 0–0)
OPIATES UR-MCNC: NEGATIVE — SIGNIFICANT CHANGE UP
OSMOLALITY SERPL: 316 MOSM/KG — HIGH (ref 275–295)
OXYCODONE UR-MCNC: NEGATIVE — SIGNIFICANT CHANGE UP
PCP SPEC-MCNC: SIGNIFICANT CHANGE UP
PCP UR-MCNC: NEGATIVE — SIGNIFICANT CHANGE UP
PH UR: 7.5 — SIGNIFICANT CHANGE UP (ref 5–8)
PHOSPHATE SERPL-MCNC: 3.6 MG/DL — SIGNIFICANT CHANGE UP (ref 2.5–4.5)
PLATELET # BLD AUTO: 250 K/UL — SIGNIFICANT CHANGE UP (ref 150–400)
POTASSIUM SERPL-MCNC: 4.7 MMOL/L — SIGNIFICANT CHANGE UP (ref 3.5–5.3)
POTASSIUM SERPL-MCNC: 5.5 MMOL/L — HIGH (ref 3.5–5.3)
POTASSIUM SERPL-SCNC: 4.7 MMOL/L — SIGNIFICANT CHANGE UP (ref 3.5–5.3)
POTASSIUM SERPL-SCNC: 5.5 MMOL/L — HIGH (ref 3.5–5.3)
POTASSIUM UR-SCNC: 28.1 MMOL/L — SIGNIFICANT CHANGE UP
PROT ?TM UR-MCNC: 4 MG/DL — SIGNIFICANT CHANGE UP
PROT UR-MCNC: SIGNIFICANT CHANGE UP MG/DL
PROT/CREAT UR-RTO: 0.1 RATIO — SIGNIFICANT CHANGE UP (ref 0–0.2)
RBC # BLD: 2.93 M/UL — LOW (ref 3.8–5.2)
RBC # FLD: 13.6 % — SIGNIFICANT CHANGE UP (ref 10.3–14.5)
RBC CASTS # UR COMP ASSIST: 0 /HPF — SIGNIFICANT CHANGE UP (ref 0–4)
RSV RNA NPH QL NAA+NON-PROBE: SIGNIFICANT CHANGE UP
SALICYLATES SERPL-MCNC: <0.3 MG/DL — LOW (ref 15–30)
SARS-COV-2 RNA SPEC QL NAA+PROBE: SIGNIFICANT CHANGE UP
SODIUM SERPL-SCNC: 134 MMOL/L — LOW (ref 135–145)
SODIUM SERPL-SCNC: 135 MMOL/L — SIGNIFICANT CHANGE UP (ref 135–145)
SODIUM UR-SCNC: 36 MMOL/L — SIGNIFICANT CHANGE UP
SP GR SPEC: 1.01 — SIGNIFICANT CHANGE UP (ref 1–1.03)
SQUAMOUS # UR AUTO: 2 /HPF — SIGNIFICANT CHANGE UP (ref 0–5)
THC UR QL: NEGATIVE — SIGNIFICANT CHANGE UP
TOXICOLOGY SCREEN, DRUGS OF ABUSE, SERUM RESULT: SIGNIFICANT CHANGE UP
TRIGL SERPL-MCNC: 98 MG/DL — SIGNIFICANT CHANGE UP
UROBILINOGEN FLD QL: 0.2 MG/DL — SIGNIFICANT CHANGE UP (ref 0.2–1)
UUN UR-MCNC: 818.7 MG/DL — SIGNIFICANT CHANGE UP
WBC # BLD: 5.32 K/UL — SIGNIFICANT CHANGE UP (ref 3.8–10.5)
WBC # FLD AUTO: 5.32 K/UL — SIGNIFICANT CHANGE UP (ref 3.8–10.5)
WBC UR QL: 2 /HPF — SIGNIFICANT CHANGE UP (ref 0–5)

## 2025-04-10 PROCEDURE — 70450 CT HEAD/BRAIN W/O DYE: CPT | Mod: 26

## 2025-04-10 PROCEDURE — 99223 1ST HOSP IP/OBS HIGH 75: CPT

## 2025-04-10 PROCEDURE — 99223 1ST HOSP IP/OBS HIGH 75: CPT | Mod: FS,GC

## 2025-04-10 RX ORDER — SODIUM CHLORIDE 9 G/1000ML
1000 INJECTION, SOLUTION INTRAVENOUS
Refills: 0 | Status: DISCONTINUED | OUTPATIENT
Start: 2025-04-10 | End: 2025-04-12

## 2025-04-10 RX ORDER — GLUCAGON 3 MG/1
1 POWDER NASAL ONCE
Refills: 0 | Status: DISCONTINUED | OUTPATIENT
Start: 2025-04-10 | End: 2025-04-12

## 2025-04-10 RX ORDER — CLOPIDOGREL BISULFATE 75 MG/1
75 TABLET, FILM COATED ORAL DAILY
Refills: 0 | Status: DISCONTINUED | OUTPATIENT
Start: 2025-04-10 | End: 2025-04-12

## 2025-04-10 RX ORDER — SODIUM BICARBONATE 1 MEQ/ML
0.06 SYRINGE (ML) INTRAVENOUS
Qty: 75 | Refills: 0 | Status: DISCONTINUED | OUTPATIENT
Start: 2025-04-10 | End: 2025-04-10

## 2025-04-10 RX ORDER — SODIUM CHLORIDE 9 G/1000ML
250 INJECTION, SOLUTION INTRAVENOUS ONCE
Refills: 0 | Status: COMPLETED | OUTPATIENT
Start: 2025-04-10 | End: 2025-04-10

## 2025-04-10 RX ORDER — INSULIN LISPRO 100 U/ML
INJECTION, SOLUTION INTRAVENOUS; SUBCUTANEOUS AT BEDTIME
Refills: 0 | Status: DISCONTINUED | OUTPATIENT
Start: 2025-04-10 | End: 2025-04-12

## 2025-04-10 RX ORDER — DEXTROSE 50 % IN WATER 50 %
25 SYRINGE (ML) INTRAVENOUS ONCE
Refills: 0 | Status: DISCONTINUED | OUTPATIENT
Start: 2025-04-10 | End: 2025-04-12

## 2025-04-10 RX ORDER — SODIUM BICARBONATE 1 MEQ/ML
0.12 SYRINGE (ML) INTRAVENOUS
Qty: 75 | Refills: 0 | Status: DISCONTINUED | OUTPATIENT
Start: 2025-04-10 | End: 2025-04-11

## 2025-04-10 RX ORDER — DEXTROSE 50 % IN WATER 50 %
15 SYRINGE (ML) INTRAVENOUS ONCE
Refills: 0 | Status: DISCONTINUED | OUTPATIENT
Start: 2025-04-10 | End: 2025-04-12

## 2025-04-10 RX ORDER — ATORVASTATIN CALCIUM 80 MG/1
40 TABLET, FILM COATED ORAL AT BEDTIME
Refills: 0 | Status: DISCONTINUED | OUTPATIENT
Start: 2025-04-10 | End: 2025-04-12

## 2025-04-10 RX ORDER — HEPARIN SODIUM 1000 [USP'U]/ML
5000 INJECTION INTRAVENOUS; SUBCUTANEOUS EVERY 8 HOURS
Refills: 0 | Status: DISCONTINUED | OUTPATIENT
Start: 2025-04-10 | End: 2025-04-12

## 2025-04-10 RX ORDER — DEXTROSE 50 % IN WATER 50 %
12.5 SYRINGE (ML) INTRAVENOUS ONCE
Refills: 0 | Status: DISCONTINUED | OUTPATIENT
Start: 2025-04-10 | End: 2025-04-12

## 2025-04-10 RX ORDER — INSULIN LISPRO 100 U/ML
INJECTION, SOLUTION INTRAVENOUS; SUBCUTANEOUS
Refills: 0 | Status: DISCONTINUED | OUTPATIENT
Start: 2025-04-10 | End: 2025-04-12

## 2025-04-10 RX ORDER — INFLUENZA A VIRUS A/IDAHO/07/2018 (H1N1) ANTIGEN (MDCK CELL DERIVED, PROPIOLACTONE INACTIVATED, INFLUENZA A VIRUS A/INDIANA/08/2018 (H3N2) ANTIGEN (MDCK CELL DERIVED, PROPIOLACTONE INACTIVATED), INFLUENZA B VIRUS B/SINGAPORE/INFTT-16-0610/2016 ANTIGEN (MDCK CELL DERIVED, PROPIOLACTONE INACTIVATED), INFLUENZA B VIRUS B/IOWA/06/2017 ANTIGEN (MDCK CELL DERIVED, PROPIOLACTONE INACTIVATED) 15; 15; 15; 15 UG/.5ML; UG/.5ML; UG/.5ML; UG/.5ML
0.5 INJECTION, SUSPENSION INTRAMUSCULAR ONCE
Refills: 0 | Status: COMPLETED | OUTPATIENT
Start: 2025-04-10 | End: 2025-04-10

## 2025-04-10 RX ADMIN — ATORVASTATIN CALCIUM 40 MILLIGRAM(S): 80 TABLET, FILM COATED ORAL at 21:25

## 2025-04-10 RX ADMIN — Medication 50 MEQ/KG/HR: at 12:02

## 2025-04-10 RX ADMIN — CLOPIDOGREL BISULFATE 75 MILLIGRAM(S): 75 TABLET, FILM COATED ORAL at 12:28

## 2025-04-10 RX ADMIN — HEPARIN SODIUM 5000 UNIT(S): 1000 INJECTION INTRAVENOUS; SUBCUTANEOUS at 21:25

## 2025-04-10 RX ADMIN — INSULIN LISPRO 1: 100 INJECTION, SOLUTION INTRAVENOUS; SUBCUTANEOUS at 17:50

## 2025-04-10 RX ADMIN — Medication 100 MEQ/KG/HR: at 15:31

## 2025-04-10 RX ADMIN — INSULIN LISPRO 1: 100 INJECTION, SOLUTION INTRAVENOUS; SUBCUTANEOUS at 21:28

## 2025-04-10 RX ADMIN — INSULIN LISPRO 4: 100 INJECTION, SOLUTION INTRAVENOUS; SUBCUTANEOUS at 12:29

## 2025-04-10 RX ADMIN — HEPARIN SODIUM 5000 UNIT(S): 1000 INJECTION INTRAVENOUS; SUBCUTANEOUS at 14:24

## 2025-04-10 RX ADMIN — SODIUM CHLORIDE 500 MILLILITER(S): 9 INJECTION, SOLUTION INTRAVENOUS at 14:25

## 2025-04-11 LAB
ANION GAP SERPL CALC-SCNC: 11 MMOL/L — SIGNIFICANT CHANGE UP (ref 7–14)
BLOOD GAS VENOUS COMPREHENSIVE RESULT: SIGNIFICANT CHANGE UP
BUN SERPL-MCNC: 41 MG/DL — HIGH (ref 7–23)
CALCIUM SERPL-MCNC: 9.2 MG/DL — SIGNIFICANT CHANGE UP (ref 8.4–10.5)
CHLORIDE SERPL-SCNC: 107 MMOL/L — SIGNIFICANT CHANGE UP (ref 98–107)
CO2 SERPL-SCNC: 17 MMOL/L — LOW (ref 22–31)
CREAT SERPL-MCNC: 0.94 MG/DL — SIGNIFICANT CHANGE UP (ref 0.5–1.3)
CULTURE RESULTS: SIGNIFICANT CHANGE UP
EGFR: 61 ML/MIN/1.73M2 — SIGNIFICANT CHANGE UP
EGFR: 61 ML/MIN/1.73M2 — SIGNIFICANT CHANGE UP
GLUCOSE BLDC GLUCOMTR-MCNC: 168 MG/DL — HIGH (ref 70–99)
GLUCOSE BLDC GLUCOMTR-MCNC: 170 MG/DL — HIGH (ref 70–99)
GLUCOSE BLDC GLUCOMTR-MCNC: 188 MG/DL — HIGH (ref 70–99)
GLUCOSE BLDC GLUCOMTR-MCNC: 196 MG/DL — HIGH (ref 70–99)
GLUCOSE SERPL-MCNC: 157 MG/DL — HIGH (ref 70–99)
HCT VFR BLD CALC: 24.8 % — LOW (ref 34.5–45)
HGB BLD-MCNC: 8.8 G/DL — LOW (ref 11.5–15.5)
LACTATE SERPL-SCNC: 1.7 MMOL/L — SIGNIFICANT CHANGE UP (ref 0.5–2)
MAGNESIUM SERPL-MCNC: 2.1 MG/DL — SIGNIFICANT CHANGE UP (ref 1.6–2.6)
MCHC RBC-ENTMCNC: 31.8 PG — SIGNIFICANT CHANGE UP (ref 27–34)
MCHC RBC-ENTMCNC: 35.5 G/DL — SIGNIFICANT CHANGE UP (ref 32–36)
MCV RBC AUTO: 89.5 FL — SIGNIFICANT CHANGE UP (ref 80–100)
NRBC # BLD AUTO: 0 K/UL — SIGNIFICANT CHANGE UP (ref 0–0)
NRBC # FLD: 0 K/UL — SIGNIFICANT CHANGE UP (ref 0–0)
NRBC BLD AUTO-RTO: 0 /100 WBCS — SIGNIFICANT CHANGE UP (ref 0–0)
PHOSPHATE SERPL-MCNC: 3.3 MG/DL — SIGNIFICANT CHANGE UP (ref 2.5–4.5)
PLATELET # BLD AUTO: 230 K/UL — SIGNIFICANT CHANGE UP (ref 150–400)
POTASSIUM SERPL-MCNC: 4.8 MMOL/L — SIGNIFICANT CHANGE UP (ref 3.5–5.3)
POTASSIUM SERPL-SCNC: 4.8 MMOL/L — SIGNIFICANT CHANGE UP (ref 3.5–5.3)
RBC # BLD: 2.77 M/UL — LOW (ref 3.8–5.2)
RBC # FLD: 13.4 % — SIGNIFICANT CHANGE UP (ref 10.3–14.5)
SODIUM SERPL-SCNC: 135 MMOL/L — SIGNIFICANT CHANGE UP (ref 135–145)
SPECIMEN SOURCE: SIGNIFICANT CHANGE UP
WBC # BLD: 5.37 K/UL — SIGNIFICANT CHANGE UP (ref 3.8–10.5)
WBC # FLD AUTO: 5.37 K/UL — SIGNIFICANT CHANGE UP (ref 3.8–10.5)

## 2025-04-11 PROCEDURE — 99232 SBSQ HOSP IP/OBS MODERATE 35: CPT

## 2025-04-11 PROCEDURE — 99232 SBSQ HOSP IP/OBS MODERATE 35: CPT | Mod: FS,GC

## 2025-04-11 RX ORDER — SODIUM BICARBONATE 1 MEQ/ML
650 SYRINGE (ML) INTRAVENOUS
Refills: 0 | Status: DISCONTINUED | OUTPATIENT
Start: 2025-04-11 | End: 2025-04-12

## 2025-04-11 RX ORDER — CALAMINE 8% AND ZINC OXIDE 8% 160 MG/ML
1 LOTION TOPICAL
Refills: 0 | Status: DISCONTINUED | OUTPATIENT
Start: 2025-04-11 | End: 2025-04-12

## 2025-04-11 RX ADMIN — Medication 100 MEQ/KG/HR: at 05:46

## 2025-04-11 RX ADMIN — CLOPIDOGREL BISULFATE 75 MILLIGRAM(S): 75 TABLET, FILM COATED ORAL at 12:14

## 2025-04-11 RX ADMIN — HEPARIN SODIUM 5000 UNIT(S): 1000 INJECTION INTRAVENOUS; SUBCUTANEOUS at 13:31

## 2025-04-11 RX ADMIN — ATORVASTATIN CALCIUM 40 MILLIGRAM(S): 80 TABLET, FILM COATED ORAL at 21:23

## 2025-04-11 RX ADMIN — Medication 650 MILLIGRAM(S): at 17:42

## 2025-04-11 RX ADMIN — HEPARIN SODIUM 5000 UNIT(S): 1000 INJECTION INTRAVENOUS; SUBCUTANEOUS at 05:46

## 2025-04-11 RX ADMIN — CALAMINE 8% AND ZINC OXIDE 8% 1 APPLICATION(S): 160 LOTION TOPICAL at 17:39

## 2025-04-11 RX ADMIN — INSULIN LISPRO 1: 100 INJECTION, SOLUTION INTRAVENOUS; SUBCUTANEOUS at 08:53

## 2025-04-11 RX ADMIN — HEPARIN SODIUM 5000 UNIT(S): 1000 INJECTION INTRAVENOUS; SUBCUTANEOUS at 21:23

## 2025-04-11 RX ADMIN — INSULIN LISPRO 1: 100 INJECTION, SOLUTION INTRAVENOUS; SUBCUTANEOUS at 17:40

## 2025-04-11 RX ADMIN — INSULIN LISPRO 1: 100 INJECTION, SOLUTION INTRAVENOUS; SUBCUTANEOUS at 12:13

## 2025-04-12 ENCOUNTER — TRANSCRIPTION ENCOUNTER (OUTPATIENT)
Age: 80
End: 2025-04-12

## 2025-04-12 VITALS
HEART RATE: 74 BPM | SYSTOLIC BLOOD PRESSURE: 125 MMHG | OXYGEN SATURATION: 100 % | DIASTOLIC BLOOD PRESSURE: 52 MMHG | TEMPERATURE: 98 F | RESPIRATION RATE: 17 BRPM

## 2025-04-12 LAB
ANION GAP SERPL CALC-SCNC: 11 MMOL/L — SIGNIFICANT CHANGE UP (ref 7–14)
BUN SERPL-MCNC: 30 MG/DL — HIGH (ref 7–23)
CALCIUM SERPL-MCNC: 9.3 MG/DL — SIGNIFICANT CHANGE UP (ref 8.4–10.5)
CHLORIDE SERPL-SCNC: 107 MMOL/L — SIGNIFICANT CHANGE UP (ref 98–107)
CO2 SERPL-SCNC: 23 MMOL/L — SIGNIFICANT CHANGE UP (ref 22–31)
CREAT SERPL-MCNC: 0.91 MG/DL — SIGNIFICANT CHANGE UP (ref 0.5–1.3)
EGFR: 64 ML/MIN/1.73M2 — SIGNIFICANT CHANGE UP
EGFR: 64 ML/MIN/1.73M2 — SIGNIFICANT CHANGE UP
GLUCOSE BLDC GLUCOMTR-MCNC: 183 MG/DL — HIGH (ref 70–99)
GLUCOSE BLDC GLUCOMTR-MCNC: 192 MG/DL — HIGH (ref 70–99)
GLUCOSE SERPL-MCNC: 164 MG/DL — HIGH (ref 70–99)
HCT VFR BLD CALC: 25.8 % — LOW (ref 34.5–45)
HGB BLD-MCNC: 9.1 G/DL — LOW (ref 11.5–15.5)
MAGNESIUM SERPL-MCNC: 2 MG/DL — SIGNIFICANT CHANGE UP (ref 1.6–2.6)
MCHC RBC-ENTMCNC: 32.3 PG — SIGNIFICANT CHANGE UP (ref 27–34)
MCHC RBC-ENTMCNC: 35.3 G/DL — SIGNIFICANT CHANGE UP (ref 32–36)
MCV RBC AUTO: 91.5 FL — SIGNIFICANT CHANGE UP (ref 80–100)
NRBC # BLD AUTO: 0 K/UL — SIGNIFICANT CHANGE UP (ref 0–0)
NRBC # FLD: 0 K/UL — SIGNIFICANT CHANGE UP (ref 0–0)
NRBC BLD AUTO-RTO: 0 /100 WBCS — SIGNIFICANT CHANGE UP (ref 0–0)
PHOSPHATE SERPL-MCNC: 3.2 MG/DL — SIGNIFICANT CHANGE UP (ref 2.5–4.5)
PLATELET # BLD AUTO: 231 K/UL — SIGNIFICANT CHANGE UP (ref 150–400)
POTASSIUM SERPL-MCNC: 4.9 MMOL/L — SIGNIFICANT CHANGE UP (ref 3.5–5.3)
POTASSIUM SERPL-SCNC: 4.9 MMOL/L — SIGNIFICANT CHANGE UP (ref 3.5–5.3)
RBC # BLD: 2.82 M/UL — LOW (ref 3.8–5.2)
RBC # FLD: 13.2 % — SIGNIFICANT CHANGE UP (ref 10.3–14.5)
SODIUM SERPL-SCNC: 141 MMOL/L — SIGNIFICANT CHANGE UP (ref 135–145)
WBC # BLD: 5.85 K/UL — SIGNIFICANT CHANGE UP (ref 3.8–10.5)
WBC # FLD AUTO: 5.85 K/UL — SIGNIFICANT CHANGE UP (ref 3.8–10.5)

## 2025-04-12 PROCEDURE — 99239 HOSP IP/OBS DSCHRG MGMT >30: CPT

## 2025-04-12 RX ORDER — CALAMINE 8% AND ZINC OXIDE 8% 160 MG/ML
1 LOTION TOPICAL
Qty: 0 | Refills: 0 | DISCHARGE
Start: 2025-04-12

## 2025-04-12 RX ADMIN — INSULIN LISPRO 1: 100 INJECTION, SOLUTION INTRAVENOUS; SUBCUTANEOUS at 12:59

## 2025-04-12 RX ADMIN — Medication 650 MILLIGRAM(S): at 05:07

## 2025-04-12 RX ADMIN — INSULIN LISPRO 1: 100 INJECTION, SOLUTION INTRAVENOUS; SUBCUTANEOUS at 09:05

## 2025-04-12 RX ADMIN — CLOPIDOGREL BISULFATE 75 MILLIGRAM(S): 75 TABLET, FILM COATED ORAL at 13:22

## 2025-04-12 RX ADMIN — HEPARIN SODIUM 5000 UNIT(S): 1000 INJECTION INTRAVENOUS; SUBCUTANEOUS at 05:06

## 2025-04-12 RX ADMIN — HEPARIN SODIUM 5000 UNIT(S): 1000 INJECTION INTRAVENOUS; SUBCUTANEOUS at 13:21

## 2025-04-12 RX ADMIN — CALAMINE 8% AND ZINC OXIDE 8% 1 APPLICATION(S): 160 LOTION TOPICAL at 05:07

## 2025-04-17 ENCOUNTER — APPOINTMENT (OUTPATIENT)
Dept: INTERNAL MEDICINE | Facility: CLINIC | Age: 80
End: 2025-04-17
Payer: MEDICARE

## 2025-04-17 VITALS
OXYGEN SATURATION: 99 % | WEIGHT: 155 LBS | HEIGHT: 63 IN | SYSTOLIC BLOOD PRESSURE: 118 MMHG | HEART RATE: 69 BPM | BODY MASS INDEX: 27.46 KG/M2 | TEMPERATURE: 97.8 F | DIASTOLIC BLOOD PRESSURE: 63 MMHG

## 2025-04-17 DIAGNOSIS — I50.9 HEART FAILURE, UNSPECIFIED: ICD-10-CM

## 2025-04-17 DIAGNOSIS — E11.9 TYPE 2 DIABETES MELLITUS W/OUT COMPLICATIONS: ICD-10-CM

## 2025-04-17 DIAGNOSIS — I10 ESSENTIAL (PRIMARY) HYPERTENSION: ICD-10-CM

## 2025-04-17 DIAGNOSIS — E87.20 ACIDOSIS, UNSPECIFIED: ICD-10-CM

## 2025-04-17 DIAGNOSIS — Z09 ENCOUNTER FOR FOLLOW-UP EXAMINATION AFTER COMPLETED TREATMENT FOR CONDITIONS OTHER THAN MALIGNANT NEOPLASM: ICD-10-CM

## 2025-04-17 PROCEDURE — 36415 COLL VENOUS BLD VENIPUNCTURE: CPT

## 2025-04-17 PROCEDURE — 99496 TRANSJ CARE MGMT HIGH F2F 7D: CPT

## 2025-04-18 LAB
ALBUMIN SERPL ELPH-MCNC: 4.3 G/DL
ALP BLD-CCNC: 69 U/L
ALT SERPL-CCNC: 19 U/L
ANION GAP SERPL CALC-SCNC: 15 MMOL/L
AST SERPL-CCNC: 21 U/L
BILIRUB SERPL-MCNC: 0.4 MG/DL
BUN SERPL-MCNC: 51 MG/DL
CALCIUM SERPL-MCNC: 9.8 MG/DL
CHLORIDE SERPL-SCNC: 100 MMOL/L
CO2 SERPL-SCNC: 23 MMOL/L
CREAT SERPL-MCNC: 1.36 MG/DL
EGFRCR SERPLBLD CKD-EPI 2021: 39 ML/MIN/1.73M2
GLUCOSE SERPL-MCNC: 111 MG/DL
HCT VFR BLD CALC: 30.8 %
HGB BLD-MCNC: 9.7 G/DL
MCHC RBC-ENTMCNC: 30.9 PG
MCHC RBC-ENTMCNC: 31.5 G/DL
MCV RBC AUTO: 98.1 FL
PLATELET # BLD AUTO: 295 K/UL
POTASSIUM SERPL-SCNC: 4.7 MMOL/L
PROT SERPL-MCNC: 7.1 G/DL
RBC # BLD: 3.14 M/UL
RBC # FLD: 14.1 %
SODIUM SERPL-SCNC: 138 MMOL/L
WBC # FLD AUTO: 5.8 K/UL

## 2025-04-29 ENCOUNTER — RX RENEWAL (OUTPATIENT)
Age: 80
End: 2025-04-29

## 2025-05-06 ENCOUNTER — RX RENEWAL (OUTPATIENT)
Age: 80
End: 2025-05-06

## 2025-05-28 ENCOUNTER — RX RENEWAL (OUTPATIENT)
Age: 80
End: 2025-05-28

## 2025-05-30 ENCOUNTER — APPOINTMENT (OUTPATIENT)
Dept: INTERNAL MEDICINE | Facility: CLINIC | Age: 80
End: 2025-05-30
Payer: MEDICARE

## 2025-05-30 VITALS
SYSTOLIC BLOOD PRESSURE: 123 MMHG | HEIGHT: 63 IN | WEIGHT: 154 LBS | OXYGEN SATURATION: 98 % | BODY MASS INDEX: 27.29 KG/M2 | TEMPERATURE: 97.6 F | DIASTOLIC BLOOD PRESSURE: 64 MMHG | HEART RATE: 71 BPM

## 2025-05-30 DIAGNOSIS — D50.9 IRON DEFICIENCY ANEMIA, UNSPECIFIED: ICD-10-CM

## 2025-05-30 DIAGNOSIS — E78.5 HYPERLIPIDEMIA, UNSPECIFIED: ICD-10-CM

## 2025-05-30 DIAGNOSIS — E11.9 TYPE 2 DIABETES MELLITUS W/OUT COMPLICATIONS: ICD-10-CM

## 2025-05-30 DIAGNOSIS — I10 ESSENTIAL (PRIMARY) HYPERTENSION: ICD-10-CM

## 2025-05-30 DIAGNOSIS — I50.9 HEART FAILURE, UNSPECIFIED: ICD-10-CM

## 2025-05-30 PROCEDURE — 36415 COLL VENOUS BLD VENIPUNCTURE: CPT

## 2025-05-30 PROCEDURE — G2211 COMPLEX E/M VISIT ADD ON: CPT

## 2025-05-30 PROCEDURE — 99214 OFFICE O/P EST MOD 30 MIN: CPT

## 2025-06-01 LAB
CREAT SERPL-MCNC: 1.16 MG/DL
EGFRCR SERPLBLD CKD-EPI 2021: 48 ML/MIN/1.73M2
POTASSIUM SERPL-SCNC: 4.6 MMOL/L

## 2025-06-27 ENCOUNTER — APPOINTMENT (OUTPATIENT)
Dept: INTERNAL MEDICINE | Facility: CLINIC | Age: 80
End: 2025-06-27
Payer: MEDICARE

## 2025-06-27 VITALS
TEMPERATURE: 97.2 F | SYSTOLIC BLOOD PRESSURE: 103 MMHG | DIASTOLIC BLOOD PRESSURE: 65 MMHG | BODY MASS INDEX: 28 KG/M2 | OXYGEN SATURATION: 98 % | HEIGHT: 63 IN | WEIGHT: 158 LBS | HEART RATE: 64 BPM

## 2025-06-27 PROBLEM — M25.551 ACUTE PAIN OF RIGHT HIP: Status: ACTIVE | Noted: 2025-06-27

## 2025-06-27 PROCEDURE — 99214 OFFICE O/P EST MOD 30 MIN: CPT

## 2025-06-27 RX ORDER — LIDOCAINE 4% 40 MG/G
4 PATCH TOPICAL
Qty: 15 | Refills: 0 | Status: ACTIVE | COMMUNITY
Start: 2025-06-27 | End: 1900-01-01

## 2025-06-27 RX ORDER — ACETAMINOPHEN 500 MG/1
500 TABLET, COATED ORAL EVERY 6 HOURS
Qty: 60 | Refills: 0 | Status: ACTIVE | COMMUNITY
Start: 2025-06-27 | End: 2025-07-12

## 2025-08-06 ENCOUNTER — APPOINTMENT (OUTPATIENT)
Dept: CARDIOLOGY | Facility: CLINIC | Age: 80
End: 2025-08-06
Payer: MEDICARE

## 2025-08-06 ENCOUNTER — NON-APPOINTMENT (OUTPATIENT)
Age: 80
End: 2025-08-06

## 2025-08-06 VITALS — WEIGHT: 158 LBS | BODY MASS INDEX: 27.99 KG/M2

## 2025-08-06 VITALS — HEART RATE: 82 BPM | SYSTOLIC BLOOD PRESSURE: 105 MMHG | OXYGEN SATURATION: 93 % | DIASTOLIC BLOOD PRESSURE: 66 MMHG

## 2025-08-06 DIAGNOSIS — I10 ESSENTIAL (PRIMARY) HYPERTENSION: ICD-10-CM

## 2025-08-06 DIAGNOSIS — E78.5 HYPERLIPIDEMIA, UNSPECIFIED: ICD-10-CM

## 2025-08-06 DIAGNOSIS — E11.9 TYPE 2 DIABETES MELLITUS W/OUT COMPLICATIONS: ICD-10-CM

## 2025-08-06 DIAGNOSIS — I25.10 ATHEROSCLEROTIC HEART DISEASE OF NATIVE CORONARY ARTERY W/OUT ANGINA PECTORIS: ICD-10-CM

## 2025-08-06 DIAGNOSIS — I50.9 HEART FAILURE, UNSPECIFIED: ICD-10-CM

## 2025-08-06 PROCEDURE — 99214 OFFICE O/P EST MOD 30 MIN: CPT

## 2025-08-06 PROCEDURE — 93000 ELECTROCARDIOGRAM COMPLETE: CPT

## 2025-08-06 PROCEDURE — G2211 COMPLEX E/M VISIT ADD ON: CPT

## 2025-08-19 ENCOUNTER — APPOINTMENT (OUTPATIENT)
Dept: ENDOCRINOLOGY | Facility: CLINIC | Age: 80
End: 2025-08-19

## 2025-08-19 VITALS
DIASTOLIC BLOOD PRESSURE: 70 MMHG | WEIGHT: 156 LBS | BODY MASS INDEX: 27.64 KG/M2 | OXYGEN SATURATION: 96 % | HEART RATE: 77 BPM | SYSTOLIC BLOOD PRESSURE: 110 MMHG | TEMPERATURE: 98 F | HEIGHT: 63 IN

## 2025-08-19 DIAGNOSIS — E11.3493 TYPE 2 DIABETES MELLITUS WITH SEVERE NONPROLIFERATIVE DIABETIC RETINOPATHY WITHOUT MACULAR EDEMA, BILATERAL: ICD-10-CM

## 2025-08-19 DIAGNOSIS — I10 ESSENTIAL (PRIMARY) HYPERTENSION: ICD-10-CM

## 2025-08-19 DIAGNOSIS — E11.9 TYPE 2 DIABETES MELLITUS W/OUT COMPLICATIONS: ICD-10-CM

## 2025-08-19 DIAGNOSIS — E78.5 HYPERLIPIDEMIA, UNSPECIFIED: ICD-10-CM

## 2025-08-19 DIAGNOSIS — E55.9 VITAMIN D DEFICIENCY, UNSPECIFIED: ICD-10-CM

## 2025-08-19 LAB
GLUCOSE BLDC GLUCOMTR-MCNC: 92
HBA1C MFR BLD HPLC: 6.8

## 2025-08-19 PROCEDURE — 99214 OFFICE O/P EST MOD 30 MIN: CPT

## 2025-08-19 PROCEDURE — 83036 HEMOGLOBIN GLYCOSYLATED A1C: CPT | Mod: QW

## 2025-08-19 PROCEDURE — G2211 COMPLEX E/M VISIT ADD ON: CPT

## 2025-08-19 PROCEDURE — 82962 GLUCOSE BLOOD TEST: CPT

## 2025-09-02 ENCOUNTER — APPOINTMENT (OUTPATIENT)
Dept: INTERNAL MEDICINE | Facility: CLINIC | Age: 80
End: 2025-09-02
Payer: MEDICARE

## 2025-09-02 VITALS
OXYGEN SATURATION: 98 % | HEART RATE: 63 BPM | WEIGHT: 163 LBS | TEMPERATURE: 97.6 F | SYSTOLIC BLOOD PRESSURE: 114 MMHG | HEIGHT: 63 IN | DIASTOLIC BLOOD PRESSURE: 67 MMHG | BODY MASS INDEX: 28.88 KG/M2

## 2025-09-02 DIAGNOSIS — I10 ESSENTIAL (PRIMARY) HYPERTENSION: ICD-10-CM

## 2025-09-02 DIAGNOSIS — R41.3 OTHER AMNESIA: ICD-10-CM

## 2025-09-02 DIAGNOSIS — E11.9 TYPE 2 DIABETES MELLITUS W/OUT COMPLICATIONS: ICD-10-CM

## 2025-09-02 DIAGNOSIS — E78.5 HYPERLIPIDEMIA, UNSPECIFIED: ICD-10-CM

## 2025-09-02 DIAGNOSIS — I21.02 ST ELEVATION (STEMI) MYOCARDIAL INFARCTION INVOLVING LEFT ANTERIOR DESCENDING CORONARY ARTERY: ICD-10-CM

## 2025-09-02 DIAGNOSIS — I50.9 HEART FAILURE, UNSPECIFIED: ICD-10-CM

## 2025-09-02 PROCEDURE — 99214 OFFICE O/P EST MOD 30 MIN: CPT

## 2025-09-02 PROCEDURE — G2211 COMPLEX E/M VISIT ADD ON: CPT

## 2025-09-02 PROCEDURE — G0008: CPT

## 2025-09-02 PROCEDURE — 90662 IIV NO PRSV INCREASED AG IM: CPT

## 2025-09-03 LAB
ANION GAP SERPL CALC-SCNC: 13 MMOL/L
BUN SERPL-MCNC: 39 MG/DL
CALCIUM SERPL-MCNC: 10 MG/DL
CHLORIDE SERPL-SCNC: 103 MMOL/L
CO2 SERPL-SCNC: 23 MMOL/L
CREAT SERPL-MCNC: 1.04 MG/DL
EGFRCR SERPLBLD CKD-EPI 2021: 54 ML/MIN/1.73M2
GLUCOSE SERPL-MCNC: 95 MG/DL
HCT VFR BLD CALC: 31.1 %
HGB BLD-MCNC: 10.2 G/DL
MCHC RBC-ENTMCNC: 32.3 PG
MCHC RBC-ENTMCNC: 32.8 G/DL
MCV RBC AUTO: 98.4 FL
PLATELET # BLD AUTO: 276 K/UL
POTASSIUM SERPL-SCNC: 4.8 MMOL/L
RBC # BLD: 3.16 M/UL
RBC # FLD: 14.6 %
SODIUM SERPL-SCNC: 139 MMOL/L
WBC # FLD AUTO: 7.12 K/UL

## (undated) DEVICE — PACK LIJ BASIC ORTHO

## (undated) DEVICE — PACK IV START WITH CHG

## (undated) DEVICE — IRRIGATOR BIO SHIELD

## (undated) DEVICE — NDL INJ SCLERO INTERJECT 25G

## (undated) DEVICE — TUBING SUCTION CONN 6FT STERILE

## (undated) DEVICE — SYR LUER LOK 10CC

## (undated) DEVICE — TOURNIQUET CUFF 18" DUAL PORT DUAL BLADDER W PLC (BLACK)

## (undated) DEVICE — Device

## (undated) DEVICE — CATH IV SAFE BC 22G X 1" (BLUE)

## (undated) DEVICE — PROBE FIAPC DIA 2.3MM/7FR LNTH 220CM/7.2FT

## (undated) DEVICE — CATH ELCTR GLD PRB 10FR

## (undated) DEVICE — NDL INJ SCLERO INTERJECT 23G

## (undated) DEVICE — SENSOR O2 FINGER ADULT

## (undated) DEVICE — DRAPE SURGICAL #1010

## (undated) DEVICE — ELCTR GROUNDING PAD ADULT COVIDIEN

## (undated) DEVICE — CLAMP BX HOT RAD JAW 3

## (undated) DEVICE — SOL INJ NS 0.9% 500ML 2 PORT

## (undated) DEVICE — TOURNIQUET ESMARK 6"

## (undated) DEVICE — CANISTER DISPOSABLE THIN WALL 3000CC

## (undated) DEVICE — PREP CHLORAPREP HI-LITE ORANGE 26ML

## (undated) DEVICE — FORCEP RADIAL JAW 4 JUMBO 2.8MM 3.2MM 240CM ORANGE DISP

## (undated) DEVICE — VENODYNE/SCD SLEEVE CALF MEDIUM

## (undated) DEVICE — FOLEY HOLDER STATLOCK 2 WAY ADULT

## (undated) DEVICE — LABELS BLANK W PEN

## (undated) DEVICE — DRSG KLING 4"

## (undated) DEVICE — TUBING CAP SET ENDO 24HR USE GI

## (undated) DEVICE — DRSG STOCKINETTE TUBULAR 6"

## (undated) DEVICE — NDL HYPO REGULAR BEVEL 25G X 1.5" (BLUE)

## (undated) DEVICE — DRSG STOCKINETTE IMPERVIOUS XL

## (undated) DEVICE — FRAZIER SUCTION TIP 8FR

## (undated) DEVICE — BLADE SURGICAL #15 CARBON

## (undated) DEVICE — ANESTHESIA CIRCUIT ADULT HMEF

## (undated) DEVICE — BRUSH COLONOSCOPY CYTOLOGY

## (undated) DEVICE — DRSG ACE BANDAGE 4" NS

## (undated) DEVICE — BIOPSY FORCEP RADIAL JAW 4 STANDARD WITH NEEDLE

## (undated) DEVICE — DRAPE TOWEL BLUE 17" X 24"

## (undated) DEVICE — DRSG XEROFORM 1 X 8"

## (undated) DEVICE — POLY TRAP ETRAP

## (undated) DEVICE — TUBING IV SET GRAVITY 3Y 100" MACRO

## (undated) DEVICE — CATH ELECROHEM GLD PROBE 7FR

## (undated) DEVICE — SOL IRR BAG NS 0.9% 3000ML

## (undated) DEVICE — SYR LUER LOK 50CC

## (undated) DEVICE — TUBING SUCTION 20FT

## (undated) DEVICE — SUCTION YANKAUER NO CONTROL VENT

## (undated) DEVICE — CATH IV SAFE BC 20G X 1.16" (PINK)